# Patient Record
Sex: MALE | Race: WHITE | NOT HISPANIC OR LATINO | Employment: UNEMPLOYED | URBAN - METROPOLITAN AREA
[De-identification: names, ages, dates, MRNs, and addresses within clinical notes are randomized per-mention and may not be internally consistent; named-entity substitution may affect disease eponyms.]

---

## 2018-03-01 ENCOUNTER — CONVERSION ENCOUNTER (OUTPATIENT)
Dept: FAMILY MEDICINE CLINIC | Facility: CLINIC | Age: 58
End: 2018-03-01

## 2018-03-01 ENCOUNTER — OFFICE VISIT CONVERTED (OUTPATIENT)
Dept: FAMILY MEDICINE CLINIC | Facility: CLINIC | Age: 58
End: 2018-03-01
Attending: FAMILY MEDICINE

## 2018-03-15 ENCOUNTER — OFFICE VISIT CONVERTED (OUTPATIENT)
Dept: FAMILY MEDICINE CLINIC | Facility: CLINIC | Age: 58
End: 2018-03-15
Attending: FAMILY MEDICINE

## 2018-03-15 ENCOUNTER — CONVERSION ENCOUNTER (OUTPATIENT)
Dept: FAMILY MEDICINE CLINIC | Facility: CLINIC | Age: 58
End: 2018-03-15

## 2018-03-29 ENCOUNTER — CONVERSION ENCOUNTER (OUTPATIENT)
Dept: FAMILY MEDICINE CLINIC | Facility: CLINIC | Age: 58
End: 2018-03-29

## 2018-03-29 ENCOUNTER — OFFICE VISIT CONVERTED (OUTPATIENT)
Dept: FAMILY MEDICINE CLINIC | Facility: CLINIC | Age: 58
End: 2018-03-29
Attending: FAMILY MEDICINE

## 2018-04-05 ENCOUNTER — OFFICE VISIT CONVERTED (OUTPATIENT)
Dept: FAMILY MEDICINE CLINIC | Facility: CLINIC | Age: 58
End: 2018-04-05
Attending: FAMILY MEDICINE

## 2018-04-05 ENCOUNTER — CONVERSION ENCOUNTER (OUTPATIENT)
Dept: FAMILY MEDICINE CLINIC | Facility: CLINIC | Age: 58
End: 2018-04-05

## 2018-06-04 ENCOUNTER — OFFICE VISIT CONVERTED (OUTPATIENT)
Dept: FAMILY MEDICINE CLINIC | Facility: CLINIC | Age: 58
End: 2018-06-04
Attending: FAMILY MEDICINE

## 2018-06-04 ENCOUNTER — CONVERSION ENCOUNTER (OUTPATIENT)
Dept: FAMILY MEDICINE CLINIC | Facility: CLINIC | Age: 58
End: 2018-06-04

## 2018-08-10 ENCOUNTER — OFFICE VISIT CONVERTED (OUTPATIENT)
Dept: SURGERY | Facility: CLINIC | Age: 58
End: 2018-08-10
Attending: SURGERY

## 2018-08-24 ENCOUNTER — OFFICE VISIT CONVERTED (OUTPATIENT)
Dept: SURGERY | Facility: CLINIC | Age: 58
End: 2018-08-24
Attending: SURGERY

## 2019-01-24 ENCOUNTER — OFFICE VISIT CONVERTED (OUTPATIENT)
Dept: FAMILY MEDICINE CLINIC | Facility: CLINIC | Age: 59
End: 2019-01-24
Attending: NURSE PRACTITIONER

## 2019-01-29 ENCOUNTER — OFFICE VISIT CONVERTED (OUTPATIENT)
Dept: FAMILY MEDICINE CLINIC | Facility: CLINIC | Age: 59
End: 2019-01-29
Attending: FAMILY MEDICINE

## 2019-01-29 ENCOUNTER — HOSPITAL ENCOUNTER (OUTPATIENT)
Dept: FAMILY MEDICINE CLINIC | Facility: CLINIC | Age: 59
Discharge: HOME | End: 2019-01-29
Attending: FAMILY MEDICINE

## 2019-01-29 LAB
ALBUMIN SERPL-MCNC: 4.3 G/DL (ref 3.5–5)
ALBUMIN/GLOB SERPL: 1.5 {RATIO} (ref 1.4–2.6)
ALP SERPL-CCNC: 82 U/L (ref 56–119)
ALT SERPL-CCNC: 17 U/L (ref 10–40)
ANION GAP SERPL CALC-SCNC: 15 MMOL/L (ref 8–19)
AST SERPL-CCNC: 17 U/L (ref 15–50)
BILIRUB SERPL-MCNC: 0.57 MG/DL (ref 0.2–1.3)
BUN SERPL-MCNC: 6 MG/DL (ref 5–25)
BUN/CREAT SERPL: 6 {RATIO} (ref 6–20)
CALCIUM SERPL-MCNC: 9.1 MG/DL (ref 8.7–10.4)
CHLORIDE SERPL-SCNC: 101 MMOL/L (ref 99–111)
CONV CO2: 26 MMOL/L (ref 22–32)
CONV TOTAL PROTEIN: 7.2 G/DL (ref 6.3–8.2)
CREAT UR-MCNC: 0.96 MG/DL (ref 0.7–1.2)
GFR SERPLBLD BASED ON 1.73 SQ M-ARVRAT: >60 ML/MIN/{1.73_M2}
GLOBULIN UR ELPH-MCNC: 2.9 G/DL (ref 2–3.5)
GLUCOSE SERPL-MCNC: 92 MG/DL (ref 70–99)
MAGNESIUM SERPL-MCNC: 1.96 MG/DL (ref 1.6–2.3)
OSMOLALITY SERPL CALC.SUM OF ELEC: 283 MOSM/KG (ref 273–304)
POTASSIUM SERPL-SCNC: 3.7 MMOL/L (ref 3.5–5.3)
SODIUM SERPL-SCNC: 138 MMOL/L (ref 135–147)

## 2019-02-01 ENCOUNTER — OFFICE VISIT CONVERTED (OUTPATIENT)
Dept: FAMILY MEDICINE CLINIC | Facility: CLINIC | Age: 59
End: 2019-02-01
Attending: NURSE PRACTITIONER

## 2019-02-05 ENCOUNTER — OFFICE VISIT CONVERTED (OUTPATIENT)
Dept: OTHER | Facility: HOSPITAL | Age: 59
End: 2019-02-05
Attending: INTERNAL MEDICINE

## 2019-02-05 ENCOUNTER — CONVERSION ENCOUNTER (OUTPATIENT)
Dept: OTHER | Facility: HOSPITAL | Age: 59
End: 2019-02-05

## 2019-03-22 ENCOUNTER — HOSPITAL ENCOUNTER (OUTPATIENT)
Dept: CARDIOLOGY | Facility: HOSPITAL | Age: 59
Discharge: HOME | End: 2019-03-22
Attending: INTERNAL MEDICINE

## 2019-04-30 ENCOUNTER — HOSPITAL ENCOUNTER (OUTPATIENT)
Dept: FAMILY MEDICINE CLINIC | Facility: CLINIC | Age: 59
Discharge: HOME | End: 2019-04-30
Attending: FAMILY MEDICINE

## 2019-04-30 ENCOUNTER — OFFICE VISIT CONVERTED (OUTPATIENT)
Dept: FAMILY MEDICINE CLINIC | Facility: CLINIC | Age: 59
End: 2019-04-30
Attending: FAMILY MEDICINE

## 2019-04-30 LAB
ALBUMIN SERPL-MCNC: 4.3 G/DL (ref 3.5–5)
ALBUMIN/GLOB SERPL: 1.2 {RATIO} (ref 1.4–2.6)
ALP SERPL-CCNC: 103 U/L (ref 56–119)
ALT SERPL-CCNC: 10 U/L (ref 10–40)
ANION GAP SERPL CALC-SCNC: 16 MMOL/L (ref 8–19)
AST SERPL-CCNC: 14 U/L (ref 15–50)
BASOPHILS # BLD AUTO: 0.07 10*3/UL (ref 0–0.2)
BASOPHILS NFR BLD AUTO: 1.1 % (ref 0–3)
BILIRUB SERPL-MCNC: 0.6 MG/DL (ref 0.2–1.3)
BUN SERPL-MCNC: 10 MG/DL (ref 5–25)
BUN/CREAT SERPL: 12 {RATIO} (ref 6–20)
CALCIUM SERPL-MCNC: 9.5 MG/DL (ref 8.7–10.4)
CHLORIDE SERPL-SCNC: 97 MMOL/L (ref 99–111)
CONV ABS IMM GRAN: 0.01 10*3/UL (ref 0–0.2)
CONV CO2: 27 MMOL/L (ref 22–32)
CONV IMMATURE GRAN: 0.2 % (ref 0–1.8)
CONV TOTAL PROTEIN: 7.8 G/DL (ref 6.3–8.2)
CREAT UR-MCNC: 0.86 MG/DL (ref 0.7–1.2)
DEPRECATED RDW RBC AUTO: 41.8 FL (ref 35.1–43.9)
EOSINOPHIL # BLD AUTO: 0.18 10*3/UL (ref 0–0.7)
EOSINOPHIL # BLD AUTO: 2.9 % (ref 0–7)
ERYTHROCYTE [DISTWIDTH] IN BLOOD BY AUTOMATED COUNT: 12.6 % (ref 11.6–14.4)
GFR SERPLBLD BASED ON 1.73 SQ M-ARVRAT: >60 ML/MIN/{1.73_M2}
GLOBULIN UR ELPH-MCNC: 3.5 G/DL (ref 2–3.5)
GLUCOSE SERPL-MCNC: 80 MG/DL (ref 70–99)
HBA1C MFR BLD: 16.1 G/DL (ref 14–18)
HCT VFR BLD AUTO: 50.3 % (ref 42–52)
LYMPHOCYTES # BLD AUTO: 2.35 10*3/UL (ref 1–5)
MCH RBC QN AUTO: 28.9 PG (ref 27–31)
MCHC RBC AUTO-ENTMCNC: 32 G/DL (ref 33–37)
MCV RBC AUTO: 90.1 FL (ref 80–96)
MONOCYTES # BLD AUTO: 0.72 10*3/UL (ref 0.2–1.2)
MONOCYTES NFR BLD AUTO: 11.6 % (ref 3–10)
NEUTROPHILS # BLD AUTO: 2.87 10*3/UL (ref 2–8)
NEUTROPHILS NFR BLD AUTO: 46.3 % (ref 30–85)
NRBC CBCN: 0 % (ref 0–0.7)
OSMOLALITY SERPL CALC.SUM OF ELEC: 280 MOSM/KG (ref 273–304)
PLATELET # BLD AUTO: 271 10*3/UL (ref 130–400)
PMV BLD AUTO: 9.4 FL (ref 9.4–12.4)
POTASSIUM SERPL-SCNC: 4 MMOL/L (ref 3.5–5.3)
RBC # BLD AUTO: 5.58 10*6/UL (ref 4.7–6.1)
SODIUM SERPL-SCNC: 136 MMOL/L (ref 135–147)
TSH SERPL-ACNC: 4.12 M[IU]/L (ref 0.27–4.2)
VARIANT LYMPHS NFR BLD MANUAL: 37.9 % (ref 20–45)
WBC # BLD AUTO: 6.2 10*3/UL (ref 4.8–10.8)

## 2019-05-07 ENCOUNTER — OFFICE VISIT CONVERTED (OUTPATIENT)
Dept: FAMILY MEDICINE CLINIC | Facility: CLINIC | Age: 59
End: 2019-05-07
Attending: FAMILY MEDICINE

## 2019-08-15 ENCOUNTER — CONVERSION ENCOUNTER (OUTPATIENT)
Dept: FAMILY MEDICINE CLINIC | Facility: CLINIC | Age: 59
End: 2019-08-15

## 2019-08-15 ENCOUNTER — OFFICE VISIT CONVERTED (OUTPATIENT)
Dept: FAMILY MEDICINE CLINIC | Facility: CLINIC | Age: 59
End: 2019-08-15
Attending: FAMILY MEDICINE

## 2020-02-11 ENCOUNTER — CONVERSION ENCOUNTER (OUTPATIENT)
Dept: FAMILY MEDICINE CLINIC | Facility: CLINIC | Age: 60
End: 2020-02-11

## 2020-02-11 ENCOUNTER — OFFICE VISIT CONVERTED (OUTPATIENT)
Dept: FAMILY MEDICINE CLINIC | Facility: CLINIC | Age: 60
End: 2020-02-11
Attending: FAMILY MEDICINE

## 2020-02-11 ENCOUNTER — HOSPITAL ENCOUNTER (OUTPATIENT)
Dept: FAMILY MEDICINE CLINIC | Facility: CLINIC | Age: 60
Discharge: HOME | End: 2020-02-11
Attending: FAMILY MEDICINE

## 2020-02-13 ENCOUNTER — OFFICE VISIT CONVERTED (OUTPATIENT)
Dept: FAMILY MEDICINE CLINIC | Facility: CLINIC | Age: 60
End: 2020-02-13
Attending: FAMILY MEDICINE

## 2020-02-13 ENCOUNTER — HOSPITAL ENCOUNTER (OUTPATIENT)
Dept: FAMILY MEDICINE CLINIC | Facility: CLINIC | Age: 60
Discharge: HOME | End: 2020-02-13
Attending: FAMILY MEDICINE

## 2020-02-14 LAB
ALBUMIN SERPL-MCNC: 4.2 G/DL (ref 3.5–5)
ALBUMIN/GLOB SERPL: 1.2 {RATIO} (ref 1.4–2.6)
ALP SERPL-CCNC: 119 U/L (ref 56–155)
ALT SERPL-CCNC: 12 U/L (ref 10–40)
ANION GAP SERPL CALC-SCNC: 17 MMOL/L (ref 8–19)
AST SERPL-CCNC: 15 U/L (ref 15–50)
BASOPHILS # BLD AUTO: 0.06 10*3/UL (ref 0–0.2)
BASOPHILS NFR BLD AUTO: 0.6 % (ref 0–3)
BILIRUB SERPL-MCNC: 0.52 MG/DL (ref 0.2–1.3)
BUN SERPL-MCNC: 9 MG/DL (ref 5–25)
BUN/CREAT SERPL: 10 {RATIO} (ref 6–20)
CALCIUM SERPL-MCNC: 9.5 MG/DL (ref 8.7–10.4)
CHLORIDE SERPL-SCNC: 100 MMOL/L (ref 99–111)
CHOLEST SERPL-MCNC: 200 MG/DL (ref 107–200)
CHOLEST/HDLC SERPL: 6.7 {RATIO} (ref 3–6)
CONV ABS IMM GRAN: 0.03 10*3/UL (ref 0–0.2)
CONV CO2: 25 MMOL/L (ref 22–32)
CONV IMMATURE GRAN: 0.3 % (ref 0–1.8)
CONV TOTAL PROTEIN: 7.8 G/DL (ref 6.3–8.2)
CREAT UR-MCNC: 0.87 MG/DL (ref 0.7–1.2)
DEPRECATED RDW RBC AUTO: 41.4 FL (ref 35.1–43.9)
EOSINOPHIL # BLD AUTO: 0.04 10*3/UL (ref 0–0.7)
EOSINOPHIL # BLD AUTO: 0.4 % (ref 0–7)
ERYTHROCYTE [DISTWIDTH] IN BLOOD BY AUTOMATED COUNT: 12.7 % (ref 11.6–14.4)
FOLATE SERPL-MCNC: 5.5 NG/ML (ref 4.8–20)
GFR SERPLBLD BASED ON 1.73 SQ M-ARVRAT: >60 ML/MIN/{1.73_M2}
GLOBULIN UR ELPH-MCNC: 3.6 G/DL (ref 2–3.5)
GLUCOSE SERPL-MCNC: 88 MG/DL (ref 70–99)
HCT VFR BLD AUTO: 49.3 % (ref 42–52)
HDLC SERPL-MCNC: 30 MG/DL (ref 40–60)
HGB BLD-MCNC: 15.6 G/DL (ref 14–18)
LDLC SERPL CALC-MCNC: 132 MG/DL (ref 70–100)
LYMPHOCYTES # BLD AUTO: 1.05 10*3/UL (ref 1–5)
LYMPHOCYTES NFR BLD AUTO: 10.8 % (ref 20–45)
MCH RBC QN AUTO: 28.7 PG (ref 27–31)
MCHC RBC AUTO-ENTMCNC: 31.6 G/DL (ref 33–37)
MCV RBC AUTO: 90.6 FL (ref 80–96)
MONOCYTES # BLD AUTO: 0.3 10*3/UL (ref 0.2–1.2)
MONOCYTES NFR BLD AUTO: 3.1 % (ref 3–10)
NEUTROPHILS # BLD AUTO: 8.28 10*3/UL (ref 2–8)
NEUTROPHILS NFR BLD AUTO: 84.8 % (ref 30–85)
NRBC CBCN: 0 % (ref 0–0.7)
OSMOLALITY SERPL CALC.SUM OF ELEC: 282 MOSM/KG (ref 273–304)
PLATELET # BLD AUTO: 282 10*3/UL (ref 130–400)
PMV BLD AUTO: 9.5 FL (ref 9.4–12.4)
POTASSIUM SERPL-SCNC: 4.5 MMOL/L (ref 3.5–5.3)
RBC # BLD AUTO: 5.44 10*6/UL (ref 4.7–6.1)
SODIUM SERPL-SCNC: 137 MMOL/L (ref 135–147)
T4 FREE SERPL-MCNC: 1 NG/DL (ref 0.9–1.8)
TRIGL SERPL-MCNC: 188 MG/DL (ref 40–150)
TSH SERPL-ACNC: 3.52 M[IU]/L (ref 0.27–4.2)
VIT B12 SERPL-MCNC: 340 PG/ML (ref 211–911)
VLDLC SERPL-MCNC: 38 MG/DL (ref 5–37)
WBC # BLD AUTO: 9.76 10*3/UL (ref 4.8–10.8)

## 2020-02-19 LAB
CONV EBV EARLY ANTIGEN: <5 U/ML (ref 0–10.9)
CONV EBV NUCLEAR ANTIGEN: 183 U/ML (ref 0–21.9)
CONV EPSTEIN BARR VIRAL CAPSID IGM: <10 U/ML (ref 0–43.9)
CONV EPSTEIN BARR VIRUS ANTIBODY TO VIRAL CAPSID IGG: 672 U/ML (ref 0–21.9)

## 2020-02-20 ENCOUNTER — HOSPITAL ENCOUNTER (OUTPATIENT)
Dept: OTHER | Facility: HOSPITAL | Age: 60
Discharge: HOME | End: 2020-02-20
Attending: FAMILY MEDICINE

## 2020-02-24 ENCOUNTER — HOSPITAL ENCOUNTER (OUTPATIENT)
Dept: FAMILY MEDICINE CLINIC | Facility: CLINIC | Age: 60
Discharge: HOME | End: 2020-02-24
Attending: FAMILY MEDICINE

## 2020-02-24 ENCOUNTER — OFFICE VISIT CONVERTED (OUTPATIENT)
Dept: FAMILY MEDICINE CLINIC | Facility: CLINIC | Age: 60
End: 2020-02-24
Attending: FAMILY MEDICINE

## 2020-02-24 LAB — PSA SERPL-MCNC: 0.65 NG/ML (ref 0–4)

## 2020-03-02 ENCOUNTER — CONVERSION ENCOUNTER (OUTPATIENT)
Dept: FAMILY MEDICINE CLINIC | Facility: CLINIC | Age: 60
End: 2020-03-02

## 2020-03-02 ENCOUNTER — OFFICE VISIT CONVERTED (OUTPATIENT)
Dept: FAMILY MEDICINE CLINIC | Facility: CLINIC | Age: 60
End: 2020-03-02
Attending: FAMILY MEDICINE

## 2020-06-08 ENCOUNTER — CONVERSION ENCOUNTER (OUTPATIENT)
Dept: FAMILY MEDICINE CLINIC | Facility: CLINIC | Age: 60
End: 2020-06-08

## 2020-06-08 ENCOUNTER — OFFICE VISIT CONVERTED (OUTPATIENT)
Dept: FAMILY MEDICINE CLINIC | Facility: CLINIC | Age: 60
End: 2020-06-08
Attending: FAMILY MEDICINE

## 2020-06-16 ENCOUNTER — OFFICE VISIT CONVERTED (OUTPATIENT)
Dept: FAMILY MEDICINE CLINIC | Facility: CLINIC | Age: 60
End: 2020-06-16
Attending: FAMILY MEDICINE

## 2020-07-06 ENCOUNTER — OFFICE VISIT CONVERTED (OUTPATIENT)
Dept: FAMILY MEDICINE CLINIC | Facility: CLINIC | Age: 60
End: 2020-07-06
Attending: FAMILY MEDICINE

## 2020-07-14 ENCOUNTER — OFFICE VISIT CONVERTED (OUTPATIENT)
Dept: FAMILY MEDICINE CLINIC | Facility: CLINIC | Age: 60
End: 2020-07-14
Attending: FAMILY MEDICINE

## 2020-08-04 ENCOUNTER — OFFICE VISIT CONVERTED (OUTPATIENT)
Dept: FAMILY MEDICINE CLINIC | Facility: CLINIC | Age: 60
End: 2020-08-04
Attending: FAMILY MEDICINE

## 2021-03-25 ENCOUNTER — APPOINTMENT (OUTPATIENT)
Dept: OTHER | Facility: HOSPITAL | Age: 61
End: 2021-03-25

## 2021-03-25 ENCOUNTER — HOSPITAL ENCOUNTER (INPATIENT)
Facility: HOSPITAL | Age: 61
LOS: 5 days | Discharge: HOME | End: 2021-03-30
Attending: EMERGENCY MEDICINE | Admitting: HOSPITALIST

## 2021-03-25 DIAGNOSIS — I63.9 ACUTE CVA (CEREBROVASCULAR ACCIDENT) (HCC): Primary | ICD-10-CM

## 2021-03-25 DIAGNOSIS — R59.0 MEDIASTINAL ADENOPATHY: ICD-10-CM

## 2021-03-25 DIAGNOSIS — Z09 FOLLOW UP: ICD-10-CM

## 2021-03-25 DIAGNOSIS — H53.461 HEMIANOPIA OF RIGHT EYE: ICD-10-CM

## 2021-03-25 LAB
ALBUMIN SERPL-MCNC: 3.9 G/DL (ref 3.5–5.2)
ALBUMIN/GLOB SERPL: 1.3 G/DL
ALP SERPL-CCNC: 99 U/L (ref 39–117)
ALT SERPL W P-5'-P-CCNC: 8 U/L (ref 1–41)
ANION GAP SERPL CALCULATED.3IONS-SCNC: 7.5 MMOL/L (ref 5–15)
APTT PPP: 27.9 SECONDS (ref 22.7–35.4)
AST SERPL-CCNC: 14 U/L (ref 1–40)
BASOPHILS # BLD AUTO: 0.07 10*3/MM3 (ref 0–0.2)
BASOPHILS NFR BLD AUTO: 1 % (ref 0–1.5)
BILIRUB SERPL-MCNC: 0.5 MG/DL (ref 0–1.2)
BUN SERPL-MCNC: 6 MG/DL (ref 8–23)
BUN/CREAT SERPL: 6.1 (ref 7–25)
CALCIUM SPEC-SCNC: 8.7 MG/DL (ref 8.6–10.5)
CHLORIDE SERPL-SCNC: 104 MMOL/L (ref 98–107)
CO2 SERPL-SCNC: 25.5 MMOL/L (ref 22–29)
CREAT SERPL-MCNC: 0.99 MG/DL (ref 0.76–1.27)
DEPRECATED RDW RBC AUTO: 39.4 FL (ref 37–54)
EOSINOPHIL # BLD AUTO: 0.22 10*3/MM3 (ref 0–0.4)
EOSINOPHIL NFR BLD AUTO: 3.2 % (ref 0.3–6.2)
ERYTHROCYTE [DISTWIDTH] IN BLOOD BY AUTOMATED COUNT: 12.7 % (ref 12.3–15.4)
GFR SERPL CREATININE-BSD FRML MDRD: 77 ML/MIN/1.73
GLOBULIN UR ELPH-MCNC: 2.9 GM/DL
GLUCOSE BLDC GLUCOMTR-MCNC: 140 MG/DL (ref 70–130)
GLUCOSE SERPL-MCNC: 121 MG/DL (ref 65–99)
HCT VFR BLD AUTO: 45 % (ref 37.5–51)
HGB BLD-MCNC: 15.4 G/DL (ref 13–17.7)
IMM GRANULOCYTES # BLD AUTO: 0.01 10*3/MM3 (ref 0–0.05)
IMM GRANULOCYTES NFR BLD AUTO: 0.1 % (ref 0–0.5)
INR PPP: 1.02 (ref 0.9–1.1)
LYMPHOCYTES # BLD AUTO: 1.96 10*3/MM3 (ref 0.7–3.1)
LYMPHOCYTES NFR BLD AUTO: 28.8 % (ref 19.6–45.3)
MCH RBC QN AUTO: 29.2 PG (ref 26.6–33)
MCHC RBC AUTO-ENTMCNC: 34.2 G/DL (ref 31.5–35.7)
MCV RBC AUTO: 85.2 FL (ref 79–97)
MONOCYTES # BLD AUTO: 0.69 10*3/MM3 (ref 0.1–0.9)
MONOCYTES NFR BLD AUTO: 10.1 % (ref 5–12)
NEUTROPHILS NFR BLD AUTO: 3.85 10*3/MM3 (ref 1.7–7)
NEUTROPHILS NFR BLD AUTO: 56.8 % (ref 42.7–76)
NRBC BLD AUTO-RTO: 0 /100 WBC (ref 0–0.2)
PLATELET # BLD AUTO: 222 10*3/MM3 (ref 140–450)
PMV BLD AUTO: 8.6 FL (ref 6–12)
POTASSIUM SERPL-SCNC: 3.8 MMOL/L (ref 3.5–5.2)
PROT SERPL-MCNC: 6.8 G/DL (ref 6–8.5)
PROTHROMBIN TIME: 13.2 SECONDS (ref 11.7–14.2)
QT INTERVAL: 378 MS
RBC # BLD AUTO: 5.28 10*6/MM3 (ref 4.14–5.8)
SARS-COV-2 ORF1AB RESP QL NAA+PROBE: NOT DETECTED
SODIUM SERPL-SCNC: 137 MMOL/L (ref 136–145)
WBC # BLD AUTO: 6.8 10*3/MM3 (ref 3.4–10.8)

## 2021-03-25 PROCEDURE — 99284 EMERGENCY DEPT VISIT MOD MDM: CPT

## 2021-03-25 PROCEDURE — 85025 COMPLETE CBC W/AUTO DIFF WBC: CPT | Performed by: EMERGENCY MEDICINE

## 2021-03-25 PROCEDURE — 85730 THROMBOPLASTIN TIME PARTIAL: CPT | Performed by: EMERGENCY MEDICINE

## 2021-03-25 PROCEDURE — 93010 ELECTROCARDIOGRAM REPORT: CPT | Performed by: INTERNAL MEDICINE

## 2021-03-25 PROCEDURE — U0004 COV-19 TEST NON-CDC HGH THRU: HCPCS | Performed by: EMERGENCY MEDICINE

## 2021-03-25 PROCEDURE — 82962 GLUCOSE BLOOD TEST: CPT

## 2021-03-25 PROCEDURE — 85610 PROTHROMBIN TIME: CPT | Performed by: EMERGENCY MEDICINE

## 2021-03-25 PROCEDURE — 93005 ELECTROCARDIOGRAM TRACING: CPT | Performed by: EMERGENCY MEDICINE

## 2021-03-25 PROCEDURE — 80053 COMPREHEN METABOLIC PANEL: CPT | Performed by: EMERGENCY MEDICINE

## 2021-03-25 RX ORDER — TAMSULOSIN HYDROCHLORIDE 0.4 MG/1
1 CAPSULE ORAL DAILY
COMMUNITY
End: 2021-09-07

## 2021-03-25 RX ORDER — PANTOPRAZOLE SODIUM 40 MG/1
40 TABLET, DELAYED RELEASE ORAL NIGHTLY
Status: DISCONTINUED | OUTPATIENT
Start: 2021-03-25 | End: 2021-03-26

## 2021-03-25 RX ORDER — PANTOPRAZOLE SODIUM 40 MG/1
40 TABLET, DELAYED RELEASE ORAL
Status: DISCONTINUED | OUTPATIENT
Start: 2021-03-26 | End: 2021-03-25

## 2021-03-25 RX ORDER — ASPIRIN 300 MG/1
300 SUPPOSITORY RECTAL DAILY
Status: DISCONTINUED | OUTPATIENT
Start: 2021-03-26 | End: 2021-03-26

## 2021-03-25 RX ORDER — LANSOPRAZOLE 30 MG/1
30 CAPSULE, DELAYED RELEASE ORAL DAILY
COMMUNITY
End: 2021-08-02

## 2021-03-25 RX ORDER — TRAZODONE HYDROCHLORIDE 50 MG/1
50 TABLET ORAL NIGHTLY
COMMUNITY
End: 2021-08-02

## 2021-03-25 RX ORDER — TRAZODONE HYDROCHLORIDE 50 MG/1
50 TABLET ORAL NIGHTLY
Status: DISCONTINUED | OUTPATIENT
Start: 2021-03-25 | End: 2021-03-30 | Stop reason: HOSPADM

## 2021-03-25 RX ORDER — ASPIRIN 81 MG/1
324 TABLET, CHEWABLE ORAL ONCE
Status: COMPLETED | OUTPATIENT
Start: 2021-03-25 | End: 2021-03-25

## 2021-03-25 RX ORDER — NICOTINE 21 MG/24HR
1 PATCH, TRANSDERMAL 24 HOURS TRANSDERMAL
Status: DISCONTINUED | OUTPATIENT
Start: 2021-03-25 | End: 2021-03-30

## 2021-03-25 RX ORDER — ATORVASTATIN CALCIUM 80 MG/1
80 TABLET, FILM COATED ORAL NIGHTLY
Status: DISCONTINUED | OUTPATIENT
Start: 2021-03-25 | End: 2021-03-30 | Stop reason: HOSPADM

## 2021-03-25 RX ORDER — ASPIRIN 325 MG
325 TABLET ORAL DAILY
Status: DISCONTINUED | OUTPATIENT
Start: 2021-03-26 | End: 2021-03-29

## 2021-03-25 RX ORDER — SODIUM CHLORIDE 0.9 % (FLUSH) 0.9 %
10 SYRINGE (ML) INJECTION AS NEEDED
Status: DISCONTINUED | OUTPATIENT
Start: 2021-03-25 | End: 2021-03-30

## 2021-03-25 RX ORDER — TAMSULOSIN HYDROCHLORIDE 0.4 MG/1
0.4 CAPSULE ORAL NIGHTLY
Status: DISCONTINUED | OUTPATIENT
Start: 2021-03-25 | End: 2021-03-30 | Stop reason: HOSPADM

## 2021-03-25 RX ORDER — SODIUM CHLORIDE 0.9 % (FLUSH) 0.9 %
10 SYRINGE (ML) INJECTION EVERY 12 HOURS SCHEDULED
Status: DISCONTINUED | OUTPATIENT
Start: 2021-03-25 | End: 2021-03-26

## 2021-03-25 RX ORDER — SODIUM CHLORIDE 9 MG/ML
75 INJECTION, SOLUTION INTRAVENOUS CONTINUOUS
Status: DISCONTINUED | OUTPATIENT
Start: 2021-03-25 | End: 2021-03-28

## 2021-03-25 RX ORDER — SODIUM CHLORIDE 9 MG/ML
75 INJECTION, SOLUTION INTRAVENOUS CONTINUOUS
Status: DISCONTINUED | OUTPATIENT
Start: 2021-03-25 | End: 2021-03-26

## 2021-03-25 RX ADMIN — ATORVASTATIN CALCIUM 80 MG: 80 TABLET, FILM COATED ORAL at 22:09

## 2021-03-25 RX ADMIN — TAMSULOSIN HYDROCHLORIDE 0.4 MG: 0.4 CAPSULE ORAL at 22:09

## 2021-03-25 RX ADMIN — TRAZODONE HYDROCHLORIDE 50 MG: 50 TABLET ORAL at 22:09

## 2021-03-25 RX ADMIN — ASPIRIN 324 MG: 81 TABLET, CHEWABLE ORAL at 16:19

## 2021-03-25 RX ADMIN — SODIUM CHLORIDE, PRESERVATIVE FREE 10 ML: 5 INJECTION INTRAVENOUS at 22:10

## 2021-03-25 RX ADMIN — PANTOPRAZOLE SODIUM 40 MG: 40 TABLET, DELAYED RELEASE ORAL at 22:09

## 2021-03-25 RX ADMIN — SODIUM CHLORIDE 75 ML/HR: 9 INJECTION, SOLUTION INTRAVENOUS at 21:57

## 2021-03-26 ENCOUNTER — APPOINTMENT (OUTPATIENT)
Dept: CARDIOLOGY | Facility: HOSPITAL | Age: 61
End: 2021-03-26

## 2021-03-26 ENCOUNTER — APPOINTMENT (OUTPATIENT)
Dept: CT IMAGING | Facility: HOSPITAL | Age: 61
End: 2021-03-26

## 2021-03-26 PROBLEM — I63.9 CRYPTOGENIC STROKE (HCC): Status: ACTIVE | Noted: 2021-03-26

## 2021-03-26 LAB
AORTIC ARCH: 2.7 CM
AORTIC DIMENSIONLESS INDEX: 0.7 (DI)
ASCENDING AORTA: 2.7 CM
BH CV ECHO MEAS - ACS: 1.5 CM
BH CV ECHO MEAS - AO MAX PG: 8 MMHG
BH CV ECHO MEAS - AO MEAN PG (FULL): 3 MMHG
BH CV ECHO MEAS - AO MEAN PG: 5 MMHG
BH CV ECHO MEAS - AO ROOT AREA (BSA CORRECTED): 1.8
BH CV ECHO MEAS - AO ROOT AREA: 10.8 CM^2
BH CV ECHO MEAS - AO ROOT DIAM: 3.7 CM
BH CV ECHO MEAS - AO V2 MAX: 144 CM/SEC
BH CV ECHO MEAS - AO V2 MEAN: 103 CM/SEC
BH CV ECHO MEAS - AO V2 VTI: 33.8 CM
BH CV ECHO MEAS - AVA(I,A): 2.3 CM^2
BH CV ECHO MEAS - AVA(I,D): 2.3 CM^2
BH CV ECHO MEAS - BSA(HAYCOCK): 2 M^2
BH CV ECHO MEAS - BSA: 2 M^2
BH CV ECHO MEAS - BZI_BMI: 22 KILOGRAMS/M^2
BH CV ECHO MEAS - BZI_METRIC_HEIGHT: 188 CM
BH CV ECHO MEAS - BZI_METRIC_WEIGHT: 77.6 KG
BH CV ECHO MEAS - EDV(CUBED): 64 ML
BH CV ECHO MEAS - EDV(MOD-SP2): 92 ML
BH CV ECHO MEAS - EDV(MOD-SP4): 105 ML
BH CV ECHO MEAS - EDV(TEICH): 70 ML
BH CV ECHO MEAS - EF(CUBED): 53.5 %
BH CV ECHO MEAS - EF(MOD-BP): 66 %
BH CV ECHO MEAS - EF(MOD-SP2): 67.4 %
BH CV ECHO MEAS - EF(MOD-SP4): 65.7 %
BH CV ECHO MEAS - EF(TEICH): 45.8 %
BH CV ECHO MEAS - ESV(CUBED): 29.8 ML
BH CV ECHO MEAS - ESV(MOD-SP2): 30 ML
BH CV ECHO MEAS - ESV(MOD-SP4): 36 ML
BH CV ECHO MEAS - ESV(TEICH): 37.9 ML
BH CV ECHO MEAS - FS: 22.5 %
BH CV ECHO MEAS - IVS/LVPW: 0.82
BH CV ECHO MEAS - IVSD: 0.9 CM
BH CV ECHO MEAS - LA DIMENSION: 3.2 CM
BH CV ECHO MEAS - LA/AO: 0.86
BH CV ECHO MEAS - LAT PEAK E' VEL: 15 CM/SEC
BH CV ECHO MEAS - LV DIASTOLIC VOL/BSA (35-75): 51.6 ML/M^2
BH CV ECHO MEAS - LV MASS(C)D: 127.1 GRAMS
BH CV ECHO MEAS - LV MASS(C)DI: 62.5 GRAMS/M^2
BH CV ECHO MEAS - LV MAX PG: 4 MMHG
BH CV ECHO MEAS - LV MEAN PG: 2 MMHG
BH CV ECHO MEAS - LV SYSTOLIC VOL/BSA (12-30): 17.7 ML/M^2
BH CV ECHO MEAS - LV V1 MAX: 95 CM/SEC
BH CV ECHO MEAS - LV V1 MEAN: 68.9 CM/SEC
BH CV ECHO MEAS - LV V1 VTI: 22.3 CM
BH CV ECHO MEAS - LVIDD: 4 CM
BH CV ECHO MEAS - LVIDS: 3.1 CM
BH CV ECHO MEAS - LVLD AP2: 8.6 CM
BH CV ECHO MEAS - LVLD AP4: 8.8 CM
BH CV ECHO MEAS - LVLS AP2: 7.4 CM
BH CV ECHO MEAS - LVLS AP4: 7.2 CM
BH CV ECHO MEAS - LVOT AREA (M): 3.5 CM^2
BH CV ECHO MEAS - LVOT AREA: 3.5 CM^2
BH CV ECHO MEAS - LVOT DIAM: 2.1 CM
BH CV ECHO MEAS - LVPWD: 1.1 CM
BH CV ECHO MEAS - MED PEAK E' VEL: 12.3 CM/SEC
BH CV ECHO MEAS - MV A DUR: 0.19 SEC
BH CV ECHO MEAS - MV A MAX VEL: 79.5 CM/SEC
BH CV ECHO MEAS - MV DEC SLOPE: 215 CM/SEC^2
BH CV ECHO MEAS - MV DEC TIME: 0.24 SEC
BH CV ECHO MEAS - MV E MAX VEL: 77.5 CM/SEC
BH CV ECHO MEAS - MV E/A: 0.97
BH CV ECHO MEAS - MV MEAN PG: 1 MMHG
BH CV ECHO MEAS - MV P1/2T MAX VEL: 80.8 CM/SEC
BH CV ECHO MEAS - MV P1/2T: 110.1 MSEC
BH CV ECHO MEAS - MV V2 MEAN: 56.5 CM/SEC
BH CV ECHO MEAS - MV V2 VTI: 27.4 CM
BH CV ECHO MEAS - MVA P1/2T LCG: 2.7 CM^2
BH CV ECHO MEAS - MVA(P1/2T): 2 CM^2
BH CV ECHO MEAS - MVA(VTI): 2.8 CM^2
BH CV ECHO MEAS - PA ACC SLOPE: 560 CM/SEC^2
BH CV ECHO MEAS - PA ACC TIME: 0.15 SEC
BH CV ECHO MEAS - PA MAX PG (FULL): 0.95 MMHG
BH CV ECHO MEAS - PA MAX PG: 3.2 MMHG
BH CV ECHO MEAS - PA PR(ACCEL): 12.4 MMHG
BH CV ECHO MEAS - PA V2 MAX: 89.6 CM/SEC
BH CV ECHO MEAS - PULM A REVS DUR: 0.17 SEC
BH CV ECHO MEAS - PULM A REVS VEL: 31.2 CM/SEC
BH CV ECHO MEAS - PULM DIAS VEL: 59.8 CM/SEC
BH CV ECHO MEAS - PULM S/D: 1.3
BH CV ECHO MEAS - PULM SYS VEL: 80.5 CM/SEC
BH CV ECHO MEAS - PVA(V,A): 2.6 CM^2
BH CV ECHO MEAS - PVA(V,D): 2.6 CM^2
BH CV ECHO MEAS - QP/QS: 0.68
BH CV ECHO MEAS - RAP SYSTOLE: 3 MMHG
BH CV ECHO MEAS - RV MAX PG: 2.3 MMHG
BH CV ECHO MEAS - RV MEAN PG: 1 MMHG
BH CV ECHO MEAS - RV V1 MAX: 75.2 CM/SEC
BH CV ECHO MEAS - RV V1 MEAN: 54.3 CM/SEC
BH CV ECHO MEAS - RV V1 VTI: 16.6 CM
BH CV ECHO MEAS - RVOT AREA: 3.1 CM^2
BH CV ECHO MEAS - RVOT DIAM: 2 CM
BH CV ECHO MEAS - RVSP: 25.7 MMHG
BH CV ECHO MEAS - SI(AO): 178.7 ML/M^2
BH CV ECHO MEAS - SI(CUBED): 16.8 ML/M^2
BH CV ECHO MEAS - SI(LVOT): 38 ML/M^2
BH CV ECHO MEAS - SI(MOD-SP2): 30.5 ML/M^2
BH CV ECHO MEAS - SI(MOD-SP4): 33.9 ML/M^2
BH CV ECHO MEAS - SI(TEICH): 15.8 ML/M^2
BH CV ECHO MEAS - SUP REN AO DIAM: 2.4 CM
BH CV ECHO MEAS - SV(AO): 363.4 ML
BH CV ECHO MEAS - SV(CUBED): 34.2 ML
BH CV ECHO MEAS - SV(LVOT): 77.2 ML
BH CV ECHO MEAS - SV(MOD-SP2): 62 ML
BH CV ECHO MEAS - SV(MOD-SP4): 69 ML
BH CV ECHO MEAS - SV(RVOT): 52.2 ML
BH CV ECHO MEAS - SV(TEICH): 32.1 ML
BH CV ECHO MEAS - TAPSE (>1.6): 2.4 CM
BH CV ECHO MEAS - TR MAX VEL: 238 CM/SEC
BH CV ECHO MEASUREMENTS AVERAGE E/E' RATIO: 5.68
BH CV XLRA - RV BASE: 3.6 CM
BH CV XLRA - RV LENGTH: 8 CM
BH CV XLRA - RV MID: 3 CM
BH CV XLRA - TDI S': 17.9 CM/SEC
CHOLEST SERPL-MCNC: 163 MG/DL (ref 0–200)
GLUCOSE BLDC GLUCOMTR-MCNC: 108 MG/DL (ref 70–130)
GLUCOSE BLDC GLUCOMTR-MCNC: 109 MG/DL (ref 70–130)
GLUCOSE BLDC GLUCOMTR-MCNC: 117 MG/DL (ref 70–130)
GLUCOSE BLDC GLUCOMTR-MCNC: 96 MG/DL (ref 70–130)
HBA1C MFR BLD: 5.7 % (ref 4.8–5.6)
HDLC SERPL-MCNC: 25 MG/DL (ref 40–60)
LDLC SERPL CALC-MCNC: 114 MG/DL (ref 0–100)
LDLC/HDLC SERPL: 4.45 {RATIO}
LEFT ATRIUM VOLUME INDEX: 15 ML/M2
LV EF 2D ECHO EST: 65 %
MAXIMAL PREDICTED HEART RATE: 158 BPM
PSA SERPL-MCNC: 0.79 NG/ML (ref 0–4)
SINUS: 2.9 CM
STJ: 2.6 CM
STRESS TARGET HR: 134 BPM
TRIGL SERPL-MCNC: 134 MG/DL (ref 0–150)
VLDLC SERPL-MCNC: 24 MG/DL (ref 5–40)

## 2021-03-26 PROCEDURE — 99254 IP/OBS CNSLTJ NEW/EST MOD 60: CPT | Performed by: PSYCHIATRY & NEUROLOGY

## 2021-03-26 PROCEDURE — 82962 GLUCOSE BLOOD TEST: CPT

## 2021-03-26 PROCEDURE — 84153 ASSAY OF PSA TOTAL: CPT | Performed by: HOSPITALIST

## 2021-03-26 PROCEDURE — 80061 LIPID PANEL: CPT | Performed by: PSYCHIATRY & NEUROLOGY

## 2021-03-26 PROCEDURE — 83036 HEMOGLOBIN GLYCOSYLATED A1C: CPT | Performed by: PSYCHIATRY & NEUROLOGY

## 2021-03-26 PROCEDURE — 99253 IP/OBS CNSLTJ NEW/EST LOW 45: CPT | Performed by: INTERNAL MEDICINE

## 2021-03-26 PROCEDURE — 0 IOPAMIDOL PER 1 ML: Performed by: HOSPITALIST

## 2021-03-26 PROCEDURE — 93306 TTE W/DOPPLER COMPLETE: CPT

## 2021-03-26 PROCEDURE — 70498 CT ANGIOGRAPHY NECK: CPT

## 2021-03-26 PROCEDURE — 93306 TTE W/DOPPLER COMPLETE: CPT | Performed by: INTERNAL MEDICINE

## 2021-03-26 PROCEDURE — 70496 CT ANGIOGRAPHY HEAD: CPT

## 2021-03-26 RX ORDER — HYDROCODONE BITARTRATE AND ACETAMINOPHEN 5; 325 MG/1; MG/1
1 TABLET ORAL EVERY 4 HOURS PRN
Status: DISCONTINUED | OUTPATIENT
Start: 2021-03-26 | End: 2021-03-30

## 2021-03-26 RX ORDER — ASPIRIN 81 MG/1
324 TABLET, CHEWABLE ORAL ONCE
Status: DISCONTINUED | OUTPATIENT
Start: 2021-03-26 | End: 2021-03-26

## 2021-03-26 RX ORDER — PANTOPRAZOLE SODIUM 40 MG/1
40 TABLET, DELAYED RELEASE ORAL
Status: DISCONTINUED | OUTPATIENT
Start: 2021-03-26 | End: 2021-03-30 | Stop reason: HOSPADM

## 2021-03-26 RX ADMIN — PANTOPRAZOLE SODIUM 40 MG: 40 TABLET, DELAYED RELEASE ORAL at 17:10

## 2021-03-26 RX ADMIN — IOPAMIDOL 100 ML: 755 INJECTION, SOLUTION INTRAVENOUS at 08:50

## 2021-03-26 RX ADMIN — Medication 1 PATCH: at 01:06

## 2021-03-26 RX ADMIN — TRAZODONE HYDROCHLORIDE 50 MG: 50 TABLET ORAL at 21:24

## 2021-03-26 RX ADMIN — ASPIRIN 325 MG: 325 TABLET ORAL at 09:45

## 2021-03-26 RX ADMIN — ATORVASTATIN CALCIUM 80 MG: 80 TABLET, FILM COATED ORAL at 21:25

## 2021-03-26 RX ADMIN — TAMSULOSIN HYDROCHLORIDE 0.4 MG: 0.4 CAPSULE ORAL at 21:24

## 2021-03-26 RX ADMIN — HYDROCODONE BITARTRATE AND ACETAMINOPHEN 1 TABLET: 5; 325 TABLET ORAL at 17:10

## 2021-03-26 RX ADMIN — HYDROCODONE BITARTRATE AND ACETAMINOPHEN 1 TABLET: 5; 325 TABLET ORAL at 21:24

## 2021-03-26 RX ADMIN — SODIUM CHLORIDE, PRESERVATIVE FREE 10 ML: 5 INJECTION INTRAVENOUS at 09:46

## 2021-03-27 LAB
ALBUMIN SERPL-MCNC: 3.5 G/DL (ref 3.5–5.2)
ALBUMIN/GLOB SERPL: 1.2 G/DL
ALP SERPL-CCNC: 105 U/L (ref 39–117)
ALT SERPL W P-5'-P-CCNC: 10 U/L (ref 1–41)
ANION GAP SERPL CALCULATED.3IONS-SCNC: 8.6 MMOL/L (ref 5–15)
AST SERPL-CCNC: 14 U/L (ref 1–40)
BASOPHILS # BLD AUTO: 0.06 10*3/MM3 (ref 0–0.2)
BASOPHILS NFR BLD AUTO: 0.7 % (ref 0–1.5)
BILIRUB SERPL-MCNC: 0.7 MG/DL (ref 0–1.2)
BUN SERPL-MCNC: 6 MG/DL (ref 8–23)
BUN/CREAT SERPL: 7.6 (ref 7–25)
CALCIUM SPEC-SCNC: 8.6 MG/DL (ref 8.6–10.5)
CHLORIDE SERPL-SCNC: 104 MMOL/L (ref 98–107)
CHOLEST SERPL-MCNC: 153 MG/DL (ref 0–200)
CO2 SERPL-SCNC: 24.4 MMOL/L (ref 22–29)
CREAT SERPL-MCNC: 0.79 MG/DL (ref 0.76–1.27)
DEPRECATED RDW RBC AUTO: 37.7 FL (ref 37–54)
EOSINOPHIL # BLD AUTO: 0.25 10*3/MM3 (ref 0–0.4)
EOSINOPHIL NFR BLD AUTO: 3.1 % (ref 0.3–6.2)
ERYTHROCYTE [DISTWIDTH] IN BLOOD BY AUTOMATED COUNT: 12.6 % (ref 12.3–15.4)
ERYTHROCYTE [SEDIMENTATION RATE] IN BLOOD: 24 MM/HR (ref 0–20)
GFR SERPL CREATININE-BSD FRML MDRD: 99 ML/MIN/1.73
GLOBULIN UR ELPH-MCNC: 3 GM/DL
GLUCOSE SERPL-MCNC: 102 MG/DL (ref 65–99)
HCT VFR BLD AUTO: 42.3 % (ref 37.5–51)
HDLC SERPL-MCNC: 25 MG/DL (ref 40–60)
HGB BLD-MCNC: 14.4 G/DL (ref 13–17.7)
IMM GRANULOCYTES # BLD AUTO: 0.03 10*3/MM3 (ref 0–0.05)
IMM GRANULOCYTES NFR BLD AUTO: 0.4 % (ref 0–0.5)
LDLC SERPL CALC-MCNC: 108 MG/DL (ref 0–100)
LDLC/HDLC SERPL: 4.24 {RATIO}
LYMPHOCYTES # BLD AUTO: 2.05 10*3/MM3 (ref 0.7–3.1)
LYMPHOCYTES NFR BLD AUTO: 25.4 % (ref 19.6–45.3)
MCH RBC QN AUTO: 28.3 PG (ref 26.6–33)
MCHC RBC AUTO-ENTMCNC: 34 G/DL (ref 31.5–35.7)
MCV RBC AUTO: 83.1 FL (ref 79–97)
MONOCYTES # BLD AUTO: 1.16 10*3/MM3 (ref 0.1–0.9)
MONOCYTES NFR BLD AUTO: 14.4 % (ref 5–12)
NEUTROPHILS NFR BLD AUTO: 4.53 10*3/MM3 (ref 1.7–7)
NEUTROPHILS NFR BLD AUTO: 56 % (ref 42.7–76)
NRBC BLD AUTO-RTO: 0 /100 WBC (ref 0–0.2)
NT-PROBNP SERPL-MCNC: 277.9 PG/ML (ref 0–900)
PLATELET # BLD AUTO: 222 10*3/MM3 (ref 140–450)
PMV BLD AUTO: 8.9 FL (ref 6–12)
POTASSIUM SERPL-SCNC: 3.9 MMOL/L (ref 3.5–5.2)
PROT SERPL-MCNC: 6.5 G/DL (ref 6–8.5)
RBC # BLD AUTO: 5.09 10*6/MM3 (ref 4.14–5.8)
SODIUM SERPL-SCNC: 137 MMOL/L (ref 136–145)
T4 FREE SERPL-MCNC: 0.93 NG/DL (ref 0.93–1.7)
TRIGL SERPL-MCNC: 110 MG/DL (ref 0–150)
TSH SERPL DL<=0.05 MIU/L-ACNC: 7.04 UIU/ML (ref 0.27–4.2)
VLDLC SERPL-MCNC: 20 MG/DL (ref 5–40)
WBC # BLD AUTO: 8.08 10*3/MM3 (ref 3.4–10.8)

## 2021-03-27 PROCEDURE — 84439 ASSAY OF FREE THYROXINE: CPT | Performed by: HOSPITALIST

## 2021-03-27 PROCEDURE — 85025 COMPLETE CBC W/AUTO DIFF WBC: CPT | Performed by: HOSPITALIST

## 2021-03-27 PROCEDURE — 80053 COMPREHEN METABOLIC PANEL: CPT | Performed by: HOSPITALIST

## 2021-03-27 PROCEDURE — 80061 LIPID PANEL: CPT | Performed by: HOSPITALIST

## 2021-03-27 PROCEDURE — 85652 RBC SED RATE AUTOMATED: CPT | Performed by: PSYCHIATRY & NEUROLOGY

## 2021-03-27 PROCEDURE — 83880 ASSAY OF NATRIURETIC PEPTIDE: CPT | Performed by: HOSPITALIST

## 2021-03-27 PROCEDURE — 99233 SBSQ HOSP IP/OBS HIGH 50: CPT | Performed by: NURSE PRACTITIONER

## 2021-03-27 PROCEDURE — 84443 ASSAY THYROID STIM HORMONE: CPT | Performed by: HOSPITALIST

## 2021-03-27 RX ADMIN — TAMSULOSIN HYDROCHLORIDE 0.4 MG: 0.4 CAPSULE ORAL at 20:15

## 2021-03-27 RX ADMIN — TRAZODONE HYDROCHLORIDE 50 MG: 50 TABLET ORAL at 20:15

## 2021-03-27 RX ADMIN — HYDROCODONE BITARTRATE AND ACETAMINOPHEN 1 TABLET: 5; 325 TABLET ORAL at 21:59

## 2021-03-27 RX ADMIN — HYDROCODONE BITARTRATE AND ACETAMINOPHEN 1 TABLET: 5; 325 TABLET ORAL at 01:39

## 2021-03-27 RX ADMIN — HYDROCODONE BITARTRATE AND ACETAMINOPHEN 1 TABLET: 5; 325 TABLET ORAL at 06:08

## 2021-03-27 RX ADMIN — ASPIRIN 325 MG: 325 TABLET ORAL at 09:11

## 2021-03-27 RX ADMIN — HYDROCODONE BITARTRATE AND ACETAMINOPHEN 1 TABLET: 5; 325 TABLET ORAL at 12:53

## 2021-03-27 RX ADMIN — Medication 1 PATCH: at 09:11

## 2021-03-27 RX ADMIN — ATORVASTATIN CALCIUM 80 MG: 80 TABLET, FILM COATED ORAL at 20:15

## 2021-03-27 RX ADMIN — PANTOPRAZOLE SODIUM 40 MG: 40 TABLET, DELAYED RELEASE ORAL at 06:08

## 2021-03-27 RX ADMIN — PANTOPRAZOLE SODIUM 40 MG: 40 TABLET, DELAYED RELEASE ORAL at 17:16

## 2021-03-27 RX ADMIN — HYDROCODONE BITARTRATE AND ACETAMINOPHEN 1 TABLET: 5; 325 TABLET ORAL at 17:16

## 2021-03-28 ENCOUNTER — APPOINTMENT (OUTPATIENT)
Dept: CT IMAGING | Facility: HOSPITAL | Age: 61
End: 2021-03-28

## 2021-03-28 LAB
ANION GAP SERPL CALCULATED.3IONS-SCNC: 9.2 MMOL/L (ref 5–15)
BASOPHILS # BLD AUTO: 0.06 10*3/MM3 (ref 0–0.2)
BASOPHILS NFR BLD AUTO: 0.8 % (ref 0–1.5)
BUN SERPL-MCNC: 7 MG/DL (ref 8–23)
BUN/CREAT SERPL: 9 (ref 7–25)
CALCIUM SPEC-SCNC: 9.1 MG/DL (ref 8.6–10.5)
CHLORIDE SERPL-SCNC: 101 MMOL/L (ref 98–107)
CO2 SERPL-SCNC: 24.8 MMOL/L (ref 22–29)
CREAT SERPL-MCNC: 0.78 MG/DL (ref 0.76–1.27)
DEPRECATED RDW RBC AUTO: 38.6 FL (ref 37–54)
EOSINOPHIL # BLD AUTO: 0.24 10*3/MM3 (ref 0–0.4)
EOSINOPHIL NFR BLD AUTO: 3.1 % (ref 0.3–6.2)
ERYTHROCYTE [DISTWIDTH] IN BLOOD BY AUTOMATED COUNT: 12.9 % (ref 12.3–15.4)
GFR SERPL CREATININE-BSD FRML MDRD: 101 ML/MIN/1.73
GLUCOSE SERPL-MCNC: 99 MG/DL (ref 65–99)
HCT VFR BLD AUTO: 42.8 % (ref 37.5–51)
HGB BLD-MCNC: 14.6 G/DL (ref 13–17.7)
IMM GRANULOCYTES # BLD AUTO: 0.03 10*3/MM3 (ref 0–0.05)
IMM GRANULOCYTES NFR BLD AUTO: 0.4 % (ref 0–0.5)
LYMPHOCYTES # BLD AUTO: 1.99 10*3/MM3 (ref 0.7–3.1)
LYMPHOCYTES NFR BLD AUTO: 25.4 % (ref 19.6–45.3)
MCH RBC QN AUTO: 28.8 PG (ref 26.6–33)
MCHC RBC AUTO-ENTMCNC: 34.1 G/DL (ref 31.5–35.7)
MCV RBC AUTO: 84.4 FL (ref 79–97)
MONOCYTES # BLD AUTO: 0.97 10*3/MM3 (ref 0.1–0.9)
MONOCYTES NFR BLD AUTO: 12.4 % (ref 5–12)
NEUTROPHILS NFR BLD AUTO: 4.55 10*3/MM3 (ref 1.7–7)
NEUTROPHILS NFR BLD AUTO: 57.9 % (ref 42.7–76)
NRBC BLD AUTO-RTO: 0 /100 WBC (ref 0–0.2)
PLATELET # BLD AUTO: 209 10*3/MM3 (ref 140–450)
PMV BLD AUTO: 8.8 FL (ref 6–12)
POTASSIUM SERPL-SCNC: 3.6 MMOL/L (ref 3.5–5.2)
RBC # BLD AUTO: 5.07 10*6/MM3 (ref 4.14–5.8)
SARS-COV-2 ORF1AB RESP QL NAA+PROBE: NOT DETECTED
SODIUM SERPL-SCNC: 135 MMOL/L (ref 136–145)
WBC # BLD AUTO: 7.84 10*3/MM3 (ref 3.4–10.8)

## 2021-03-28 PROCEDURE — 71260 CT THORAX DX C+: CPT

## 2021-03-28 PROCEDURE — 80048 BASIC METABOLIC PNL TOTAL CA: CPT | Performed by: HOSPITALIST

## 2021-03-28 PROCEDURE — 25010000002 ONDANSETRON PER 1 MG: Performed by: HOSPITALIST

## 2021-03-28 PROCEDURE — 85025 COMPLETE CBC W/AUTO DIFF WBC: CPT | Performed by: HOSPITALIST

## 2021-03-28 PROCEDURE — 25010000002 IOPAMIDOL 61 % SOLUTION: Performed by: HOSPITALIST

## 2021-03-28 PROCEDURE — U0004 COV-19 TEST NON-CDC HGH THRU: HCPCS | Performed by: INTERNAL MEDICINE

## 2021-03-28 RX ORDER — ONDANSETRON 2 MG/ML
4 INJECTION INTRAMUSCULAR; INTRAVENOUS EVERY 4 HOURS PRN
Status: DISCONTINUED | OUTPATIENT
Start: 2021-03-28 | End: 2021-03-30

## 2021-03-28 RX ADMIN — HYDROCODONE BITARTRATE AND ACETAMINOPHEN 1 TABLET: 5; 325 TABLET ORAL at 02:15

## 2021-03-28 RX ADMIN — ATORVASTATIN CALCIUM 80 MG: 80 TABLET, FILM COATED ORAL at 20:50

## 2021-03-28 RX ADMIN — ONDANSETRON 4 MG: 2 INJECTION INTRAMUSCULAR; INTRAVENOUS at 11:30

## 2021-03-28 RX ADMIN — TRAZODONE HYDROCHLORIDE 50 MG: 50 TABLET ORAL at 20:50

## 2021-03-28 RX ADMIN — HYDROCODONE BITARTRATE AND ACETAMINOPHEN 1 TABLET: 5; 325 TABLET ORAL at 16:12

## 2021-03-28 RX ADMIN — HYDROCODONE BITARTRATE AND ACETAMINOPHEN 1 TABLET: 5; 325 TABLET ORAL at 06:40

## 2021-03-28 RX ADMIN — Medication 1 PATCH: at 08:22

## 2021-03-28 RX ADMIN — IOPAMIDOL 75 ML: 612 INJECTION, SOLUTION INTRAVENOUS at 09:35

## 2021-03-28 RX ADMIN — HYDROCODONE BITARTRATE AND ACETAMINOPHEN 1 TABLET: 5; 325 TABLET ORAL at 20:50

## 2021-03-28 RX ADMIN — PANTOPRAZOLE SODIUM 40 MG: 40 TABLET, DELAYED RELEASE ORAL at 18:21

## 2021-03-28 RX ADMIN — TAMSULOSIN HYDROCHLORIDE 0.4 MG: 0.4 CAPSULE ORAL at 20:50

## 2021-03-28 RX ADMIN — PANTOPRAZOLE SODIUM 40 MG: 40 TABLET, DELAYED RELEASE ORAL at 06:40

## 2021-03-28 RX ADMIN — ASPIRIN 325 MG: 325 TABLET ORAL at 18:21

## 2021-03-29 ENCOUNTER — APPOINTMENT (OUTPATIENT)
Dept: CARDIOLOGY | Facility: HOSPITAL | Age: 61
End: 2021-03-29

## 2021-03-29 PROBLEM — R59.0 MEDIASTINAL ADENOPATHY: Status: ACTIVE | Noted: 2021-03-29

## 2021-03-29 LAB
ANION GAP SERPL CALCULATED.3IONS-SCNC: 7.7 MMOL/L (ref 5–15)
BASOPHILS # BLD AUTO: 0.06 10*3/MM3 (ref 0–0.2)
BASOPHILS NFR BLD AUTO: 0.9 % (ref 0–1.5)
BH CV ECHO MEAS - BSA(HAYCOCK): 2 M^2
BH CV ECHO MEAS - BSA: 2.1 M^2
BH CV ECHO MEAS - BZI_BMI: 22.5 KILOGRAMS/M^2
BH CV ECHO MEAS - BZI_METRIC_HEIGHT: 188 CM
BH CV ECHO MEAS - BZI_METRIC_WEIGHT: 79.4 KG
BH CV LOWER VASCULAR LEFT COMMON FEMORAL AUGMENT: NORMAL
BH CV LOWER VASCULAR LEFT COMMON FEMORAL COMPETENT: NORMAL
BH CV LOWER VASCULAR LEFT COMMON FEMORAL COMPRESS: NORMAL
BH CV LOWER VASCULAR LEFT COMMON FEMORAL PHASIC: NORMAL
BH CV LOWER VASCULAR LEFT COMMON FEMORAL SPONT: NORMAL
BH CV LOWER VASCULAR LEFT DISTAL FEMORAL COMPRESS: NORMAL
BH CV LOWER VASCULAR LEFT GASTRONEMIUS COMPRESS: NORMAL
BH CV LOWER VASCULAR LEFT GREATER SAPH AK COMPRESS: NORMAL
BH CV LOWER VASCULAR LEFT GREATER SAPH BK COMPRESS: NORMAL
BH CV LOWER VASCULAR LEFT LESSER SAPH COMPRESS: NORMAL
BH CV LOWER VASCULAR LEFT MID FEMORAL AUGMENT: NORMAL
BH CV LOWER VASCULAR LEFT MID FEMORAL COMPETENT: NORMAL
BH CV LOWER VASCULAR LEFT MID FEMORAL COMPRESS: NORMAL
BH CV LOWER VASCULAR LEFT MID FEMORAL PHASIC: NORMAL
BH CV LOWER VASCULAR LEFT MID FEMORAL SPONT: NORMAL
BH CV LOWER VASCULAR LEFT PERONEAL COMPRESS: NORMAL
BH CV LOWER VASCULAR LEFT POPLITEAL AUGMENT: NORMAL
BH CV LOWER VASCULAR LEFT POPLITEAL COMPETENT: NORMAL
BH CV LOWER VASCULAR LEFT POPLITEAL COMPRESS: NORMAL
BH CV LOWER VASCULAR LEFT POPLITEAL PHASIC: NORMAL
BH CV LOWER VASCULAR LEFT POPLITEAL SPONT: NORMAL
BH CV LOWER VASCULAR LEFT POSTERIOR TIBIAL COMPRESS: NORMAL
BH CV LOWER VASCULAR LEFT PROFUNDA FEMORAL COMPRESS: NORMAL
BH CV LOWER VASCULAR LEFT PROXIMAL FEMORAL COMPRESS: NORMAL
BH CV LOWER VASCULAR LEFT SAPHENOFEMORAL JUNCTION COMPRESS: NORMAL
BH CV LOWER VASCULAR RIGHT COMMON FEMORAL AUGMENT: NORMAL
BH CV LOWER VASCULAR RIGHT COMMON FEMORAL COMPETENT: NORMAL
BH CV LOWER VASCULAR RIGHT COMMON FEMORAL COMPRESS: NORMAL
BH CV LOWER VASCULAR RIGHT COMMON FEMORAL PHASIC: NORMAL
BH CV LOWER VASCULAR RIGHT COMMON FEMORAL SPONT: NORMAL
BH CV LOWER VASCULAR RIGHT DISTAL FEMORAL COMPRESS: NORMAL
BH CV LOWER VASCULAR RIGHT GASTRONEMIUS COMPRESS: NORMAL
BH CV LOWER VASCULAR RIGHT GREATER SAPH AK COMPRESS: NORMAL
BH CV LOWER VASCULAR RIGHT GREATER SAPH BK COMPRESS: NORMAL
BH CV LOWER VASCULAR RIGHT LESSER SAPH COMPRESS: NORMAL
BH CV LOWER VASCULAR RIGHT MID FEMORAL AUGMENT: NORMAL
BH CV LOWER VASCULAR RIGHT MID FEMORAL COMPETENT: NORMAL
BH CV LOWER VASCULAR RIGHT MID FEMORAL COMPRESS: NORMAL
BH CV LOWER VASCULAR RIGHT MID FEMORAL PHASIC: NORMAL
BH CV LOWER VASCULAR RIGHT MID FEMORAL SPONT: NORMAL
BH CV LOWER VASCULAR RIGHT PERONEAL COMPRESS: NORMAL
BH CV LOWER VASCULAR RIGHT POPLITEAL AUGMENT: NORMAL
BH CV LOWER VASCULAR RIGHT POPLITEAL COMPETENT: NORMAL
BH CV LOWER VASCULAR RIGHT POPLITEAL COMPRESS: NORMAL
BH CV LOWER VASCULAR RIGHT POPLITEAL PHASIC: NORMAL
BH CV LOWER VASCULAR RIGHT POPLITEAL SPONT: NORMAL
BH CV LOWER VASCULAR RIGHT POSTERIOR TIBIAL COMPRESS: NORMAL
BH CV LOWER VASCULAR RIGHT PROFUNDA FEMORAL COMPRESS: NORMAL
BH CV LOWER VASCULAR RIGHT PROXIMAL FEMORAL COMPRESS: NORMAL
BH CV LOWER VASCULAR RIGHT SAPHENOFEMORAL JUNCTION COMPRESS: NORMAL
BUN SERPL-MCNC: 9 MG/DL (ref 8–23)
BUN/CREAT SERPL: 10.5 (ref 7–25)
CALCIUM SPEC-SCNC: 9.3 MG/DL (ref 8.6–10.5)
CHLORIDE SERPL-SCNC: 100 MMOL/L (ref 98–107)
CO2 SERPL-SCNC: 27.3 MMOL/L (ref 22–29)
CREAT SERPL-MCNC: 0.86 MG/DL (ref 0.76–1.27)
DEPRECATED RDW RBC AUTO: 39.4 FL (ref 37–54)
EOSINOPHIL # BLD AUTO: 0.3 10*3/MM3 (ref 0–0.4)
EOSINOPHIL NFR BLD AUTO: 4.5 % (ref 0.3–6.2)
ERYTHROCYTE [DISTWIDTH] IN BLOOD BY AUTOMATED COUNT: 12.8 % (ref 12.3–15.4)
GFR SERPL CREATININE-BSD FRML MDRD: 90 ML/MIN/1.73
GLUCOSE SERPL-MCNC: 89 MG/DL (ref 65–99)
HCT VFR BLD AUTO: 45.5 % (ref 37.5–51)
HGB BLD-MCNC: 15 G/DL (ref 13–17.7)
IMM GRANULOCYTES # BLD AUTO: 0.02 10*3/MM3 (ref 0–0.05)
IMM GRANULOCYTES NFR BLD AUTO: 0.3 % (ref 0–0.5)
LYMPHOCYTES # BLD AUTO: 1.64 10*3/MM3 (ref 0.7–3.1)
LYMPHOCYTES NFR BLD AUTO: 24.4 % (ref 19.6–45.3)
MCH RBC QN AUTO: 28 PG (ref 26.6–33)
MCHC RBC AUTO-ENTMCNC: 33 G/DL (ref 31.5–35.7)
MCV RBC AUTO: 84.9 FL (ref 79–97)
MONOCYTES # BLD AUTO: 0.89 10*3/MM3 (ref 0.1–0.9)
MONOCYTES NFR BLD AUTO: 13.3 % (ref 5–12)
NEUTROPHILS NFR BLD AUTO: 3.8 10*3/MM3 (ref 1.7–7)
NEUTROPHILS NFR BLD AUTO: 56.6 % (ref 42.7–76)
NRBC BLD AUTO-RTO: 0 /100 WBC (ref 0–0.2)
PLATELET # BLD AUTO: 216 10*3/MM3 (ref 140–450)
PMV BLD AUTO: 8.9 FL (ref 6–12)
POTASSIUM SERPL-SCNC: 4.4 MMOL/L (ref 3.5–5.2)
RBC # BLD AUTO: 5.36 10*6/MM3 (ref 4.14–5.8)
SODIUM SERPL-SCNC: 135 MMOL/L (ref 136–145)
WBC # BLD AUTO: 6.71 10*3/MM3 (ref 3.4–10.8)

## 2021-03-29 PROCEDURE — 93321 DOPPLER ECHO F-UP/LMTD STD: CPT | Performed by: INTERNAL MEDICINE

## 2021-03-29 PROCEDURE — 99152 MOD SED SAME PHYS/QHP 5/>YRS: CPT

## 2021-03-29 PROCEDURE — 93312 ECHO TRANSESOPHAGEAL: CPT

## 2021-03-29 PROCEDURE — 25010000002 FENTANYL CITRATE (PF) 100 MCG/2ML SOLUTION: Performed by: INTERNAL MEDICINE

## 2021-03-29 PROCEDURE — 99233 SBSQ HOSP IP/OBS HIGH 50: CPT | Performed by: NURSE PRACTITIONER

## 2021-03-29 PROCEDURE — B24BZZ4 ULTRASONOGRAPHY OF HEART WITH AORTA, TRANSESOPHAGEAL: ICD-10-PCS | Performed by: INTERNAL MEDICINE

## 2021-03-29 PROCEDURE — 25010000002 MIDAZOLAM PER 1 MG: Performed by: INTERNAL MEDICINE

## 2021-03-29 PROCEDURE — 93321 DOPPLER ECHO F-UP/LMTD STD: CPT

## 2021-03-29 PROCEDURE — 93970 EXTREMITY STUDY: CPT

## 2021-03-29 PROCEDURE — 93325 DOPPLER ECHO COLOR FLOW MAPG: CPT | Performed by: INTERNAL MEDICINE

## 2021-03-29 PROCEDURE — 80048 BASIC METABOLIC PNL TOTAL CA: CPT | Performed by: HOSPITALIST

## 2021-03-29 PROCEDURE — 93325 DOPPLER ECHO COLOR FLOW MAPG: CPT

## 2021-03-29 PROCEDURE — 93312 ECHO TRANSESOPHAGEAL: CPT | Performed by: INTERNAL MEDICINE

## 2021-03-29 PROCEDURE — 85025 COMPLETE CBC W/AUTO DIFF WBC: CPT | Performed by: HOSPITALIST

## 2021-03-29 PROCEDURE — 99233 SBSQ HOSP IP/OBS HIGH 50: CPT | Performed by: INTERNAL MEDICINE

## 2021-03-29 RX ORDER — LEVOTHYROXINE SODIUM 0.03 MG/1
25 TABLET ORAL
Status: DISCONTINUED | OUTPATIENT
Start: 2021-03-30 | End: 2021-03-30 | Stop reason: HOSPADM

## 2021-03-29 RX ORDER — FENTANYL CITRATE 50 UG/ML
INJECTION, SOLUTION INTRAMUSCULAR; INTRAVENOUS
Status: COMPLETED | OUTPATIENT
Start: 2021-03-29 | End: 2021-03-29

## 2021-03-29 RX ORDER — SODIUM CHLORIDE 9 MG/ML
INJECTION, SOLUTION INTRAVENOUS
Status: COMPLETED | OUTPATIENT
Start: 2021-03-29 | End: 2021-03-29

## 2021-03-29 RX ORDER — MIDAZOLAM HYDROCHLORIDE 1 MG/ML
INJECTION INTRAMUSCULAR; INTRAVENOUS
Status: COMPLETED | OUTPATIENT
Start: 2021-03-29 | End: 2021-03-29

## 2021-03-29 RX ORDER — LIDOCAINE HYDROCHLORIDE 20 MG/ML
SOLUTION OROPHARYNGEAL
Status: COMPLETED | OUTPATIENT
Start: 2021-03-29 | End: 2021-03-29

## 2021-03-29 RX ADMIN — PANTOPRAZOLE SODIUM 40 MG: 40 TABLET, DELAYED RELEASE ORAL at 08:10

## 2021-03-29 RX ADMIN — Medication 1 PATCH: at 08:12

## 2021-03-29 RX ADMIN — MIDAZOLAM 1 MG: 1 INJECTION INTRAMUSCULAR; INTRAVENOUS at 13:18

## 2021-03-29 RX ADMIN — SODIUM CHLORIDE 50 ML/HR: 9 INJECTION, SOLUTION INTRAVENOUS at 12:46

## 2021-03-29 RX ADMIN — APIXABAN 5 MG: 5 TABLET, FILM COATED ORAL at 20:43

## 2021-03-29 RX ADMIN — HYDROCODONE BITARTRATE AND ACETAMINOPHEN 1 TABLET: 5; 325 TABLET ORAL at 18:33

## 2021-03-29 RX ADMIN — MIDAZOLAM 1 MG: 1 INJECTION INTRAMUSCULAR; INTRAVENOUS at 13:21

## 2021-03-29 RX ADMIN — ATORVASTATIN CALCIUM 80 MG: 80 TABLET, FILM COATED ORAL at 20:43

## 2021-03-29 RX ADMIN — MIDAZOLAM 2 MG: 1 INJECTION INTRAMUSCULAR; INTRAVENOUS at 13:15

## 2021-03-29 RX ADMIN — PANTOPRAZOLE SODIUM 40 MG: 40 TABLET, DELAYED RELEASE ORAL at 20:42

## 2021-03-29 RX ADMIN — LIDOCAINE HYDROCHLORIDE 10 ML: 20 SOLUTION ORAL; TOPICAL at 12:59

## 2021-03-29 RX ADMIN — TRAZODONE HYDROCHLORIDE 50 MG: 50 TABLET ORAL at 20:43

## 2021-03-29 RX ADMIN — SODIUM CHLORIDE 50 ML/HR: 9 INJECTION, SOLUTION INTRAVENOUS at 12:58

## 2021-03-29 RX ADMIN — FENTANYL CITRATE 25 MCG: 50 INJECTION INTRAMUSCULAR; INTRAVENOUS at 13:16

## 2021-03-29 RX ADMIN — FENTANYL CITRATE 25 MCG: 50 INJECTION INTRAMUSCULAR; INTRAVENOUS at 13:22

## 2021-03-29 RX ADMIN — HYDROCODONE BITARTRATE AND ACETAMINOPHEN 1 TABLET: 5; 325 TABLET ORAL at 08:48

## 2021-03-29 RX ADMIN — TAMSULOSIN HYDROCHLORIDE 0.4 MG: 0.4 CAPSULE ORAL at 20:43

## 2021-03-29 RX ADMIN — MIDAZOLAM 2 MG: 1 INJECTION INTRAMUSCULAR; INTRAVENOUS at 13:14

## 2021-03-29 RX ADMIN — FENTANYL CITRATE 25 MCG: 50 INJECTION INTRAMUSCULAR; INTRAVENOUS at 13:14

## 2021-03-30 ENCOUNTER — TRANSCRIBE ORDERS (OUTPATIENT)
Dept: PULMONOLOGY | Facility: HOSPITAL | Age: 61
End: 2021-03-30

## 2021-03-30 ENCOUNTER — APPOINTMENT (OUTPATIENT)
Dept: CARDIOLOGY | Facility: HOSPITAL | Age: 61
End: 2021-03-30

## 2021-03-30 VITALS
DIASTOLIC BLOOD PRESSURE: 71 MMHG | RESPIRATION RATE: 16 BRPM | OXYGEN SATURATION: 96 % | BODY MASS INDEX: 22.46 KG/M2 | SYSTOLIC BLOOD PRESSURE: 137 MMHG | WEIGHT: 175 LBS | TEMPERATURE: 98.1 F | HEART RATE: 81 BPM | HEIGHT: 74 IN

## 2021-03-30 LAB
ANION GAP SERPL CALCULATED.3IONS-SCNC: 9.6 MMOL/L (ref 5–15)
BASOPHILS # BLD AUTO: 0.08 10*3/MM3 (ref 0–0.2)
BASOPHILS NFR BLD AUTO: 1 % (ref 0–1.5)
BUN SERPL-MCNC: 13 MG/DL (ref 8–23)
BUN/CREAT SERPL: 15.9 (ref 7–25)
CALCIUM SPEC-SCNC: 8.6 MG/DL (ref 8.6–10.5)
CHLORIDE SERPL-SCNC: 99 MMOL/L (ref 98–107)
CO2 SERPL-SCNC: 24.4 MMOL/L (ref 22–29)
CREAT SERPL-MCNC: 0.82 MG/DL (ref 0.76–1.27)
DEPRECATED RDW RBC AUTO: 39 FL (ref 37–54)
EOSINOPHIL # BLD AUTO: 0.47 10*3/MM3 (ref 0–0.4)
EOSINOPHIL NFR BLD AUTO: 6 % (ref 0.3–6.2)
ERYTHROCYTE [DISTWIDTH] IN BLOOD BY AUTOMATED COUNT: 12.7 % (ref 12.3–15.4)
GFR SERPL CREATININE-BSD FRML MDRD: 95 ML/MIN/1.73
GLUCOSE SERPL-MCNC: 107 MG/DL (ref 65–99)
HCT VFR BLD AUTO: 44.6 % (ref 37.5–51)
HGB BLD-MCNC: 15 G/DL (ref 13–17.7)
IMM GRANULOCYTES # BLD AUTO: 0.02 10*3/MM3 (ref 0–0.05)
IMM GRANULOCYTES NFR BLD AUTO: 0.3 % (ref 0–0.5)
LYMPHOCYTES # BLD AUTO: 1.72 10*3/MM3 (ref 0.7–3.1)
LYMPHOCYTES NFR BLD AUTO: 22 % (ref 19.6–45.3)
MCH RBC QN AUTO: 28.3 PG (ref 26.6–33)
MCHC RBC AUTO-ENTMCNC: 33.6 G/DL (ref 31.5–35.7)
MCV RBC AUTO: 84.2 FL (ref 79–97)
MONOCYTES # BLD AUTO: 1.09 10*3/MM3 (ref 0.1–0.9)
MONOCYTES NFR BLD AUTO: 13.9 % (ref 5–12)
NEUTROPHILS NFR BLD AUTO: 4.44 10*3/MM3 (ref 1.7–7)
NEUTROPHILS NFR BLD AUTO: 56.8 % (ref 42.7–76)
NRBC BLD AUTO-RTO: 0 /100 WBC (ref 0–0.2)
PLATELET # BLD AUTO: 233 10*3/MM3 (ref 140–450)
PMV BLD AUTO: 8.8 FL (ref 6–12)
POTASSIUM SERPL-SCNC: 4.1 MMOL/L (ref 3.5–5.2)
RBC # BLD AUTO: 5.3 10*6/MM3 (ref 4.14–5.8)
SODIUM SERPL-SCNC: 133 MMOL/L (ref 136–145)
WBC # BLD AUTO: 7.82 10*3/MM3 (ref 3.4–10.8)

## 2021-03-30 PROCEDURE — 80048 BASIC METABOLIC PNL TOTAL CA: CPT | Performed by: HOSPITALIST

## 2021-03-30 PROCEDURE — 93246 EXT ECG>7D<15D RECORDING: CPT

## 2021-03-30 PROCEDURE — 85025 COMPLETE CBC W/AUTO DIFF WBC: CPT | Performed by: HOSPITALIST

## 2021-03-30 RX ORDER — ATORVASTATIN CALCIUM 80 MG/1
80 TABLET, FILM COATED ORAL NIGHTLY
Qty: 30 TABLET | Refills: 0 | Status: SHIPPED | OUTPATIENT
Start: 2021-03-30 | End: 2021-04-29

## 2021-03-30 RX ORDER — LEVOTHYROXINE SODIUM 0.03 MG/1
25 TABLET ORAL DAILY
Qty: 30 TABLET | Refills: 0 | Status: SHIPPED | OUTPATIENT
Start: 2021-03-30 | End: 2021-10-14 | Stop reason: SDUPTHER

## 2021-03-30 RX ADMIN — HYDROCODONE BITARTRATE AND ACETAMINOPHEN 1 TABLET: 5; 325 TABLET ORAL at 02:08

## 2021-03-30 RX ADMIN — HYDROCODONE BITARTRATE AND ACETAMINOPHEN 1 TABLET: 5; 325 TABLET ORAL at 06:54

## 2021-03-30 RX ADMIN — LEVOTHYROXINE SODIUM 25 MCG: 0.03 TABLET ORAL at 06:46

## 2021-03-30 RX ADMIN — APIXABAN 5 MG: 5 TABLET, FILM COATED ORAL at 08:59

## 2021-03-30 RX ADMIN — Medication 1 PATCH: at 09:03

## 2021-03-30 RX ADMIN — PANTOPRAZOLE SODIUM 40 MG: 40 TABLET, DELAYED RELEASE ORAL at 06:46

## 2021-03-31 ENCOUNTER — READMISSION MANAGEMENT (OUTPATIENT)
Dept: CALL CENTER | Facility: HOSPITAL | Age: 61
End: 2021-03-31

## 2021-03-31 NOTE — OUTREACH NOTE
Prep Survey      Responses   [facility] facility patient discharged from?  [place]   Is LACE score < 7 ?  No   Emergency Room discharge w/ pulse ox?  No   Eligibility  Readm Mgmt   Discharge diagnosis  Bilateral posterior cerebral artery embolic stroke   Does the patient have one of the following disease processes/diagnoses(primary or secondary)?  Stroke (TIA)   Does the patient have Home health ordered?  No   Is there a DME ordered?  No   Medication alerts for this patient  Eliquis    Prep survey completed?  Yes          Michelle Gonzales RN

## 2021-04-01 ENCOUNTER — TRANSCRIBE ORDERS (OUTPATIENT)
Dept: ADMINISTRATIVE | Facility: HOSPITAL | Age: 61
End: 2021-04-01

## 2021-04-01 ENCOUNTER — READMISSION MANAGEMENT (OUTPATIENT)
Dept: CALL CENTER | Facility: HOSPITAL | Age: 61
End: 2021-04-01

## 2021-04-01 DIAGNOSIS — Z01.818 OTHER SPECIFIED PRE-OPERATIVE EXAMINATION: Primary | ICD-10-CM

## 2021-04-01 NOTE — OUTREACH NOTE
Stroke Week 1 Survey      Responses   [facility] [place] patient discharged from?  [place]   Does the patient have one of the following disease processes/diagnoses(primary or secondary)?  Stroke (TIA)   Week 1 attempt successful?  Yes   Call start time  1759   Call end time  1803   Meds reviewed with patient/caregiver?  Yes   Is the patient having any side effects they believe may be caused by any medication additions or changes?  No   Does the patient have all medications ordered at discharge?  Yes   Is the patient taking all medications as directed (includes completed medication regime)?  Yes   Does the patient have a primary care provider?   Yes   Does the patient have an appointment with their PCP within 7 days of discharge?  N/A   Comments  will be giving a call about insurance   Has home health visited the patient within 72 hours of discharge?  N/A   Did the patient receive a copy of their discharge instructions?  Yes   Nursing interventions  Reviewed instructions with patient   What is the patient's perception of their health status since discharge?  Same [never felt bad, no vision perpherial]   Is the patient able to teach back FAST for Stroke?  Yes [discussed]   Is the patient/caregiver able to teach back signs and symptoms related to disease process for when to call PCP?  Yes   Is the patient/caregiver able to teach back signs and symptoms related to disease process for when to call 911?  Yes   If the patient is a current smoker, are they able to teach back resources for cessation?  -- [quit smoking]   Is the patient/caregiver able to teach back the hierarchy of who to call/visit for symptoms/problems? PCP, Specialist, Home health nurse, Urgent Care, ED, 911  Yes   Week 1 call completed?  Yes   Wrap up additional comments  has no problems at this time          Leisa Berumen RN

## 2021-04-06 ENCOUNTER — LAB (OUTPATIENT)
Dept: LAB | Facility: HOSPITAL | Age: 61
End: 2021-04-06

## 2021-04-06 DIAGNOSIS — Z01.818 OTHER SPECIFIED PRE-OPERATIVE EXAMINATION: ICD-10-CM

## 2021-04-06 LAB — SARS-COV-2 ORF1AB RESP QL NAA+PROBE: NOT DETECTED

## 2021-04-06 PROCEDURE — C9803 HOPD COVID-19 SPEC COLLECT: HCPCS

## 2021-04-06 PROCEDURE — U0004 COV-19 TEST NON-CDC HGH THRU: HCPCS

## 2021-04-08 ENCOUNTER — HOSPITAL ENCOUNTER (OUTPATIENT)
Facility: HOSPITAL | Age: 61
Setting detail: HOSPITAL OUTPATIENT SURGERY
Discharge: HOME | End: 2021-04-08
Attending: INTERNAL MEDICINE | Admitting: INTERNAL MEDICINE

## 2021-04-08 ENCOUNTER — ANESTHESIA (OUTPATIENT)
Dept: GASTROENTEROLOGY | Facility: HOSPITAL | Age: 61
End: 2021-04-08

## 2021-04-08 ENCOUNTER — ANESTHESIA EVENT (OUTPATIENT)
Dept: GASTROENTEROLOGY | Facility: HOSPITAL | Age: 61
End: 2021-04-08

## 2021-04-08 ENCOUNTER — READMISSION MANAGEMENT (OUTPATIENT)
Dept: CALL CENTER | Facility: HOSPITAL | Age: 61
End: 2021-04-08

## 2021-04-08 VITALS
SYSTOLIC BLOOD PRESSURE: 124 MMHG | OXYGEN SATURATION: 97 % | BODY MASS INDEX: 21.82 KG/M2 | HEART RATE: 76 BPM | RESPIRATION RATE: 16 BRPM | WEIGHT: 170 LBS | HEIGHT: 74 IN | DIASTOLIC BLOOD PRESSURE: 71 MMHG

## 2021-04-08 DIAGNOSIS — R59.0 MEDIASTINAL ADENOPATHY: ICD-10-CM

## 2021-04-08 PROCEDURE — 88305 TISSUE EXAM BY PATHOLOGIST: CPT | Performed by: INTERNAL MEDICINE

## 2021-04-08 PROCEDURE — C1726 CATH, BAL DIL, NON-VASCULAR: HCPCS | Performed by: INTERNAL MEDICINE

## 2021-04-08 PROCEDURE — 25010000002 PROPOFOL 10 MG/ML EMULSION: Performed by: ANESTHESIOLOGY

## 2021-04-08 PROCEDURE — 88173 CYTOPATH EVAL FNA REPORT: CPT | Performed by: INTERNAL MEDICINE

## 2021-04-08 PROCEDURE — 25010000002 MIDAZOLAM PER 1 MG: Performed by: ANESTHESIOLOGY

## 2021-04-08 PROCEDURE — 25010000002 PHENYLEPHRINE PER 1 ML: Performed by: ANESTHESIOLOGY

## 2021-04-08 RX ORDER — SODIUM CHLORIDE, SODIUM LACTATE, POTASSIUM CHLORIDE, CALCIUM CHLORIDE 600; 310; 30; 20 MG/100ML; MG/100ML; MG/100ML; MG/100ML
30 INJECTION, SOLUTION INTRAVENOUS CONTINUOUS PRN
Status: DISCONTINUED | OUTPATIENT
Start: 2021-04-08 | End: 2021-04-08 | Stop reason: HOSPADM

## 2021-04-08 RX ORDER — FAMOTIDINE 10 MG/ML
20 INJECTION, SOLUTION INTRAVENOUS
Status: COMPLETED | OUTPATIENT
Start: 2021-04-08 | End: 2021-04-08

## 2021-04-08 RX ORDER — LIDOCAINE HYDROCHLORIDE 10 MG/ML
INJECTION, SOLUTION EPIDURAL; INFILTRATION; INTRACAUDAL; PERINEURAL AS NEEDED
Status: DISCONTINUED | OUTPATIENT
Start: 2021-04-08 | End: 2021-04-08 | Stop reason: HOSPADM

## 2021-04-08 RX ORDER — MIDAZOLAM HYDROCHLORIDE 1 MG/ML
2 INJECTION INTRAMUSCULAR; INTRAVENOUS
Status: DISCONTINUED | OUTPATIENT
Start: 2021-04-08 | End: 2021-04-08 | Stop reason: HOSPADM

## 2021-04-08 RX ORDER — EPHEDRINE SULFATE 50 MG/ML
INJECTION, SOLUTION INTRAVENOUS AS NEEDED
Status: DISCONTINUED | OUTPATIENT
Start: 2021-04-08 | End: 2021-04-08 | Stop reason: SURG

## 2021-04-08 RX ORDER — PROPOFOL 10 MG/ML
VIAL (ML) INTRAVENOUS AS NEEDED
Status: DISCONTINUED | OUTPATIENT
Start: 2021-04-08 | End: 2021-04-08 | Stop reason: SURG

## 2021-04-08 RX ORDER — MIDAZOLAM HYDROCHLORIDE 1 MG/ML
1 INJECTION INTRAMUSCULAR; INTRAVENOUS
Status: DISCONTINUED | OUTPATIENT
Start: 2021-04-08 | End: 2021-04-08 | Stop reason: HOSPADM

## 2021-04-08 RX ORDER — LIDOCAINE HYDROCHLORIDE 20 MG/ML
INJECTION, SOLUTION INFILTRATION; PERINEURAL AS NEEDED
Status: DISCONTINUED | OUTPATIENT
Start: 2021-04-08 | End: 2021-04-08 | Stop reason: SURG

## 2021-04-08 RX ADMIN — FAMOTIDINE 20 MG: 10 INJECTION INTRAVENOUS at 10:46

## 2021-04-08 RX ADMIN — LIDOCAINE HYDROCHLORIDE 60 MG: 20 INJECTION, SOLUTION INFILTRATION; PERINEURAL at 11:11

## 2021-04-08 RX ADMIN — MIDAZOLAM 1 MG: 1 INJECTION INTRAMUSCULAR; INTRAVENOUS at 10:46

## 2021-04-08 RX ADMIN — PROPOFOL 300 MCG/KG/MIN: 10 INJECTION, EMULSION INTRAVENOUS at 11:11

## 2021-04-08 RX ADMIN — SODIUM CHLORIDE, POTASSIUM CHLORIDE, SODIUM LACTATE AND CALCIUM CHLORIDE 30 ML/HR: 600; 310; 30; 20 INJECTION, SOLUTION INTRAVENOUS at 10:41

## 2021-04-08 RX ADMIN — PROPOFOL 200 MG: 10 INJECTION, EMULSION INTRAVENOUS at 11:11

## 2021-04-08 RX ADMIN — PHENYLEPHRINE HYDROCHLORIDE 200 MCG: 10 INJECTION INTRAVENOUS at 11:46

## 2021-04-08 RX ADMIN — EPHEDRINE SULFATE 10 MG: 50 INJECTION INTRAVENOUS at 11:28

## 2021-04-08 RX ADMIN — PHENYLEPHRINE HYDROCHLORIDE 150 MCG: 10 INJECTION INTRAVENOUS at 11:42

## 2021-04-08 RX ADMIN — PHENYLEPHRINE HYDROCHLORIDE 50 MCG: 10 INJECTION INTRAVENOUS at 11:37

## 2021-04-08 NOTE — ANESTHESIA PREPROCEDURE EVALUATION
Anesthesia Evaluation     Patient summary reviewed and Nursing notes reviewed   NPO Solid Status: > 8 hours  NPO Liquid Status: > 2 hours           Airway   Mallampati: II  TM distance: >3 FB  Neck ROM: full  Dental - normal exam   (+) poor dentition    Pulmonary - normal exam    breath sounds clear to auscultation  (+) a smoker Abstained day of surgery,   Cardiovascular - normal exam    Rhythm: regular  Rate: normal    (+) valvular problems/murmurs (PFO), CHF Diastolic >=55%, hyperlipidemia,   (-) angina, orthopnea, PND, PAK    ROS comment: · Left ventricular ejection fraction appears to be 61 - 65%  · Left ventricular wall thickness is consistent with mild concentric hypertrophy  · Left ventricular diastolic function is consistent with (grade I) impaired relaxation  · No evidence of a left atrial appendage thrombus was present.  · The agitated saline study was consistent with a small to moderate PFO  · The noncoronary leaflet of the aortic valve is heavily calcified and restricted in motion  · There were mild plaques in the descending thoracic aorta. There were moderate plaques in the aortic arch. All plaques were nonmobile.  · There is no evidence of pericardial effusion    Neuro/Psych  (+) CVA (Cryptogenic stroke with loss of peripheral vision) residual symptoms, psychiatric history Anxiety,     GI/Hepatic/Renal/Endo    (+)  GERD,  thyroid problem hypothyroidism    Musculoskeletal (-) negative ROS    Abdominal    Substance History - negative use     OB/GYN negative ob/gyn ROS         Other - negative ROS                     Anesthesia Plan    ASA 3     general   (LMA)  intravenous induction     Anesthetic plan, all risks, benefits, and alternatives have been provided, discussed and informed consent has been obtained with: patient.

## 2021-04-08 NOTE — ANESTHESIA PROCEDURE NOTES
Airway  Urgency: elective    Date/Time: 4/8/2021 11:14 AM  Airway not difficult    General Information and Staff    Patient location during procedure: OR  Anesthesiologist: Usman Chambers MD    Indications and Patient Condition  Indications for airway management: airway protection    Preoxygenated: yes  MILS maintained throughout  Mask difficulty assessment: 1 - vent by mask    Final Airway Details  Final airway type: supraglottic airway      Successful airway: classic and LMA  Size 5    Number of attempts at approach: 1  Assessment: lips, teeth, and gum same as pre-op and atraumatic intubation

## 2021-04-08 NOTE — ANESTHESIA POSTPROCEDURE EVALUATION
Patient: Sathya Benavides    Procedure Summary     Date: 04/08/21 Room / Location:  [facility]    Anesthesia Start: 1058 Anesthesia Stop: 1209    Procedure: BRONCHOSCOPY ENDOBRONCHIAL ULTRASOUND WITH FNA'S (Bilateral Bronchus) Diagnosis:       Mediastinal adenopathy      (Mediastinal adenopathy [R59.0])    Surgeons: Sam Tony MD Provider: sUman Chambers MD    Anesthesia Type: MAC ASA Status: 3          Anesthesia Type: MAC    Vitals  No vitals data found for the desired time range.          Post Anesthesia Care and Evaluation    Patient location during evaluation: PHASE II  Patient participation: complete - patient participated  Level of consciousness: awake and alert  Pain management: adequate  Airway patency: patent  Anesthetic complications: No anesthetic complications  PONV Status: none  Cardiovascular status: acceptable and hemodynamically stable  Respiratory status: acceptable, nonlabored ventilation and spontaneous ventilation  Hydration status: acceptable

## 2021-04-08 NOTE — OUTREACH NOTE
Stroke Week 2 Survey      Responses   [facility] patient discharged from?  [place]   Does the patient have one of the following disease processes/diagnoses(primary or secondary)?  Stroke (TIA)   Week 2 attempt successful?  No   Unsuccessful attempts  Attempt 1          Shira Brown RN

## 2021-04-09 ENCOUNTER — READMISSION MANAGEMENT (OUTPATIENT)
Dept: CALL CENTER | Facility: HOSPITAL | Age: 61
End: 2021-04-09

## 2021-04-09 LAB
CYTO UR: NORMAL
CYTOLOGY INITIAL INTERPRETATION: NORMAL
LAB AP CASE REPORT: NORMAL
LAB AP NON-GYN SPECIMEN ADEQUACY: NORMAL
PATH REPORT.FINAL DX SPEC: NORMAL
PATH REPORT.GROSS SPEC: NORMAL

## 2021-04-09 NOTE — OUTREACH NOTE
Stroke Week 2 Survey      Responses   [facility] patient discharged from?  [place]   Does the patient have one of the following disease processes/diagnoses(primary or secondary)?  Stroke (TIA)   Week 2 attempt successful?  Yes   Call start time  1623   Call end time  1633   Discharge diagnosis  Bilateral posterior cerebral artery embolic stroke   Is patient permission given to speak with other caregiver?  Yes   List who call center can speak with  dtr or sister   Meds reviewed with patient/caregiver?  Yes   Is the patient having any side effects they believe may be caused by any medication additions or changes?  Yes   Side effects comments   diarrhea from lipitor   Is the patient taking all medications as directed (includes completed medication regime)?  Yes   Does the patient have an appointment with their PCP within 7 days of discharge?  Yes   Comments regarding PCP  PCP f/u 4-16   Has the patient kept scheduled appointments due by today?  N/A   Psychosocial issues?  No   Does the patient require any assistance with activities of daily living such as eating, bathing, dressing, walking, etc.?  No   Does the patient have any residual symptoms from stroke/TIA?  Yes   Residual symptoms comments  right eye vision is still impaired, no peripheral vision on right side of either eye.    Does the patient understand the diet ordered at discharge?  Yes   Comments  pt doing well other than the vision. Denies HA or dizziness.   What is the patient's perception of their health status since discharge?  Same   Is the patient able to teach back FAST for Stroke?  Yes   Is the patient/caregiver able to teach back the risk factors for a stroke?  Smoking, High blood pressure-goal below 120/80, Physical inactivity and obesity, High Cholesterol   Is the patient/caregiver able to teach back signs and symptoms related to disease process for when to call PCP?  Yes   Is the patient/caregiver able to teach back signs and symptoms  related to disease process for when to call 911?  Yes   Is the patient/caregiver able to teach back the hierarchy of who to call/visit for symptoms/problems? PCP, Specialist, Home health nurse, Urgent Care, ED, 911  Yes   Week 2 call completed?  Yes   Wrap up additional comments  has no problems at this time          Cindy Schilling, RN

## 2021-04-12 ENCOUNTER — TRANSCRIBE ORDERS (OUTPATIENT)
Dept: ADMINISTRATIVE | Facility: HOSPITAL | Age: 61
End: 2021-04-12

## 2021-04-12 DIAGNOSIS — R59.1 LYMPHADENOPATHY: Primary | ICD-10-CM

## 2021-04-16 ENCOUNTER — OFFICE VISIT CONVERTED (OUTPATIENT)
Dept: FAMILY MEDICINE CLINIC | Facility: CLINIC | Age: 61
End: 2021-04-16
Attending: FAMILY MEDICINE

## 2021-04-16 ENCOUNTER — READMISSION MANAGEMENT (OUTPATIENT)
Dept: CALL CENTER | Facility: HOSPITAL | Age: 61
End: 2021-04-16

## 2021-04-16 NOTE — OUTREACH NOTE
Stroke Week 3 Survey      Responses   [facility] patient discharged from?  [place]   Does the patient have one of the following disease processes/diagnoses(primary or secondary)?  Stroke (TIA)   Week 3 attempt successful?  Yes   Call start time  1448   Call end time  1451   Discharge diagnosis  Bilateral posterior cerebral artery embolic stroke   Meds reviewed with patient/caregiver?  Yes   Is the patient taking all medications as directed (includes completed medication regime)?  Yes   Medication comments  PCP added Welbutrin today for smoking cessation.    Has the patient kept scheduled appointments due by today?  Yes   Does the patient require any assistance with activities of daily living such as eating, bathing, dressing, walking, etc.?  No   Does the patient have any residual symptoms from stroke/TIA?  Yes   Residual symptoms comments  tension neck aches. Still has no peripheral vision on right side out of bilat eyes. No dizziness, he feels left arm is weakner than was.    Does the patient understand the diet ordered at discharge?  Yes   What is the patient's perception of their health status since discharge?  Same   If the patient is a current smoker, are they able to teach back resources for cessation?  Smoking cessation medications [pt used to smoke 30cig/day but down to 5cig/day and just starting Wellbutrin today.]   Is the patient/caregiver able to teach back the hierarchy of who to call/visit for symptoms/problems? PCP, Specialist, Home health nurse, Urgent Care, ED, 911  Yes   Week 3 call completed?  Yes   Revoked  No further contact(revokes)-requires comment   Is the patient interested in additional calls from an ambulatory ?  NOTE:  applies to high risk patients requiring additional follow-up.  No   Graduated/Revoked comments  goals met          Cindy Schilling RN

## 2021-04-19 ENCOUNTER — HOSPITAL ENCOUNTER (OUTPATIENT)
Dept: PET IMAGING | Facility: HOSPITAL | Age: 61
Discharge: HOME | End: 2021-04-19

## 2021-04-19 DIAGNOSIS — R59.1 LYMPHADENOPATHY: ICD-10-CM

## 2021-04-19 LAB — GLUCOSE BLDC GLUCOMTR-MCNC: 104 MG/DL (ref 70–130)

## 2021-04-19 PROCEDURE — 82962 GLUCOSE BLOOD TEST: CPT

## 2021-04-19 PROCEDURE — 78815 PET IMAGE W/CT SKULL-THIGH: CPT

## 2021-04-19 PROCEDURE — 0 FLUDEOXYGLUCOSE F18 SOLUTION: Performed by: INTERNAL MEDICINE

## 2021-04-19 PROCEDURE — A9552 F18 FDG: HCPCS | Performed by: INTERNAL MEDICINE

## 2021-04-19 RX ADMIN — FLUDEOXYGLUCOSE F18 1 DOSE: 300 INJECTION INTRAVENOUS at 11:00

## 2021-04-24 PROCEDURE — 93248 EXT ECG>7D<15D REV&INTERPJ: CPT | Performed by: INTERNAL MEDICINE

## 2021-05-03 ENCOUNTER — TRANSCRIBE ORDERS (OUTPATIENT)
Dept: ADMINISTRATIVE | Facility: HOSPITAL | Age: 61
End: 2021-05-03

## 2021-05-03 DIAGNOSIS — R59.0 MEDIASTINAL ADENOPATHY: Primary | ICD-10-CM

## 2021-05-04 ENCOUNTER — OFFICE VISIT (OUTPATIENT)
Dept: CARDIOLOGY | Facility: CLINIC | Age: 61
End: 2021-05-04

## 2021-05-04 VITALS
SYSTOLIC BLOOD PRESSURE: 136 MMHG | HEIGHT: 74 IN | RESPIRATION RATE: 18 BRPM | DIASTOLIC BLOOD PRESSURE: 70 MMHG | WEIGHT: 166 LBS | HEART RATE: 78 BPM | OXYGEN SATURATION: 97 % | BODY MASS INDEX: 21.3 KG/M2

## 2021-05-04 DIAGNOSIS — Z09 HOSPITAL DISCHARGE FOLLOW-UP: Primary | ICD-10-CM

## 2021-05-04 DIAGNOSIS — R59.0 MEDIASTINAL ADENOPATHY: ICD-10-CM

## 2021-05-04 DIAGNOSIS — I63.433 CEREBROVASCULAR ACCIDENT (CVA) DUE TO BILATERAL EMBOLISM OF POSTERIOR CEREBRAL ARTERIES (HCC): ICD-10-CM

## 2021-05-04 PROCEDURE — 99213 OFFICE O/P EST LOW 20 MIN: CPT | Performed by: INTERNAL MEDICINE

## 2021-05-04 RX ORDER — ATORVASTATIN CALCIUM 80 MG/1
80 TABLET, FILM COATED ORAL DAILY
COMMUNITY
End: 2021-10-01

## 2021-05-05 ENCOUNTER — OFFICE VISIT (OUTPATIENT)
Dept: NEUROLOGY | Facility: CLINIC | Age: 61
End: 2021-05-05

## 2021-05-05 VITALS
SYSTOLIC BLOOD PRESSURE: 138 MMHG | OXYGEN SATURATION: 99 % | WEIGHT: 168 LBS | HEIGHT: 74 IN | BODY MASS INDEX: 21.56 KG/M2 | HEART RATE: 88 BPM | DIASTOLIC BLOOD PRESSURE: 82 MMHG

## 2021-05-05 DIAGNOSIS — Z72.0 TOBACCO ABUSE: ICD-10-CM

## 2021-05-05 DIAGNOSIS — Z86.73 RECENT CEREBROVASCULAR ACCIDENT (CVA): Primary | ICD-10-CM

## 2021-05-05 DIAGNOSIS — Q21.12 PFO (PATENT FORAMEN OVALE): Chronic | ICD-10-CM

## 2021-05-05 DIAGNOSIS — E78.5 HYPERLIPIDEMIA LDL GOAL <70: ICD-10-CM

## 2021-05-05 PROCEDURE — 99215 OFFICE O/P EST HI 40 MIN: CPT | Performed by: NURSE PRACTITIONER

## 2021-05-05 NOTE — PROGRESS NOTES
DOS: 2021  NAME: Sathya Benavides   : 1960  PCP: Adi Kaminski MD    Chief Complaint   Patient presents with   • Stroke      SUBJECTIVE  Neurological Problem:  60 y.o. RHW male with stroke, HLD, GERD, tobacco abuse who presents today for stroke follow-up.  He is unaccompanied.    Interval History:   Mr. Benavides presented to [facility] on 3/25/21 after being referred by ophthalmology d/t abnormal MRI brain and right visual field changes. At the time of admission he was not on any antiplatelet or statin. His MRI brain showed old bilateral PCA strokes of different ages, mostly subacute. Vessel imaging with CTA h/n showed evidence of recent left occipital lobe infarct; No flow-limiting stenosis but also noted right hilar and subcarinal adenopathy. F/U CT chest showed RLLnoncalcified nodule and pulmonology was consulted. His cardiac w/u revealed a very small PFO on D[place], no thrombus. BLE Dopplers were negative. Etiology of stroke not definitive, possibilities including PFO, hypercoag state of malignancy or possible arrhythmia. He was empirically treated with OAC, Eliquis and ZIo patch was ordered.    Review of Holter monitor results shows a normal study, no evidence of A. fib.  Since lab work from March admission shows A1c 5.7; TSH 7.040.  He apparently was started on Synthroid but reports from patient indicate this caused severe diarrhea and he is now longer taking this.  Lipid panel with a total of 153, HDL 25, , .  He also underwent a NM PET scan in April, showing hypermetabolic lymphadenopathy and enlarged node near liver, no hypermetabolic pulmonary lesions, surveillance was recommended.  Continues to follow-up with pulmonology.      He is doing well on Eliquis, denies any signs/symptoms of bleeding.  He is followed by cardiology, being considered for PFO closure but awaiting follow-up malignancy work-up.  He denies any new stroke/TIA symptoms.  He continues with right far peripheral visual field  deficit.  He tells me he has not seen ophthalmology since his discharge.  Also reports some left shoulder pain in the past, feels like his left arm is weaker at times.  He denies any radicular symptoms.  No dropping things, no difficulties using his left arm.  No involvement of his leg.  He denies any other changes in his health since his last visit.  He states his BP is well controlled.  He denies any signs or symptoms of sleep apnea.  He continues to smoke, is trying to quit, now smoking about only 6-7 cigarette a day, down from 2 packs a day.  Occasional alcohol.  Denies any changes in relation, no falls, no assistive device.    Review of Systems:Review of Systems   Constitutional: Positive for activity change and appetite change. Negative for fatigue.   HENT: Negative for ear pain, tinnitus and trouble swallowing.    Eyes: Negative for photophobia, pain and visual disturbance.   Respiratory: Negative for cough, chest tightness and shortness of breath.    Cardiovascular: Negative for chest pain, palpitations and leg swelling.   Gastrointestinal: Negative for abdominal pain, nausea and vomiting.   Endocrine: Negative for cold intolerance, heat intolerance and polydipsia.   Musculoskeletal: Negative for back pain, gait problem and neck pain.   Skin: Negative for color change, pallor and rash.   Allergic/Immunologic: Negative for environmental allergies, food allergies and immunocompromised state.   Neurological: Negative for dizziness, tremors, seizures, syncope, facial asymmetry, speech difficulty, weakness, light-headedness, numbness and headaches.   Hematological: Negative for adenopathy. Does not bruise/bleed easily.   Psychiatric/Behavioral: Negative for agitation, behavioral problems, confusion, decreased concentration, dysphoric mood, hallucinations, self-injury, sleep disturbance and suicidal ideas. The patient is not nervous/anxious and is not hyperactive.     Above ROS reviewed    The following portions  of the patient's history were reviewed and updated as appropriate: allergies, current medications, past family history, past medical history, past social history, past surgical history and problem list.    Current Medications:   Current Outpatient Medications:   •  apixaban (ELIQUIS) 5 MG tablet tablet, Take 5 mg by mouth 2 (Two) Times a Day., Disp: , Rfl:   •  atorvastatin (LIPITOR) 80 MG tablet, Take 80 mg by mouth Daily., Disp: , Rfl:   •  lansoprazole (PREVACID) 30 MG capsule, Take 30 mg by mouth Daily., Disp: , Rfl:   •  tamsulosin (FLOMAX) 0.4 MG capsule 24 hr capsule, Take 1 capsule by mouth Daily., Disp: , Rfl:   •  traZODone (DESYREL) 50 MG tablet, Take 50 mg by mouth Every Night., Disp: , Rfl:   •  levothyroxine (SYNTHROID, LEVOTHROID) 25 MCG tablet, Take 1 tablet by mouth Daily for 30 days., Disp: 30 tablet, Rfl: 0  **I did not stop or change the above medications.  Patient's medication list was updated to reflect medications they have reported as currently taking, including medication changes made by other providers.    OBJECTIVE  Vitals:    05/05/21 0749   BP: 138/82   Pulse: 88   SpO2: 99%     Body mass index is 21.56 kg/m².    Diagnostics:  DALLAS 3/29/2021:Interpretation Summary  · Left ventricular ejection fraction appears to be 61 - 65%  · Left ventricular wall thickness is consistent with mild concentric hypertrophy  · Left ventricular diastolic function is consistent with (grade I) impaired relaxation  · No evidence of a left atrial appendage thrombus was present.  · The agitated saline study was consistent with a small to moderate PFO  · The noncoronary leaflet of the aortic valve is heavily calcified and restricted in motion  · There were mild plaques in the descending thoracic aorta. There were moderate plaques in the aortic arch. All plaques were nonmobile.  · There is no evidence of pericardial effusion.     Holter Monitor: Monitor placed on patient by MB on 3/30/2021  at 12:48 EDT.  The  monitor was scanned on 4/23/2021. The patient was monitored for 10 days 5 hours,  Normal Study.  No evidence of Afib.     NM PET scan 4/21/2021: IMPRESSION:  1. The right hilar and mediastinal lymphadenopathy is hypermetabolic and  there is a mildly enlarged node at the joey hepatis which is mildly  hypermetabolic as well. There are no hypermetabolic pulmonary lesions.  Tissue diagnosis should be considered with bronchoscopy.  2. Conservative surveillance is recommended for the photopenic 2.4 cm  right lung base nodule and the asymmetric nodular scarring at the right  apex with a chest CT in 3 months.    Laboratory Results:         Lab Results   Component Value Date    WBC 7.82 03/30/2021    HGB 15.0 03/30/2021    HCT 44.6 03/30/2021    MCV 84.2 03/30/2021     03/30/2021     Lab Results   Component Value Date    GLUCOSE 107 (H) 03/30/2021    BUN 13 03/30/2021    CREATININE 0.82 03/30/2021    EGFRIFNONA 95 03/30/2021    BCR 15.9 03/30/2021    K 4.1 03/30/2021    CO2 24.4 03/30/2021    CALCIUM 8.6 03/30/2021    ALBUMIN 3.50 03/27/2021    LABIL2 1.3 07/26/2019    AST 14 03/27/2021    ALT 10 03/27/2021     Lab Results   Component Value Date    HGBA1C 5.70 (H) 03/26/2021     Lab Results   Component Value Date    CHOL 153 03/27/2021    CHOL 163 03/26/2021     Lab Results   Component Value Date    HDL 25 (L) 03/27/2021    HDL 25 (L) 03/26/2021     Lab Results   Component Value Date     (H) 03/27/2021     (H) 03/26/2021     Lab Results   Component Value Date    TRIG 110 03/27/2021    TRIG 134 03/26/2021     No results found for: RPR  Lab Results   Component Value Date    TSH 7.040 (H) 03/27/2021     No results found for: [id number]    Physical Examination:   General Appearance:   Well developed, thin, well groomed, alert, and cooperative.  HEENT: Normocephalic.    Neck and Spine: Normal range of motion.  Normal alignment. No mass or tenderness.   Cardiac: Regular rate and rhythm.   Peripheral  Vasculature: Radial pulses are equal and symmetric. No signs of distal embolization.  Extremities:    No edema or deformities. Normal joint ROM.  Skin:    No rashes or birth marks.  Psychiatric:    Good mood, normal affect. Thought process normal.    Neurological examination:  Higher Integrative  Function: Oriented to time, place and person. Normal registration, recall (3/3), attention span and concentration. Normal language including comprehension, spontaneous speech, repetition, reading, naming and vocabulary. No neglect. Normal fund of knowledge and higher integrative function.  CN II: Pupils are equal, round, and reactive to light. Grossly normal visual acuity and visual fields, subjective far right peripheral deficit.    CN III IV VI: Extraocular movements are full without nystagmus.   CN V: Normal facial sensation and strength of muscles of mastication.  CN VII: Facial movements are symmetric. No weakness.  CN VIII:   Auditory acuity is normal.  CN IX & X:   Symmetric palatal movement.  CN XI: Sternocleidomastoid and trapezius are normal.  No weakness.  CN XII:   The tongue is midline.  No atrophy or fasciculations.  Motor: Normal muscle strength, bulk and tone in upper and lower extremities.  No fasciculations, rigidity, spasticity, or abnormal movements.  Reflexes: 1+ in the upper and lower extremities.   Sensation: Normal to light touch, vibration, temperature in arms and legs.  Station and Gait: Normal gait and station.    Coordination: Finger to nose test shows no dysmetria.  Heel to shin normal.    Impression: Mr. Benavides presents for follow-up of recent CVA, likely embolic, unclear etiology, possibly PFO versus hypercoag state from possible malignancy, work-up in progress.  He has been maintained on Eliquis plus statin with no recurrent or new stroke/TIA symptoms and essentially normal neurologic exam today.  Recommend continuing the same.  He will continue to follow-up with cardiology, pulmonology.  We  reviewed the signs and symptoms of stroke the importance of calling 911 if you develop any of these.  We reviewed his vascular risk factors and the importance of RF control, strongly encouraged him to quit smoking altogether.  He will follow-up here in 6 months, sooner if symptoms warrant.  He voiced understanding and agrees with above plan.    Plan:    1. Recent b/l PCA strokes, embolic  Continue Eliquis, Lipitor  F/U with PCP for continued cholesterol surveillance.    Monitor BP regularly  Strongly encouraged smoking cessation  Secondary stroke prevention: Ideal targets for stroke prevention would be Blood pressure < 130/80; B12 > 500 TSH in normal range and LDL < 70; HbA1c < 6.5 and smoking cessation if applicable.  Call 911 for stroke symptoms  Follow-up in 6 months    2. Small PFO  Followed by cardiology, considering closure    3. Mediastinal adenopathy   Followed by pulmonology    4. Tobacco abuse  Strongly encouraged smoking cessation.       I spent a total of 55 minutes today in reviewing records, prior diagnostics, examination of patient as well as counseling and educating patient regarding diagnoses, symptoms, reviewing diagnostics with patient, pharmacologic treatment options including purpose, risk, benefits, possible side-effects, recommendations, lifestyle modifications for stroke prevention, signs/symptoms of stroke, coordination of care and documenting plan of care.      There are no diagnoses linked to this encounter.    Coding      Dictated using Dragon

## 2021-05-07 NOTE — PROGRESS NOTES
Progress Note      Patient Name: Sathya Benavides   Patient ID: [id number]   Sex: Male   YOB: 1960        Visit Date: March 29, 2018    Provider: Adi Kaminski MD   Location: [facility]   Location Address: [address] [place] [address]  [id number]   Location Phone: [phone]          Chief Complaint     2 week follow up       History Of Present Illness  Sathya Benavides is a 57 year old /White male who presents for evaluation and treatment of:      pt is getting some better but still having some depression- he says that the cloudy weather has not helped- no SI or HI- pt will be given 1 more week then will reevaluate med- will be increasing Zoloft dose  pt urged to quit smoking       Past Medical History  Disease Name Date Onset Notes   Acid reflux --  --    Anxiety --  --    Depression --  --          Past Surgical History  Procedure Name Date Notes   *Denies any surgical procedures --  --          Medication List  Name Date Started Instructions   cimetidine 300 mg oral tablet 01/11/2018 take 1 tablet by oral route 3 times a day as needed for 30 days acid reflex   Flomax 0.4 mg oral capsule,extended release 24hr 03/01/2018 take 1 capsule by oral route 2 times a day for 30 days   Nicoderm CQ 21 mg/24 hr transdermal patch 24 hour  apply 1 patch (21 mg) by transdermal route once daily   Zoloft 25 mg oral tablet 03/01/2018 take 1 tablet (25 mg) by oral route once daily for 30 days         Allergy List  Allergen Name Date Reaction Notes   Augmentin --  vomiting --          Family Medical History  Disease Name Relative/Age Notes   *No Known Family History / --          Social History  Finding Status Start/Stop Quantity Notes   Alcohol Never --/-- --  --    Tobacco Current every day --/-- 1 ppd smoker x 20 yrs         Immunizations  NameDate Admin Mfg Trade Name Lot Number Route Inj VIS Given VIS Publication   Uhtpfvlru41/17/2017 UNK Unknown TradeName [id number] NE NE  03/15/2018 08/07/2015   Comments:          Review of Systems  · Constitutional  o Denies  o : fatigue, fever  · Cardiovascular  o Denies  o : chest pain, palpitations  · Respiratory  o Denies  o : shortness of breath, cough  · Gastrointestinal  o Denies  o : nausea, vomiting, diarrhea  · Psychiatric  o Admits  o : anxiety, depression  o Denies  o : suicidal ideation, homicidal ideation      Vitals  Date Time BP Position Site L\R Cuff Size HR RR TEMP(F) WT  HT  BMI kg/m2 BSA m2 O2 Sat HC       03/29/2018 10:08 /60 Sitting    99 - R  97 166lbs 0oz    98 %          Physical Examination  · Constitutional  o Appearance  o : well developed, well-nourished, in no acute distress  · Respiratory  o Respiratory Effort  o : breathing unlabored  o Auscultation of Lungs  o : clear to ascultation  · Cardiovascular  o Heart  o :   § Auscultation of Heart  § : regular rate and rhythm  o Peripheral Vascular System  o :   § Extremities  § : no edema  · Musculoskeletal  o General  o :   § General Musculoskeletal  § : No joint swelling or deformity., Muscle tone, strength, and development grossly normal.  · Neurologic  o Gait and Station  o :   § Gait Screening  § : normal gait  · Psychiatric  o Mood and Affect  o : mood normal, affect appropriate          Assessment  · Fatigue     780.79/R53.83  · Anxiety and depression       Other specified anxiety disorders     300.00/F41.8      Plan  · Orders  o TSH [facility] (73256) - 300.00/F41.8, 780.79/R53.83 - 03/29/2018  o ACO-17: Screened for tobacco use AND received tobacco cessation intervention (4004F) - - 03/29/2018  o ACO-39: Current medications updated and reviewed () - - 03/29/2018  o ACO-18: Screening for clinical depression not completed due to current diagnosis of depression or bipolar disorder () - - 03/29/2018  o Influenza immunization was not ordered or administered for reasons documented by clinician () - - 03/29/2018  o Vitamin B-12 (90913) with Folate(47855)  (85747\VF) - 300.00/F41.8, 780.79/R53.83 - 03/29/2018  · Medications  o Zoloft 50 mg oral tablet   SIG: take 1 tablet (50 mg) by oral route once daily for 30 days   DISP: (30) tablets with 1 refills  Adjusted on 03/29/2018     · Instructions  o Handouts were given to patient: anxiety self care  o Patient was educated/instructed on their diagnosis, treatment and medications prior to discharge from the clinic today.            Electronically Signed by: Adi Kaminski MD -Author on March 29, 2018 10:51:20 AM

## 2021-05-07 NOTE — PROGRESS NOTES
Progress Note      Patient Name: Sathya Benavides   Patient ID: [id number]   Sex: Male   YOB: 1960    Primary Care Provider: Nery LUNA    Visit Date: February 11, 2020    Provider: Adi Kaminski MD   Location: [facility]   Location Address: [address] [place] [address] [place]  [address]   Location Phone: [phone]          Chief Complaint     Cough x four days. Getting worse       History Of Present Illness  Sathya Benavides is a 59 year old /White male who presents for evaluation and treatment of:      cough x 4 days- sudden onset- worsening symptoms- otc meds not helping    xrays:NAD       Past Medical History  Disease Name Date Onset Notes   Acid reflux --  --    Anxiety --  --    Depression --  --          Past Surgical History  Procedure Name Date Notes   *Denies any surgical procedures --  --          Medication List  Name Date Started Instructions   Flomax 0.4 mg oral capsule 07/22/2019 TAKE ONE CAPSULE BY MOUTH TWICE A DAY   lansoprazole 30 mg oral capsule,delayed release(/EC) 11/13/2019 take 1 capsule (30 mg) by oral route once daily before a meal for 90 days   trazodone 50 mg oral tablet 04/30/2019 TAKE ONE TABLET BY MOUTH EVERY NIGHT AT BEDTIME FOR 30 DAYS   Wellbutrin  mg oral tablet extended release 24 hr 07/22/2019 take 1 tablet (150 mg) by oral route once daily for 90 days         Allergy List  Allergen Name Date Reaction Notes   Augmentin --  vomiting --          Family Medical History  Disease Name Relative/Age Notes   *No Known Family History  --          Social History  Finding Status Start/Stop Quantity Notes   Alcohol Never --/-- --  --    Tobacco Current every day --/-- 1 ppd smoker x 20 yrs         Immunizations  NameDate Admin Mfg Trade Name Lot Number Route Inj VIS Given VIS Publication   Lvaihgpvl00/17/2017 UNK Unknown TradeName [id number] NE NE 03/15/2018 08/07/2015   Comments:    Tdap05/17/2017 PMC ADACEL [id number] NE NE  03/29/2018 02/24/2015   Comments:          Review of Systems  · Constitutional  o Admits  o : fever, chills, body aches  o Denies  o : fatigue  · HENT  o Admits  o : nasal congestion, nasal discharge, sore throat  · Cardiovascular  o Denies  o : chest pain, palpitations  · Respiratory  o Admits  o : cough  o Denies  o : shortness of breath  · Gastrointestinal  o Denies  o : nausea, vomiting, diarrhea      Vitals  Date Time BP Position Site L\R Cuff Size HR RR TEMP (F) WT  HT  BMI kg/m2 BSA m2 O2 Sat HC       02/11/2020 06:24 /70 Sitting    100 - R  96 160lbs 0oz    95 %          Physical Examination  · Constitutional  o Appearance  o : well developed, well-nourished, in no acute distress  · Head and Face  o HEENT  o : MMM, erythema of throat, uvula midline, throat open, no abscesses seen, TM's bilaterally clear  · Respiratory  o Respiratory Effort  o : breathing unlabored  o Auscultation of Lungs  o : clear to ascultation  · Cardiovascular  o Heart  o :   § Auscultation of Heart  § : regular rate and rhythm  o Peripheral Vascular System  o :   § Extremities  § : no edema  · Gastrointestinal  o Abdomen  o : soft, non-tender, non-distended, + bowel sounds, no hepatosplenomegaly, no masses palpated  · Musculoskeletal  o General  o :   § General Musculoskeletal  § : No joint swelling or deformity., Muscle tone, strength, and development grossly normal.  · Neurologic  o Gait and Station  o :   § Gait Screening  § : normal gait  · Psychiatric  o Mood and Affect  o : mood normal, affect appropriate          Assessment  · Cough     786.2/R05  · Pharyngitis     462/J02.9    Problems Reconciled  Plan  · Orders  o IOP - Rapid Strep (47266) - 786.2/R05, 462/J02.9 - 02/11/2020   STREP- NEG  o ACO-39: Current medications updated and reviewed () - - 02/11/2020  o Xray chest 2 views [facility] Preferred View (09404) - 786.2/R05, 462/J02.9 - 02/11/2020  o IOP - Influenza A/B Test (81601) - 786.2/R05, 462/J02.9 - 02/11/2020   FLU  A-NEG FLU B-NEG  · Medications  o cefdinir 300 mg oral capsule   SIG: take 1 capsule (300 mg) by oral route every 12 hours for 7 days   DISP: (14) capsules with 0 refills  Prescribed on 02/11/2020     o Probiotic 20 billion cell oral capsule   SIG: take 1 capsule by oral route 2 times a day for 10 days   DISP: (20) capsules with 0 refills  Prescribed on 02/11/2020     o prednisone 20 mg oral tablet   SIG: take 3 tablets by oral route daily for 5 days   DISP: (15) tablets with 0 refills  Prescribed on 02/11/2020     o Medications have been Reconciled  o Transition of Care or Provider Policy  · Instructions  o Patient was educated/instructed on their diagnosis, treatment and medications prior to discharge from the clinic today.            Electronically Signed by: Adi Kaminski MD -Author on February 13, 2020 04:49:12 PM

## 2021-05-07 NOTE — PROGRESS NOTES
Progress Note      Patient Name: Sathya Benavides   Patient ID: [id number]   Sex: Male   YOB: 1960    Primary Care Provider: Nery ULNA   Referring Provider: Nery LUNA    Visit Date: February 13, 2020    Provider: Adi Kaminski MD   Location: [facility]   Location Address: [address] [place] [address] [place]  [address]   Location Phone: [phone]          Chief Complaint     Follow up on depression screening and recent cxr results.       History Of Present Illness  Sathya Benavides is a 59 year old /White male who presents for evaluation and treatment of:      discussed xrays- will get chest CT- pt agrees    pt has flare-up of depression- has multiple family issues going- friend has cancer- son getting divorce- pt having a lot of stress and anxiety- no SI or HI- restarted Wellbutrin and trazodone for sleep    urged pt to quit smoking    pt will be off work until 2/24/2020       Past Medical History  Disease Name Date Onset Notes   Acid reflux --  --    Anxiety --  --    Depression --  --          Past Surgical History  Procedure Name Date Notes   *Denies any surgical procedures --  --          Medication List  Name Date Started Instructions   cefdinir 300 mg oral capsule 02/11/2020 take 1 capsule (300 mg) by oral route every 12 hours for 7 days   Flomax 0.4 mg oral capsule 07/22/2019 TAKE ONE CAPSULE BY MOUTH TWICE A DAY   lansoprazole 30 mg oral capsule,delayed release(DR/EC) 11/13/2019 take 1 capsule (30 mg) by oral route once daily before a meal for 90 days   prednisone 20 mg oral tablet 02/11/2020 take 3 tablets by oral route daily for 5 days   Probiotic 20 billion cell oral capsule 02/11/2020 take 1 capsule by oral route 2 times a day for 10 days   trazodone 50 mg oral tablet 02/13/2020 TAKE ONE TABLET BY MOUTH EVERY NIGHT AT BEDTIME FOR 30 DAYS   Wellbutrin  mg oral tablet extended release 24 hr 02/13/2020 take 1 tablet (150 mg) by  oral route once daily for 90 days         Allergy List  Allergen Name Date Reaction Notes   Augmentin --  vomiting --          Family Medical History  Disease Name Relative/Age Notes   *No Known Family History  --          Social History  Finding Status Start/Stop Quantity Notes   Alcohol Never --/-- --  --    Tobacco Current every day --/-- 1 ppd smoker x 20 yrs         Immunizations  NameDate Admin Mfg Trade Name Lot Number Route Inj VIS Given VIS Publication   Qcpdocgyf97/17/2017 UNK Unknown TradeName [id number] NE NE 03/15/2018 08/07/2015   Comments:    Tdap05/17/2017 PMC ADACEL [id number] NE NE 03/29/2018 02/24/2015   Comments:          Review of Systems  · Constitutional  o Admits  o : fatigue  o Denies  o : fever  · Cardiovascular  o Denies  o : chest pain, palpitations  · Respiratory  o Denies  o : shortness of breath, cough  · Gastrointestinal  o Denies  o : nausea, vomiting, diarrhea  · Psychiatric  o Admits  o : anxiety, depression  o Denies  o : suicidal ideation, homicidal ideation      Vitals  Date Time BP Position Site L\R Cuff Size HR RR TEMP (F) WT  HT  BMI kg/m2 BSA m2 O2 Sat        02/13/2020 04:19 /60 Sitting    90 - R  97.6 160lbs 0oz    98 %          Physical Examination  · Constitutional  o Appearance  o : well developed, well-nourished, in no acute distress  · Respiratory  o Respiratory Effort  o : breathing unlabored  o Auscultation of Lungs  o : clear to ascultation  · Cardiovascular  o Heart  o :   § Auscultation of Heart  § : regular rate and rhythm  o Peripheral Vascular System  o :   § Extremities  § : no edema  · Gastrointestinal  o Abdomen  o : soft, non-tender, non-distended, + bowel sounds, no hepatosplenomegaly, no masses palpated  · Musculoskeletal  o General  o :   § General Musculoskeletal  § : No joint swelling or deformity., Muscle tone, strength, and development grossly normal.  · Neurologic  o Gait and Station  o :   § Gait Screening  § : normal gait  · Psychiatric  o Mood  and Affect  o : mood normal, affect appropriate          Assessment  · Essential hypertension     401.9/I10  · Fatigue     780.79/R53.83  · Anxiety and depression       Anxiety disorder, unspecified     300.00/F41.9  Major depressive disorder, single episode, unspecified     300.00/F32.9    Problems Reconciled  Plan  · Orders  o Thyroid Profile (THYII) - 780.79/R53.83 - 02/13/2020  o EBV antibody panel (61076, 33098, 66864) - 780.79/R53.83 - 02/13/2020  o B12 Folate levels (B12FO) - 780.79/R53.83 - 02/13/2020  o CBC with Auto Diff [facility] (50807) - 300.00/F41.9, 300.00/F32.9, 401.9/I10, 780.79/R53.83 - 02/13/2020  o CMP [facility] (27874) - 300.00/F41.9, 300.00/F32.9, 401.9/I10, 780.79/R53.83 - 02/13/2020  o Lipid Panel [facility] (24506) - 300.00/F41.9, 300.00/F32.9, 401.9/I10, 780.79/R53.83 - 02/13/2020  o ACO-17: Screened for tobacco use AND received tobacco cessation intervention (4004F) - - 02/13/2020  o ACO-39: Current medications updated and reviewed () - - 02/13/2020  · Medications  o lisinopril 10 mg oral tablet   SIG: take 1 tablet (10 mg) by oral route once daily for 30 days   DISP: (30) tablets with 5 refills  Prescribed on 02/13/2020     o trazodone 50 mg oral tablet   SIG: TAKE ONE TABLET BY MOUTH EVERY NIGHT AT BEDTIME FOR 30 DAYS   DISP: (30) Tablet with 5 refills  Adjusted on 02/13/2020     o Wellbutrin  mg oral tablet extended release 24 hr   SIG: take 1 tablet (150 mg) by oral route once daily for 90 days   DISP: (90) tablets with 1 refills  Adjusted on 02/13/2020     o Medications have been Reconciled  o Transition of Care or Provider Policy  · Instructions  o Patient was educated/instructed on their diagnosis, treatment and medications prior to discharge from the clinic today.            Electronically Signed by: Adi Kaminski MD -Author on February 13, 2020 05:11:19 PM

## 2021-05-07 NOTE — PROGRESS NOTES
Progress Note      Patient Name: Sathya Benavides   Patient ID: [id number]   Sex: Male   YOB: 1960    Primary Care Provider: Nery LUNA   Referring Provider: Nery LUNA    Visit Date: January 29, 2019    Provider: Adi Kaminski MD   Location: [facility]   Location Address: [address] [place] [address]  [id number]   Location Phone: [phone]          Chief Complaint     still does not feel like he can go back to work after having pneumonia two weeks ago.       History Of Present Illness  Sathya Benavides is a 58 year old /White male who presents for evaluation and treatment of:      symptoms began on 1/16/19- had worsening symptoms- seen in ER [facility]- diagnosed with pneumonia- has had Levaquin antibiotic- still having fevers and cough and fatigue and weakness and pneumonia symptoms- pt does not feel that he can return to work yet as he is still having episodes of breathing difficulties    xrays: right apical nodularity, no infiltrate seen now       Past Medical History  Disease Name Date Onset Notes   Acid reflux --  --    Anxiety --  --    Depression --  --          Past Surgical History  Procedure Name Date Notes   *Denies any surgical procedures --  --          Medication List  Name Date Started Instructions   albuterol sulfate 0.63 mg/3 mL inhalation solution for nebulization 01/24/2019 use in nebulizer as directed every 4 hours as needed   Chantix Starting Month Box 0.5 mg (11)- 1 mg (42) oral tablets,dose pack 01/24/2019 take as directed for 30 days   Flomax 0.4 mg oral capsule 01/24/2019 TAKE ONE CAPSULE BY MOUTH TWICE A DAY   Nicoderm CQ 21 mg/24 hr transdermal patch 24 hour  apply 1 patch (21 mg) by transdermal route once daily   Tagamet  mg oral tablet 07/17/2018 take 1 tablet (200 mg) by oral route 2 times per day 30 minutes before meals for 30 days   trazodone 50 mg oral tablet 01/24/2019 TAKE ONE TABLET BY MOUTH EVERY  "NIGHT AT BEDTIME FOR 30 DAYS         Allergy List  Allergen Name Date Reaction Notes   Augmentin --  vomiting --          Family Medical History  Disease Name Relative/Age Notes   *No Known Family History / --          Social History  Finding Status Start/Stop Quantity Notes   Alcohol Never --/-- --  --    Tobacco Current every day --/-- 1 ppd smoker x 20 yrs         Immunizations  NameDate Admin Mfg Trade Name Lot Number Route Inj VIS Given VIS Publication   Ximzibxfj24/17/2017 UNK Unknown TradeName [id number] NE NE 03/15/2018 08/07/2015   Comments:    Tdap05/17/2017 PMC ADACEL [id number] NE NE 03/29/2018 02/24/2015   Comments:          Review of Systems  · Constitutional  o Admits  o : fatigue, fever, body aches  · HENT  o Admits  o : nasal congestion, nasal discharge, sore throat  · Cardiovascular  o Denies  o : chest pain, palpitations  · Respiratory  o Admits  o : cough  o Denies  o : shortness of breath  · Gastrointestinal  o Denies  o : nausea, vomiting, diarrhea      Vitals  Date Time BP Position Site L\R Cuff Size HR RR TEMP(F) WT  HT  BMI kg/m2 BSA m2 O2 Sat        01/29/2019 02:22 /84 Sitting    96 - R  97.3 163lbs 0oz 6'  2\" 20.93 1.96 97 %           Physical Examination  · Constitutional  o Appearance  o : well developed, well-nourished, in no acute distress  · Head and Face  o HEENT  o : erythema of throat, uvula midline, throat open, no abscesses seen  · Respiratory  o Respiratory Effort  o : breathing unlabored  o Auscultation of Lungs  o : clear to ascultation  · Cardiovascular  o Heart  o :   § Auscultation of Heart  § : regular rate and rhythm  o Peripheral Vascular System  o :   § Extremities  § : no edema  · Gastrointestinal  o Abdomen  o : soft, non-tender, non-distended, + bowel sounds, no hepatosplenomegaly, no masses palpated  · Musculoskeletal  o General  o :   § General Musculoskeletal  § : No joint swelling or deformity., Muscle tone, strength, and development grossly " normal.  · Neurologic  o Gait and Station  o :   § Gait Screening  § : normal gait  · Psychiatric  o Mood and Affect  o : mood normal, affect appropriate          Assessment  · Pneumonia     486/J18.9      Plan  · Orders  o IOP - CBC (79966) - 486/J18.9 - 01/29/2019  o CMP [facility] (43337) - 486/J18.9 - 01/29/2019  o ACO-39: Current medications updated and reviewed () - - 01/29/2019  o Xray chest 2 views [facility] Preferred View (26003) - 486/J18.9 - 01/29/2019  o Magnesium level (71899) - 486/J18.9 - 01/29/2019  o IOP - Influenza A/B Test (80990) - 486/J18.9 - 01/29/2019  o Pulmonology Consultation (26) - 486/J18.9 - 01/29/2019   pt has possible nodule at right apex of lung seen on chest x-ray today- need eval and tx  · Medications  o azithromycin 250 mg oral tablet   SIG: take 2 tablets (500 mg) by oral route once daily for 1 day then 1 tablet (250 mg) by oral route once daily for 4 days   DISP: (6) tablets with 0 refills  Prescribed on 01/29/2019     o prednisone 10 mg oral tablet   SIG: take 4 tabs PO daily x 2 days, then 3 tabs daily x 2 days then 2 tabs daily x 2 days then 1 tab daily x 2 days   DISP: (20) tablets with 0 refills  Prescribed on 01/29/2019     · Instructions  o Patient was educated/instructed on their diagnosis, treatment and medications prior to discharge from the clinic today.            Electronically Signed by: Adi Kaminski MD -Author on January 29, 2019 04:04:54 PM

## 2021-05-07 NOTE — PROGRESS NOTES
Progress Note      Patient Name: Sathya Benavides   Patient ID: [id number]   Sex: Male   YOB: 1960        Visit Date: June 4, 2018    Provider: Adi Kaminski MD   Location: [facility]   Location Address: [address] [place] [address]  [id number]   Location Phone: [phone]          Chief Complaint     follow up on depression       History Of Present Illness  Sathya Benavides is a 57 year old /White male who presents for evaluation and treatment of:      Pt doing better with depression now- job going well- Zoloft helping well  need FMLA filled out  pt wants to take Chantix for smoking cessation- pt understands risk of SI and depression and will take risk- pt smokes 1ppd- pt has no h/o seizures       Past Medical History  Disease Name Date Onset Notes   Acid reflux --  --    Anxiety --  --    Depression --  --          Past Surgical History  Procedure Name Date Notes   *Denies any surgical procedures --  --          Medication List  Name Date Started Instructions   cimetidine 300 mg oral tablet 01/11/2018 take 1 tablet by oral route 3 times a day as needed for 30 days acid reflex   Flomax 0.4 mg oral capsule,extended release 24hr 03/01/2018 take 1 capsule by oral route 2 times a day for 30 days   Nicoderm CQ 21 mg/24 hr transdermal patch 24 hour  apply 1 patch (21 mg) by transdermal route once daily   Zoloft 50 mg oral tablet 03/29/2018 take 1 tablet (50 mg) by oral route once daily for 30 days         Allergy List  Allergen Name Date Reaction Notes   Augmentin --  vomiting --          Family Medical History  Disease Name Relative/Age Notes   *No Known Family History / --          Social History  Finding Status Start/Stop Quantity Notes   Alcohol Never --/-- --  --    Tobacco Current every day --/-- 1 ppd smoker x 20 yrs         Immunizations  NameDate Admin Mfg Trade Name Lot Number Route Inj VIS Given VIS Publication   [id number]/17/2017 UNK Unknown TradeName  [id number] NE NE 03/15/2018 08/07/2015   Comments:    Tdap05/17/2017 [facility] ADACEL [id number] NE NE 03/29/2018 02/24/2015   Comments:          Review of Systems  · Constitutional  o Denies  o : fatigue, fever  · Cardiovascular  o Denies  o : chest pain, palpitations  · Respiratory  o Denies  o : shortness of breath, cough  · Gastrointestinal  o Denies  o : nausea, vomiting, diarrhea  · Psychiatric  o Denies  o : anxiety, depression      Vitals  Date Time BP Position Site L\R Cuff Size HR RR TEMP(F) WT  HT  BMI kg/m2 BSA m2 O2 Sat        06/04/2018 03:03 /60 Sitting    87 - R  97.6 165lbs 4oz    98 %           Physical Examination  · Constitutional  o Appearance  o : well developed, well-nourished, in no acute distress  · Respiratory  o Respiratory Effort  o : breathing unlabored  o Auscultation of Lungs  o : clear to ascultation  · Cardiovascular  o Heart  o :   § Auscultation of Heart  § : regular rate and rhythm  · Gastrointestinal  o Abdomen  o : soft, non-tender, non-distended, + bowel sounds, no hepatosplenomegaly, no masses palpated  · Musculoskeletal  o General  o :   § General Musculoskeletal  § : No joint swelling or deformity., Muscle tone, strength, and development grossly normal.  · Neurologic  o Mental Status Examination  o :   § Orientation  § : grossly oriented to person, place and time  o Gait and Station  o :   § Gait Screening  § : normal gait          Assessment  · Tobacco abuse counseling       Tobacco abuse counseling     V65.42/Z71.6  · Anxiety and depression       Anxiety disorder, unspecified     300.00/F41.9  Major depressive disorder, single episode, unspecified     300.00/F32.9      Plan  · Orders  o Smoking cessation counseling, 3-10 minutes [facility] ([address]) - V65.42/Z71.6 - 06/04/2018  o ACO-17: Screened for tobacco use AND received tobacco cessation intervention (4004F) - V65.42/Z71.6 - 06/04/2018  o ACO-39: Current medications updated and reviewed () - -  06/04/2018  · Medications  o Chantix Starting Month Box 0.5 mg (11)- 1 mg (42) oral tablets,dose pack   SIG: take as directed for 30 days   DISP: (1) 53 ct dose-pack with 0 refills  Prescribed on 06/04/2018     · Instructions  o Tobacco and smoking cessation counseling for more than 3 minutes was completed.  o Patient was educated/instructed on their diagnosis, treatment and medications prior to discharge from the clinic today.            Electronically Signed by: Adi Kaminski MD -Author on June 4, 2018 06:04:33 PM

## 2021-05-07 NOTE — PROGRESS NOTES
Progress Note      Patient Name: Sathya Benavides   Patient ID: [id number]   Sex: Male   YOB: 1960    Primary Care Provider: Nery LUNA   Referring Provider: Nery LUNA    Visit Date: February 24, 2020    Provider: Adi Kaminski MD   Location: [facility]   Location Address: [address] [place] [address] [place]  [address]   Location Phone: [phone]          Chief Complaint     Follow up on labs  and CT results.       History Of Present Illness  Sathya Benavides is a 59 year old /White male who presents for evaluation and treatment of:      discussed labs and CT  hyperlipidemia- uncontrolled- counseled on diet and exercise    depression/anxiety- getting better but not ready for work- pt still having trouble sleeping and focusing- pt says that he is hoping to be ready to work in 1 week- flare-up is getting better- no SI or HI- will give phone numbers for psychiatry for counseling for future flare-ups    pt will be off 1 more week.       Past Medical History  Disease Name Date Onset Notes   Acid reflux --  --    Anxiety --  --    Depression --  --          Past Surgical History  Procedure Name Date Notes   *Denies any surgical procedures --  --          Medication List  Name Date Started Instructions   cefdinir 300 mg oral capsule 02/11/2020 take 1 capsule (300 mg) by oral route every 12 hours for 7 days   Flomax 0.4 mg oral capsule 07/22/2019 TAKE ONE CAPSULE BY MOUTH TWICE A DAY   lansoprazole 30 mg oral capsule,delayed release(/EC) 11/13/2019 take 1 capsule (30 mg) by oral route once daily before a meal for 90 days   lisinopril 10 mg oral tablet 02/13/2020 take 1 tablet (10 mg) by oral route once daily for 30 days   prednisone 20 mg oral tablet 02/11/2020 take 3 tablets by oral route daily for 5 days   Probiotic 20 billion cell oral capsule 02/11/2020 take 1 capsule by oral route 2 times a day for 10 days   trazodone 50 mg oral tablet 02/13/2020  TAKE ONE TABLET BY MOUTH EVERY NIGHT AT BEDTIME FOR 30 DAYS   Wellbutrin  mg oral tablet extended release 24 hr 02/13/2020 take 1 tablet (150 mg) by oral route once daily for 90 days         Allergy List  Allergen Name Date Reaction Notes   Augmentin --  vomiting --          Family Medical History  Disease Name Relative/Age Notes   *No Known Family History  --          Social History  Finding Status Start/Stop Quantity Notes   Alcohol Never --/-- --  --    Tobacco Current every day --/-- 1 ppd smoker x 20 yrs         Immunizations  NameDate Admin Mfg Trade Name Lot Number Route Inj VIS Given VIS Publication   [id number]/17/2017 UNK Unknown TradeName [id number] NE NE 03/15/2018 08/07/2015   Comments:    Tdap05/17/2017 [facility] ADACEL [id number] NE NE 03/29/2018 02/24/2015   Comments:          Review of Systems  · Constitutional  o Denies  o : fatigue, fever  · Cardiovascular  o Denies  o : chest pain, palpitations  · Respiratory  o Denies  o : shortness of breath, cough  · Gastrointestinal  o Denies  o : nausea, vomiting, diarrhea  · Psychiatric  o Admits  o : anxiety, depression, difficulty sleeping  o Denies  o : suicidal ideation, homicidal ideation      Vitals  Date Time BP Position Site L\R Cuff Size HR RR TEMP (F) WT  HT  BMI kg/m2 BSA m2 O2 Sat        02/24/2020 01:44 /70 Sitting    102 - R  98.6 168lbs 8oz    97 %          Physical Examination  · Constitutional  o Appearance  o : well developed, well-nourished, in no acute distress  · Head and Face  o HEENT  o : Unremarkable  · Respiratory  o Respiratory Effort  o : breathing unlabored  o Auscultation of Lungs  o : clear to ascultation  · Cardiovascular  o Heart  o :   § Auscultation of Heart  § : regular rate and rhythm  o Peripheral Vascular System  o :   § Extremities  § : no edema  · Gastrointestinal  o Abdomen  o : soft, non-tender, non-distended, + bowel sounds, no hepatosplenomegaly, no masses palpated  · Musculoskeletal  o General  o :   § General  Musculoskeletal  § : No joint swelling or deformity., Muscle tone, strength, and development grossly normal.  · Neurologic  o Gait and Station  o :   § Gait Screening  § : normal gait  · Psychiatric  o Mood and Affect  o : mood normal, affect appropriate          Assessment  · Hyperlipidemia     272.4/E78.5  · Screening PSA (prostate specific antigen)     V76.44/Z12.5  · Anxiety and depression       Anxiety disorder, unspecified     300.00/F41.9  Major depressive disorder, single episode, unspecified     300.00/F32.9    Problems Reconciled  Plan  · Orders  o ACO-18: Positive screen for clinical depression using a standardized tool and a follow-up plan documented () - - 02/24/2020  o ACO-39: Current medications updated and reviewed () - - 02/24/2020  o PSA Ultrasensitive, ANNUAL SCREENING [facility] (04013) - V76.44/Z12.5 - 02/24/2020  · Medications  o Medications have been Reconciled  o Transition of Care or Provider Policy  · Instructions  o Advised that cheeses and other sources of dairy fats, animal fats, fast food, and the extras (candy, pasteries, pies, doughnuts and cookies) all contain LDL raising nutrients. Advised to increase fruits, vegetables, whole grains, and to monitor portion sizes.   o Patient was educated/instructed on their diagnosis, treatment and medications prior to discharge from the clinic today.            Electronically Signed by: Adi Kaminski MD -Author on February 24, 2020 02:09:28 PM

## 2021-05-07 NOTE — PROGRESS NOTES
Progress Note      Patient Name: Sathya Benavides   Patient ID: [id number]   Sex: Male   YOB: 1960    Primary Care Provider: Nery LUNA   Referring Provider: Nery LUNA    Visit Date: March 2, 2020    Provider: Adi Kaminski MD   Location: [facility]   Location Address: [address] [place] [address] [place]  [address]   Location Phone: [phone]          Chief Complaint     PATIENT IS HERE OR A FOLLOW UP APT.       History Of Present Illness  Sathya Benavides is a 59 year old /White male who presents for evaluation and treatment of:      following up for depression- pt getting better- taking meds and pt says symptoms have almost resolved- no SI or HI       Past Medical History  Disease Name Date Onset Notes   Acid reflux --  --    Anxiety --  --    Depression --  --          Past Surgical History  Procedure Name Date Notes   *Denies any surgical procedures --  --          Medication List  Name Date Started Instructions   cefdinir 300 mg oral capsule 02/11/2020 take 1 capsule (300 mg) by oral route every 12 hours for 7 days   Flomax 0.4 mg oral capsule 07/22/2019 TAKE ONE CAPSULE BY MOUTH TWICE A DAY   lansoprazole 30 mg oral capsule,delayed release(DR/EC) 11/13/2019 take 1 capsule (30 mg) by oral route once daily before a meal for 90 days   lisinopril 10 mg oral tablet 02/13/2020 take 1 tablet (10 mg) by oral route once daily for 30 days   prednisone 20 mg oral tablet 02/11/2020 take 3 tablets by oral route daily for 5 days   Probiotic 20 billion cell oral capsule 02/11/2020 take 1 capsule by oral route 2 times a day for 10 days   Tessalon Perles 100 mg oral capsule 02/24/2020 take 1 capsule (100 mg) by oral route 3 times per day as needed for cough for 10 days   trazodone 50 mg oral tablet 02/13/2020 TAKE ONE TABLET BY MOUTH EVERY NIGHT AT BEDTIME FOR 30 DAYS   Wellbutrin  mg oral tablet extended release 24 hr 02/13/2020 take 1 tablet (150 mg)  "by oral route once daily for 90 days         Allergy List  Allergen Name Date Reaction Notes   Augmentin --  vomiting --          Family Medical History  Disease Name Relative/Age Notes   *No Known Family History  --          Social History  Finding Status Start/Stop Quantity Notes   Alcohol Never --/-- --  --    Tobacco Current every day --/-- 1 ppd smoker x 20 yrs         Immunizations  NameDate Admin Mfg Trade Name Lot Number Route Inj VIS Given VIS Publication   [id number]/17/2017 UNK Unknown TradeName [id number] NE NE 03/15/2018 08/07/2015   Comments:    Tdap05/17/2017 [facility] ADACEL [id number] NE NE 03/29/2018 02/24/2015   Comments:          Review of Systems  · Constitutional  o Denies  o : fatigue, fever  · Cardiovascular  o Denies  o : chest pain, palpitations  · Respiratory  o Denies  o : shortness of breath, cough  · Psychiatric  o Denies  o : anxiety, depression, difficulty sleeping, suicidal ideation, homicidal ideation      Vitals  Date Time BP Position Site L\R Cuff Size HR RR TEMP (F) WT  HT  BMI kg/m2 BSA m2 O2 Sat        03/02/2020 01:13 /78 Sitting    90 - R  98.2 166lbs 8oz 6'  2\" 21.38 1.99 97 %          Physical Examination  · Constitutional  o Appearance  o : well developed, well-nourished, in no acute distress  · Respiratory  o Respiratory Effort  o : breathing unlabored  o Auscultation of Lungs  o : clear to ascultation  · Cardiovascular  o Heart  o :   § Auscultation of Heart  § : regular rate and rhythm  o Peripheral Vascular System  o :   § Extremities  § : no edema  · Musculoskeletal  o General  o :   § General Musculoskeletal  § : No joint swelling or deformity., Muscle tone, strength, and development grossly normal.  · Neurologic  o Gait and Station  o :   § Gait Screening  § : normal gait  · Psychiatric  o Mood and Affect  o : mood normal, affect appropriate          Assessment  · Anxiety and depression       Anxiety disorder, unspecified     300.00/F41.9  Major depressive disorder, " single episode, unspecified     300.00/F32.9    Problems Reconciled  Plan  · Orders  o ACO-39: Current medications updated and reviewed () - - 03/02/2020  · Medications  o Medications have been Reconciled  o Transition of Care or Provider Policy  · Instructions  o Patient was educated/instructed on their diagnosis, treatment and medications prior to discharge from the clinic today.  o Pt ready to return to work on 3/3/2020.            Electronically Signed by: Adi Kaminski MD -Author on March 2, 2020 01:47:34 PM

## 2021-05-07 NOTE — PROGRESS NOTES
Progress Note      Patient Name: Sathya Benavides   Patient ID: [id number]   Sex: Male   YOB: 1960    Primary Care Provider: Nery LUNA   Referring Provider: Nery LUNA    Visit Date: August 4, 2020    Provider: Adi Kaminski MD   Location: [facility]   Location Address: [address] [place] [address] [place]  [address]   Location Phone: [phone]          Chief Complaint     3 WEEK MEDICINE FOLLOW UP       History Of Present Illness  Sathya Benavides is a 60 year old /White male who presents for evaluation and treatment of:      pt seeing psychiatry and is off until 8/10/2020  pt thinking about going back to work  pt feeling better- anxiety/depression getting better  pt has rash on back that is worsened during the summer and goes away in fall- has had for a couple yrs- sudden onset- continuing symptoms       Past Medical History  Disease Name Date Onset Notes   Acid reflux --  --    Anxiety --  --    Depression --  --          Past Surgical History  Procedure Name Date Notes   *Denies any surgical procedures --  --          Medication List  Name Date Started Instructions   buspirone 10 mg oral tablet 07/14/2020 take 1 tablet (10 mg) by oral route 2 times per day for 30 days   Flomax 0.4 mg oral capsule 04/07/2020 TAKE ONE CAPSULE BY MOUTH TWICE A DAY   lansoprazole 30 mg oral capsule,delayed release(/EC) 04/07/2020 take 1 capsule (30 mg) by oral route once daily before a meal for 90 days   Wellbutrin  mg oral tablet extended release 24 hr 04/07/2020 take 1 tablet (150 mg) by oral route once daily for 90 days         Allergy List  Allergen Name Date Reaction Notes   Augmentin --  vomiting --          Family Medical History  Disease Name Relative/Age Notes   *No Known Family History  --          Social History  Finding Status Start/Stop Quantity Notes   Alcohol Never --/-- --  --    Tobacco Current every day --/-- 1 ppd smoker x 20 yrs          Immunizations  NameDate Admin Mfg Trade Name Lot Number Route Inj VIS Given VIS Publication   [id number]/17/2017 UNK Unknown TradeName [id number] NE NE 03/15/2018 08/07/2015   Comments:    Tdap05/17/2017 [facility] ADACEL [id number] NE NE 03/29/2018 02/24/2015   Comments:          Review of Systems  · Constitutional  o Denies  o : fatigue, fever  · Cardiovascular  o Denies  o : chest pain, palpitations  · Respiratory  o Denies  o : shortness of breath, cough  · Gastrointestinal  o Denies  o : nausea, vomiting, diarrhea  · Integument  o Admits  o : rash, itching  · Psychiatric  o Admits  o : anxiety, depression  o Denies  o : suicidal ideation, homicidal ideation      Vitals  Date Time BP Position Site L\R Cuff Size HR RR TEMP (F) WT  HT  BMI kg/m2 BSA m2 O2 Sat HC       08/04/2020 12:46 /60 Sitting    99 - R  98.4 165lbs 6oz    96 %          Physical Examination  · Constitutional  o Appearance  o : well developed, well-nourished, in no acute distress  · Respiratory  o Respiratory Effort  o : breathing unlabored  o Auscultation of Lungs  o : clear to ascultation  · Cardiovascular  o Heart  o :   § Auscultation of Heart  § : regular rate and rhythm  o Peripheral Vascular System  o :   § Extremities  § : no edema  · Gastrointestinal  o Abdomen  o : soft, non-tender, non-distended, + bowel sounds, no hepatosplenomegaly, no masses palpated  · Musculoskeletal  o General  o :   § General Musculoskeletal  § : No joint swelling or deformity., Muscle tone, strength, and development grossly normal.  · Skin and Subcutaneous Tissue  o General Inspection  o : left midback 5cm macular rash with raised borders circular  · Neurologic  o Gait and Station  o :   § Gait Screening  § : normal gait  · Psychiatric  o Mood and Affect  o : mood normal, affect appropriate          Assessment  · Anxiety     300.02/F41.1  · Depression     311/F32.9  · Tinea corporis     110.5/B35.4      Plan  · Orders  o ACO-39: Current medications updated  and reviewed () - - 08/04/2020  o DERMATOLOGY CONSULTATION (DERMA) - 110.5/B35.4 - 08/04/2020  · Medications  o ketoconazole 2 % topical cream   SIG: apply to the affected area(s) by topical route 2 times per day for 4 weeks   DISP: (1) 60 gm tube with 1 refills  Prescribed on 08/04/2020     o Medications have been Reconciled  o Transition of Care or Provider Policy  · Instructions  o Patient was educated/instructed on their diagnosis, treatment and medications prior to discharge from the clinic today.            Electronically Signed by: Adi Kaminski MD -Author on August 4, 2020 01:23:35 PM

## 2021-05-07 NOTE — PROGRESS NOTES
Progress Note      Patient Name: Sathya Benavides   Patient ID: [id number]   Sex: Male   YOB: 1960        Visit Date: 2018    Provider: Adi Kaminski MD   Location: [facility]   Location Address: [address] [place] [address]  [id number]   Location Phone: [phone]          Chief Complaint     Did not take Flomax x 3 days now having trouble urinating       History Of Present Illness  Sathya Benavides is a 57 year old /White male who presents for evaluation and treatment of:      pt having more problems with urinating x 3 days- pt back on Flomax but still having problems- pt had some prostate pain a little week ago- no pain currently    pt having more depression- pt lost best friend- he  after CABG procedure 1 week ago- pt having a lot of depression, decreased appetite- trouble focusing, fatigue    pt will be given 30 days off from work for his depression until pt can get life back in order    gave pt psyche numbers to call if he has any emergencies       Past Medical History  Disease Name Date Onset Notes   Acid reflux --  --    Anxiety --  --    Depression --  --          Past Surgical History  Procedure Name Date Notes   *Denies any surgical procedures --  --          Medication List  Name Date Started Instructions   cefdinir 300 mg oral capsule 2017 take 1 capsule (300 mg) by oral route every 12 hours for 7 days   cimetidine 300 mg oral tablet 2018 take 1 tablet by oral route 3 times a day as needed for 30 days acid reflex   Flomax 0.4 mg oral capsule,extended release 24hr 2018 TAKE ONE CAPSULE BY MOUTH DAILY 1/2 AN HOUR FOLLOWING THE SAME MEAL EACH DAY FOR 30 DAYS   Nicoderm CQ 21 mg/24 hr transdermal patch 24 hour  apply 1 patch (21 mg) by transdermal route once daily   Probiotic 20 billion cell oral capsule 2017 take 1 capsule by oral route 2 times a day for 10 days   trazodone 50 mg oral tablet 2017 take 1 tablet  (50 mg) by oral route once daily at bedtime for 30 days   Zofran (as hydrochloride) 4 mg oral tablet 12/05/2017 take 1 tablet by oral route 4 times a day as needed for 7 days nausea   Zyban 150 mg oral tablet extended release 12 hr 11/07/2017 take 1 tablet (150 mg) by oral route 2 times per day for 30 days         Allergy List  Allergen Name Date Reaction Notes   Augmentin --  vomiting --          Family Medical History  Disease Name Relative/Age Notes   *No Known Family History / --          Social History  Finding Status Start/Stop Quantity Notes   Alcohol Never --/-- --  --    Tobacco Current every day --/-- 1 ppd smoker x 20 yrs         Review of Systems  · Constitutional  o Admits  o : fatigue  o Denies  o : fever  · Cardiovascular  o Denies  o : chest pain, palpitations  · Respiratory  o Denies  o : shortness of breath, cough  · Gastrointestinal  o Denies  o : nausea, vomiting, diarrhea  · Psychiatric  o Admits  o : depression  o Denies  o : anxiety, suicidal ideation, homicidal ideation      Vitals  Date Time BP Position Site L\R Cuff Size HR RR TEMP(F) WT  HT  BMI kg/m2 BSA m2 O2 Sat HC       03/01/2018 12:16 /70 Sitting    80 - R  97 163lbs 0oz    97 %           Physical Examination  · Constitutional  o Appearance  o : well developed, well-nourished, in no acute distress  · Respiratory  o Respiratory Effort  o : breathing unlabored  o Auscultation of Lungs  o : clear to ascultation  · Cardiovascular  o Heart  o :   § Auscultation of Heart  § : regular rate and rhythm  o Peripheral Vascular System  o :   § Extremities  § : no edema  · Gastrointestinal  o Abdominal Examination  o :   § Abdomen  § : active bowel sounds, nontender, no hepatosplenomegaly, no masses palpated, no CVA tenderness  · Musculoskeletal  o General  o :   § General Musculoskeletal  § : No joint swelling or deformity., Muscle tone, strength, and development grossly normal.  · Skin and Subcutaneous Tissue  o General Inspection  o : no  lesions present, no areas of discoloration, skin turgor normal, texture normal  · Neurologic  o Gait and Station  o :   § Gait Screening  § : normal gait  · Psychiatric  o Mood and Affect  o : mood normal, affect appropriate          Assessment  · BPH (benign prostatic hyperplasia)     600.00/N40.0  · Anxiety and depression       Other specified anxiety disorders     300.00/F41.8  · Prostatitis     601.9/N41.9  · Screening PSA (prostate specific antigen)     V76.44/Z12.5  · Screening cholesterol level     V77.91/Z13.220      Plan  · Orders  o PSA Ultrasensitive, ANNUAL SCREENING [facility] (04183) - 600.00/N40.0 - 03/01/2018  o CBC with Auto Diff [facility] (72071) - 601.9/N41.9 - 03/01/2018  o CMP [facility] (94350) - 601.9/N41.9 - 03/01/2018  o Lipid Panel [facility] (04486) - V77.91/Z13.220 - 03/01/2018  o IOP - Urinalysis without Microscopy (Clinitek) [facility] (45460) - 601.9/N41.9 - 03/01/2018   glu neg shiraz neg ket neg sg 1.015 blo neg ph 7.0 pro neg uro 0.2 nit neg pratima neg  o ACO-39: Current medications updated and reviewed () - - 03/01/2018  · Medications  o Zoloft 25 mg oral tablet   SIG: take 1 tablet (25 mg) by oral route once daily for 30 days   DISP: (30) tablets with 1 refills  Prescribed on 03/01/2018     o Cipro 500 mg oral tablet   SIG: take 1 tablet (500 mg) by oral route 2 times per day for 10 days   DISP: (20) tablets with 0 refills  Prescribed on 03/01/2018     o Flomax 0.4 mg oral capsule,extended release 24hr   SIG: take 1 capsule by oral route 2 times a day for 30 days   DISP: (60) Capsule with 1 refills  Adjusted on 03/01/2018     o cefdinir 300 mg oral capsule   SIG: take 1 capsule (300 mg) by oral route every 12 hours for 7 days   DISP: (14) capsules with 0 refills  Discontinued on 03/01/2018     o Probiotic 20 billion cell oral capsule   SIG: take 1 capsule by oral route 2 times a day for 10 days   DISP: (20) capsules with 0 refills  Discontinued on 03/01/2018     o trazodone 50 mg oral tablet   SIG: take 1 tablet  (50 mg) by oral route once daily at bedtime for 30 days   DISP: (30) tablets with 5 refills  Discontinued on 03/01/2018     o Zofran (as hydrochloride) 4 mg oral tablet   SIG: take 1 tablet by oral route 4 times a day as needed for 7 days nausea   DISP: (20) tablets with 1 refills  Discontinued on 03/01/2018     o Zyban 150 mg oral tablet extended release 12 hr   SIG: take 1 tablet (150 mg) by oral route 2 times per day for 30 days   DISP: (60) tablets with 2 refills  Discontinued on 03/01/2018     · Instructions  o Take all medications as prescribed/directed.  o Patient was educated/instructed on their diagnosis, treatment and medications prior to discharge from the clinic today.            Electronically Signed by: Adi Kaminski MD -Author on March 1, 2018 01:24:44 PM

## 2021-05-07 NOTE — PROGRESS NOTES
Progress Note      Patient Name: Sathya Benavides   Patient ID: [id number]   Sex: Male   YOB: 1960    Primary Care Provider: Nery LUNA    Visit Date: June 8, 2020    Provider: Adi Kaminski MD   Location: [facility]   Location Address: [address] [place] [address]   [place]  [address]   Location Phone: [phone]          Chief Complaint     Anxiety and depression.       History Of Present Illness  Sathya Benavides is a 59 year old /White male who presents for evaluation and treatment of:      pt having more trouble with anxiety and depression over last 2 weeks- pt has been out of work over last 2 weeks due to fear of getting virus at work  pt has no SI or HI  pt had been out of Wellbutrin for a couple weeks in May due to [employer] not being able to deliver meds on time- pt back on Wellbutrin  pt will be calling psyche to get further eval and tx and learn coping mechanisms  pt has had sleep problems and severe depression- pt describes almost being frozen to move when getting ready for work- His job has him close to 2 other people and all he has is a mask- 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 [employer]       Past Medical History  Disease Name Date Onset Notes   Acid reflux --  --    Anxiety --  --    Depression --  --          Past Surgical History  Procedure Name Date Notes   *Denies any surgical procedures --  --          Medication List  Name Date Started Instructions   Flomax 0.4 mg oral capsule 04/07/2020 TAKE ONE CAPSULE BY MOUTH TWICE A DAY   lansoprazole 30 mg oral capsule,delayed release(/EC) 04/07/2020 take 1 capsule (30 mg) by oral route once daily before a meal for 90 days   lisinopril 10 mg oral tablet 02/13/2020 take 1 tablet (10 mg) by oral route once daily for 30 days   Wellbutrin  mg oral tablet extended release 24 hr 04/07/2020 take 1 tablet (150 mg) by oral route once daily for 90 days          Allergy List  Allergen Name Date Reaction Notes   Augmentin --  vomiting --          Family Medical History  Disease Name Relative/Age Notes   *No Known Family History  --          Social History  Finding Status Start/Stop Quantity Notes   Alcohol Never --/-- --  --    Tobacco Current every day --/-- 1 ppd smoker x 20 yrs         Immunizations  NameDate Admin Mfg Trade Name Lot Number Route Inj VIS Given VIS Publication   Bgsttnxlb32/17/2017 UNK Unknown TradeName [id number] NE NE 03/15/2018 08/07/2015   Comments:    Tdap05/17/2017 [facility] ADACEL [id number] NE NE 03/29/2018 02/24/2015   Comments:          Review of Systems  · Constitutional  o Denies  o : fatigue, fever  · Cardiovascular  o Denies  o : chest pain, palpitations  · Respiratory  o Denies  o : shortness of breath, cough  · Gastrointestinal  o Denies  o : nausea, vomiting, diarrhea  · Psychiatric  o Admits  o : anxiety, depression  o Denies  o : suicidal ideation, homicidal ideation      Vitals  Date Time BP Position Site L\R Cuff Size HR RR TEMP (F) WT  HT  BMI kg/m2 BSA m2 O2 Sat        06/08/2020 01:08 /70 Sitting    100 - R  97.3 165lbs 0oz    96 %          Physical Examination  · Constitutional  o Appearance  o : well developed, well-nourished, in no acute distress  · Respiratory  o Respiratory Effort  o : breathing unlabored  o Auscultation of Lungs  o : clear to ascultation  · Cardiovascular  o Heart  o :   § Auscultation of Heart  § : regular rate and rhythm  o Peripheral Vascular System  o :   § Extremities  § : no edema  · Gastrointestinal  o Abdomen  o : soft, non-tender, non-distended, + bowel sounds, no hepatosplenomegaly, no masses palpated  · Musculoskeletal  o General  o :   § General Musculoskeletal  § : No joint swelling or deformity., Muscle tone, strength, and development grossly normal.  · Neurologic  o Gait and Station  o :   § Gait Screening  § : normal gait  · Psychiatric  o Mood and Affect  o : mood normal, affect  appropriate          Assessment  · Anxiety and depression       Anxiety disorder, unspecified     300.00/F41.9  Major depressive disorder, single episode, unspecified     300.00/F32.9    Problems Reconciled  Plan  · Orders  o ACO-39: Current medications updated and reviewed () - - 06/08/2020  · Medications  o Seroquel  mg oral tablet extended release 24 hr   SIG: take 1 tablet (150 mg) by oral route once daily in the evening without food or with a light meal for 30 days   DISP: (30) tablets with 1 refills  Prescribed on 06/08/2020     o trazodone 50 mg oral tablet   SIG: TAKE ONE TABLET BY MOUTH EVERY NIGHT AT BEDTIME FOR 30 DAYS   DISP: (30) Tablet with 5 refills  Discontinued on 06/08/2020     o Medications have been Reconciled  o Transition of Care or Provider Policy  · Instructions  o Patient was educated/instructed on their diagnosis, treatment and medications prior to discharge from the clinic today.  o Pt will be calling [facility] to make appt. In meantime, pt will stop trazodone and start Seroquel to get better control of depression. Pt understands to go to ER if symptoms worsen. He feels safe currently. Will have off work this week and pt will follow-up in 1 week to reevaluate condition.            Electronically Signed by: Adi Kaminski MD -Author on June 11, 2020 11:16:20 AM

## 2021-05-07 NOTE — PROGRESS NOTES
Progress Note      Patient Name: Sathya Benavides   Patient ID: [id number]   Sex: Male   YOB: 1960    Primary Care Provider: Nery LUNA   Referring Provider: Nery LUNA    Visit Date: July 14, 2020    Provider: Adi Kaminski MD   Location: [facility] [place]   Location Address: [address] [place] [address] [place]  [address]   Location Phone: [phone]          Chief Complaint     follow up from counselor       History Of Present Illness  Sathya Benavides is a 59 year old /White male who presents for evaluation and treatment of:      pt has been seeing counselor at [facility]- they are suggesting for pt to be off 3 more weeks then he can decide what to do as far as work- pt tolerating meds well- pt needs to increase dose of buspar due to continued anxiety.  Pt will be seeing psyche again in 2 weeks.  He is making progress.  He is very thankful for us getting him into psychiatry.       Past Medical History  Disease Name Date Onset Notes   Acid reflux --  --    Anxiety --  --    Depression --  --          Past Surgical History  Procedure Name Date Notes   *Denies any surgical procedures --  --          Medication List  Name Date Started Instructions   buspirone 7.5 mg oral tablet 06/16/2020 take 1 tablet (7.5 mg) by oral route 2 times per day for 30 days   Flomax 0.4 mg oral capsule 04/07/2020 TAKE ONE CAPSULE BY MOUTH TWICE A DAY   lansoprazole 30 mg oral capsule,delayed release(/EC) 04/07/2020 take 1 capsule (30 mg) by oral route once daily before a meal for 90 days   Wellbutrin  mg oral tablet extended release 24 hr 04/07/2020 take 1 tablet (150 mg) by oral route once daily for 90 days         Allergy List  Allergen Name Date Reaction Notes   Augmentin --  vomiting --          Family Medical History  Disease Name Relative/Age Notes   *No Known Family History  --          Social History  Finding Status Start/Stop Quantity Notes   Alcohol Never --/-- --   "--    Tobacco Current every day --/-- 1 ppd smoker x 20 yrs         Immunizations  NameDate Admin Mfg Trade Name Lot Number Route Inj VIS Given VIS Publication   Bnoeloryx00/17/2017 UNK Unknown TradeName [id number] NE NE 03/15/2018 08/07/2015   Comments:    Tdap05/17/2017 [facility] ADACEL [id number] NE NE 03/29/2018 02/24/2015   Comments:          Review of Systems  · Constitutional  o Denies  o : fatigue, fever  · Cardiovascular  o Denies  o : chest pain, palpitations  · Respiratory  o Denies  o : shortness of breath, cough  · Gastrointestinal  o Denies  o : nausea, vomiting, diarrhea  · Psychiatric  o Admits  o : anxiety, depression  o Denies  o : suicidal ideation, homicidal ideation      Vitals  Date Time BP Position Site L\R Cuff Size HR RR TEMP (F) WT  HT  BMI kg/m2 BSA m2 O2 Sat        07/14/2020 09:53 /74 Sitting    83 - R  98.4 165lbs 4oz 6'  1\" 21.8 1.96 97 %          Physical Examination  · Constitutional  o Appearance  o : well developed, well-nourished, in no acute distress  · Respiratory  o Respiratory Effort  o : breathing unlabored  o Auscultation of Lungs  o : clear to ascultation  · Cardiovascular  o Heart  o :   § Auscultation of Heart  § : regular rate and rhythm  o Peripheral Vascular System  o :   § Extremities  § : no edema  · Gastrointestinal  o Abdomen  o : soft, non-tender, non-distended, + bowel sounds, no hepatosplenomegaly, no masses palpated  · Musculoskeletal  o General  o :   § General Musculoskeletal  § : No joint swelling or deformity., Muscle tone, strength, and development grossly normal.  · Neurologic  o Gait and Station  o :   § Gait Screening  § : normal gait  · Psychiatric  o Mood and Affect  o : mood normal, affect appropriate          Assessment  · Anxiety and depression       Anxiety disorder, unspecified     300.00/F41.9  Major depressive disorder, single episode, unspecified     300.00/F32.9      Plan  · Orders  o ACO-39: Current medications updated and reviewed () - - " 07/14/2020  · Medications  o buspirone 10 mg oral tablet   SIG: take 1 tablet (10 mg) by oral route 2 times per day for 30 days   DISP: (60) tablets with 1 refills  Adjusted on 07/14/2020     o Medications have been Reconciled  o Transition of Care or Provider Policy  · Instructions  o Patient was educated/instructed on their diagnosis, treatment and medications prior to discharge from the clinic today.  o Pt will be off work until 8/6/2020.  · Disposition  o Return Visit Request in/on 3 weeks +/- 2 days (9243).            Electronically Signed by: Adi Kaminski MD -Author on July 14, 2020 10:41:25 AM

## 2021-05-07 NOTE — PROGRESS NOTES
Progress Note      Patient Name: Sathya Benavides   Patient ID: [id number]   Sex: Male   YOB: 1960    Primary Care Provider: Nery LUNA   Referring Provider: Nery LUNA    Visit Date: April 16, 2021    Provider: Adi Kaminski MD   Location: [facility]   Location Address: [address] [place] [address] [place]  [address]   Location Phone: [phone]          Chief Complaint     HOSPITAL FOLLOW UP; STROKE  MED REFILLS       History Of Present Illness  Sathya Benavides is a 60 year old /White male who presents for evaluation and treatment of:      pt went to eye doctor recently for decreased right peripheral vision- milly got MRI and was found to have had a stroke  pt was found to have a pfo- pt will be seeing cardiology to possibly get repair to pfo  pt has seen pulmonology last week and had bronchoscopy and lesion was found too small for biopsy, per pt- awaiting notes from pulmonology  pt is getting PET scan done next week  pt doing better  pt has weakness and numbness in left arm  pt is weaning down off smoking- smoking 5-6 cigarettes daily       Past Medical History  Disease Name Date Onset Notes   Acid reflux --  --    Anxiety --  --    BPH (benign prostatic hyperplasia) --  --    Depression --  --    Embolic stroke --  --    Hypertension, Benign Essential --  --    Hypothyroidism --  --    Lung nodule --  --          Past Surgical History  Procedure Name Date Notes   *Denies any surgical procedures --  --          Medication List  Name Date Started Instructions   atorvastatin 80 mg oral tablet  take 1 tablet (80 mg) by oral route once daily at bedtime   Eliquis 5 mg oral tablet  take 1 tablet (5 mg) by oral route 2 times per day   Flomax 0.4 mg oral capsule 04/02/2021 TAKE ONE CAPSULE BY MOUTH TWICE A DAY   lansoprazole 30 mg oral capsule,delayed release(/EC) 04/02/2021 take 1 capsule (30 mg) by oral route once daily before a meal for 30  "days   levothyroxine 25 mcg oral capsule  take 1 capsule (25 mcg) by oral route once daily on an empty stomach 30 minutes before breakfast   trazodone 50 mg oral tablet 04/02/2021 take 1 tablet (50 mg) by oral route once daily at bedtime for 30 days         Allergy List  Allergen Name Date Reaction Notes   Augmentin --  vomiting --          Family Medical History  Disease Name Relative/Age Notes   *No Known Family History  --          Social History  Finding Status Start/Stop Quantity Notes   Alcohol Never --/-- --  --    Tobacco Current every day --/-- 4-5CIGS ppd smoker x 20 yrs, 4/16/21 DOWN TO 4-5 CIGS DAILY         Immunizations  NameDate Admin Mfg Trade Name Lot Number Route Inj VIS Given VIS Publication   [id number]/17/2017 UNK Unknown TradeName [id number] NE NE 03/15/2018 08/07/2015   Comments:    Tdap05/17/2017 [facility] ADACEL [id number] NE NE 03/29/2018 02/24/2015   Comments:          Review of Systems  · Constitutional  o Denies  o : fatigue, fever  · Cardiovascular  o Denies  o : chest pain, palpitations  · Respiratory  o Denies  o : shortness of breath, cough  · Psychiatric  o Denies  o : anxiety, depression      Vitals  Date Time BP Position Site L\R Cuff Size HR RR TEMP (F) WT  HT  BMI kg/m2 BSA m2 O2 Sat FR L/min FiO2 HC       04/16/2021 11:46 /90 Sitting    97 - R  98 169lbs 4oz 6'  1\" 22.33 1.99 98 %            Physical Examination  · Constitutional  o Appearance  o : well developed, well-nourished, in no acute distress  · Eyes  o Conjunctivae  o : conjunctivae normal  o Pupils and Irises  o : pupils equal and round, pupils reactive to light bilaterally  · Neck  o Inspection/Palpation  o : supple  o Thyroid  o : no thyromegaly  · Respiratory  o Respiratory Effort  o : breathing unlabored  o Auscultation of Lungs  o : clear to ascultation  · Cardiovascular  o Heart  o :   § Auscultation of Heart  § : regular rate and rhythm  o Peripheral Vascular System  o :   § Extremities  § : no " edema  · Gastrointestinal  o Abdomen  o : soft, non-tender, non-distended, + bowel sounds, no hepatosplenomegaly, no masses palpated  · Lymphatic  o Neck  o : no lymphadenopathy present in neck  · Musculoskeletal  o General  o :   § General Musculoskeletal  § : No joint swelling or deformity., Muscle tone, strength, and development grossly normal.  · Skin and Subcutaneous Tissue  o General Inspection  o : no lesions present, no areas of discoloration, skin turgor normal, texture normal  · Neurologic  o Gait and Station  o :   § Gait Screening  § : normal gait, right peripheral vision is absent, strength is 5/5 in all extremities except left arm is 4/5, rest of neuro exam normal  · Psychiatric  o Mood and Affect  o : mood normal, affect appropriate          Assessment  · Hypothyroidism     244.9/E03.9  · CVA (cerebral vascular accident)     434.91/I63.9  · Weakness     780.79/R53.1  · Tobacco abuse     305.1/Z72.0      Plan  · Orders  o Thyroid Profile (THYII) - 244.9/E03.9 - 05/16/2021  o Thyroid antibody panel (31697) - 244.9/E03.9 - 05/16/2021  o ACO-39: Current medications updated and reviewed (, 1159F) - - 04/16/2021  o PHYSICAL THERAPY CONSULTATION ([facility]) - 434.91/I63.9, 780.79/R53.1 - 04/16/2021  · Medications  o Wellbutrin  mg oral tablet extended release 24 hr   SIG: take 1 tablet (150 mg) by oral route once daily for 30 days   DISP: (30) Tablet with 2 refills  Prescribed on 04/16/2021     o atorvastatin 80 mg oral tablet   SIG: take 1 tablet (80 mg) by oral route once daily at bedtime for 30 days   DISP: (30) Tablet with 5 refills  Prescribed on 04/16/2021     o Eliquis 5 mg oral tablet   SIG: take 1 tablet (5 mg) by oral route 2 times per day for 30 days   DISP: (60) Tablet with 1 refills  Prescribed on 04/16/2021     o levothyroxine 25 mcg oral capsule   SIG: take 1 capsule (25 mcg) by oral route once daily on an empty stomach 30 minutes before breakfast for 30 days   DISP: (30) Capsule with 1  refills  Prescribed on 04/16/2021     o Medications have been Reconciled  o Transition of Care or Provider Policy  · Instructions  o Patient was educated/instructed on their diagnosis, treatment and medications prior to discharge from the clinic today.            Electronically Signed by: Adi Kaminski MD -Author on April 16, 2021 12:36:03 PM

## 2021-05-07 NOTE — PROGRESS NOTES
Progress Note      Patient Name: Sathya Benavides   Patient ID: [id number]   Sex: Male   YOB: 1960    Primary Care Provider: Nery LUNA   Referring Provider: Nery LUNA    Visit Date: May 7, 2019    Provider: Adi Kaminski MD   Location: [facility]   Location Address: [address] [place] [address]  [id number]   Location Phone: [phone]          Chief Complaint     1 week follow up for depression       History Of Present Illness  Sathya Benavides is a 58 year old /White male who presents for evaluation and treatment of:      pt said that medicine is beginning to work and should be ready to return to work this next Monday 5/13/19.  Pt doing better- depression improving- sleep getting better, pt able to focus better on tasks       Past Medical History  Disease Name Date Onset Notes   Acid reflux --  --    Anxiety --  --    Depression --  --          Past Surgical History  Procedure Name Date Notes   *Denies any surgical procedures --  --          Medication List  Name Date Started Instructions   Flomax 0.4 mg oral capsule 01/24/2019 TAKE ONE CAPSULE BY MOUTH TWICE A DAY   Tagamet  mg oral tablet 03/11/2019 TAKE ONE TABLET BY MOUTH TWICE A DAY 30 MINUTES BEFORE MEALS   trazodone 50 mg oral tablet 04/30/2019 TAKE ONE TABLET BY MOUTH EVERY NIGHT AT BEDTIME FOR 30 DAYS   Wellbutrin  mg oral tablet extended release 24 hr 04/30/2019 take 1 tablet (150 mg) by oral route once daily for 30 days         Allergy List  Allergen Name Date Reaction Notes   Augmentin --  vomiting --          Family Medical History  Disease Name Relative/Age Notes   *No Known Family History  --          Social History  Finding Status Start/Stop Quantity Notes   Alcohol Never --/-- --  --    Tobacco Current every day --/-- 1 ppd smoker x 20 yrs         Immunizations  NameDate Admin Mfg Trade Name Lot Number Route Inj VIS Given VIS Publication   [id number]/17/2017  "UNK Unknown TradeName [id number] NE NE 03/15/2018 08/07/2015   Comments:    Tdap05/17/2017 [facility] ADACEL [id number] NE NE 03/29/2018 02/24/2015   Comments:          Review of Systems  · Constitutional  o Denies  o : fatigue, fever  · Cardiovascular  o Denies  o : chest pain, palpitations  · Respiratory  o Denies  o : shortness of breath, cough  · Gastrointestinal  o Denies  o : nausea, vomiting, diarrhea  · Psychiatric  o Admits  o : anxiety, depression  o Denies  o : suicidal ideation, homicidal ideation      Vitals  Date Time BP Position Site L\R Cuff Size HR RR TEMP (F) WT  HT  BMI kg/m2 BSA m2 O2 Sat        05/07/2019 03:50 /56 Sitting    108 - R  98 162lbs 8oz 6'  2\" 20.86 1.96 97 %          Physical Examination  · Constitutional  o Appearance  o : well developed, well-nourished, in no acute distress  · Respiratory  o Respiratory Effort  o : breathing unlabored  o Auscultation of Lungs  o : clear to ascultation  · Cardiovascular  o Heart  o :   § Auscultation of Heart  § : regular rate and rhythm  o Peripheral Vascular System  o :   § Extremities  § : no edema  · Gastrointestinal  o Abdomen  o : soft, non-tender, non-distended, + bowel sounds, no hepatosplenomegaly, no masses palpated  · Musculoskeletal  o General  o :   § General Musculoskeletal  § : No joint swelling or deformity., Muscle tone, strength, and development grossly normal.  · Neurologic  o Gait and Station  o :   § Gait Screening  § : normal gait  · Psychiatric  o Mood and Affect  o : mood normal, affect appropriate          Assessment  · Anxiety     300.02/F41.1  · Depression     311/F32.9      Plan  · Orders  o ACO-39: Current medications updated and reviewed () - - 05/07/2019  · Instructions  o Patient was educated/instructed on their diagnosis, treatment and medications prior to discharge from the clinic today.  o pt will be released to go back to work on Monday 5/13/19. Pt is to continue meds.            Electronically Signed by: Adi" TORY Kaminski MD -Author on May 7, 2019 04:06:32 PM

## 2021-05-07 NOTE — PROGRESS NOTES
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       History Of Present Illness  Sathya Benavides is a 59 year old /White male who presents for evaluation and treatment of:      pt is seeing [facility] for anxiety and depression- pt will be off work until 7/15/2020- pt sees [facility] again 7/10/2020- pt will make decision on work after this visit with [facility]- pt tolerating meds well now- pt is stable- no SI or HI       Past Medical History  Disease Name Date Onset Notes   Acid reflux --  --    Anxiety --  --    Depression --  --          Past Surgical History  Procedure Name Date Notes   *Denies any surgical procedures --  --          Medication List  Name Date Started Instructions   buspirone 7.5 mg oral tablet 06/16/2020 take 1 tablet (7.5 mg) by oral route 2 times per day for 30 days   Flomax 0.4 mg oral capsule 04/07/2020 TAKE ONE CAPSULE BY MOUTH TWICE A DAY   lansoprazole 30 mg oral capsule,delayed release(DR/EC) 04/07/2020 take 1 capsule (30 mg) by oral route once daily before a meal for 90 days   Wellbutrin  mg oral tablet extended release 24 hr 04/07/2020 take 1 tablet (150 mg) by oral route once daily for 90 days         Allergy List  Allergen Name Date Reaction Notes   Augmentin --  vomiting --          Family Medical History  Disease Name Relative/Age Notes   *No Known Family History  --          Social History  Finding Status Start/Stop Quantity Notes   Alcohol Never --/-- --  --    Tobacco Current every day --/-- 1 ppd smoker x 20 yrs         Immunizations  NameDate Admin [facility]  "Trade Name Lot Number Route Inj VIS Given VIS Publication   [id number]/17/2017 UNK Unknown TradeName [id number] NE NE 03/15/2018 08/07/2015   Comments:    Tdap05/17/2017 [facility] ADACEL [id number] NE NE 03/29/2018 02/24/2015   Comments:          Review of Systems  · Constitutional  o Denies  o : fatigue, fever  · Cardiovascular  o Denies  o : chest pain, palpitations  · Respiratory  o Denies  o : shortness of breath, cough  · Gastrointestinal  o Denies  o : nausea, vomiting, diarrhea  · Psychiatric  o Admits  o : anxiety, depression  o Denies  o : suicidal ideation, homicidal ideation      Vitals  Date Time BP Position Site L\R Cuff Size HR RR TEMP (F) WT  HT  BMI kg/m2 BSA m2 O2 Sat HC       07/06/2020 09:32 /64 Sitting    98 - R  97.7 165lbs 4oz 6'  1\" 21.8 1.96 97 %          Physical Examination  · Constitutional  o Appearance  o : well developed, well-nourished, in no acute distress  · Respiratory  o Respiratory Effort  o : breathing unlabored  o Auscultation of Lungs  o : clear to ascultation  · Cardiovascular  o Heart  o :   § Auscultation of Heart  § : regular rate and rhythm  o Peripheral Vascular System  o :   § Extremities  § : no edema  · Gastrointestinal  o Abdomen  o : soft, non-tender, non-distended, + bowel sounds, no hepatosplenomegaly, no masses palpated  · Musculoskeletal  o General  o :   § General Musculoskeletal  § : No joint swelling or deformity., Muscle tone, strength, and development grossly normal.  · Neurologic  o Gait and Station  o :   § Gait Screening  § : normal gait  · Psychiatric  o Mood and Affect  o : mood normal, affect appropriate          Assessment  · Hyperlipidemia     272.4/E78.5  · Anxiety and depression       Anxiety disorder, unspecified     300.00/F41.9  Major depressive disorder, single episode, unspecified     300.00/F32.9      Plan  · Orders  o CBC with Auto Diff [facility] (81595) - 300.00/F41.9, 300.00/F32.9, 272.4/E78.5 - 07/06/2020  o CMP [facility] (72970) - 300.00/F41.9, " 300.00/F32.9, 272.4/E78.5 - 07/06/2020  o Lipid Panel [facility] (05826) - 300.00/F41.9, 300.00/F32.9, 272.4/E78.5 - 07/06/2020  o ACO-39: Current medications updated and reviewed () - - 07/06/2020  · Medications  o Medications have been Reconciled  o Transition of Care or Provider Policy  · Instructions  o Advised that cheeses and other sources of dairy fats, animal fats, fast food, and the extras (candy, pasteries, pies, doughnuts and cookies) all contain LDL raising nutrients. Advised to increase fruits, vegetables, whole grains, and to monitor portion sizes.   o Patient was educated/instructed on their diagnosis, treatment and medications prior to discharge from the clinic today.            Electronically Signed by: Adi Kaminski MD -Author on July 6, 2020 10:03:36 AM

## 2021-05-07 NOTE — PROGRESS NOTES
Progress Note      Patient Name: Sathya Benavides   Patient ID: [id number]   Sex: Male   YOB: 1960    Primary Care Provider: Adi Kaminski MD   Referring Provider: Adi Kaminski MD    Visit Date: June 16, 2020    Provider: Adi Kaminski MD   Location: [facility]   Location Address: [address] [place] [address]   [place]  [address]   Location Phone: [phone]          Chief Complaint     medicine follow up  Seroquel made him mean when he woke up.       History Of Present Illness  Sathya Benavides is a 59 year old /White male who presents for evaluation and treatment of:      pt awaiting to get into [facility]- they are scheduling him an appointment- he will be going there this afternoon to fill out paperwork- he has already been calling them to get an appointment scheduled- he did this after last visit    pt was unable to tolerate Seroquel- caused too much sleep and pt was really angry when he got up- the medication actually caused more anger in patient and worsened symptoms    pt still having a lot of symptoms of anxiety and depression- pt feels that anxiety seems to be the main contributing factor to the latest flare-up- he has intense fear of getting Covid-19 to the point that it is causing him to freeze from going to work- we will try placing pt on buspar to help the anxiety- pt will be seeing soon [facility] for psyche and counseling on coping with the anxiety.  Pt has no SI or HI.  Pt is not at point to being able to control his anxiety enough to return to work.    Filled out paperwork for work       Past Medical History  Disease Name Date Onset Notes   Acid reflux --  --    Anxiety --  --    Depression --  --          Past Surgical History  Procedure Name Date Notes   *Denies any surgical procedures --  --          Medication List  Name Date Started Instructions   Flomax 0.4 mg oral capsule 04/07/2020 TAKE ONE CAPSULE BY MOUTH TWICE A DAY   lansoprazole 30 mg oral  "capsule,delayed release(DR/EC) 04/07/2020 take 1 capsule (30 mg) by oral route once daily before a meal for 90 days   Wellbutrin  mg oral tablet extended release 24 hr 04/07/2020 take 1 tablet (150 mg) by oral route once daily for 90 days         Allergy List  Allergen Name Date Reaction Notes   Augmentin --  vomiting --          Family Medical History  Disease Name Relative/Age Notes   *No Known Family History  --          Social History  Finding Status Start/Stop Quantity Notes   Alcohol Never --/-- --  --    Tobacco Current every day --/-- 1 ppd smoker x 20 yrs         Immunizations  NameDate Admin Mfg Trade Name Lot Number Route Inj VIS Given VIS Publication   [id number]/17/2017 UNK Unknown TradeName [id number] NE NE 03/15/2018 08/07/2015   Comments:    Tdap05/17/2017 [facility] ADACEL [id number] NE NE 03/29/2018 02/24/2015   Comments:          Review of Systems  · Constitutional  o Admits  o : fatigue  o Denies  o : fever  · Cardiovascular  o Denies  o : chest pain, palpitations  · Respiratory  o Denies  o : shortness of breath, cough  · Gastrointestinal  o Denies  o : nausea, vomiting, diarrhea  · Psychiatric  o Admits  o : anxiety, depression, difficulty sleeping  o Denies  o : suicidal ideation, homicidal ideation      Vitals  Date Time BP Position Site L\R Cuff Size HR RR TEMP (F) WT  HT  BMI kg/m2 BSA m2 O2 Sat        06/16/2020 11:32 /56 Sitting    111 - R  97.3 166lbs 6oz 6'  1\" 21.95 1.97 96 %          Physical Examination  · Constitutional  o Appearance  o : well developed, well-nourished, in no acute distress  · Respiratory  o Respiratory Effort  o : breathing unlabored  o Auscultation of Lungs  o : clear to ascultation  · Cardiovascular  o Heart  o :   § Auscultation of Heart  § : regular rate and rhythm  o Peripheral Vascular System  o :   § Extremities  § : no edema  · Gastrointestinal  o Abdomen  o : soft, non-tender, non-distended, + bowel sounds, no hepatosplenomegaly, no masses " palpated  · Musculoskeletal  o General  o :   § General Musculoskeletal  § : No joint swelling or deformity., Muscle tone, strength, and development grossly normal.  · Neurologic  o Gait and Station  o :   § Gait Screening  § : normal gait  · Psychiatric  o Mood and Affect  o : mood normal, affect appropriate          Assessment  · Anxiety     300.02/F41.1  · Depression     311/F32.9  · Anxiety and depression       Anxiety disorder, unspecified     300.00/F41.9  Major depressive disorder, single episode, unspecified     300.00/F32.9      Plan  · Orders  o ACO-39: Current medications updated and reviewed () - - 06/16/2020  · Medications  o buspirone 7.5 mg oral tablet   SIG: take 1 tablet (7.5 mg) by oral route 2 times per day for 30 days   DISP: (60) tablets with 1 refills  Prescribed on 06/16/2020     o Medications have been Reconciled  o Transition of Care or Provider Policy  · Instructions  o Patient was educated/instructed on their diagnosis, treatment and medications prior to discharge from the clinic today.  · Disposition  o Return Visit Request in/on 2 weeks +/- 2 days ([id number]).            Electronically Signed by: Adi Kaminski MD -Author on June 16, 2020 11:55:56 AM

## 2021-05-07 NOTE — PROGRESS NOTES
Progress Note      Patient Name: Sathya Benavides   Patient ID: [id number]   Sex: Male   YOB: 1960        Visit Date: March 15, 2018    Provider: Adi Kaminski MD   Location: [facility] [place]   Location Address: [address] [place] [address]  [id number]   Location Phone: [phone]          Chief Complaint     follow up on medicine       History Of Present Illness  Sathya Benavieds is a 57 year old /White male who presents for evaluation and treatment of:      pt says that meds are helping- depression symptoms getting better- pt getting back to [facility]  pt is getting counseling from   pt no longer tearful- doing much better- estimated time to return to work- 2-3 weeks       Past Medical History  Disease Name Date Onset Notes   Acid reflux --  --    Anxiety --  --    Depression --  --          Past Surgical History  Procedure Name Date Notes   *Denies any surgical procedures --  --          Medication List  Name Date Started Instructions   cimetidine 300 mg oral tablet 01/11/2018 take 1 tablet by oral route 3 times a day as needed for 30 days acid reflex   Flomax 0.4 mg oral capsule,extended release 24hr 03/01/2018 take 1 capsule by oral route 2 times a day for 30 days   Nicoderm CQ 21 mg/24 hr transdermal patch 24 hour  apply 1 patch (21 mg) by transdermal route once daily   Zoloft 25 mg oral tablet 03/01/2018 take 1 tablet (25 mg) by oral route once daily for 30 days         Allergy List  Allergen Name Date Reaction Notes   Augmentin --  vomiting --          Family Medical History  Disease Name Relative/Age Notes   *No Known Family History / --          Social History  Finding Status Start/Stop Quantity Notes   Alcohol Never --/-- --  --    Tobacco Current every day --/-- 1 ppd smoker x 20 yrs         Immunizations  NameDate Admin Mfg Trade Name Lot Number Route Inj VIS Given VIS Publication   Bapaegaue16/17/2017 UNK Unknown TradeName [id number] NE NE 03/15/2018  08/07/2015   Comments:          Review of Systems  · Constitutional  o Denies  o : fatigue, fever  · Cardiovascular  o Denies  o : chest pain, palpitations  · Respiratory  o Denies  o : shortness of breath, cough  · Gastrointestinal  o Denies  o : nausea, vomiting  · Psychiatric  o Admits  o : depression  o Denies  o : anxiety, suicidal ideation, homicidal ideation      Vitals  Date Time BP Position Site L\R Cuff Size HR RR TEMP(F) WT  HT  BMI kg/m2 BSA m2 O2 Sat HC       03/15/2018 10:49 /78 Sitting    121 - R  97.8 162lbs 0oz    100 %          Physical Examination  · Constitutional  o Appearance  o : well developed, well-nourished, in no acute distress  · Respiratory  o Respiratory Effort  o : breathing unlabored  o Auscultation of Lungs  o : clear to ascultation  · Cardiovascular  o Heart  o :   § Auscultation of Heart  § : regular rate and rhythm  o Peripheral Vascular System  o :   § Extremities  § : no edema  · Gastrointestinal  o Abdominal Examination  o :   § Abdomen  § : active bowel sounds, no hepatosplenomegaly, nontender, no masses palpated  · Musculoskeletal  o General  o :   § General Musculoskeletal  § : No joint swelling or deformity., Muscle tone, strength, and development grossly normal.  · Neurologic  o Gait and Station  o :   § Gait Screening  § : normal gait  · Psychiatric  o Mood and Affect  o : mood normal, affect appropriate          Assessment  · Anxiety and depression       Other specified anxiety disorders     300.00/F41.8      Plan  · Orders  o ACO-17: Screened for tobacco use AND received tobacco cessation intervention (4004F) - - 03/15/2018  o ACO-39: Current medications updated and reviewed () - - 03/15/2018  o ACO-18: Screening for clinical depression not completed due to current diagnosis of depression or bipolar disorder () - - 03/15/2018  o Influenza immunization was not ordered or administered for reasons documented by clinician () - -  03/15/2018  · Instructions  o Handouts were given to patient: anxiety disorder self care  o Patient was educated/instructed on their diagnosis, treatment and medications prior to discharge from the clinic today.            Electronically Signed by: Adi Kaminski MD -Author on March 15, 2018 10:55:06 AM

## 2021-05-07 NOTE — PROGRESS NOTES
Progress Note      Patient Name: Sathya Benavides   Patient ID: [id number]   Sex: Male   YOB: 1960        Visit Date: April 5, 2018    Provider: Adi Kaminski MD   Location: [facility]   Location Address: [address] [place] [address]  [id number]   Location Phone: [phone]          Chief Complaint     medical leave RTW form       History Of Present Illness  Sathya Benavides is a 57 year old /White male who presents for evaluation and treatment of:      pt returning to work 4/9/18- pt doing much better- new dose on med working well    discussed labs- hyperlipidemia       Past Medical History  Disease Name Date Onset Notes   Acid reflux --  --    Anxiety --  --    Depression --  --          Past Surgical History  Procedure Name Date Notes   *Denies any surgical procedures --  --          Medication List  Name Date Started Instructions   cimetidine 300 mg oral tablet 01/11/2018 take 1 tablet by oral route 3 times a day as needed for 30 days acid reflex   Flomax 0.4 mg oral capsule,extended release 24hr 03/01/2018 take 1 capsule by oral route 2 times a day for 30 days   Nicoderm CQ 21 mg/24 hr transdermal patch 24 hour  apply 1 patch (21 mg) by transdermal route once daily   Zoloft 50 mg oral tablet 03/29/2018 take 1 tablet (50 mg) by oral route once daily for 30 days         Allergy List  Allergen Name Date Reaction Notes   Augmentin --  vomiting --          Family Medical History  Disease Name Relative/Age Notes   *No Known Family History / --          Social History  Finding Status Start/Stop Quantity Notes   Alcohol Never --/-- --  --    Tobacco Current every day --/-- 1 ppd smoker x 20 yrs         Immunizations  NameDate Admin Mfg Trade Name Lot Number Route Inj VIS Given VIS Publication   [id number]/17/2017 UNK Unknown TradeName [id number] NE NE 03/15/2018 08/07/2015   Comments:    Tdap05/17/2017 [facility] ADACEL [id number] NE NE 03/29/2018 02/24/2015   Comments:           Review of Systems  · Constitutional  o Denies  o : fatigue, fever  · Cardiovascular  o Denies  o : chest pain, palpitations  · Respiratory  o Denies  o : shortness of breath, cough  · Gastrointestinal  o Denies  o : nausea, vomiting, diarrhea  · Psychiatric  o Denies  o : anxiety, depression, suicidal ideation      Vitals  Date Time BP Position Site L\R Cuff Size HR RR TEMP(F) WT  HT  BMI kg/m2 BSA m2 O2 Sat        04/05/2018 10:30 /76 Sitting    120 - R  98.3 165lbs 4oz    98 %           Physical Examination  · Constitutional  o Appearance  o : well developed, well-nourished, in no acute distress  · Respiratory  o Respiratory Effort  o : breathing unlabored  o Auscultation of Lungs  o : clear to ascultation  · Cardiovascular  o Heart  o :   § Auscultation of Heart  § : regular rate and rhythm  o Peripheral Vascular System  o :   § Extremities  § : no edema  · Gastrointestinal  o Abdominal Examination  o :   § Abdomen  § : active bowel sounds, nontender, no hepatosplenomegaly, no masses palpated  · Musculoskeletal  o General  o :   § General Musculoskeletal  § : No joint swelling or deformity., Muscle tone, strength, and development grossly normal.  · Neurologic  o Gait and Station  o :   § Gait Screening  § : normal gait  · Psychiatric  o Mood and Affect  o : mood normal, affect appropriate          Assessment  · Hyperlipidemia     272.4/E78.5  · Depression     311/F32.9      Plan  · Orders  o ACO-17: Screened for tobacco use AND received tobacco cessation intervention (4004F) - - 04/05/2018  o ACO-39: Current medications updated and reviewed () - - 04/05/2018  o ACO-18: Screening for clinical depression not completed due to current diagnosis of depression or bipolar disorder () - - 04/05/2018  o Influenza immunization was not ordered or administered for reasons documented by clinician () - - 04/05/2018  · Instructions  o Advised that cheeses and other sources of dairy fats, animal  fats, fast food, and the extras (candy, pasteries, pies, doughnuts and cookies) all contain LDL raising nutrients. Advised to increase fruits, vegetables, whole grains, and to monitor portion sizes.   o Handouts were given to patient: cholesterol diet.  o Take all medications as prescribed/directed.  o Patient instructed/educated on their diet and exercise program.  o Patient was educated/instructed on their diagnosis, treatment and medications prior to discharge from the clinic today.  o pt will go back to work on 4/9/18            Electronically Signed by: Adi Kaminski MD -Author on April 5, 2018 11:02:33 AM

## 2021-05-07 NOTE — PROGRESS NOTES
Progress Note      Patient Name: Sathya Benavides   Patient ID: [id number]   Sex: Male   YOB: 1960    Primary Care Provider: Nery LUNA   Referring Provider: [place] LUNA    Visit Date: January 24, 2019    Provider: JEREMY Nettles   Location: [facility]   Location Address: [address] [place] [address]  [id number]   Location Phone: [phone]          Chief Complaint     FOLLOW UP ON PNEUMONIA.  WANTS TO STOP SMOKING.       History Of Present Illness  Sathya Benavides is a 58 year old /White male who presents for evaluation and treatment of:      follow up from ER visit to [facility] and dx'd with pneumonia - continues to feel SOB intermittently - cannot sleep due to coughing with lying down - continues to have some fever, chills, and body aches - pt states he was recommended to stay at the hospital but states that he couldn't at the time but wishes he would have  Has been off work for about 1 week -     Pt states he had CT scan and recommended he have a PET scan - he has pleural thickening and a PET scan was recommended to get for benign     Has not smoked for 1 week - would like to continue to quit - no current desire to smoke       Past Medical History  Disease Name Date Onset Notes   Acid reflux --  --    Anxiety --  --    Depression --  --          Past Surgical History  Procedure Name Date Notes   *Denies any surgical procedures --  --          Medication List  Name Date Started Instructions   Chantix Starting Month Box 0.5 mg (11)- 1 mg (42) oral tablets,dose pack 06/04/2018 take as directed for 30 days   Flomax 0.4 mg oral capsule 11/26/2018 TAKE ONE CAPSULE BY MOUTH TWICE A DAY   Nicoderm CQ 21 mg/24 hr transdermal patch 24 hour  apply 1 patch (21 mg) by transdermal route once daily   Tagamet  mg oral tablet 07/17/2018 take 1 tablet (200 mg) by oral route 2 times per day 30 minutes before meals for 30 days   trazodone 50 mg oral  tablet 11/26/2018 TAKE ONE TABLET BY MOUTH EVERY NIGHT AT BEDTIME FOR 30 DAYS         Allergy List  Allergen Name Date Reaction Notes   Augmentin --  vomiting --          Family Medical History  Disease Name Relative/Age Notes   *No Known Family History / --          Social History  Finding Status Start/Stop Quantity Notes   Alcohol Never --/-- --  --    Tobacco Current every day --/-- 1 ppd smoker x 20 yrs         Immunizations  NameDate Admin Mfg Trade Name Lot Number Route Inj VIS Given VIS Publication   Hqnbhpvfp05/17/2017 UNK Unknown TradeName [id number] NE NE 03/15/2018 08/07/2015   Comments:    Tdap05/17/2017 [facility] ADACEL [id number] NE NE 03/29/2018 02/24/2015   Comments:          Review of Systems  · Constitutional  o * See HPI  · Respiratory  o * See HPI  · Gastrointestinal  o Denies  o : nausea, vomiting, diarrhea      Vitals  Date Time BP Position Site L\R Cuff Size HR RR TEMP(F) WT  HT  BMI kg/m2 BSA m2 O2 Sat HC       01/24/2019 10:23 /80 Sitting    92 - R  97.2 161lbs 6oz    95 %           Physical Examination  · Constitutional  o Appearance  o : well developed, well-nourished, in no acute distress  · Eyes  o Conjunctivae  o : conjunctivae normal  o Pupils and Irises  o : pupils equal and round, pupils reactive to light bilaterally  · Neck  o Inspection/Palpation  o : supple  o Thyroid  o : no thyromegaly  · Respiratory  o Respiratory Effort  o : breathing unlabored; coughing with deep breathing  o Auscultation of Lungs  o : wheezing and crackles present throughout  · Cardiovascular  o Heart  o :   § Auscultation of Heart  § : regular rate and rhythm  o Peripheral Vascular System  o :   § Extremities  § : no edema  · Lymphatic  o Neck  o : no lymphadenopathy present  · Musculoskeletal  o General  o :   § General Musculoskeletal  § : No joint swelling or deformity. Muscle tone, strength, and development grossly normal.  · Skin and Subcutaneous Tissue  o General Inspection  o : NL tone  · Neurologic  o Gait  and Station  o :   § Gait Screening  § : normal gait  · Psychiatric  o Mood and Affect  o : mood normal, affect appropriate              Assessment  · Pneumonia     486/J18.9  · Pleural thickening     511.0/J92.9      Plan  · Orders  o ACO-39: Current medications updated and reviewed () - - 01/24/2019  o Nebulizer Treatment [facility] (29839) - 486/J18.9 - 01/24/2019   DuoNeb   NDC 9901-9108-52 LOT [id number] EXP 05 2020  o Solu-Medrol Injection 40mg (-4) - 486/J18.9 - 01/24/2019   Injection - Solu-Medrol 40mg; Dose: 1mL; Site: Right Gluteus; Route: intramuscular; Date: 01/24/2019 11:19:04; Exp: 07/01/2020; Lot: [id number]; Mfg: [employer]; TradeName: methylprednisolone sod suc(PF); Location: [facility]; Administered By: Johnny Mcmahan MA; Comment: NDC [id number]  · Medications  o prednisone 5 mg oral tablets,dose pack   SIG: take as directed for 6 days   DISP: (1) 21 ct dose-pack with 0 refills  Prescribed on 01/24/2019     o albuterol sulfate 0.63 mg/3 mL inhalation solution for nebulization   SIG: use in nebulizer as directed every 4 hours as needed   DISP: (100) 3 ml vial with 0 refills  Prescribed on 01/24/2019     o Chantix Starting Month Box 0.5 mg (11)- 1 mg (42) oral tablets,dose pack   SIG: take as directed for 30 days   DISP: (1) 53 ct dose-pack with 0 refills  Adjusted on 01/24/2019     · Instructions  o Patient was educated/instructed on their diagnosis, treatment and medications prior to discharge from the clinic today.  o Pt was given nebulizer machine to use at home every 4 hours x 4 days then use as needed - may return to work on Monday if symptoms are improved. Solumedrol 40mg in office today and start steroid dose pack tomorrow - finish Levaquin. Follow up in 2 weeks to repeat CXR  o Will repeat CXR and pending will order CT or refer to Pulmonology if needed.   · Disposition  o Return Visit Request in/on 2 weeks +/- 2 days ([id number]).            Electronically Signed by: [place]  JEREMY Schuster -Author on January 29, 2019 03:00:49 PM

## 2021-05-07 NOTE — PROGRESS NOTES
Progress Note      Patient Name: Sathya Benavides   Patient ID: [id number]   Sex: Male   YOB: 1960    Primary Care Provider: Nery LUNA   Referring Provider: Nery LUNA    Visit Date: August 15, 2019    Provider: Adi Kaminski MD   Location: [facility]   Location Address: [address] [place] [address] [place]  [id number]   Location Phone: [phone]          Chief Complaint     follow up on meds and labs       History Of Present Illness  Sathya Benavides is a 59 year old /White male who presents for evaluation and treatment of:      follow-up for depression- doing better- meds helping    needs fasting labs    filled out FMLA forms  pt tolerating meds well       Past Medical History  Disease Name Date Onset Notes   Acid reflux --  --    Anxiety --  --    Depression --  --          Past Surgical History  Procedure Name Date Notes   *Denies any surgical procedures --  --          Medication List  Name Date Started Instructions   Flomax 0.4 mg oral capsule 07/22/2019 TAKE ONE CAPSULE BY MOUTH TWICE A DAY   Tagamet  mg oral tablet 03/11/2019 TAKE ONE TABLET BY MOUTH TWICE A DAY 30 MINUTES BEFORE MEALS   trazodone 50 mg oral tablet 04/30/2019 TAKE ONE TABLET BY MOUTH EVERY NIGHT AT BEDTIME FOR 30 DAYS   Wellbutrin  mg oral tablet extended release 24 hr 07/22/2019 take 1 tablet (150 mg) by oral route once daily for 90 days         Allergy List  Allergen Name Date Reaction Notes   Augmentin --  vomiting --          Family Medical History  Disease Name Relative/Age Notes   *No Known Family History  --          Social History  Finding Status Start/Stop Quantity Notes   Alcohol Never --/-- --  --    Tobacco Current every day --/-- 1 ppd smoker x 20 yrs         Immunizations  NameDate Admin Mfg Trade Name Lot Number Route Inj VIS Given VIS Publication   Ulsckeoqg41/17/2017 UNK Unknown TradeName [id number] NE NE 03/15/2018 08/07/2015   Comments:     Tdap05/17/2017 [facility] ADACEL [id number] NE NE 03/29/2018 02/24/2015   Comments:          Review of Systems  · Constitutional  o Denies  o : fatigue, fever  · Cardiovascular  o Denies  o : chest pain, palpitations  · Respiratory  o Denies  o : shortness of breath, cough  · Gastrointestinal  o Denies  o : nausea, vomiting, diarrhea  · Psychiatric  o Denies  o : anxiety, depression      Vitals  Date Time BP Position Site L\R Cuff Size HR RR TEMP (F) WT  HT  BMI kg/m2 BSA m2 O2 Sat        08/15/2019 09:33 /80 Sitting    105 - R   155lbs 0oz    97 %          Physical Examination  · Constitutional  o Appearance  o : well developed, well-nourished, in no acute distress  · Respiratory  o Respiratory Effort  o : breathing unlabored  o Auscultation of Lungs  o : clear to ascultation  · Cardiovascular  o Heart  o :   § Auscultation of Heart  § : regular rate and rhythm  o Peripheral Vascular System  o :   § Extremities  § : no edema  · Gastrointestinal  o Abdomen  o : soft, non-tender, non-distended, + bowel sounds, no hepatosplenomegaly, no masses palpated  · Musculoskeletal  o General  o :   § General Musculoskeletal  § : No joint swelling or deformity., Muscle tone, strength, and development grossly normal.  · Neurologic  o Gait and Station  o :   § Gait Screening  § : normal gait  · Psychiatric  o Mood and Affect  o : mood normal, affect appropriate          Assessment  · Anxiety and depression       Anxiety disorder, unspecified     300.00/F41.9  Major depressive disorder, single episode, unspecified     300.00/F32.9  · Screening PSA (prostate specific antigen)     V76.44/Z12.5  · Screening cholesterol level     V77.91/Z13.220      Plan  · Orders  o CBC with Auto Diff [facility] (23731) - 300.00/F41.9, 300.00/F32.9, V76.44/Z12.5, V77.91/Z13.220 - 08/15/2019  o CMP [facility] (54868) - 300.00/F41.9, 300.00/F32.9, V76.44/Z12.5, V77.91/Z13.220 - 08/15/2019  o Lipid Panel [facility] (28027) - 300.00/F41.9, 300.00/F32.9, V76.44/Z12.5,  V77.91/Z13.220 - 08/15/2019  o Thyroid Profile (THYII) - 300.00/F41.9, 300.00/F32.9, V76.44/Z12.5, V77.91/Z13.220 - 08/15/2019  o ACO-39: Current medications updated and reviewed () - - 08/15/2019  o PSA Ultrasensitive, ANNUAL SCREENING [facility] (20840) - V76.44/Z12.5 - 08/15/2019  · Instructions  o Patient was educated/instructed on their diagnosis, treatment and medications prior to discharge from the clinic today.            Electronically Signed by: Adi Kaminski MD -Author on August 15, 2019 10:01:44 AM

## 2021-05-09 VITALS
OXYGEN SATURATION: 98 % | DIASTOLIC BLOOD PRESSURE: 60 MMHG | TEMPERATURE: 97.6 F | WEIGHT: 160 LBS | SYSTOLIC BLOOD PRESSURE: 142 MMHG | HEART RATE: 90 BPM

## 2021-05-09 VITALS
DIASTOLIC BLOOD PRESSURE: 84 MMHG | BODY MASS INDEX: 20.92 KG/M2 | WEIGHT: 163 LBS | TEMPERATURE: 97.3 F | HEIGHT: 74 IN | HEART RATE: 96 BPM | OXYGEN SATURATION: 97 % | SYSTOLIC BLOOD PRESSURE: 140 MMHG

## 2021-05-09 VITALS
TEMPERATURE: 98.6 F | HEART RATE: 102 BPM | OXYGEN SATURATION: 97 % | SYSTOLIC BLOOD PRESSURE: 130 MMHG | WEIGHT: 168.5 LBS | DIASTOLIC BLOOD PRESSURE: 70 MMHG

## 2021-05-09 VITALS
TEMPERATURE: 97.6 F | OXYGEN SATURATION: 98 % | SYSTOLIC BLOOD PRESSURE: 140 MMHG | DIASTOLIC BLOOD PRESSURE: 60 MMHG | WEIGHT: 165.25 LBS | HEART RATE: 87 BPM

## 2021-05-09 VITALS
OXYGEN SATURATION: 96 % | WEIGHT: 161.12 LBS | TEMPERATURE: 98.2 F | SYSTOLIC BLOOD PRESSURE: 124 MMHG | HEART RATE: 111 BPM | DIASTOLIC BLOOD PRESSURE: 74 MMHG

## 2021-05-09 VITALS
WEIGHT: 155 LBS | DIASTOLIC BLOOD PRESSURE: 58 MMHG | OXYGEN SATURATION: 97 % | SYSTOLIC BLOOD PRESSURE: 110 MMHG | HEART RATE: 105 BPM | DIASTOLIC BLOOD PRESSURE: 80 MMHG | SYSTOLIC BLOOD PRESSURE: 150 MMHG

## 2021-05-09 VITALS
DIASTOLIC BLOOD PRESSURE: 60 MMHG | SYSTOLIC BLOOD PRESSURE: 122 MMHG | TEMPERATURE: 97 F | OXYGEN SATURATION: 98 % | HEART RATE: 99 BPM | WEIGHT: 166 LBS

## 2021-05-09 VITALS
WEIGHT: 165.25 LBS | SYSTOLIC BLOOD PRESSURE: 124 MMHG | TEMPERATURE: 98.3 F | OXYGEN SATURATION: 98 % | DIASTOLIC BLOOD PRESSURE: 76 MMHG | HEART RATE: 120 BPM

## 2021-05-09 VITALS
TEMPERATURE: 98 F | DIASTOLIC BLOOD PRESSURE: 56 MMHG | SYSTOLIC BLOOD PRESSURE: 100 MMHG | WEIGHT: 162.5 LBS | HEIGHT: 74 IN | OXYGEN SATURATION: 97 % | BODY MASS INDEX: 20.86 KG/M2 | HEART RATE: 108 BPM

## 2021-05-09 VITALS
TEMPERATURE: 97.8 F | HEART RATE: 100 BPM | DIASTOLIC BLOOD PRESSURE: 70 MMHG | OXYGEN SATURATION: 97 % | SYSTOLIC BLOOD PRESSURE: 120 MMHG | WEIGHT: 160.25 LBS

## 2021-05-09 VITALS
WEIGHT: 162 LBS | SYSTOLIC BLOOD PRESSURE: 138 MMHG | TEMPERATURE: 97.8 F | HEART RATE: 121 BPM | DIASTOLIC BLOOD PRESSURE: 78 MMHG | OXYGEN SATURATION: 100 %

## 2021-05-09 VITALS
TEMPERATURE: 97.3 F | OXYGEN SATURATION: 96 % | HEART RATE: 100 BPM | SYSTOLIC BLOOD PRESSURE: 150 MMHG | DIASTOLIC BLOOD PRESSURE: 70 MMHG | WEIGHT: 165 LBS

## 2021-05-09 VITALS
HEART RATE: 97 BPM | OXYGEN SATURATION: 98 % | HEIGHT: 73 IN | WEIGHT: 169.25 LBS | TEMPERATURE: 98 F | SYSTOLIC BLOOD PRESSURE: 146 MMHG | DIASTOLIC BLOOD PRESSURE: 90 MMHG | BODY MASS INDEX: 22.43 KG/M2

## 2021-05-09 VITALS
HEART RATE: 100 BPM | SYSTOLIC BLOOD PRESSURE: 140 MMHG | OXYGEN SATURATION: 95 % | WEIGHT: 160 LBS | TEMPERATURE: 96 F | DIASTOLIC BLOOD PRESSURE: 70 MMHG

## 2021-05-09 VITALS
TEMPERATURE: 98.4 F | SYSTOLIC BLOOD PRESSURE: 134 MMHG | DIASTOLIC BLOOD PRESSURE: 74 MMHG | HEART RATE: 83 BPM | WEIGHT: 165.25 LBS | BODY MASS INDEX: 21.9 KG/M2 | HEIGHT: 73 IN | OXYGEN SATURATION: 97 %

## 2021-05-09 VITALS
OXYGEN SATURATION: 96 % | WEIGHT: 166.37 LBS | TEMPERATURE: 97.3 F | HEART RATE: 111 BPM | HEIGHT: 73 IN | SYSTOLIC BLOOD PRESSURE: 100 MMHG | BODY MASS INDEX: 22.05 KG/M2 | DIASTOLIC BLOOD PRESSURE: 56 MMHG

## 2021-05-09 VITALS
HEIGHT: 73 IN | HEART RATE: 98 BPM | BODY MASS INDEX: 21.9 KG/M2 | TEMPERATURE: 97.7 F | OXYGEN SATURATION: 97 % | WEIGHT: 165.25 LBS | DIASTOLIC BLOOD PRESSURE: 64 MMHG | SYSTOLIC BLOOD PRESSURE: 126 MMHG

## 2021-05-09 VITALS
WEIGHT: 161.37 LBS | OXYGEN SATURATION: 95 % | DIASTOLIC BLOOD PRESSURE: 80 MMHG | TEMPERATURE: 97.2 F | SYSTOLIC BLOOD PRESSURE: 150 MMHG | HEART RATE: 92 BPM

## 2021-05-09 VITALS
BODY MASS INDEX: 21.37 KG/M2 | HEIGHT: 74 IN | TEMPERATURE: 98.2 F | HEART RATE: 90 BPM | DIASTOLIC BLOOD PRESSURE: 78 MMHG | SYSTOLIC BLOOD PRESSURE: 128 MMHG | WEIGHT: 166.5 LBS | OXYGEN SATURATION: 97 %

## 2021-05-09 VITALS
DIASTOLIC BLOOD PRESSURE: 70 MMHG | OXYGEN SATURATION: 97 % | WEIGHT: 163 LBS | TEMPERATURE: 97 F | HEART RATE: 80 BPM | SYSTOLIC BLOOD PRESSURE: 130 MMHG

## 2021-05-09 VITALS
DIASTOLIC BLOOD PRESSURE: 60 MMHG | WEIGHT: 165.37 LBS | HEART RATE: 99 BPM | SYSTOLIC BLOOD PRESSURE: 110 MMHG | OXYGEN SATURATION: 96 % | TEMPERATURE: 98.4 F

## 2021-05-16 VITALS — RESPIRATION RATE: 16 BRPM | WEIGHT: 165 LBS | BODY MASS INDEX: 21.17 KG/M2 | HEIGHT: 74 IN

## 2021-05-16 VITALS — HEIGHT: 74 IN | WEIGHT: 163 LBS | BODY MASS INDEX: 20.92 KG/M2 | RESPIRATION RATE: 16 BRPM

## 2021-05-28 VITALS
HEIGHT: 74 IN | HEART RATE: 89 BPM | DIASTOLIC BLOOD PRESSURE: 60 MMHG | TEMPERATURE: 99 F | OXYGEN SATURATION: 97 % | BODY MASS INDEX: 20.41 KG/M2 | WEIGHT: 159 LBS | RESPIRATION RATE: 14 BRPM | SYSTOLIC BLOOD PRESSURE: 119 MMHG

## 2021-05-28 NOTE — PROGRESS NOTES
Patient: SCOTTY PALENCIA     Acct: [id number]     Report: #[id number]  UNIT #: [id number]     : 1960    Encounter Date:2019  PRIMARY CARE: ANDREW THEODORE  ***Signed***  --------------------------------------------------------------------------------------------------------------------  Chief Complaint      Encounter Date      2019            Primary Care Provider      ANDREW THEODORE            Referring Provider      ANDREW THEODORE            Patient Complaint      Patient is complaining of      New pt here for lung nodule            VITALS      Height 6 ft 2 in / 187.96 cm      Weight 159 lbs 0 oz / 72.611739 kg      BSA 1.97 m2      BMI 20.4 kg/m2      Temperature 99 F / 37.22 C - Temporal      Pulse 89      Respirations 14      Blood Pressure 119/60 Sitting, Left Arm      Pulse Oximetry 97%, Room air            HPI      The patient is a very pleasant 58 year old  male cigarettes smoker here    for abnormal chest CT scan.            The patient reported being in his usual health until he got pneumonia about a     month ago.  He had increasing coughing, shortness of breath, weight loss and     fatigue. He received a course of antibiotics and had chest x-ray done showing     some right apex pleural thickening and nodularity. Chest CT scan was done     showing significant emphysema and right greater than left biapical pleural     thickening with some opacification. The patient had no other nodules and     significant emphysema. The patient is trying to quit smoking as he has been     feeling so bad. He received a course of antibiotics and steroids and is feeling     somewhat better. He has a nebulizer at home which I gave him albuterol for but     he is not taking it. He works at [employer] and denies any silica     exposure but does have a history of brake dust exposure related to this. He also    has a history of dust exposure and inhalational  exposure. He gets short of     breath walking about 1000 feet or up 3-4 flights of stairs, moderate in     severity, worse with exertion, better with rest. His cough is now productive     throughout the day of thick, milky sputum. He denies any wheezing, chest pain or    hemoptysis. He denies any seasonal allergies, itchy, scratchy throat and watery     eyes or nasal congestion. He is able to perform his activities of daily living     without difficulty and denies any swollen lymph nodes or glands in his head and     neck.             I have personally reviewed the Review of Systems, past family, social, surgical     and medical histories and I agree with the findings.            ROS      Constitutional:  Denies: Fatigue, Fever, Weight gain, Weight loss, Chills,     Insomnia, Other      Respiratory/Breathing:  Complains of: Cough; Denies: Shortness of air, Wheezing,    Hemoptysis, Pleuritic pain, Other      Endocrine:  Denies: Polydipsia, Polyuria, Heat/cold intolerance, Diabetes, Other      Eyes:  Denies: Blurred vision, Vision Changes, Other      Ears, nose, mouth, throat:  Denies: Mouth lesions, Thrush, Throat pain,     Hoarseness, Allergies/Hay Fever, Post Nasal Drip, Headaches, Recent Head Injury,    Nose Bleeding, Neck Stiffness, Thyroid Mass, Hearing Loss, Ear Fullness, Dry     Mouth, Nasal or Sinus Pain, Dry Lips, Nasal discharge, Nasal congestion, Other      Cardiovascular:  Denies: Palpitations, Syncope, Claudication, Chest Pain, Wake     up Gasping for air, Leg Swelling, Irregular Heart Rate, Cyanosis, Dyspnea on     Exertion, Other      Gastrointestinal:  Denies: Nausea, Constipation, Diarrhea, Abdominal pain,     Vomiting, Difficulty Swallowing, Reflux/Heartburn, Dysphagia, Jaundice,     Bloating, Melena, Bloody stools, Other      Genitourinary:  Denies: Urinary frequency, Incontinence, Hematuria, Urgency,     Nocturia, Dysuria, Testicular problems, Other      Musculoskeletal:  Denies: Joint Pain, Joint  "Stiffness, Joint Swelling, Myalgias,    Other      Hematologic/lymphatic:  DENIES: Lymphadenopathy, Bruising, Bleeding tendencies,     Other      Neurological:  Denies: Headache, Numbness, Weakness, Seizures, Other      Psychiatric:  Denies: Anxiety, Appropriate Effect, Depression, Other      Sleep:  No: Excessive daytime sleep, Morning Headache?, Snoring, Insomnia?, Stop    breathing at sleep?, Other      Integumentary:  Denies: Rash, Dry skin, Skin Warm to Touch, Other      Immunologic/Allergic:  Denies: Latex allergy, Seasonal allergies, Asthma,     Urticaria, Eczema, Other      Immunization status:  No: Up to date            FAMILY/SOCIAL/MEDICAL HX      Surgical History:  Yes: Appendectomy, Orthopedic Surgery (RIGHT SHOULDER, RIGHT     KNEE ARTHROSCOPY, CARPAL TUNNEL SURGERY BOTH WRISTS)      Diabetes - Family Hx:  Mother      Cancer/Type - Family Hx:  Father      Is Father Still Living?:  No      Is Mother Still Living?:  No      Smoking status:  Current every day smoker (1ppd x 20 years, nothing since     2/12/19)      Anticoagulation Therapy:  No      Antibiotic Prophylaxis:  No      Medical History:  Yes: Depression, Hemorrhoids/Rectal Prob (STOMACH ULCER IN     PAST, GALLSTONES, NAUSEA ); No: Blood Disease, Chemotherapy/Cancer, Deafness or     Ringing Ears, Shortness Of Breath, Miscellaneous Medical/oth      Psychiatric History      Depression            PREVENTION      Hx Influenza Vaccination:  No      Influenza Vaccine Declined:  Yes      2 or More Falls Past Year?:  No      Fall Past Year with Injury?:  No      Hx Pneumococcal Vaccination:  No      Encouraged to follow-up with:  PCP regarding preventative exams.      Chart initiated by      Iraida Green ma            ALLERGIES/MEDICATIONS      Allergies:        Coded Allergies:             ADHESIVE TAPE (Verified  Allergy, Unknown, RASH AND ITCHING IF LEFT ON FOR     \"LONG TIME\", 2/4/19)           AMOXICILLIN TRIHYDRATE (Verified  Allergy, Unknown, " STOMACH INFECTION,     2/4/19)           POTASSIUM CLAVULANATE (Verified  Allergy, Unknown, STOMACH INFECTION,     2/4/19)      Medications    Last Reconciled on 2/19/19 14:52 by NIA KINCAID MD      Fluticasone/Vilanterol 100-25 Mcg Inh (Breo Ellipta 100-25 Mcg Inh) 1 Each     Blst.w.dev      1 PUFF INH QDAY, #1 INH 0 Refills         Prov: NIA KINCAID         2/19/19       Varenicline (Chantix) Unknown Strength Tablet      PO BID for 30 Days, TAB         Reported         2/19/19       Cimetidine (Cimetidine) 200 Mg Tab      200 MG PO BID, #60 TAB         Reported         8/15/18       Tamsulosin HCL (Flomax) 0.4 Mg Cap.er.24h      0.4 MG PO BID, #30 CAP 0 Refills         Reported         8/15/18      Current Medications      Current Medications Reviewed 2/4/19            EXAM      Vital Signs Reviewed      Gen: WDWN, Alert, NAD.        HEENT:  PERRL, EOMI.  OP, nares clear, no sinus tenderness.      Neck:  Supple, no JVD, no thyromegaly.      Lymph: No axillary, cervical, supraclavicular lymphadenopathy noted bilaterally.      Chest:  Good aeration, barrel chested, bibasilar rhonchi with expiration,     tympanic to percussion bilaterally, no work of breathing noted.      CV:  RRR, no MGR, pulses 2+, equal.      Abd:  Soft, NT, ND, + BS, no HSM.      EXT:  No clubbing, no cyanosis, no edema, no joint tenderness.       Neuro:  A  Skin: No rashes or lesions.      Vtials      Vitals:             Height 6 ft 2 in / 187.96 cm           Weight 159 lbs 0 oz / 72.173005 kg           BSA 1.97 m2           BMI 20.4 kg/m2           Temperature 99 F / 37.22 C - Temporal           Pulse 89           Respirations 14           Blood Pressure 119/60 Sitting, Left Arm           Pulse Oximetry 97%, Room air            REVIEW      Results Reviewed      PCCS Results Reviewed?:  Yes Prev Lab Results, Yes Prev Radiology Results, Yes     Previous [facility] Records (I personally reviewed the patient's office notes from     his referring  provider. )      Lab Results      I personally reviewed the patient's labs from 2018 showing no peripheral     eosinophilia and labs from 2019 showing no evidence of chronic     hypercapnic respiratory failure.      Radiographic Results      I personally reviewed the patient's chest CT scan from  showing emphysema     with right greater than left biapical pleural thickening with some right upper     lobe opacification with no lung nodules or masses. Chest CT scan also shows some    very mild lymphadenopathy in the mediastinum which appears to be reactive.                      [facility]                PACS RADIOLOGY REPORT            Patient: SCOTTY PALENCIA   Acct: #[id number]   Report: #[id number]            UNIT #: [id number]    DOS: 19       INSURANCE:[employer]   ORDER #:[id number]      LOCATION:[facility]     : 1960            PROVIDERS      ADMITTING:     ATTENDING: ANDREW THEODORE      FAMILY:  ANDREW THEODORE   ORDERING:  ANDREW THEODORE         OTHER:    DICTATING:  DELILAH HOLLOWAY MD            REQ #:[id number]   EXAM:CXR2 - CHEST 2V AP PA LAT      REASON FOR EXAM:  COUGH      REASON FOR VISIT:  COUGH            *******Signed******         PROCEDURE:   CHEST AP/PA AND LATERAL             COMPARISON:   None.             INDICATIONS:   COUGH             FINDINGS:         Lungs are hyperexpanded consistent with emphysema.  There is an area of asy    mmetric nodularity at       the right apex which may relate to a pleural-parenchymal scarring however there     is no comparison       imaging at time of this dictation.  There is chronic scarring and blunting at     the left or right       lateral costophrenic angles posteriorly.             CONCLUSION:         1. Emphysema with area of nodularity at the right apex, recommend chest CT for     further assessment.        This could reflect an  area of chronic scarring versus a pulmonary nodule.      2. No consolidation or findings of pneumonia.              DELILAH HOLLOWAY MD             Electronically Signed and Approved By: DELILAH HOLLOWAY MD on 1/29/2019 at 15:22                        Until signed, this is an unconfirmed preliminary report that may contain      errors and is subject to change.                                              NICKI:      D:01/29/19 1522            Assessment      Cough - R05            PAK (dyspnea on exertion) - R06.09            Tobacco abuse - Z72.0            Emphysema lung         Centrilobular emphysema - J43.2         Emphysema type: centrilobular            Abnormal CT of the chest - R93.89            Notes      New Medications      * Varenicline (Chantix) Unknown Strength TABLET: PO BID 30 Days         Instructions: FOR SMOKING CESSATION      * Fluticasone/Vilanterol 100-25 Mcg Inh (Breo Ellipta 100-25 Mcg Inh) 1 EACH       BLST.W.DEV: 1 PUFF INH QDAY #1         Dx: Cough - R05      New Diagnostics      * 6 Min Walk With Pulse Ox, Routine         Dx: Cough - R05      * Chest W/O Cont CT, Month         Dx: Cough - R05      * PFT-Comp, PrePost,DLCO,BodyBox, Week         Dx: Cough - R05      IMPRESSION:      1. Dyspnea on exertion.       2. Cough.       3. Tobacco abuse of cigarettes.       4. Emphysema of lung.       5. Abnormal chest CT scan with right greater than left biapical pleural     thickening and mild reactive mediastinal adenopathy.             PLAN:      1. Clinically I think the patient likely has a pneumonia and has responded well     to treatment.       2. Repeat noncontrast chest CT scan in short interval period of 2 months to     assess for resolution of possible infiltrates versus transition into pleural     scarring.       3. Start Breo 100/25 1 puff daily. Inhaler education provided today.      4. Check pulmonary fibrosis and six minute walk test to assess for airflow     obstruction and bronchodilator  response.       5. Smoking cessation counseling provided. I spent 4 minutes today counseling the    patient on risks of smoking, including throat cancer, lung cancer, COPD, heart     disease and death. I also discussed the benefits of quitting. I applauded the     patient for trying to cut back and he is currently on Chantix.       6. Up to date with flu vaccine, we will assess Prevnar and Pneumovax at the next    office visit.       7. We will assess alpha-1 antitrypsin testing at the next office visit.       8. Follow up with me in 1-2 months to discuss his chest CT scan results and     reassess his symptoms.       9.  The case was discussed in multidisciplinary fashion with myself, thoracic     surgery, radiation oncology, hematology oncology and radiology.            Patient Education      Tobacco Cessation Counseling:  for 3 - 10 minutes      Patient Education Provided:  COPD, How to use an Inhaler      Other Education:  CT with pleural thickening                 Disclaimer: Converted document may not contain table formatting or lab diagrams. Please see [employer] for the authenticated document.

## 2021-06-01 ENCOUNTER — HOSPITAL ENCOUNTER (OUTPATIENT)
Dept: FAMILY MEDICINE CLINIC | Facility: CLINIC | Age: 61
Discharge: HOME | End: 2021-06-01
Attending: FAMILY MEDICINE

## 2021-06-01 LAB
T4 FREE SERPL-MCNC: 0.9 NG/DL (ref 0.9–1.8)
TSH SERPL-ACNC: 3.75 M[IU]/L (ref 0.27–4.2)

## 2021-06-03 LAB
CONV ANTI MICROSOMAL AB: 132 IU/ML (ref 0–34)
THYROGLOBULIN ANTIBODY: 3 IU/ML (ref 0–0.9)

## 2021-06-09 ENCOUNTER — TELEPHONE (OUTPATIENT)
Dept: FAMILY MEDICINE CLINIC | Facility: CLINIC | Age: 61
End: 2021-06-09

## 2021-06-09 NOTE — TELEPHONE ENCOUNTER
"PATIENT'S EX WIFE HAS CALLED CONCERNED ABOUT PATIENT'S EYES, SHE STATED HIS EYES ARE \"DROOPY\" AND WORRIED IT COULD BE A MINI STROKE, SHE ALSO SAID IT COULD BE THAT THE PATIENT HAS BUMPED AND HURT HIS EYE.     SHE STATED PATIENT HAS HAS PREVIOUS STROKES. SHE IS SEEKING ADVICE FROM DR. THEODORE.     HAKAN'S CALL BACK: [phone]      "

## 2021-08-02 RX ORDER — APIXABAN 5 MG/1
TABLET, FILM COATED ORAL
Qty: 60 TABLET | Refills: 0 | Status: SHIPPED | OUTPATIENT
Start: 2021-08-02 | End: 2021-09-07

## 2021-08-02 RX ORDER — LANSOPRAZOLE 30 MG/1
CAPSULE, DELAYED RELEASE ORAL
Qty: 30 CAPSULE | Refills: 0 | Status: SHIPPED | OUTPATIENT
Start: 2021-08-02 | End: 2021-09-07

## 2021-08-02 RX ORDER — TRAZODONE HYDROCHLORIDE 50 MG/1
TABLET ORAL
Qty: 30 TABLET | Refills: 0 | Status: SHIPPED | OUTPATIENT
Start: 2021-08-02 | End: 2021-09-07

## 2021-09-02 ENCOUNTER — HOSPITAL ENCOUNTER (OUTPATIENT)
Dept: CT IMAGING | Facility: HOSPITAL | Age: 61
Discharge: HOME | End: 2021-09-02
Admitting: INTERNAL MEDICINE

## 2021-09-02 DIAGNOSIS — R59.0 MEDIASTINAL ADENOPATHY: ICD-10-CM

## 2021-09-02 LAB — CREAT BLDA-MCNC: 0.9 MG/DL (ref 0.6–1.3)

## 2021-09-02 PROCEDURE — 71260 CT THORAX DX C+: CPT

## 2021-09-02 PROCEDURE — 25010000002 IOPAMIDOL 61 % SOLUTION: Performed by: INTERNAL MEDICINE

## 2021-09-02 PROCEDURE — 82565 ASSAY OF CREATININE: CPT

## 2021-09-02 RX ADMIN — IOPAMIDOL 75 ML: 612 INJECTION, SOLUTION INTRAVENOUS at 11:06

## 2021-09-03 NOTE — PROGRESS NOTES
"   Progress Note      Patient Name: Sathya Benavides   Patient ID: [id number]   Sex: Male   YOB: 1960    Primary Care Provider: Nery LUNA   Referring Provider: Nery LUNA    Visit Date: February 1, 2019    Provider: JEREMY Nettles   Location: [facility]   Location Address: [address] [place] [address]  [id number]   Location Phone: [phone]          Chief Complaint     f/u from last visit with Dr Kaminski on going back to work.       History Of Present Illness  Sathya Benavides is a 58 year old /White male who presents for evaluation and treatment of:      he was here earlier this week and saw Dr. Kaminski and was referred to Pulmonology on 2/5/19 and told to follow up today - continues to have some cough with some production -   lots of \"air on stomach\"           Past Medical History  Disease Name Date Onset Notes   Acid reflux --  --    Anxiety --  --    Depression --  --          Past Surgical History  Procedure Name Date Notes   *Denies any surgical procedures --  --          Medication List  Name Date Started Instructions   albuterol sulfate 0.63 mg/3 mL inhalation solution for nebulization 01/24/2019 use in nebulizer as directed every 4 hours as needed   azithromycin 250 mg oral tablet 01/29/2019 take 2 tablets (500 mg) by oral route once daily for 1 day then 1 tablet (250 mg) by oral route once daily for 4 days   Chantix Starting Month Box 0.5 mg (11)- 1 mg (42) oral tablets,dose pack 01/24/2019 take as directed for 30 days   Flomax 0.4 mg oral capsule 01/24/2019 TAKE ONE CAPSULE BY MOUTH TWICE A DAY   Nicoderm CQ 21 mg/24 hr transdermal patch 24 hour  apply 1 patch (21 mg) by transdermal route once daily   prednisone 10 mg oral tablet 01/29/2019 take 4 tabs PO daily x 2 days, then 3 tabs daily x 2 days then 2 tabs daily x 2 days then 1 tab daily x 2 days   Tagamet  mg oral tablet 07/17/2018 take 1 tablet (200 mg) by oral route 2 " times per day 30 minutes before meals for 30 days   trazodone 50 mg oral tablet 01/24/2019 TAKE ONE TABLET BY MOUTH EVERY NIGHT AT BEDTIME FOR 30 DAYS         Allergy List  Allergen Name Date Reaction Notes   Augmentin --  vomiting --          Family Medical History  Disease Name Relative/Age Notes   *No Known Family History / --          Social History  Finding Status Start/Stop Quantity Notes   Alcohol Never --/-- --  --    Tobacco Current every day --/-- 1 ppd smoker x 20 yrs         Immunizations  NameDate Admin Mfg Trade Name Lot Number Route Inj VIS Given VIS Publication   Yefdxmydi64/17/2017 UNK Unknown TradeName [id number] NE NE 03/15/2018 08/07/2015   Comments:    Tdap05/17/2017 PMC ADACEL [id number] NE NE 03/29/2018 02/24/2015   Comments:          Review of Systems  · Constitutional  o Denies  o : fever, chills, body aches  · HENT  o Denies  o : sinus pain, nasal congestion  · Respiratory  o Admits  o : cough  · Gastrointestinal  o Admits  o : excessive flatulence  o Denies  o : nausea, vomiting      Vitals  Date Time BP Position Site L\R Cuff Size HR RR TEMP(F) WT  HT  BMI kg/m2 BSA m2 O2 Sat HC       02/01/2019 03:58 /74 Sitting    111 - R  98.2 161lbs 2oz    96 %           Physical Examination  · Constitutional  o Appearance  o : well developed, well-nourished, in no acute distress  · Eyes  o Conjunctivae  o : conjunctivae normal  o Pupils and Irises  o : pupils equal and round, pupils reactive to light bilaterally  · Neck  o Inspection/Palpation  o : supple  o Thyroid  o : no thyromegaly  · Respiratory  o Respiratory Effort  o : breathing unlabored  o Auscultation of Lungs  o : wheezing present to bilateral lower lung bases  · Cardiovascular  o Heart  o :   § Auscultation of Heart  § : regular rate and rhythm  o Peripheral Vascular System  o :   § Extremities  § : no edema  · Lymphatic  o Neck  o : no lymphadenopathy present  · Musculoskeletal  o General  o :   § General Musculoskeletal  § : No joint  swelling or deformity. Muscle tone, strength, and development grossly normal.  · Skin and Subcutaneous Tissue  o General Inspection  o : NL tone  · Neurologic  o Gait and Station  o :   § Gait Screening  § : normal gait  · Psychiatric  o Mood and Affect  o : mood normal, affect appropriate              Assessment  · Pneumonia     486/J18.9      Plan  · Orders  o ACO-39: Current medications updated and reviewed () - - 02/01/2019  · Instructions  o Take all medications as prescribed/directed.  o Rest. Increase Fluids.  o Patient was educated/instructed on their diagnosis, treatment and medications prior to discharge from the clinic today.  o RTW without restriction.   · Disposition  o Follow up as needed.            Electronically Signed by: JEREMY Nettles -Author on February 1, 2019 04:24:53 PM   01-Jan-2018

## 2021-09-07 RX ORDER — TRAZODONE HYDROCHLORIDE 50 MG/1
TABLET ORAL
Qty: 30 TABLET | Refills: 0 | Status: SHIPPED | OUTPATIENT
Start: 2021-09-07 | End: 2021-10-14 | Stop reason: SDUPTHER

## 2021-09-07 RX ORDER — APIXABAN 5 MG/1
TABLET, FILM COATED ORAL
Qty: 60 TABLET | Refills: 0 | Status: SHIPPED | OUTPATIENT
Start: 2021-09-07 | End: 2021-10-14 | Stop reason: SDUPTHER

## 2021-09-07 RX ORDER — TAMSULOSIN HYDROCHLORIDE 0.4 MG/1
CAPSULE ORAL
Qty: 60 CAPSULE | Refills: 2 | Status: SHIPPED | OUTPATIENT
Start: 2021-09-07 | End: 2021-10-14 | Stop reason: SDUPTHER

## 2021-09-07 RX ORDER — LANSOPRAZOLE 30 MG/1
CAPSULE, DELAYED RELEASE ORAL
Qty: 30 CAPSULE | Refills: 0 | Status: SHIPPED | OUTPATIENT
Start: 2021-09-07 | End: 2021-10-14 | Stop reason: SDUPTHER

## 2021-09-10 RX ORDER — TAMSULOSIN HYDROCHLORIDE 0.4 MG/1
1 CAPSULE ORAL 2 TIMES DAILY
Qty: 60 CAPSULE | Refills: 2 | OUTPATIENT
Start: 2021-09-10

## 2021-09-10 RX ORDER — ATORVASTATIN CALCIUM 80 MG/1
80 TABLET, FILM COATED ORAL DAILY
OUTPATIENT
Start: 2021-09-10

## 2021-09-10 RX ORDER — TRAZODONE HYDROCHLORIDE 50 MG/1
50 TABLET ORAL
Qty: 30 TABLET | Refills: 0 | OUTPATIENT
Start: 2021-09-10

## 2021-09-10 RX ORDER — LEVOTHYROXINE SODIUM 0.03 MG/1
25 TABLET ORAL DAILY
Qty: 30 TABLET | Refills: 0 | OUTPATIENT
Start: 2021-09-10 | End: 2021-10-10

## 2021-09-10 RX ORDER — LANSOPRAZOLE 30 MG/1
30 CAPSULE, DELAYED RELEASE ORAL
Qty: 30 CAPSULE | Refills: 0 | OUTPATIENT
Start: 2021-09-10

## 2021-09-10 NOTE — TELEPHONE ENCOUNTER
Caller: Sathya Benavides    Relationship: Self    Best call back number: [phone]  Medication needed:   Requested Prescriptions     Pending Prescriptions Disp Refills   • apixaban (Eliquis) 5 MG tablet tablet 60 tablet 0     Sig: Take 1 tablet by mouth 2 (Two) Times a Day.   • tamsulosin (FLOMAX) 0.4 MG capsule 24 hr capsule 60 capsule 2     Sig: Take 1 capsule by mouth 2 (Two) Times a Day.   • traZODone (DESYREL) 50 MG tablet 30 tablet 0     Sig: Take 1 tablet by mouth every night at bedtime.   • levothyroxine (SYNTHROID, LEVOTHROID) 25 MCG tablet 30 tablet 0     Sig: Take 1 tablet by mouth Daily for 30 days.   • atorvastatin (LIPITOR) 80 MG tablet       Sig: Take 1 tablet by mouth Daily.   • lansoprazole (PREVACID) 30 MG capsule 30 capsule 0     Sig: Take 1 capsule by mouth Every Morning Before Breakfast.       When do you need the refill by: TODAY    What additional details did the patient provide when requesting the medication: PATIENT IS COMPLETELY OUT OF ALL MEDICATIONS    Does the patient have less than a 3 day supply:  [x] Yes  [] No    What is the patient's preferred pharmacy:    [facility] [address] [address] [place] - [phone]        PLEASE NOTIFY THE PATIENT WHEN CALLED IN PLEASE.

## 2021-09-24 ENCOUNTER — TRANSCRIBE ORDERS (OUTPATIENT)
Dept: ADMINISTRATIVE | Facility: HOSPITAL | Age: 61
End: 2021-09-24

## 2021-09-24 DIAGNOSIS — R59.0 MEDIASTINAL ADENOPATHY: Primary | ICD-10-CM

## 2021-09-27 ENCOUNTER — OFFICE VISIT (OUTPATIENT)
Dept: FAMILY MEDICINE CLINIC | Facility: CLINIC | Age: 61
End: 2021-09-27

## 2021-09-27 VITALS
OXYGEN SATURATION: 98 % | DIASTOLIC BLOOD PRESSURE: 70 MMHG | WEIGHT: 168 LBS | HEART RATE: 75 BPM | SYSTOLIC BLOOD PRESSURE: 120 MMHG | TEMPERATURE: 98 F | BODY MASS INDEX: 21.56 KG/M2

## 2021-09-27 DIAGNOSIS — E06.3 HYPOTHYROIDISM DUE TO HASHIMOTO'S THYROIDITIS: ICD-10-CM

## 2021-09-27 DIAGNOSIS — R20.0 NUMBNESS: ICD-10-CM

## 2021-09-27 DIAGNOSIS — E03.8 HYPOTHYROIDISM DUE TO HASHIMOTO'S THYROIDITIS: ICD-10-CM

## 2021-09-27 DIAGNOSIS — H54.3: ICD-10-CM

## 2021-09-27 DIAGNOSIS — E78.2 MIXED HYPERLIPIDEMIA: ICD-10-CM

## 2021-09-27 DIAGNOSIS — E06.3 HASHIMOTO'S THYROIDITIS: Primary | ICD-10-CM

## 2021-09-27 DIAGNOSIS — H53.9 VISION CHANGES: ICD-10-CM

## 2021-09-27 PROCEDURE — 99214 OFFICE O/P EST MOD 30 MIN: CPT | Performed by: FAMILY MEDICINE

## 2021-09-27 NOTE — PROGRESS NOTES
Chief Complaint  Hypothyroidism (Follow up)    Subjective          Sathya Benavides presents to [facility]  Pt has had right eye lateral decreased visual fields x 3 weeks    Hypothyroidism  This is a chronic problem. The current episode started more than 1 year ago. The problem occurs constantly. The problem has been gradually improving. Pertinent negatives include no abdominal pain, anorexia, arthralgias, change in bowel habit, chest pain, chills, congestion, coughing, diaphoresis, fatigue, fever, headaches, joint swelling, myalgias, nausea, neck pain, numbness, rash, sore throat, swollen glands, urinary symptoms, vertigo, visual change, vomiting or weakness. Nothing aggravates the symptoms. Treatments tried: synthroid. The treatment provided moderate relief.   Hyperlipidemia  This is a chronic problem. The current episode started more than 1 year ago. The problem is uncontrolled. Recent lipid tests were reviewed and are variable. Exacerbating diseases include hypothyroidism. There are no known factors aggravating his hyperlipidemia. Pertinent negatives include no chest pain, focal sensory loss, focal weakness, leg pain, myalgias or shortness of breath. Current antihyperlipidemic treatment includes statins. The current treatment provides significant improvement of lipids. There are no compliance problems.  Risk factors for coronary artery disease include dyslipidemia, family history and male sex.       Objective   Allergies   Allergen Reactions   • Amoxicillin-Pot Clavulanate Other (See Comments) and GI Intolerance     Upset stomach       There is no immunization history on file for this patient.    Vital Signs:   Vitals:    09/27/21 1434   BP: 120/70   Pulse: 75   Temp: 98 °F (36.7 °C)   SpO2: 98%   Weight: 76.2 kg (168 lb)       Physical Exam  Vitals reviewed.   Constitutional:       Appearance: Normal appearance. He is well-developed.   HENT:      Head: Normocephalic and  atraumatic.      Right Ear: External ear normal.      Left Ear: External ear normal.      Mouth/Throat:      Pharynx: No oropharyngeal exudate.   Eyes:      Conjunctiva/sclera: Conjunctivae normal.      Pupils: Pupils are equal, round, and reactive to light.   Cardiovascular:      Rate and Rhythm: Normal rate and regular rhythm.      Pulses: Normal pulses.      Heart sounds: Normal heart sounds. No murmur heard.   No friction rub. No gallop.    Pulmonary:      Effort: Pulmonary effort is normal.      Breath sounds: Normal breath sounds. No wheezing or rhonchi.   Abdominal:      General: Bowel sounds are normal. There is no distension.      Palpations: Abdomen is soft.      Tenderness: There is no abdominal tenderness.   Skin:     General: Skin is warm and dry.   Neurological:      Mental Status: He is alert and oriented to person, place, and time.      Cranial Nerves: No cranial nerve deficit.   Psychiatric:         Mood and Affect: Mood and affect normal.         Behavior: Behavior normal.         Thought Content: Thought content normal.         Judgment: Judgment normal.        Result Review :                 Assessment and Plan    Diagnoses and all orders for this visit:    1. Hashimoto's thyroiditis (Primary)  -     Ambulatory Referral to Endocrinology  -     TSH+Free T4    2. Hypothyroidism due to Hashimoto's thyroiditis  -     Ambulatory Referral to Endocrinology  -     TSH+Free T4    3. Vision changes  -     CBC Auto Differential  -     Comprehensive Metabolic Panel  -     Ambulatory Referral to Neurology  -     MRI Brain With & Without Contrast; Future  -     Ambulatory Referral to Ophthalmology    4. Complete loss of vision in visual field  -     Ambulatory Referral to Neurology  -     MRI Brain With & Without Contrast; Future  -     Ambulatory Referral to Ophthalmology    5. Numbness  -     Vitamin B12 & Folate  -     Ambulatory Referral to Neurology  -     MRI Brain With & Without Contrast; Future    6.  Mixed hyperlipidemia  -     Comprehensive Metabolic Panel  -     Lipid Panel            Follow Up   Return in about 6 months (around 3/27/2022) for Recheck.  Patient was given instructions and counseling regarding his condition or for health maintenance advice. Please see specific information pulled into the AVS if appropriate.

## 2021-10-01 ENCOUNTER — OFFICE VISIT (OUTPATIENT)
Dept: CARDIOLOGY | Facility: CLINIC | Age: 61
End: 2021-10-01

## 2021-10-01 VITALS
DIASTOLIC BLOOD PRESSURE: 70 MMHG | BODY MASS INDEX: 20.35 KG/M2 | WEIGHT: 158.6 LBS | HEIGHT: 74 IN | SYSTOLIC BLOOD PRESSURE: 120 MMHG | HEART RATE: 70 BPM

## 2021-10-01 DIAGNOSIS — Q21.12 PFO (PATENT FORAMEN OVALE): Primary | Chronic | ICD-10-CM

## 2021-10-01 DIAGNOSIS — R59.0 MEDIASTINAL ADENOPATHY: ICD-10-CM

## 2021-10-01 DIAGNOSIS — Z72.0 TOBACCO ABUSE: ICD-10-CM

## 2021-10-01 DIAGNOSIS — I10 BENIGN ESSENTIAL HYPERTENSION: ICD-10-CM

## 2021-10-01 DIAGNOSIS — I63.9 CRYPTOGENIC STROKE (HCC): ICD-10-CM

## 2021-10-01 PROBLEM — N40.0 BPH (BENIGN PROSTATIC HYPERPLASIA): Status: ACTIVE | Noted: 2021-10-01

## 2021-10-01 PROBLEM — N42.9 PROSTATE DISORDER: Status: ACTIVE | Noted: 2021-10-01

## 2021-10-01 PROBLEM — R91.8 LUNG MASS: Status: ACTIVE | Noted: 2021-10-01

## 2021-10-01 PROBLEM — F32.A DEPRESSION: Status: ACTIVE | Noted: 2021-10-01

## 2021-10-01 PROBLEM — K21.9 ACID REFLUX: Status: ACTIVE | Noted: 2021-10-01

## 2021-10-01 PROBLEM — F41.9 ANXIETY: Status: ACTIVE | Noted: 2021-10-01

## 2021-10-01 PROBLEM — R10.11 RUQ PAIN: Status: ACTIVE | Noted: 2021-10-01

## 2021-10-01 PROBLEM — Z86.73 RECENT CEREBROVASCULAR ACCIDENT (CVA): Status: RESOLVED | Noted: 2021-05-05 | Resolved: 2021-10-01

## 2021-10-01 PROBLEM — R10.11 RUQ PAIN: Status: RESOLVED | Noted: 2021-10-01 | Resolved: 2021-10-01

## 2021-10-01 PROBLEM — E03.9 HYPOTHYROIDISM: Status: ACTIVE | Noted: 2021-10-01

## 2021-10-01 PROCEDURE — 93000 ELECTROCARDIOGRAM COMPLETE: CPT | Performed by: NURSE PRACTITIONER

## 2021-10-01 PROCEDURE — 99214 OFFICE O/P EST MOD 30 MIN: CPT | Performed by: NURSE PRACTITIONER

## 2021-10-01 NOTE — PROGRESS NOTES
"  Date of Office Visit: 10/01/2021  Encounter Provider: JEREMY Machuca  Place of Service: [facility]  Patient Name: Sathya Benavides  :1960  Primary Cardiologist: Dr. Edson Hua     Chief Complaint   Patient presents with   • Follow-up     PFO   :     HPI: Sathya Benavides is a pleasant 61 y.o. male who presents today for follow up on PFO. He is a new patient to me and his previous records have been reviewed.    In 2021, he presented with right eye vision loss 10 days prior to presentation to the ED.  He had already been evaluated outpatient by ophthalmology.  MRI showed bilateral posterior cerebral artery embolic infarcts.  Echocardiogram and DALLAS demonstrated a small to moderate sized PFO.  He was found to have mediastinal and right hilar adenopathy and pulmonary nodule.  He was seen by Dr. Tony.  He was started on apixaban and atorvastatin.  Outpatient Holter monitor showed no evidence of atrial fibrillation.  PET scan showed right hilar and mediastinal lymphadenopathy with hypermetabolic and mildly enlarged node in the joey hepatis.  He underwent bronchoscopy with biopsy.    In May 2021, he followed up with Dr. Hua in the office.  He noted that the CVA was due to bilateral emboli felt secondary to the PFO.  Dr. Hua recommended consideration of potential closure in the future, pending determination of the etiology for the hypermetabolic adenopathy.    Mr. Benavides saw Dr. Tony on 2021 I reviewed the note.  His recent CT scan showed stable lymph nodes.  Endobronchial guided needle biopsy did not show any malignancy of the lymph node.  He was recommended to have a follow-up CT scan in 3 months and quit smoking.  \"From the respiratory standpoint, he is okay to undergo general anesthesia and closure of his PFO\".  He was felt to be at high risk for stroke due to his past history of strokes and current cigarette smoking.  In that same note there was " documentation that the patient was recommended to see a gastroenterologist, but refused to do so.  The patient and his wife said they did not decline and would be glad to see the gastroenterologist if recommended.    The patient presents today with his wife accompanying him.  He said if he needs to have the PFO closure procedure completed he is very nervous about it.  He denies any further neurological symptoms, but has continued to experience right-sided visual loss.  He denies chest pain, shortness of air, palpitations, edema, dizziness, syncope, or bleeding.  His blood pressure and heart rate are both normal today.    Past Medical History:   Diagnosis Date   • Acid reflux    • Enlarged prostate    • Headache, tension-type    • Hyperlipidemia    • Lung nodule    • PFO (patent foramen ovale)    • Stroke (HCC) 2021    LOSS OF PERIPHERAL VISION       Past Surgical History:   Procedure Laterality Date   • APPENDECTOMY     • BRONCHOSCOPY Bilateral 2021    Procedure: BRONCHOSCOPY ENDOBRONCHIAL ULTRASOUND WITH FNA'S;  Surgeon: Sam Tony MD;  Location: [facility];  Service: Pulmonary;  Laterality: Bilateral;  PRE- LYMPHADENOPATHY  POST- SAME   • KNEE ARTHROSCOPY     • SHOULDER ARTHROSCOPY         Social History     Socioeconomic History   • Marital status:      Spouse name: Not on file   • Number of children: Not on file   • Years of education: Not on file   • Highest education level: Not on file   Tobacco Use   • Smoking status: Former Smoker     Packs/day: 1.00     Years: 22.00     Pack years: 22.00     Types: Cigarettes     Quit date: 2021     Years since quittin.5   • Smokeless tobacco: Never Used   • Tobacco comment: caffeine 4 cups qd   Vaping Use   • Vaping Use: Never used   Substance and Sexual Activity   • Alcohol use: Yes     Comment: occasionally   • Drug use: Not Currently   • Sexual activity: Defer       Family History   Problem Relation Age of Onset   • Malig Hyperthermia  "Neg Hx        The following portion of the patient's history were reviewed and updated as appropriate: past medical history, past surgical history, past social history, past family history, allergies, current medications, and problem list.    Review of Systems   Constitutional: Positive for weight loss.   Cardiovascular: Negative.    Respiratory: Negative.    Hematologic/Lymphatic: Negative.    Neurological: Negative.        Allergies   Allergen Reactions   • Amoxicillin-Pot Clavulanate Other (See Comments) and GI Intolerance     Upset stomach         Current Outpatient Medications:   •  Eliquis 5 MG tablet tablet, TAKE ONE TABLET BY MOUTH TWICE A DAY, Disp: 60 tablet, Rfl: 0  •  lansoprazole (PREVACID) 30 MG capsule, TAKE ONE CAPSULE BY MOUTH DAILY BEFORE A MEAL, Disp: 30 capsule, Rfl: 0  •  tamsulosin (FLOMAX) 0.4 MG capsule 24 hr capsule, TAKE ONE CAPSULE BY MOUTH TWICE A DAY, Disp: 60 capsule, Rfl: 2  •  traZODone (DESYREL) 50 MG tablet, TAKE ONE TABLET BY MOUTH EVERY NIGHT AT BEDTIME, Disp: 30 tablet, Rfl: 0  •  levothyroxine (SYNTHROID, LEVOTHROID) 25 MCG tablet, Take 1 tablet by mouth Daily for 30 days., Disp: 30 tablet, Rfl: 0        Objective:     Vitals:    10/01/21 1112 10/01/21 1117   BP: 124/68 120/70   BP Location: Left arm Right arm   Pulse: 70    Weight: 71.9 kg (158 lb 9.6 oz)    Height: 188 cm (74\")      Body mass index is 20.36 kg/m².    PHYSICAL EXAM:    Vitals Reviewed.   General Appearance: No acute distress, well developed and well nourished. Thin.    Eyes: Conjunctiva and lids: No erythema, swelling, or discharge. Sclera non-icteric. Glasses.   HENT: Atraumatic, normocephalic. External eyes, ears, and nose normal. No hearing loss noted. Mucous membranes normal. Lips not cyanotic. Neck supple with no tenderness. Wearing mask.   Respiratory: No signs of respiratory distress. Respiration rhythm and depth normal.   Clear to auscultation. No rales, crackles, rhonchi, or wheezing auscultated. "   Cardiovascular:  Jugular Venous Pressure: Normal  Heart Rate and Rhythm: Normal, Heart Sounds: Normal S1 and S2. No S3 or S4 noted.  Murmurs: No murmurs noted. No rubs, thrills, or gallops.   Lower Extremities: No edema noted.  Gastrointestinal:  Abdomen soft, non-distended, non-tender.    Musculoskeletal: Normal movement of extremities.  Skin and Nails: General appearance normal. No pallor, cyanosis, diaphoresis. Skin temperature normal. No clubbing of fingernails.   Psychiatric: Patient alert and oriented to person, place, and time. Speech and behavior appropriate. Normal mood and affect.       ECG 12 Lead    Date/Time: 10/1/2021 11:22 AM  Performed by: Dipika Lucas APRN  Authorized by: Dipika Lucas APRN   Comparison: compared with previous ECG from 3/25/2021  Similar to previous ECG  Rhythm: sinus rhythm  Rate: normal  BPM: 70  Conduction: conduction normal  ST Segments: ST segments normal  T Waves: T waves normal  QRS axis: normal  Other findings: non-specific ST-T wave changes    Clinical impression: non-specific ECG              Assessment:       Diagnosis Plan   1. PFO (patent foramen ovale)     2. Cryptogenic stroke (HCC)     3. Benign essential hypertension     4. Tobacco abuse     5. Mediastinal adenopathy            Plan:       1.  PFO: I will discuss with Dr. Hua if he is a feels the patient is ready to proceed with PFO closure.  Dr. Tony said from a respiratory standpoint, the patient was at acceptable risk to proceed with PFO closure.    2.  Cryptogenic Stroke: Felt to be embolic from PFO.  He should continue with the apixaban and atorvastatin.  He has residual right eye vision loss.    3.  Hypertension: Blood pressure well controlled today.    4.  Cigarette Smoking: I have highly recommended that he abstain from cigarette smoking.    5.  Mediastinal Adenopathy and Pulmonary Nodule: Followed by Dr. Tony.    6.  If necessary, he said he would be willing to see the gastroenterologist if  still recommended.  I will defer to Dr. Tony and Dr. Kaminski.    7.  Further recommendations to follow.      As always, it has been a pleasure to participate in your patient's care. Thank you.       Sincerely,         JEREMY Gonzalez  [facility]      · Dictated utilizing Dragon Dictation  · COVID-19 Precautions - Patient was compliant in wearing a mask. When I saw the patient, I used appropriate personal protective equipment (PPE) including mask and eye shield (standard procedure).  Additionally, I used gown and gloves if indicated.  Hand hygiene was completed before and after seeing the patient.  · I spent 30 minutes reviewing her medical records/testing/previous office notes/labs, face-to-face interaction with patient, physical examination, formulating the plan of care, and discussion of plan of care with patient.

## 2021-10-08 ENCOUNTER — TELEPHONE (OUTPATIENT)
Dept: CARDIOLOGY | Facility: CLINIC | Age: 61
End: 2021-10-08

## 2021-10-08 NOTE — TELEPHONE ENCOUNTER
Dr. Hua - please review my last office note. Patient is ready to proceed with PFO closure and wants to know if you are ok with that now. Please advise next steps. (I am out of the office today) Thanks.

## 2021-10-08 NOTE — TELEPHONE ENCOUNTER
Thanks, I would arrange for him to be seen by one of the interventionalists in the office so they can discuss PFO closure with the patient and then proceed

## 2021-10-11 NOTE — TELEPHONE ENCOUNTER
I discussed with Dr. Venkat Chatman and he said he will be glad to see the patient for possible PFO closure.    Faustina Chatman asked me to send this message to you so that you could arrange for the patient to be seen in the office.  Thank you

## 2021-10-12 NOTE — TELEPHONE ENCOUNTER
Called pt and left a message offering a couple of possibilities, 10/18 at 8:40a or 10/28 at 1:20p. I asked for a call back to see if either of those works for him.    Oanh

## 2021-10-13 RX ORDER — TRAZODONE HYDROCHLORIDE 50 MG/1
TABLET ORAL
Qty: 30 TABLET | Refills: 0 | OUTPATIENT
Start: 2021-10-13

## 2021-10-13 RX ORDER — APIXABAN 5 MG/1
TABLET, FILM COATED ORAL
Qty: 60 TABLET | Refills: 0 | OUTPATIENT
Start: 2021-10-13

## 2021-10-13 RX ORDER — LANSOPRAZOLE 30 MG/1
CAPSULE, DELAYED RELEASE ORAL
Qty: 30 CAPSULE | Refills: 0 | OUTPATIENT
Start: 2021-10-13

## 2021-10-14 DIAGNOSIS — E03.9 HYPOTHYROIDISM, UNSPECIFIED TYPE: ICD-10-CM

## 2021-10-14 DIAGNOSIS — G47.00 INSOMNIA, UNSPECIFIED TYPE: ICD-10-CM

## 2021-10-14 DIAGNOSIS — Z79.01 ANTICOAGULATED: Primary | ICD-10-CM

## 2021-10-14 DIAGNOSIS — K21.9 GASTROESOPHAGEAL REFLUX DISEASE WITHOUT ESOPHAGITIS: ICD-10-CM

## 2021-10-14 DIAGNOSIS — N40.0 BENIGN PROSTATIC HYPERPLASIA WITHOUT LOWER URINARY TRACT SYMPTOMS: ICD-10-CM

## 2021-10-14 RX ORDER — LEVOTHYROXINE SODIUM 0.03 MG/1
25 TABLET ORAL DAILY
Qty: 30 TABLET | Refills: 5 | Status: SHIPPED | OUTPATIENT
Start: 2021-10-14 | End: 2022-06-28

## 2021-10-14 RX ORDER — TRAZODONE HYDROCHLORIDE 50 MG/1
50 TABLET ORAL
Qty: 30 TABLET | Refills: 5 | Status: SHIPPED | OUTPATIENT
Start: 2021-10-14 | End: 2022-05-13

## 2021-10-14 RX ORDER — TAMSULOSIN HYDROCHLORIDE 0.4 MG/1
1 CAPSULE ORAL 2 TIMES DAILY
Qty: 60 CAPSULE | Refills: 5 | Status: SHIPPED | OUTPATIENT
Start: 2021-10-14

## 2021-10-14 RX ORDER — LANSOPRAZOLE 30 MG/1
30 CAPSULE, DELAYED RELEASE ORAL
Qty: 30 CAPSULE | Refills: 5 | Status: SHIPPED | OUTPATIENT
Start: 2021-10-14 | End: 2022-05-13

## 2021-10-15 ENCOUNTER — APPOINTMENT (OUTPATIENT)
Dept: MRI IMAGING | Facility: HOSPITAL | Age: 61
End: 2021-10-15

## 2021-10-18 ENCOUNTER — TRANSCRIBE ORDERS (OUTPATIENT)
Dept: CARDIOLOGY | Facility: CLINIC | Age: 61
End: 2021-10-18

## 2021-10-18 ENCOUNTER — LAB (OUTPATIENT)
Dept: LAB | Facility: HOSPITAL | Age: 61
End: 2021-10-18

## 2021-10-18 ENCOUNTER — OFFICE VISIT (OUTPATIENT)
Dept: CARDIOLOGY | Facility: CLINIC | Age: 61
End: 2021-10-18

## 2021-10-18 VITALS
BODY MASS INDEX: 20.53 KG/M2 | HEIGHT: 74 IN | SYSTOLIC BLOOD PRESSURE: 142 MMHG | HEART RATE: 80 BPM | DIASTOLIC BLOOD PRESSURE: 68 MMHG | WEIGHT: 160 LBS

## 2021-10-18 DIAGNOSIS — Z01.810 PREPROCEDURAL CARDIOVASCULAR EXAMINATION: ICD-10-CM

## 2021-10-18 DIAGNOSIS — Z01.818 OTHER SPECIFIED PRE-OPERATIVE EXAMINATION: ICD-10-CM

## 2021-10-18 DIAGNOSIS — I63.9 CRYPTOGENIC STROKE (HCC): ICD-10-CM

## 2021-10-18 DIAGNOSIS — Q21.12 PFO (PATENT FORAMEN OVALE): Primary | Chronic | ICD-10-CM

## 2021-10-18 DIAGNOSIS — I10 BENIGN ESSENTIAL HYPERTENSION: ICD-10-CM

## 2021-10-18 DIAGNOSIS — Z13.6 SCREENING FOR CARDIOVASCULAR CONDITION: Primary | ICD-10-CM

## 2021-10-18 DIAGNOSIS — Z13.6 SCREENING FOR CARDIOVASCULAR CONDITION: ICD-10-CM

## 2021-10-18 LAB
ANION GAP SERPL CALCULATED.3IONS-SCNC: 8.5 MMOL/L (ref 5–15)
BASOPHILS # BLD AUTO: 0.06 10*3/MM3 (ref 0–0.2)
BASOPHILS NFR BLD AUTO: 1 % (ref 0–1.5)
BUN SERPL-MCNC: 6 MG/DL (ref 8–23)
BUN/CREAT SERPL: 8.3 (ref 7–25)
CALCIUM SPEC-SCNC: 8.8 MG/DL (ref 8.6–10.5)
CHLORIDE SERPL-SCNC: 104 MMOL/L (ref 98–107)
CO2 SERPL-SCNC: 28.5 MMOL/L (ref 22–29)
CREAT SERPL-MCNC: 0.72 MG/DL (ref 0.76–1.27)
DEPRECATED RDW RBC AUTO: 38.9 FL (ref 37–54)
EOSINOPHIL # BLD AUTO: 0.15 10*3/MM3 (ref 0–0.4)
EOSINOPHIL NFR BLD AUTO: 2.6 % (ref 0.3–6.2)
ERYTHROCYTE [DISTWIDTH] IN BLOOD BY AUTOMATED COUNT: 12.4 % (ref 12.3–15.4)
GFR SERPL CREATININE-BSD FRML MDRD: 110 ML/MIN/1.73
GLUCOSE SERPL-MCNC: 89 MG/DL (ref 65–99)
HCT VFR BLD AUTO: 43.5 % (ref 37.5–51)
HGB BLD-MCNC: 14.9 G/DL (ref 13–17.7)
IMM GRANULOCYTES # BLD AUTO: 0.01 10*3/MM3 (ref 0–0.05)
IMM GRANULOCYTES NFR BLD AUTO: 0.2 % (ref 0–0.5)
LYMPHOCYTES # BLD AUTO: 1.43 10*3/MM3 (ref 0.7–3.1)
LYMPHOCYTES NFR BLD AUTO: 24.3 % (ref 19.6–45.3)
MCH RBC QN AUTO: 29.9 PG (ref 26.6–33)
MCHC RBC AUTO-ENTMCNC: 34.3 G/DL (ref 31.5–35.7)
MCV RBC AUTO: 87.2 FL (ref 79–97)
MONOCYTES # BLD AUTO: 0.62 10*3/MM3 (ref 0.1–0.9)
MONOCYTES NFR BLD AUTO: 10.5 % (ref 5–12)
NEUTROPHILS NFR BLD AUTO: 3.61 10*3/MM3 (ref 1.7–7)
NEUTROPHILS NFR BLD AUTO: 61.4 % (ref 42.7–76)
NRBC BLD AUTO-RTO: 0 /100 WBC (ref 0–0.2)
PLATELET # BLD AUTO: 214 10*3/MM3 (ref 140–450)
PMV BLD AUTO: 8.9 FL (ref 6–12)
POTASSIUM SERPL-SCNC: 4.1 MMOL/L (ref 3.5–5.2)
RBC # BLD AUTO: 4.99 10*6/MM3 (ref 4.14–5.8)
SODIUM SERPL-SCNC: 141 MMOL/L (ref 136–145)
WBC # BLD AUTO: 5.88 10*3/MM3 (ref 3.4–10.8)

## 2021-10-18 PROCEDURE — 85025 COMPLETE CBC W/AUTO DIFF WBC: CPT

## 2021-10-18 PROCEDURE — 99214 OFFICE O/P EST MOD 30 MIN: CPT | Performed by: INTERNAL MEDICINE

## 2021-10-18 PROCEDURE — 36415 COLL VENOUS BLD VENIPUNCTURE: CPT

## 2021-10-18 PROCEDURE — 80048 BASIC METABOLIC PNL TOTAL CA: CPT

## 2021-10-18 RX ORDER — ATORVASTATIN CALCIUM 80 MG/1
1 TABLET, FILM COATED ORAL DAILY
Status: ON HOLD | COMMUNITY
Start: 2021-10-12 | End: 2021-10-29

## 2021-10-18 NOTE — PROGRESS NOTES
Sathya LONG Kennedy  1960  Date of Office Visit: 10/18/21  Encounter Provider: Sivakumar Chatman MD  Place of Service: [facility] CARDIOLOGY      CHIEF COMPLAINT:  Patent foramen ovale  CVA      HISTORY OF PRESENT ILLNESS: 61-year-old male who is here to see me today secondary to PFO and cryptogenic stroke.  He previously had been evaluated back in March by ophthalmology and was noted at that time to have visual field defect.  He had an MRI that showed bilateral posterior cerebral artery embolic infarcts.  He underwent evaluation including transthoracic and transesophageal echocardiogram with a small to moderate sized patent foramen ovale but no other evidence of intracardiac clot.  He had rhythm monitoring system with no atrial fibrillation.  He underwent CTA of the neck without severe carotid artery disease.  He did have mediastinal and hilar lymphadenopathy and underwent biopsy along with bronchoscopy.  He was not noted to have any malignancy on that.  He has continued on Eliquis therapy.  He is here today to discuss patent foramen ovale closure.    Review of Systems   Constitutional: Negative for fever and malaise/fatigue.   HENT: Negative for nosebleeds and sore throat.    Eyes: Negative for blurred vision and double vision.   Cardiovascular: Negative for chest pain, claudication, palpitations and syncope.   Respiratory: Negative for cough, shortness of breath and snoring.    Endocrine: Negative for cold intolerance, heat intolerance and polydipsia.   Skin: Negative for itching, poor wound healing and rash.   Musculoskeletal: Negative for joint pain, joint swelling, muscle weakness and myalgias.   Gastrointestinal: Negative for abdominal pain, melena, nausea and vomiting.   Neurological: Negative for light-headedness, loss of balance, seizures, vertigo and weakness.   Psychiatric/Behavioral: Negative for altered mental status and depression.       Past Medical History:  "  Diagnosis Date   • Acid reflux    • Enlarged prostate    • Headache, tension-type    • Hyperlipidemia    • Lung nodule    • PFO (patent foramen ovale)    • Stroke (HCC) 03/2021    LOSS OF PERIPHERAL VISION       The following portions of the patient's history were reviewed and updated as appropriate: Social history , Family history and Surgical history     Current Outpatient Medications on File Prior to Visit   Medication Sig Dispense Refill   • apixaban (Eliquis) 5 MG tablet tablet Take 1 tablet by mouth 2 (Two) Times a Day. 60 tablet 5   • atorvastatin (LIPITOR) 80 MG tablet Take 1 tablet by mouth Daily.     • lansoprazole (PREVACID) 30 MG capsule Take 1 capsule by mouth Every Morning Before Breakfast. 30 capsule 5   • levothyroxine (SYNTHROID, LEVOTHROID) 25 MCG tablet Take 1 tablet by mouth Daily for 30 days. 30 tablet 5   • tamsulosin (FLOMAX) 0.4 MG capsule 24 hr capsule Take 1 capsule by mouth 2 (Two) Times a Day. 60 capsule 5   • traZODone (DESYREL) 50 MG tablet Take 1 tablet by mouth every night at bedtime. 30 tablet 5     No current facility-administered medications on file prior to visit.       Allergies   Allergen Reactions   • Amoxicillin-Pot Clavulanate Other (See Comments) and GI Intolerance     Upset stomach       Vitals:    10/18/21 0841   BP: 142/68   Pulse: 80   Weight: 72.6 kg (160 lb)   Height: 188 cm (74\")     Body mass index is 20.54 kg/m².   Constitutional:       Appearance: Well-developed.   Eyes:      General: No scleral icterus.     Conjunctiva/sclera: Conjunctivae normal.   HENT:      Head: Normocephalic and atraumatic.   Neck:      Thyroid: No thyromegaly.      Vascular: Normal carotid pulses. No carotid bruit, hepatojugular reflux or JVD.      Trachea: No tracheal deviation.   Pulmonary:      Effort: No respiratory distress.      Breath sounds: Normal breath sounds. No decreased breath sounds. No wheezing. No rhonchi. No rales.   Chest:      Chest wall: Not tender to palpatation. "   Cardiovascular:      Normal rate. Regular rhythm.      No gallop.   Pulses:     Carotid: 2+ bilaterally.     Radial: 2+ bilaterally.     Femoral: 2+ bilaterally.     Dorsalis pedis: 2+ bilaterally.     Posterior tibial: 2+ bilaterally.  Edema:     Peripheral edema absent.   Abdominal:      General: Bowel sounds are normal. There is no distension.      Palpations: Abdomen is soft.      Tenderness: There is no abdominal tenderness.   Musculoskeletal:         General: No deformity.      Cervical back: Normal range of motion and neck supple. Skin:     Findings: No erythema or rash.   Neurological:      Mental Status: Alert and oriented to person, place, and time.      Sensory: No sensory deficit.   Psychiatric:         Behavior: Behavior normal.            Lab Results   Component Value Date    WBC 7.82 03/30/2021    HGB 15.0 03/30/2021    HCT 44.6 03/30/2021    MCV 84.2 03/30/2021     03/30/2021       Lab Results   Component Value Date    GLUCOSE 107 (H) 03/30/2021    BUN 13 03/30/2021    CREATININE 0.90 09/02/2021    EGFRIFNONA 95 03/30/2021    BCR 15.9 03/30/2021    K 4.1 03/30/2021    CO2 24.4 03/30/2021    CALCIUM 8.6 03/30/2021    ALBUMIN 3.50 03/27/2021    LABIL2 1.2 (L) 02/13/2020    AST 14 03/27/2021    ALT 10 03/27/2021       Lab Results   Component Value Date    GLUCOSE 107 (H) 03/30/2021    CALCIUM 8.6 03/30/2021     (L) 03/30/2021    K 4.1 03/30/2021    CO2 24.4 03/30/2021    CL 99 03/30/2021    BUN 13 03/30/2021    CREATININE 0.90 09/02/2021    EGFRIFNONA 95 03/30/2021    BCR 15.9 03/30/2021    ANIONGAP 9.6 03/30/2021       Lab Results   Component Value Date    CHOL 153 03/27/2021    CHLPL 200 02/13/2020    TRIG 110 03/27/2021    HDL 25 (L) 03/27/2021     (H) 03/27/2021       Procedures     Results for orders placed during the hospital encounter of 03/25/21    Adult Transesophageal Echo ([place]) W/ Cont if Necessary Per Protocol    Interpretation Summary  · Left ventricular ejection  fraction appears to be 61 - 65%  · Left ventricular wall thickness is consistent with mild concentric hypertrophy  · Left ventricular diastolic function is consistent with (grade I) impaired relaxation  · No evidence of a left atrial appendage thrombus was present.  · The agitated saline study was consistent with a small to moderate PFO  · The noncoronary leaflet of the aortic valve is heavily calcified and restricted in motion  · There were mild plaques in the descending thoracic aorta. There were moderate plaques in the aortic arch. All plaques were nonmobile.  · There is no evidence of pericardial effusion.      DISCUSSION/SUMMARY  Very pleasant 61-year-old male who presents to me secondary to patent foramen ovale along with cardioembolic CVA.  He has prior MRI and CT imaging documenting bilateral embolic appearing infarcts.  He has had no recent issues documented on telemetry or rhythm monitoring system of atrial fibrillation.  I do think that based on his multiple infarcts and lack of an alternative etiology that he can be diagnosed with PFO associated strokes.  I do think a PFO closure is indicated    1.  Patent foramen ovale with PFO associated stroke  Discontinue Eliquis until we get him set up for closure.  After PFO closure we will have him on aspirin and Plavix for 6 months and then aspirin lifelong afterwards.    -MRI, CT and transesophageal echocardiogram have been reviewed.  Clearly evidence of CVA that is bilateral and cardioembolic in appearance.  Patent foramen ovale is tunneled but certainly present and I would recommend closure

## 2021-10-25 ENCOUNTER — TRANSCRIBE ORDERS (OUTPATIENT)
Dept: CARDIOLOGY | Facility: CLINIC | Age: 61
End: 2021-10-25

## 2021-10-25 DIAGNOSIS — Z01.818 OTHER SPECIFIED PRE-OPERATIVE EXAMINATION: Primary | ICD-10-CM

## 2021-10-27 ENCOUNTER — LAB (OUTPATIENT)
Dept: LAB | Facility: HOSPITAL | Age: 61
End: 2021-10-27

## 2021-10-27 LAB — SARS-COV-2 ORF1AB RESP QL NAA+PROBE: NOT DETECTED

## 2021-10-27 PROCEDURE — C9803 HOPD COVID-19 SPEC COLLECT: HCPCS | Performed by: INTERNAL MEDICINE

## 2021-10-27 PROCEDURE — U0004 COV-19 TEST NON-CDC HGH THRU: HCPCS | Performed by: INTERNAL MEDICINE

## 2021-10-29 ENCOUNTER — HOSPITAL ENCOUNTER (OUTPATIENT)
Facility: HOSPITAL | Age: 61
Setting detail: HOSPITAL OUTPATIENT SURGERY
Discharge: HOME | End: 2021-10-29
Attending: INTERNAL MEDICINE | Admitting: INTERNAL MEDICINE

## 2021-10-29 VITALS
SYSTOLIC BLOOD PRESSURE: 124 MMHG | HEIGHT: 74 IN | OXYGEN SATURATION: 99 % | WEIGHT: 172 LBS | HEART RATE: 54 BPM | TEMPERATURE: 97.6 F | DIASTOLIC BLOOD PRESSURE: 57 MMHG | RESPIRATION RATE: 16 BRPM | BODY MASS INDEX: 22.07 KG/M2

## 2021-10-29 DIAGNOSIS — Q21.12 PFO (PATENT FORAMEN OVALE): ICD-10-CM

## 2021-10-29 DIAGNOSIS — I63.9 CRYPTOGENIC STROKE (HCC): ICD-10-CM

## 2021-10-29 LAB
ACT BLD: 180 SECONDS (ref 82–152)
ACT BLD: 230 SECONDS (ref 82–152)

## 2021-10-29 PROCEDURE — 85347 COAGULATION TIME ACTIVATED: CPT

## 2021-10-29 PROCEDURE — C1894 INTRO/SHEATH, NON-LASER: HCPCS | Performed by: INTERNAL MEDICINE

## 2021-10-29 PROCEDURE — 93662 INTRACARDIAC ECG (ICE): CPT | Performed by: INTERNAL MEDICINE

## 2021-10-29 PROCEDURE — 25010000002 VANCOMYCIN: Performed by: INTERNAL MEDICINE

## 2021-10-29 PROCEDURE — 25010000002 MORPHINE PER 10 MG: Performed by: INTERNAL MEDICINE

## 2021-10-29 PROCEDURE — 93580 TRANSCATH CLOSURE OF ASD: CPT | Performed by: INTERNAL MEDICINE

## 2021-10-29 PROCEDURE — 25010000002 FENTANYL CITRATE (PF) 50 MCG/ML SOLUTION: Performed by: INTERNAL MEDICINE

## 2021-10-29 PROCEDURE — C1887 CATHETER, GUIDING: HCPCS | Performed by: INTERNAL MEDICINE

## 2021-10-29 PROCEDURE — C1759 CATH, INTRA ECHOCARDIOGRAPHY: HCPCS | Performed by: INTERNAL MEDICINE

## 2021-10-29 PROCEDURE — 25010000002 HEPARIN (PORCINE) PER 1000 UNITS: Performed by: INTERNAL MEDICINE

## 2021-10-29 PROCEDURE — 25010000002 MIDAZOLAM PER 1 MG: Performed by: INTERNAL MEDICINE

## 2021-10-29 PROCEDURE — C1817 SEPTAL DEFECT IMP SYS: HCPCS | Performed by: INTERNAL MEDICINE

## 2021-10-29 PROCEDURE — C1769 GUIDE WIRE: HCPCS | Performed by: INTERNAL MEDICINE

## 2021-10-29 PROCEDURE — 99152 MOD SED SAME PHYS/QHP 5/>YRS: CPT | Performed by: INTERNAL MEDICINE

## 2021-10-29 PROCEDURE — 99153 MOD SED SAME PHYS/QHP EA: CPT | Performed by: INTERNAL MEDICINE

## 2021-10-29 DEVICE — OCCL PFO AMPLATZER CVR 45D 8F 25MM: Type: IMPLANTABLE DEVICE | Status: FUNCTIONAL

## 2021-10-29 RX ORDER — ASPIRIN 81 MG/1
81 TABLET ORAL DAILY
Qty: 30 TABLET | Refills: 6 | Status: SHIPPED | OUTPATIENT
Start: 2021-10-29

## 2021-10-29 RX ORDER — ONDANSETRON 4 MG/1
4 TABLET, FILM COATED ORAL EVERY 6 HOURS PRN
Status: DISCONTINUED | OUTPATIENT
Start: 2021-10-29 | End: 2021-10-29 | Stop reason: HOSPADM

## 2021-10-29 RX ORDER — CLOPIDOGREL BISULFATE 75 MG/1
TABLET ORAL AS NEEDED
Status: DISCONTINUED | OUTPATIENT
Start: 2021-10-29 | End: 2021-10-29 | Stop reason: HOSPADM

## 2021-10-29 RX ORDER — SODIUM CHLORIDE 0.9 % (FLUSH) 0.9 %
3 SYRINGE (ML) INJECTION EVERY 12 HOURS SCHEDULED
Status: DISCONTINUED | OUTPATIENT
Start: 2021-10-29 | End: 2021-10-29 | Stop reason: HOSPADM

## 2021-10-29 RX ORDER — HYDROCODONE BITARTRATE AND ACETAMINOPHEN 5; 325 MG/1; MG/1
1 TABLET ORAL EVERY 4 HOURS PRN
Status: DISCONTINUED | OUTPATIENT
Start: 2021-10-29 | End: 2021-10-29 | Stop reason: HOSPADM

## 2021-10-29 RX ORDER — MORPHINE SULFATE 2 MG/ML
1 INJECTION, SOLUTION INTRAMUSCULAR; INTRAVENOUS EVERY 4 HOURS PRN
Status: DISCONTINUED | OUTPATIENT
Start: 2021-10-29 | End: 2021-10-29 | Stop reason: HOSPADM

## 2021-10-29 RX ORDER — TEMAZEPAM 15 MG/1
15 CAPSULE ORAL NIGHTLY PRN
Status: DISCONTINUED | OUTPATIENT
Start: 2021-10-29 | End: 2021-10-29 | Stop reason: HOSPADM

## 2021-10-29 RX ORDER — FENTANYL CITRATE 50 UG/ML
INJECTION, SOLUTION INTRAMUSCULAR; INTRAVENOUS AS NEEDED
Status: DISCONTINUED | OUTPATIENT
Start: 2021-10-29 | End: 2021-10-29 | Stop reason: HOSPADM

## 2021-10-29 RX ORDER — HEPARIN SODIUM 1000 [USP'U]/ML
INJECTION, SOLUTION INTRAVENOUS; SUBCUTANEOUS AS NEEDED
Status: DISCONTINUED | OUTPATIENT
Start: 2021-10-29 | End: 2021-10-29 | Stop reason: HOSPADM

## 2021-10-29 RX ORDER — CLOPIDOGREL BISULFATE 75 MG/1
75 TABLET ORAL DAILY
Qty: 30 TABLET | Refills: 5 | Status: SHIPPED | OUTPATIENT
Start: 2021-10-29 | End: 2022-05-13

## 2021-10-29 RX ORDER — SODIUM CHLORIDE 9 MG/ML
75 INJECTION, SOLUTION INTRAVENOUS CONTINUOUS
Status: DISCONTINUED | OUTPATIENT
Start: 2021-10-29 | End: 2021-10-29 | Stop reason: HOSPADM

## 2021-10-29 RX ORDER — ACETAMINOPHEN 325 MG/1
650 TABLET ORAL EVERY 4 HOURS PRN
Status: DISCONTINUED | OUTPATIENT
Start: 2021-10-29 | End: 2021-10-29 | Stop reason: HOSPADM

## 2021-10-29 RX ORDER — SODIUM CHLORIDE 9 MG/ML
250 INJECTION, SOLUTION INTRAVENOUS ONCE AS NEEDED
Status: DISCONTINUED | OUTPATIENT
Start: 2021-10-29 | End: 2021-10-29 | Stop reason: HOSPADM

## 2021-10-29 RX ORDER — LIDOCAINE HYDROCHLORIDE 20 MG/ML
INJECTION, SOLUTION INFILTRATION; PERINEURAL AS NEEDED
Status: DISCONTINUED | OUTPATIENT
Start: 2021-10-29 | End: 2021-10-29 | Stop reason: HOSPADM

## 2021-10-29 RX ORDER — ASPIRIN 325 MG
TABLET ORAL AS NEEDED
Status: DISCONTINUED | OUTPATIENT
Start: 2021-10-29 | End: 2021-10-29 | Stop reason: HOSPADM

## 2021-10-29 RX ORDER — NALOXONE HCL 0.4 MG/ML
0.4 VIAL (ML) INJECTION
Status: DISCONTINUED | OUTPATIENT
Start: 2021-10-29 | End: 2021-10-29 | Stop reason: HOSPADM

## 2021-10-29 RX ORDER — SODIUM CHLORIDE 0.9 % (FLUSH) 0.9 %
10 SYRINGE (ML) INJECTION AS NEEDED
Status: DISCONTINUED | OUTPATIENT
Start: 2021-10-29 | End: 2021-10-29 | Stop reason: HOSPADM

## 2021-10-29 RX ORDER — SODIUM CHLORIDE 9 MG/ML
100 INJECTION, SOLUTION INTRAVENOUS CONTINUOUS
Status: DISCONTINUED | OUTPATIENT
Start: 2021-10-29 | End: 2021-10-29 | Stop reason: HOSPADM

## 2021-10-29 RX ORDER — PANTOPRAZOLE SODIUM 40 MG/1
40 TABLET, DELAYED RELEASE ORAL ONCE
Status: DISCONTINUED | OUTPATIENT
Start: 2021-10-29 | End: 2021-10-29 | Stop reason: HOSPADM

## 2021-10-29 RX ORDER — MIDAZOLAM HYDROCHLORIDE 1 MG/ML
INJECTION INTRAMUSCULAR; INTRAVENOUS AS NEEDED
Status: DISCONTINUED | OUTPATIENT
Start: 2021-10-29 | End: 2021-10-29 | Stop reason: HOSPADM

## 2021-10-29 RX ORDER — ONDANSETRON 2 MG/ML
4 INJECTION INTRAMUSCULAR; INTRAVENOUS EVERY 6 HOURS PRN
Status: DISCONTINUED | OUTPATIENT
Start: 2021-10-29 | End: 2021-10-29 | Stop reason: HOSPADM

## 2021-10-29 RX ADMIN — VANCOMYCIN HYDROCHLORIDE 1500 MG: 10 INJECTION, POWDER, LYOPHILIZED, FOR SOLUTION INTRAVENOUS at 08:20

## 2021-10-29 RX ADMIN — SODIUM CHLORIDE 75 ML/HR: 9 INJECTION, SOLUTION INTRAVENOUS at 07:43

## 2021-10-29 RX ADMIN — HYDROCODONE BITARTRATE AND ACETAMINOPHEN 1 TABLET: 5; 325 TABLET ORAL at 10:16

## 2021-10-29 RX ADMIN — MORPHINE SULFATE 1 MG: 2 INJECTION, SOLUTION INTRAMUSCULAR; INTRAVENOUS at 10:16

## 2021-11-01 ENCOUNTER — PATIENT ROUNDING (BHMG ONLY) (OUTPATIENT)
Dept: CARDIOLOGY | Facility: CLINIC | Age: 61
End: 2021-11-01

## 2021-11-01 NOTE — PROGRESS NOTES
November 1, 2021    Hello, may I speak with Sathya Benavides?    My name is Viola Hadley      I am  with [facility] CARDIOLOGY  [address]  [place] [address] [phone].    Before we get started may I verify your date of birth? 1960    I am calling to officially welcome you to our practice and ask about your recent visit. Is this a good time to talk? yes    Tell me about your visit with us. What things went well?  Everything was wonderful and I just had a procedure to repair my PFO       We're always looking for ways to make our patients' experiences even better. Do you have recommendations on ways we may improve?  no    Overall were you satisfied with your first visit to our practice? yes       I appreciate you taking the time to speak with me today. Is there anything else I can do for you? no      Thank you, and have a great day.

## 2021-11-09 ENCOUNTER — OFFICE VISIT (OUTPATIENT)
Dept: CARDIOLOGY | Facility: CLINIC | Age: 61
End: 2021-11-09

## 2021-11-09 VITALS
WEIGHT: 157.6 LBS | SYSTOLIC BLOOD PRESSURE: 126 MMHG | DIASTOLIC BLOOD PRESSURE: 70 MMHG | HEIGHT: 74 IN | HEART RATE: 87 BPM | BODY MASS INDEX: 20.23 KG/M2

## 2021-11-09 DIAGNOSIS — Q21.12 PFO (PATENT FORAMEN OVALE): Primary | Chronic | ICD-10-CM

## 2021-11-09 DIAGNOSIS — I10 BENIGN ESSENTIAL HYPERTENSION: ICD-10-CM

## 2021-11-09 PROCEDURE — 99214 OFFICE O/P EST MOD 30 MIN: CPT | Performed by: NURSE PRACTITIONER

## 2021-11-09 PROCEDURE — 93000 ELECTROCARDIOGRAM COMPLETE: CPT | Performed by: NURSE PRACTITIONER

## 2021-11-09 NOTE — PROGRESS NOTES
Date of Office Visit: 2021  Encounter Provider: JEREMY Amos  Place of Service: [facility] CARDIOLOGY  Patient Name: Sathya Benavides  :1960    Chief Complaint   Patient presents with   • PFO   :     HPI: Sathya Benavides is a 61 y.o. male who presents today for follow-up.  He is a patient of Dr. Hua' who suffered an acute CVA in March of this year.  At that time, he was found to have a moderate sized PFO.  Additionally, he was found to have mediastinal and right hilar adenopathy along with a lung nodule for which she was seen by Dr. Tony.  He was placed on anticoagulation and an event monitor was placed.  There was no evidence of atrial fibrillation.  Ultimately, Dr. Hua recommended consideration of PFO closure pending the determination of the hypermetabolic adenopathy.  Fortunately, biopsy demonstrated no evidence of malignancy and a follow-up CT scan was recommended.  On 10/18, he was seen in the office by Dr. Chatman to discuss PFO closure.  The Eliquis was discontinued in preparation for upcoming procedure.  Ultimately, on 10/29, he underwent successful PFO closure utilizing a 25 mm Amplatzer PFO occluder device.  The plan was to continue aspirin and Plavix for 6 months.  He is here today for follow-up.     He is feeling well.  He denies any chest pain, shortness of breath, palpitations, edema, dizziness, syncope, bleeding difficulties or melena.  His only complaint is fatigue which does seem to be improving.    Past Medical History:   Diagnosis Date   • Acid reflux    • Enlarged prostate    • Headache, tension-type    • Hyperlipidemia    • Lung nodule    • PFO (patent foramen ovale)    • Stroke (HCC) 2021    LOSS OF PERIPHERAL VISION       Past Surgical History:   Procedure Laterality Date   • APPENDECTOMY     • BRONCHOSCOPY Bilateral 2021    Procedure: BRONCHOSCOPY ENDOBRONCHIAL ULTRASOUND WITH FNA'S;  Surgeon: aSm Tony MD;  Location: [facility]  ENDOSCOPY;  Service: Pulmonary;  Laterality: Bilateral;  PRE- LYMPHADENOPATHY  POST- SAME   • CARDIAC CATHETERIZATION N/A 10/29/2021    Procedure: Patent foramen ovale closure  ABBOTT;  Surgeon: Sivakumar Chatman MD;  Location: [facility] INVASIVE LOCATION;  Service: Cardiology;  Laterality: N/A;   • KNEE ARTHROSCOPY     • SHOULDER ARTHROSCOPY         Social History     Socioeconomic History   • Marital status:    Tobacco Use   • Smoking status: Former Smoker     Packs/day: 1.00     Years: 22.00     Pack years: 22.00     Types: Cigarettes     Quit date: 2021     Years since quittin.6   • Smokeless tobacco: Never Used   • Tobacco comment: caffeine 4 cups qd   Vaping Use   • Vaping Use: Never used   Substance and Sexual Activity   • Alcohol use: Yes     Comment: occasionally   • Drug use: Not Currently   • Sexual activity: Defer       Family History   Problem Relation Age of Onset   • No Known Problems Mother    • No Known Problems Father    • Malig Hyperthermia Neg Hx        Review of Systems   Constitutional: Positive for malaise/fatigue. Negative for chills and fever.   Cardiovascular: Negative for chest pain, dyspnea on exertion, leg swelling, near-syncope, orthopnea, palpitations, paroxysmal nocturnal dyspnea and syncope.   Respiratory: Negative for cough and shortness of breath.    Musculoskeletal: Negative for joint pain, joint swelling and myalgias.   Gastrointestinal: Negative for abdominal pain, diarrhea, melena, nausea and vomiting.   Genitourinary: Negative for frequency and hematuria.   Neurological: Negative for light-headedness, numbness, paresthesias and seizures.   Allergic/Immunologic: Negative.    All other systems reviewed and are negative.      Allergies   Allergen Reactions   • Adhesive Tape Rash   • Amoxicillin-Pot Clavulanate Other (See Comments) and GI Intolerance     Upset stomach         Current Outpatient Medications:   •  aspirin (aspirin) 81 MG EC tablet, Take 1  "tablet by mouth Daily., Disp: 30 tablet, Rfl: 6  •  clopidogrel (PLAVIX) 75 MG tablet, Take 1 tablet by mouth Daily., Disp: 30 tablet, Rfl: 5  •  lansoprazole (PREVACID) 30 MG capsule, Take 1 capsule by mouth Every Morning Before Breakfast., Disp: 30 capsule, Rfl: 5  •  levothyroxine (SYNTHROID, LEVOTHROID) 25 MCG tablet, Take 1 tablet by mouth Daily for 30 days., Disp: 30 tablet, Rfl: 5  •  tamsulosin (FLOMAX) 0.4 MG capsule 24 hr capsule, Take 1 capsule by mouth 2 (Two) Times a Day., Disp: 60 capsule, Rfl: 5  •  traZODone (DESYREL) 50 MG tablet, Take 1 tablet by mouth every night at bedtime., Disp: 30 tablet, Rfl: 5      Objective:     Vitals:    11/09/21 1452   BP: 126/70   Pulse: 87   Weight: 71.5 kg (157 lb 9.6 oz)   Height: 188 cm (74\")     Body mass index is 20.23 kg/m².    PHYSICAL EXAM:    Neck:      Vascular: No JVD.   Pulmonary:      Effort: Pulmonary effort is normal.      Breath sounds: Normal breath sounds.   Cardiovascular:      Normal rate. Regular rhythm.      Murmurs: There is no murmur.      No gallop. No click. No rub.   Pulses:     Intact distal pulses.   Skin:               ECG 12 Lead    Date/Time: 11/9/2021 3:04 PM  Performed by: Adelina Maxwell APRN  Authorized by: Adelina Maxwell APRN   Comparison: compared with previous ECG from 10/1/2021  Similar to previous ECG  Rhythm: sinus rhythm  Rate: normal  BPM: 87  Other findings: left atrial abnormality and right atrial abnormality    Clinical impression: abnormal EKG  Comments: Indication: PFO              Assessment:       Diagnosis Plan   1. PFO (patent foramen ovale)  ECG 12 Lead    Adult Transthoracic Echo Complete W/ Cont if Necessary Per Protocol   2. Benign essential hypertension       Orders Placed This Encounter   Procedures   • ECG 12 Lead     This order was created via procedure documentation     Order Specific Question:   Release to patient     Answer:   Immediate   • Adult Transthoracic Echo Complete W/ Cont if Necessary " Per Protocol     Standing Status:   Future     Standing Expiration Date:   11/9/2022     Order Specific Question:   Reason for exam?     Answer:   Other Reasons     Order Specific Question:   Other reason(s)?     Answer:   Additional Reasons     Order Specific Question:   Additional reason(s)?     Answer:   Adult Congenital Heart Disease          Plan:       1.  PFO.  Status post closure.  Continue aspirin and Plavix for 6 months.      2.  Hypertension.  His blood pressure looks great.      I think is doing well.  He denies any symptoms of angina or heart failure.  I am not going to make any changes today.  He will have a repeat echocardiogram at his 1 month follow-up with Dr. Chatman.      As always, it has been a pleasure to participate in your patient's care.      Sincerely,         JEREMY Ho

## 2021-12-20 ENCOUNTER — HOSPITAL ENCOUNTER (OUTPATIENT)
Dept: CARDIOLOGY | Facility: HOSPITAL | Age: 61
Discharge: HOME | End: 2021-12-20
Admitting: NURSE PRACTITIONER

## 2021-12-20 ENCOUNTER — TELEPHONE (OUTPATIENT)
Dept: CARDIOLOGY | Facility: CLINIC | Age: 61
End: 2021-12-20

## 2021-12-20 ENCOUNTER — OFFICE VISIT (OUTPATIENT)
Dept: CARDIOLOGY | Facility: CLINIC | Age: 61
End: 2021-12-20

## 2021-12-20 VITALS
HEIGHT: 74 IN | HEART RATE: 58 BPM | SYSTOLIC BLOOD PRESSURE: 122 MMHG | DIASTOLIC BLOOD PRESSURE: 70 MMHG | WEIGHT: 157.2 LBS | BODY MASS INDEX: 20.17 KG/M2

## 2021-12-20 DIAGNOSIS — I63.433 CEREBROVASCULAR ACCIDENT (CVA) DUE TO BILATERAL EMBOLISM OF POSTERIOR CEREBRAL ARTERIES (HCC): ICD-10-CM

## 2021-12-20 DIAGNOSIS — I10 BENIGN ESSENTIAL HYPERTENSION: ICD-10-CM

## 2021-12-20 DIAGNOSIS — Q21.12 PFO (PATENT FORAMEN OVALE): ICD-10-CM

## 2021-12-20 DIAGNOSIS — Q21.12 PFO (PATENT FORAMEN OVALE): Primary | Chronic | ICD-10-CM

## 2021-12-20 DIAGNOSIS — E78.5 HYPERLIPIDEMIA LDL GOAL <70: ICD-10-CM

## 2021-12-20 LAB
BH CV ECHO MEAS - ACS: 2 CM
BH CV ECHO MEAS - AO ROOT AREA (BSA CORRECTED): 1.6
BH CV ECHO MEAS - AO ROOT AREA: 7.9 CM^2
BH CV ECHO MEAS - AO ROOT DIAM: 3.2 CM
BH CV ECHO MEAS - BSA(HAYCOCK): 2 M^2
BH CV ECHO MEAS - BSA: 2 M^2
BH CV ECHO MEAS - BZI_BMI: 21.2 KILOGRAMS/M^2
BH CV ECHO MEAS - BZI_METRIC_HEIGHT: 188 CM
BH CV ECHO MEAS - BZI_METRIC_WEIGHT: 74.8 KG
BH CV ECHO MEAS - EDV(MOD-SP2): 83 ML
BH CV ECHO MEAS - EDV(MOD-SP4): 81 ML
BH CV ECHO MEAS - EDV(TEICH): 101.5 ML
BH CV ECHO MEAS - EF(CUBED): 63.7 %
BH CV ECHO MEAS - EF(MOD-BP): 57 %
BH CV ECHO MEAS - EF(MOD-SP2): 56.6 %
BH CV ECHO MEAS - EF(MOD-SP4): 55.6 %
BH CV ECHO MEAS - EF(TEICH): 55.2 %
BH CV ECHO MEAS - ESV(MOD-SP2): 36 ML
BH CV ECHO MEAS - ESV(MOD-SP4): 36 ML
BH CV ECHO MEAS - ESV(TEICH): 45.5 ML
BH CV ECHO MEAS - FS: 28.7 %
BH CV ECHO MEAS - IVS/LVPW: 0.93
BH CV ECHO MEAS - IVSD: 0.9 CM
BH CV ECHO MEAS - LAT PEAK E' VEL: 14.6 CM/SEC
BH CV ECHO MEAS - LV DIASTOLIC VOL/BSA (35-75): 40.4 ML/M^2
BH CV ECHO MEAS - LV MASS(C)D: 148 GRAMS
BH CV ECHO MEAS - LV MASS(C)DI: 73.9 GRAMS/M^2
BH CV ECHO MEAS - LV SYSTOLIC VOL/BSA (12-30): 18 ML/M^2
BH CV ECHO MEAS - LVIDD: 4.7 CM
BH CV ECHO MEAS - LVIDS: 3.3 CM
BH CV ECHO MEAS - LVLD AP2: 9.1 CM
BH CV ECHO MEAS - LVLD AP4: 8.7 CM
BH CV ECHO MEAS - LVLS AP2: 7.4 CM
BH CV ECHO MEAS - LVLS AP4: 7.3 CM
BH CV ECHO MEAS - LVOT AREA (M): 3.5 CM^2
BH CV ECHO MEAS - LVOT AREA: 3.3 CM^2
BH CV ECHO MEAS - LVOT DIAM: 2.1 CM
BH CV ECHO MEAS - LVPWD: 0.96 CM
BH CV ECHO MEAS - MED PEAK E' VEL: 10.6 CM/SEC
BH CV ECHO MEAS - MV A DUR: 0.1 SEC
BH CV ECHO MEAS - MV A MAX VEL: 56.6 CM/SEC
BH CV ECHO MEAS - MV DEC SLOPE: 369 CM/SEC^2
BH CV ECHO MEAS - MV DEC TIME: 0.22 SEC
BH CV ECHO MEAS - MV E MAX VEL: 74.6 CM/SEC
BH CV ECHO MEAS - MV E/A: 1.3
BH CV ECHO MEAS - MV MAX PG: 2.5 MMHG
BH CV ECHO MEAS - MV MEAN PG: 1.1 MMHG
BH CV ECHO MEAS - MV P1/2T MAX VEL: 79.1 CM/SEC
BH CV ECHO MEAS - MV P1/2T: 62.8 MSEC
BH CV ECHO MEAS - MV V2 MAX: 78.3 CM/SEC
BH CV ECHO MEAS - MV V2 MEAN: 49.6 CM/SEC
BH CV ECHO MEAS - MV V2 VTI: 17.4 CM
BH CV ECHO MEAS - MVA P1/2T LCG: 2.8 CM^2
BH CV ECHO MEAS - MVA(P1/2T): 3.5 CM^2
BH CV ECHO MEAS - PA MAX PG (FULL): -0.3 MMHG
BH CV ECHO MEAS - PA MAX PG: 2.1 MMHG
BH CV ECHO MEAS - PA V2 MAX: 71.7 CM/SEC
BH CV ECHO MEAS - PULM A REVS DUR: 0.09 SEC
BH CV ECHO MEAS - PULM A REVS VEL: 34.3 CM/SEC
BH CV ECHO MEAS - PULM DIAS VEL: 65.1 CM/SEC
BH CV ECHO MEAS - PULM S/D: 0.73
BH CV ECHO MEAS - PULM SYS VEL: 47.6 CM/SEC
BH CV ECHO MEAS - PVA(V,A): 3.1 CM^2
BH CV ECHO MEAS - PVA(V,D): 3.1 CM^2
BH CV ECHO MEAS - RV MAX PG: 2.4 MMHG
BH CV ECHO MEAS - RV MEAN PG: 1.5 MMHG
BH CV ECHO MEAS - RV V1 MAX: 76.7 CM/SEC
BH CV ECHO MEAS - RV V1 MEAN: 57.5 CM/SEC
BH CV ECHO MEAS - RV V1 VTI: 17.5 CM
BH CV ECHO MEAS - RVOT AREA: 2.9 CM^2
BH CV ECHO MEAS - RVOT DIAM: 1.9 CM
BH CV ECHO MEAS - SI(CUBED): 32.7 ML/M^2
BH CV ECHO MEAS - SI(MOD-SP2): 23.5 ML/M^2
BH CV ECHO MEAS - SI(MOD-SP4): 22.5 ML/M^2
BH CV ECHO MEAS - SI(TEICH): 28 ML/M^2
BH CV ECHO MEAS - SV(CUBED): 65.4 ML
BH CV ECHO MEAS - SV(MOD-SP2): 47 ML
BH CV ECHO MEAS - SV(MOD-SP4): 45 ML
BH CV ECHO MEAS - SV(RVOT): 50.9 ML
BH CV ECHO MEAS - SV(TEICH): 56 ML
BH CV ECHO MEASUREMENTS AVERAGE E/E' RATIO: 5.92
BH CV XLRA - RV BASE: 2.9 CM
BH CV XLRA - RV LENGTH: 6.2 CM
BH CV XLRA - RV MID: 2.2 CM
BH CV XLRA - TDI S': 12.6 CM/SEC
LEFT ATRIUM VOLUME INDEX: 23 ML/M2
MAXIMAL PREDICTED HEART RATE: 157 BPM
STRESS TARGET HR: 133 BPM

## 2021-12-20 PROCEDURE — 93306 TTE W/DOPPLER COMPLETE: CPT

## 2021-12-20 PROCEDURE — 99214 OFFICE O/P EST MOD 30 MIN: CPT | Performed by: INTERNAL MEDICINE

## 2021-12-20 PROCEDURE — 93306 TTE W/DOPPLER COMPLETE: CPT | Performed by: INTERNAL MEDICINE

## 2021-12-20 PROCEDURE — 93000 ELECTROCARDIOGRAM COMPLETE: CPT | Performed by: INTERNAL MEDICINE

## 2021-12-20 NOTE — TELEPHONE ENCOUNTER
Pt wants to know if he needs to continue with the plavix. He forgot to ask when he was in office today. Let me know and I will give him a call.    Thanks  Heidi

## 2021-12-20 NOTE — PROGRESS NOTES
Date of Office Visit: 21  Encounter Provider: Sivakumar Chatman MD  Place of Service: [facility] CARDIOLOGY  Patient Name: Sathya Benavides  :1960    Chief complaint  PFO  CVA    HPI:   61 y.o. male who presents today for follow-up.  He is a patient of Dr. Hua' who suffered an acute CVA in .  At that time, he was found to have a moderate sized PFO.  Additionally, he was found to have mediastinal and right hilar adenopathy along with a lung nodule for which she was seen by Dr. Tony.  He was placed on anticoagulation and an event monitor was placed.  There was no evidence of atrial fibrillation.  Ultimately, Dr. Hua recommended consideration of PFO closure pending the determination of the hypermetabolic adenopathy.  Fortunately, biopsy demonstrated no evidence of malignancy and a follow-up CT scan was recommended.  On 10/18, he was seen in the office byme to discuss PFO closure.  The Eliquis was discontinued in preparation for upcoming procedure.  Ultimately, on 10/29, he underwent successful PFO closure utilizing a 25 mm Amplatzer PFO occluder device.  The plan was to continue aspirin and Plavix for 6 months.  He is here today for follow-up.      He is doing great.  He denies any issues with his groin access.  No chest pain or dyspnea.  He denies bleeding complications on his dual antiplatelet therapy    Past Medical History:   Diagnosis Date   • Acid reflux    • Enlarged prostate    • Headache, tension-type    • Hyperlipidemia    • Lung nodule    • PFO (patent foramen ovale)    • Stroke (HCC) 2021    LOSS OF PERIPHERAL VISION       Past Surgical History:   Procedure Laterality Date   • APPENDECTOMY     • BRONCHOSCOPY Bilateral 2021    Procedure: BRONCHOSCOPY ENDOBRONCHIAL ULTRASOUND WITH FNA'S;  Surgeon: Sam Tony MD;  Location: [facility] ENDOSCOPY;  Service: Pulmonary;  Laterality: Bilateral;  PRE- LYMPHADENOPATHY  POST- SAME   •  CARDIAC CATHETERIZATION N/A 10/29/2021    Procedure: Patent foramen ovale closure  ABBOTT;  Surgeon: Sivakumar Chatman MD;  Location: [facility] INVASIVE LOCATION;  Service: Cardiology;  Laterality: N/A;   • KNEE ARTHROSCOPY     • SHOULDER ARTHROSCOPY         Social History     Socioeconomic History   • Marital status:    Tobacco Use   • Smoking status: Former Smoker     Packs/day: 1.00     Years: 22.00     Pack years: 22.00     Types: Cigarettes     Quit date: 2021     Years since quittin.7   • Smokeless tobacco: Never Used   • Tobacco comment: caffeine use- soda   Vaping Use   • Vaping Use: Never used   Substance and Sexual Activity   • Alcohol use: Yes     Comment: occasionally   • Drug use: Not Currently   • Sexual activity: Defer       Family History   Problem Relation Age of Onset   • No Known Problems Mother    • No Known Problems Father    • Malig Hyperthermia Neg Hx        Review of Systems   Constitutional: Positive for malaise/fatigue. Negative for chills and fever.   Cardiovascular: Negative for chest pain, dyspnea on exertion, leg swelling, near-syncope, orthopnea, palpitations, paroxysmal nocturnal dyspnea and syncope.   Respiratory: Negative for cough and shortness of breath.    Musculoskeletal: Negative for joint pain, joint swelling and myalgias.   Gastrointestinal: Negative for abdominal pain, diarrhea, melena, nausea and vomiting.   Genitourinary: Negative for frequency and hematuria.   Neurological: Negative for light-headedness, numbness, paresthesias and seizures.   Allergic/Immunologic: Negative.    All other systems reviewed and are negative.      Allergies   Allergen Reactions   • Adhesive Tape Rash   • Amoxicillin-Pot Clavulanate Other (See Comments) and GI Intolerance     Upset stomach         Current Outpatient Medications:   •  aspirin (aspirin) 81 MG EC tablet, Take 1 tablet by mouth Daily., Disp: 30 tablet, Rfl: 6  •  clopidogrel (PLAVIX) 75 MG tablet, Take 1  "tablet by mouth Daily., Disp: 30 tablet, Rfl: 5  •  lansoprazole (PREVACID) 30 MG capsule, Take 1 capsule by mouth Every Morning Before Breakfast., Disp: 30 capsule, Rfl: 5  •  tamsulosin (FLOMAX) 0.4 MG capsule 24 hr capsule, Take 1 capsule by mouth 2 (Two) Times a Day., Disp: 60 capsule, Rfl: 5  •  traZODone (DESYREL) 50 MG tablet, Take 1 tablet by mouth every night at bedtime., Disp: 30 tablet, Rfl: 5  •  levothyroxine (SYNTHROID, LEVOTHROID) 25 MCG tablet, Take 1 tablet by mouth Daily for 30 days., Disp: 30 tablet, Rfl: 5      Objective:     Vitals:    12/20/21 1103   BP: 122/70   BP Location: Left arm   Pulse: 58   Weight: 71.3 kg (157 lb 3.2 oz)   Height: 188 cm (74\")     Body mass index is 20.18 kg/m².    PHYSICAL EXAM:  Constitutional:       Appearance: Well-developed.   Eyes:      General: No scleral icterus.     Conjunctiva/sclera: Conjunctivae normal.   HENT:      Head: Normocephalic and atraumatic.   Neck:      Thyroid: No thyromegaly.      Vascular: Normal carotid pulses. No carotid bruit, hepatojugular reflux or JVD.      Trachea: No tracheal deviation.   Pulmonary:      Effort: No respiratory distress.      Breath sounds: Normal breath sounds. No decreased breath sounds. No wheezing. No rhonchi. No rales.   Chest:      Chest wall: Not tender to palpatation.   Cardiovascular:      Normal rate. Regular rhythm.      No gallop.   Pulses:     Carotid: 2+ bilaterally.     Radial: 2+ bilaterally.     Femoral: 2+ bilaterally.     Dorsalis pedis: 2+ bilaterally.     Posterior tibial: 2+ bilaterally.  Abdominal:      General: Bowel sounds are normal. There is no distension.      Palpations: Abdomen is soft.      Tenderness: There is no abdominal tenderness.   Musculoskeletal:         General: No deformity.      Cervical back: Normal range of motion and neck supple. Skin:     Findings: No erythema or rash.   Neurological:      Mental Status: Alert and oriented to person, place, and time.      Sensory: No sensory " deficit.   Psychiatric:         Behavior: Behavior normal.           ECG 12 Lead    Date/Time: 12/20/2021 11:25 AM  Performed by: Sivakumar Chatman MD  Authorized by: Sivakumar Chatman MD   Comparison: compared with previous ECG from 11/9/2021  Similar to previous ECG  Rhythm: sinus rhythm  Rate: normal  QRS axis: normal    Clinical impression: normal ECG              Assessment:       Diagnosis Plan   1. PFO (patent foramen ovale)     2. Hyperlipidemia LDL goal <70     3. Benign essential hypertension     4. Cerebrovascular accident (CVA) due to bilateral embolism of posterior cerebral arteries (HCC)       No orders of the defined types were placed in this encounter.         Plan:   Very pleasant 61-year-old with medical history of CVA, PFO, essential hypertension underwent PFO closure with an excellent result.  At 25 mm Amplatzer PFO occluder device placed about a couple of months ago and this looks great on echo today.  No residual shunting.  Tolerated the procedure well.      1.  PFO.  Status post closure.  Continue aspirin and Plavix for 6 months.  He will continue aspirin lifelong after that.  -Antibiotic prophylaxis for 6 months for dental work.    2.  Hypertension.  His blood pressure looks great.      I have recommended that he follow with Dr. Edson Hua as his primary cardiologist and give us a call if he has any additional issues.

## 2021-12-20 NOTE — TELEPHONE ENCOUNTER
Per verbal from Dr. Chatman, pt to continue the Plavix for 6 months since PFO closure.  I called and s/w pt,  he understands verbally./jlf

## 2022-01-31 ENCOUNTER — APPOINTMENT (OUTPATIENT)
Dept: CT IMAGING | Facility: HOSPITAL | Age: 62
End: 2022-01-31

## 2022-05-13 DIAGNOSIS — K21.9 GASTROESOPHAGEAL REFLUX DISEASE WITHOUT ESOPHAGITIS: ICD-10-CM

## 2022-05-13 DIAGNOSIS — G47.00 INSOMNIA, UNSPECIFIED TYPE: ICD-10-CM

## 2022-05-13 RX ORDER — LANSOPRAZOLE 30 MG/1
CAPSULE, DELAYED RELEASE ORAL
Qty: 30 CAPSULE | Refills: 5 | Status: SHIPPED | OUTPATIENT
Start: 2022-05-13 | End: 2023-01-06

## 2022-05-13 RX ORDER — TRAZODONE HYDROCHLORIDE 50 MG/1
TABLET ORAL
Qty: 30 TABLET | Refills: 5 | Status: SHIPPED | OUTPATIENT
Start: 2022-05-13

## 2022-05-13 RX ORDER — CLOPIDOGREL BISULFATE 75 MG/1
TABLET ORAL
Qty: 30 TABLET | Refills: 5 | Status: SHIPPED | OUTPATIENT
Start: 2022-05-13 | End: 2022-06-28

## 2022-06-28 ENCOUNTER — OFFICE VISIT (OUTPATIENT)
Dept: CARDIOLOGY | Facility: CLINIC | Age: 62
End: 2022-06-28

## 2022-06-28 VITALS
HEART RATE: 65 BPM | BODY MASS INDEX: 20.28 KG/M2 | SYSTOLIC BLOOD PRESSURE: 160 MMHG | WEIGHT: 158 LBS | DIASTOLIC BLOOD PRESSURE: 72 MMHG | HEIGHT: 74 IN

## 2022-06-28 DIAGNOSIS — Q21.12 PFO (PATENT FORAMEN OVALE): Primary | Chronic | ICD-10-CM

## 2022-06-28 DIAGNOSIS — I63.9 CRYPTOGENIC STROKE: ICD-10-CM

## 2022-06-28 PROCEDURE — 99213 OFFICE O/P EST LOW 20 MIN: CPT | Performed by: INTERNAL MEDICINE

## 2022-06-28 NOTE — PROGRESS NOTES
"    Subjective:     Encounter Date:06/28/22      Patient ID: Sathya Benavides is a 61 y.o. male.    Chief Complaint:  History of Present Illness    Dear Dr. Kaminski,    I had the pleasure of seeing this patient in the office today.  He comes in today fof/u of his PFO s/p closure.    This patient underwent PFO closure with Dr. Chatman October 29, 2021.  He was seen by Dr. Chatman in December and the device was found to be well seated with no shunt.    Patient is doing great with no further symptoms.  He was instructed to stop clopidogrel 6 months after his procedure but he still taking the clopidogrel along with the aspirin.    Patient presented March 25.  He presented with acute CVA.  He was found to have bilateral posterior cerebral artery embolic CVAs.  He had an echocardiogram and then a transesophageal cardiogram that demonstrated a small to moderate sized PFO.  He was also found to have mediastinal and right hilar adenopathy along with a 2.3 mm noncalcified lung nodule.  He was seen by Dr. Tony.  For his CVA was placed on chronic anticoagulation, and an event monitor was placed.    Event monitor showed no atrial arrhythmias.  There is no evidence of atrial fibrillation.      The following portions of the patient's history were reviewed and updated as appropriate: allergies, current medications, past family history, past medical history, past social history, past surgical history and problem list.    Procedures       Objective:     Vitals:    06/28/22 0914   BP: 160/72   Pulse: 65   Weight: 71.7 kg (158 lb)   Height: 188 cm (74\")     Body mass index is 20.29 kg/m².      Vitals reviewed.   Constitutional:       General: Not in acute distress.     Appearance: Well-developed. Not diaphoretic.   Eyes:      General:         Right eye: No discharge.         Left eye: No discharge.      Conjunctiva/sclera: Conjunctivae normal.      Pupils: Pupils are equal, round, and reactive to light.   HENT:      Head: Normocephalic and " atraumatic.      Nose: Nose normal.   Neck:      Thyroid: No thyromegaly.      Trachea: No tracheal deviation.      Lymphadenopathy: No cervical adenopathy.   Pulmonary:      Effort: Pulmonary effort is normal. No respiratory distress.      Breath sounds: Normal breath sounds. No stridor.   Chest:      Chest wall: Not tender to palpatation.   Cardiovascular:      Normal rate. Regular rhythm.      Murmurs: There is no murmur.      . No S3 gallop. No click. No rub.   Pulses:     Intact distal pulses.   Edema:     Peripheral edema absent.   Abdominal:      General: Bowel sounds are normal. There is no distension.      Palpations: Abdomen is soft. There is no abdominal mass.      Tenderness: There is no abdominal tenderness. There is no guarding or rebound.   Musculoskeletal: Normal range of motion.         General: No tenderness or deformity.      Cervical back: Normal range of motion and neck supple. Skin:     General: Skin is warm and dry.      Findings: No erythema or rash.   Neurological:      Mental Status: Alert and oriented to person, place, and time.      Deep Tendon Reflexes: Reflexes are normal and symmetric.   Psychiatric:         Thought Content: Thought content normal.         Data and records reviewed:     Lab Results   Component Value Date    GLUCOSE 89 10/18/2021    BUN 6 (L) 10/18/2021    CREATININE 0.72 (L) 10/18/2021    EGFRIFNONA 110 10/18/2021    BCR 8.3 10/18/2021    K 4.1 10/18/2021    CO2 28.5 10/18/2021    CALCIUM 8.8 10/18/2021    ALBUMIN 3.50 03/27/2021    LABIL2 1.2 (L) 02/13/2020    AST 14 03/27/2021    ALT 10 03/27/2021     Lab Results   Component Value Date    CHOL 153 03/27/2021    CHOL 163 03/26/2021     Lab Results   Component Value Date    TRIG 110 03/27/2021    TRIG 134 03/26/2021    TRIG 188 (H) 02/13/2020     Lab Results   Component Value Date    HDL 25 (L) 03/27/2021    HDL 25 (L) 03/26/2021    HDL 30 (L) 02/13/2020     Lab Results   Component Value Date     (H) 03/27/2021      (H) 03/26/2021     (H) 02/13/2020     Lab Results   Component Value Date    VLDL 20 03/27/2021    VLDL 24 03/26/2021    VLDL 38 (H) 02/13/2020     Lab Results   Component Value Date    LDLHDL 4.24 03/27/2021    LDLHDL 4.45 03/26/2021     CBC    CBC 10/18/21   WBC 5.88   RBC 4.99   Hemoglobin 14.9   Hematocrit 43.5   MCV 87.2   MCH 29.9   MCHC 34.3   RDW 12.4   Platelets 214           No radiology results for the last 90 days.  Results for orders placed during the hospital encounter of 12/20/21    Adult Transthoracic Echo Complete W/ Cont if Necessary Per Protocol    Interpretation Summary  · Left ventricular ejection fraction appears to be 61 - 65%. Left ventricular systolic function is normal.  · Left ventricular diastolic function was normal.  · Normal right ventricular cavity size and systolic function noted.  · ASD or PFO closure device in interatrial septum. The agitated saline study is mildly positive with Valsalva  · The left atrial cavity is mildly dilated.  · A bicuspid aortic valve cannot be excluded from the study. There is moderate nodular calcification of the aortic valve  · No hemodynamically significant aortic valve stenosis is present.  · There is no evidence of pericardial effusion          Assessment:          Diagnosis Plan   1. PFO (patent foramen ovale)     2. Cryptogenic stroke (HCC)            Plan:       1.  CVA due to bilateral emboli-felt secondary to PFO.  Status post PFO closure October 29, 2021.  Patient will remain on life time aspirin  2.  Mediastinal and right hilar hypermetabolic adenopathy- negative biopsy, continues to follow with pulmonary.    Thank you very much for allowing us to participate in the care of this pleasant patient.  Please don't hesitate to call if I can be of assistance in any way.      Current Outpatient Medications:   •  aspirin (aspirin) 81 MG EC tablet, Take 1 tablet by mouth Daily., Disp: 30 tablet, Rfl: 6  •  lansoprazole (PREVACID) 30 MG  capsule, TAKE ONE CAPSULE BY MOUTH EVERY MORNING BEFORE BREAKFAST, Disp: 30 capsule, Rfl: 5  •  tamsulosin (FLOMAX) 0.4 MG capsule 24 hr capsule, Take 1 capsule by mouth 2 (Two) Times a Day., Disp: 60 capsule, Rfl: 5  •  traZODone (DESYREL) 50 MG tablet, TAKE ONE TABLET BY MOUTH EVERY NIGHT AT BEDTIME, Disp: 30 tablet, Rfl: 5         No follow-ups on file.

## 2023-01-06 DIAGNOSIS — K21.9 GASTROESOPHAGEAL REFLUX DISEASE WITHOUT ESOPHAGITIS: ICD-10-CM

## 2023-01-06 RX ORDER — LANSOPRAZOLE 30 MG/1
CAPSULE, DELAYED RELEASE ORAL
Qty: 30 CAPSULE | Refills: 0 | Status: SHIPPED | OUTPATIENT
Start: 2023-01-06

## 2023-02-11 DIAGNOSIS — K21.9 GASTROESOPHAGEAL REFLUX DISEASE WITHOUT ESOPHAGITIS: ICD-10-CM

## 2023-02-11 DIAGNOSIS — N40.0 BENIGN PROSTATIC HYPERPLASIA WITHOUT LOWER URINARY TRACT SYMPTOMS: ICD-10-CM

## 2023-02-13 RX ORDER — LANSOPRAZOLE 30 MG/1
30 CAPSULE, DELAYED RELEASE ORAL
Qty: 30 CAPSULE | Refills: 0 | OUTPATIENT
Start: 2023-02-13

## 2023-02-13 RX ORDER — TAMSULOSIN HYDROCHLORIDE 0.4 MG/1
1 CAPSULE ORAL 2 TIMES DAILY
Qty: 60 CAPSULE | Refills: 5 | OUTPATIENT
Start: 2023-02-13

## 2023-02-13 RX ORDER — LANSOPRAZOLE 30 MG/1
CAPSULE, DELAYED RELEASE ORAL
Qty: 30 CAPSULE | Refills: 0 | OUTPATIENT
Start: 2023-02-13

## 2023-02-13 RX ORDER — TAMSULOSIN HYDROCHLORIDE 0.4 MG/1
CAPSULE ORAL
Qty: 60 CAPSULE | Refills: 0 | OUTPATIENT
Start: 2023-02-13

## 2023-02-13 NOTE — TELEPHONE ENCOUNTER
Caller: Sathya Benavides    Relationship: Self    Best call back number: [phone]    Requested Prescriptions:   Requested Prescriptions     Pending Prescriptions Disp Refills   • tamsulosin (FLOMAX) 0.4 MG capsule 24 hr capsule 60 capsule 5     Sig: Take 1 capsule by mouth 2 (Two) Times a Day.   • lansoprazole (PREVACID) 30 MG capsule 30 capsule 0     Sig: Take 1 capsule by mouth Every Morning Before Breakfast.     Refused Prescriptions Disp Refills   • lansoprazole (PREVACID) 30 MG capsule [Pharmacy Med Name: Lansoprazole 30 MG Oral Capsule Delayed Release] 30 capsule 0     Sig: TAKE 1 CAPSULE BY MOUTH ONCE DAILY IN THE MORNING BEFORE BREAKFAST     Refused By: ANDREW THEODORE     Reason for Refusal: Patient needs an appointment   • tamsulosin (FLOMAX) 0.4 MG capsule 24 hr capsule [Pharmacy Med Name: Tamsulosin HCl 0.4 MG Oral Capsule] 60 capsule 0     Sig: Take 1 capsule by mouth twice daily     Refused By: ANDREW THEODORE     Reason for Refusal: Patient needs an appointment        Pharmacy where request should be sent: [facility] [address] [place], [place] [id number] [place] [address]     Additional details provided by patient: PATIENT REPORTS HE DOES NOT HAVE INSURANCE AT THIS TIME - WON'T HAVE INSURANCE UNTIL July - DOES NOT / CAN NOT COME IN FOR AN APPOINTMENT UNTIL THEN. - HAS APPOINTMENT SCHEDULED FOR 07/10/2023    PLEASE CONTACT PATIENT WITH ANY STATUS UPDATES.      Does the patient have less than a 3 day supply:  [x] Yes  [] No      Franca Da Silva Rep   02/13/23 11:24 EST            yes...

## 2023-02-15 NOTE — TELEPHONE ENCOUNTER
Patient states that he will not have insurance til July, he got refills at the hospital, he is having a hard time urinating. I told him to go to ER if it gets worse or if he can not urinate. He wants to know if you can refill the Flomax til July.

## 2023-08-18 DIAGNOSIS — K21.9 GASTROESOPHAGEAL REFLUX DISEASE WITHOUT ESOPHAGITIS: ICD-10-CM

## 2023-08-18 RX ORDER — LANSOPRAZOLE 30 MG/1
CAPSULE, DELAYED RELEASE ORAL
Qty: 30 CAPSULE | Refills: 0 | Status: SHIPPED | OUTPATIENT
Start: 2023-08-18

## 2023-08-25 DIAGNOSIS — N40.0 BENIGN PROSTATIC HYPERPLASIA WITHOUT LOWER URINARY TRACT SYMPTOMS: ICD-10-CM

## 2023-08-25 RX ORDER — TAMSULOSIN HYDROCHLORIDE 0.4 MG/1
CAPSULE ORAL
Qty: 60 CAPSULE | Refills: 0 | Status: SHIPPED | OUTPATIENT
Start: 2023-08-25

## 2023-11-08 DIAGNOSIS — N40.0 BENIGN PROSTATIC HYPERPLASIA WITHOUT LOWER URINARY TRACT SYMPTOMS: ICD-10-CM

## 2023-11-08 DIAGNOSIS — G47.00 INSOMNIA, UNSPECIFIED TYPE: ICD-10-CM

## 2023-11-08 DIAGNOSIS — K21.9 GASTROESOPHAGEAL REFLUX DISEASE WITHOUT ESOPHAGITIS: ICD-10-CM

## 2023-11-08 RX ORDER — LANSOPRAZOLE 30 MG/1
CAPSULE, DELAYED RELEASE ORAL
Qty: 30 CAPSULE | Refills: 0 | Status: SHIPPED | OUTPATIENT
Start: 2023-11-08

## 2023-11-08 RX ORDER — TAMSULOSIN HYDROCHLORIDE 0.4 MG/1
CAPSULE ORAL
Qty: 60 CAPSULE | Refills: 0 | Status: SHIPPED | OUTPATIENT
Start: 2023-11-08

## 2023-11-08 RX ORDER — TRAZODONE HYDROCHLORIDE 50 MG/1
50 TABLET ORAL
Qty: 30 TABLET | Refills: 0 | Status: SHIPPED | OUTPATIENT
Start: 2023-11-08

## 2023-12-08 ENCOUNTER — TELEPHONE (OUTPATIENT)
Dept: FAMILY MEDICINE CLINIC | Facility: CLINIC | Age: 63
End: 2023-12-08
Payer: MEDICARE

## 2023-12-08 NOTE — TELEPHONE ENCOUNTER
"    Caller: Sathya Benavides \"Yariel\"    Relationship to patient: Self    Best call back number: [phone]    Chief complaint: N/A    Type of visit: WELCOME TO MEDICARE?    Requested date: 01/05/2023     If rescheduling, when is the original appointment: N/A     Additional notes:PATIENT CALLED IN TODAY WITH HIS NEW INSURANCE PROVIDER AND REQUESTED A MEDICARE WELLNESS EXAM. I ASKED INSURANCE PERSON WAS THE APPT FOR SUBSEQUENT MEDICARE WELLNESS AND SHE JUST SAID WELLNESS EXAM.    UPON FURTHER REVIEW, PATIENT HAS TURNED 63 SINCE HIS LAST APPT. NOT SURE IF PATIENT HAS HAD THE INITIAL MEDICARE VISIT OR NOT. NOT SURE HOW LONG PATIENT HAS HAD MEDICARE MOSTLY OFFICE VISITS ON HIS PAST TAB. PLEASE CHANGE TYPE IF NECESSARY TO WELCOME TO MEDICARE AND PHYSICAL IF NECESSARY.         "

## 2023-12-10 DIAGNOSIS — G47.00 INSOMNIA, UNSPECIFIED TYPE: ICD-10-CM

## 2023-12-10 DIAGNOSIS — N40.0 BENIGN PROSTATIC HYPERPLASIA WITHOUT LOWER URINARY TRACT SYMPTOMS: ICD-10-CM

## 2023-12-10 DIAGNOSIS — K21.9 GASTROESOPHAGEAL REFLUX DISEASE WITHOUT ESOPHAGITIS: ICD-10-CM

## 2023-12-11 RX ORDER — TRAZODONE HYDROCHLORIDE 50 MG/1
50 TABLET ORAL
Qty: 30 TABLET | Refills: 0 | Status: SHIPPED | OUTPATIENT
Start: 2023-12-11

## 2023-12-11 RX ORDER — LANSOPRAZOLE 30 MG/1
CAPSULE, DELAYED RELEASE ORAL
Qty: 30 CAPSULE | Refills: 0 | Status: SHIPPED | OUTPATIENT
Start: 2023-12-11

## 2023-12-11 RX ORDER — TAMSULOSIN HYDROCHLORIDE 0.4 MG/1
CAPSULE ORAL
Qty: 60 CAPSULE | Refills: 0 | Status: SHIPPED | OUTPATIENT
Start: 2023-12-11

## 2024-01-13 DIAGNOSIS — K21.9 GASTROESOPHAGEAL REFLUX DISEASE WITHOUT ESOPHAGITIS: ICD-10-CM

## 2024-01-13 DIAGNOSIS — G47.00 INSOMNIA, UNSPECIFIED TYPE: ICD-10-CM

## 2024-01-13 DIAGNOSIS — N40.0 BENIGN PROSTATIC HYPERPLASIA WITHOUT LOWER URINARY TRACT SYMPTOMS: ICD-10-CM

## 2024-01-15 RX ORDER — LANSOPRAZOLE 30 MG/1
CAPSULE, DELAYED RELEASE ORAL
Qty: 30 CAPSULE | Refills: 0 | OUTPATIENT
Start: 2024-01-15

## 2024-01-15 RX ORDER — TRAZODONE HYDROCHLORIDE 50 MG/1
50 TABLET ORAL
Qty: 30 TABLET | Refills: 0 | OUTPATIENT
Start: 2024-01-15

## 2024-01-15 RX ORDER — TAMSULOSIN HYDROCHLORIDE 0.4 MG/1
CAPSULE ORAL
Qty: 60 CAPSULE | Refills: 0 | OUTPATIENT
Start: 2024-01-15

## 2024-01-18 DIAGNOSIS — K21.9 GASTROESOPHAGEAL REFLUX DISEASE WITHOUT ESOPHAGITIS: ICD-10-CM

## 2024-01-18 DIAGNOSIS — N40.0 BENIGN PROSTATIC HYPERPLASIA WITHOUT LOWER URINARY TRACT SYMPTOMS: ICD-10-CM

## 2024-01-18 DIAGNOSIS — G47.00 INSOMNIA, UNSPECIFIED TYPE: ICD-10-CM

## 2024-01-18 RX ORDER — TAMSULOSIN HYDROCHLORIDE 0.4 MG/1
CAPSULE ORAL
Qty: 60 CAPSULE | Refills: 0 | OUTPATIENT
Start: 2024-01-18

## 2024-01-18 RX ORDER — TRAZODONE HYDROCHLORIDE 50 MG/1
50 TABLET ORAL
Qty: 30 TABLET | Refills: 0 | OUTPATIENT
Start: 2024-01-18

## 2024-01-18 RX ORDER — LANSOPRAZOLE 30 MG/1
CAPSULE, DELAYED RELEASE ORAL
Qty: 30 CAPSULE | Refills: 0 | OUTPATIENT
Start: 2024-01-18

## 2024-01-26 ENCOUNTER — OFFICE VISIT (OUTPATIENT)
Dept: FAMILY MEDICINE CLINIC | Facility: CLINIC | Age: 64
End: 2024-01-26
Payer: MEDICARE

## 2024-01-26 ENCOUNTER — TELEPHONE (OUTPATIENT)
Dept: GASTROENTEROLOGY | Facility: CLINIC | Age: 64
End: 2024-01-26
Payer: COMMERCIAL

## 2024-01-26 VITALS
BODY MASS INDEX: 20.41 KG/M2 | HEART RATE: 99 BPM | SYSTOLIC BLOOD PRESSURE: 150 MMHG | TEMPERATURE: 97.3 F | OXYGEN SATURATION: 99 % | DIASTOLIC BLOOD PRESSURE: 90 MMHG | WEIGHT: 159 LBS

## 2024-01-26 DIAGNOSIS — K21.9 GASTROESOPHAGEAL REFLUX DISEASE WITHOUT ESOPHAGITIS: ICD-10-CM

## 2024-01-26 DIAGNOSIS — Z12.5 SCREENING PSA (PROSTATE SPECIFIC ANTIGEN): ICD-10-CM

## 2024-01-26 DIAGNOSIS — Z23 NEED FOR VACCINATION FOR PNEUMOCOCCUS: ICD-10-CM

## 2024-01-26 DIAGNOSIS — I10 PRIMARY HYPERTENSION: Primary | ICD-10-CM

## 2024-01-26 DIAGNOSIS — N52.9 ERECTILE DYSFUNCTION, UNSPECIFIED ERECTILE DYSFUNCTION TYPE: ICD-10-CM

## 2024-01-26 DIAGNOSIS — Z12.11 SCREEN FOR COLON CANCER: ICD-10-CM

## 2024-01-26 DIAGNOSIS — N40.0 BENIGN PROSTATIC HYPERPLASIA WITHOUT LOWER URINARY TRACT SYMPTOMS: ICD-10-CM

## 2024-01-26 DIAGNOSIS — G47.00 INSOMNIA, UNSPECIFIED TYPE: ICD-10-CM

## 2024-01-26 DIAGNOSIS — E78.2 MIXED HYPERLIPIDEMIA: ICD-10-CM

## 2024-01-26 LAB
ALBUMIN SERPL-MCNC: 4.3 G/DL (ref 3.5–5.2)
ALBUMIN/GLOB SERPL: 1.5 G/DL
ALP SERPL-CCNC: 95 U/L (ref 39–117)
ALT SERPL W P-5'-P-CCNC: 10 U/L (ref 1–41)
ANION GAP SERPL CALCULATED.3IONS-SCNC: 11 MMOL/L (ref 5–15)
AST SERPL-CCNC: 16 U/L (ref 1–40)
BASOPHILS # BLD AUTO: 0.08 10*3/MM3 (ref 0–0.2)
BASOPHILS NFR BLD AUTO: 1.2 % (ref 0–1.5)
BILIRUB SERPL-MCNC: 0.6 MG/DL (ref 0–1.2)
BUN SERPL-MCNC: 7 MG/DL (ref 8–23)
BUN/CREAT SERPL: 6.3 (ref 7–25)
CALCIUM SPEC-SCNC: 9.4 MG/DL (ref 8.6–10.5)
CHLORIDE SERPL-SCNC: 102 MMOL/L (ref 98–107)
CHOLEST SERPL-MCNC: 193 MG/DL (ref 0–200)
CO2 SERPL-SCNC: 26 MMOL/L (ref 22–29)
CREAT SERPL-MCNC: 1.11 MG/DL (ref 0.76–1.27)
DEPRECATED RDW RBC AUTO: 39.6 FL (ref 37–54)
EGFRCR SERPLBLD CKD-EPI 2021: 73.7 ML/MIN/1.73
EOSINOPHIL # BLD AUTO: 0.12 10*3/MM3 (ref 0–0.4)
EOSINOPHIL NFR BLD AUTO: 1.8 % (ref 0.3–6.2)
ERYTHROCYTE [DISTWIDTH] IN BLOOD BY AUTOMATED COUNT: 12.6 % (ref 12.3–15.4)
GLOBULIN UR ELPH-MCNC: 2.9 GM/DL
GLUCOSE SERPL-MCNC: 80 MG/DL (ref 65–99)
HCT VFR BLD AUTO: 45.4 % (ref 37.5–51)
HDLC SERPL-MCNC: 31 MG/DL (ref 40–60)
HGB BLD-MCNC: 15.4 G/DL (ref 13–17.7)
IMM GRANULOCYTES # BLD AUTO: 0.01 10*3/MM3 (ref 0–0.05)
IMM GRANULOCYTES NFR BLD AUTO: 0.1 % (ref 0–0.5)
LDLC SERPL CALC-MCNC: 145 MG/DL (ref 0–100)
LDLC/HDLC SERPL: 4.65 {RATIO}
LYMPHOCYTES # BLD AUTO: 2.13 10*3/MM3 (ref 0.7–3.1)
LYMPHOCYTES NFR BLD AUTO: 31.6 % (ref 19.6–45.3)
MCH RBC QN AUTO: 29.4 PG (ref 26.6–33)
MCHC RBC AUTO-ENTMCNC: 33.9 G/DL (ref 31.5–35.7)
MCV RBC AUTO: 86.8 FL (ref 79–97)
MONOCYTES # BLD AUTO: 0.67 10*3/MM3 (ref 0.1–0.9)
MONOCYTES NFR BLD AUTO: 10 % (ref 5–12)
NEUTROPHILS NFR BLD AUTO: 3.72 10*3/MM3 (ref 1.7–7)
NEUTROPHILS NFR BLD AUTO: 55.3 % (ref 42.7–76)
NRBC BLD AUTO-RTO: 0 /100 WBC (ref 0–0.2)
PLATELET # BLD AUTO: 237 10*3/MM3 (ref 140–450)
PMV BLD AUTO: 9 FL (ref 6–12)
POTASSIUM SERPL-SCNC: 4 MMOL/L (ref 3.5–5.2)
PROLACTIN SERPL-MCNC: 5.87 NG/ML (ref 4.04–15.2)
PROT SERPL-MCNC: 7.2 G/DL (ref 6–8.5)
PSA SERPL-MCNC: 0.75 NG/ML (ref 0–4)
RBC # BLD AUTO: 5.23 10*6/MM3 (ref 4.14–5.8)
SODIUM SERPL-SCNC: 139 MMOL/L (ref 136–145)
T4 FREE SERPL-MCNC: 0.94 NG/DL (ref 0.93–1.7)
TRIGL SERPL-MCNC: 90 MG/DL (ref 0–150)
TSH SERPL DL<=0.05 MIU/L-ACNC: 3.54 UIU/ML (ref 0.27–4.2)
VLDLC SERPL-MCNC: 17 MG/DL (ref 5–40)
WBC NRBC COR # BLD AUTO: 6.73 10*3/MM3 (ref 3.4–10.8)

## 2024-01-26 PROCEDURE — 1159F MED LIST DOCD IN RCRD: CPT | Performed by: FAMILY MEDICINE

## 2024-01-26 PROCEDURE — 36415 COLL VENOUS BLD VENIPUNCTURE: CPT | Performed by: FAMILY MEDICINE

## 2024-01-26 PROCEDURE — 84146 ASSAY OF PROLACTIN: CPT | Performed by: FAMILY MEDICINE

## 2024-01-26 PROCEDURE — 84439 ASSAY OF FREE THYROXINE: CPT | Performed by: FAMILY MEDICINE

## 2024-01-26 PROCEDURE — G0103 PSA SCREENING: HCPCS | Performed by: FAMILY MEDICINE

## 2024-01-26 PROCEDURE — 1160F RVW MEDS BY RX/DR IN RCRD: CPT | Performed by: FAMILY MEDICINE

## 2024-01-26 PROCEDURE — 99214 OFFICE O/P EST MOD 30 MIN: CPT | Performed by: FAMILY MEDICINE

## 2024-01-26 PROCEDURE — 80053 COMPREHEN METABOLIC PANEL: CPT | Performed by: FAMILY MEDICINE

## 2024-01-26 PROCEDURE — 85025 COMPLETE CBC W/AUTO DIFF WBC: CPT | Performed by: FAMILY MEDICINE

## 2024-01-26 PROCEDURE — 90677 PCV20 VACCINE IM: CPT | Performed by: FAMILY MEDICINE

## 2024-01-26 PROCEDURE — 84443 ASSAY THYROID STIM HORMONE: CPT | Performed by: FAMILY MEDICINE

## 2024-01-26 PROCEDURE — G0009 ADMIN PNEUMOCOCCAL VACCINE: HCPCS | Performed by: FAMILY MEDICINE

## 2024-01-26 PROCEDURE — 80061 LIPID PANEL: CPT | Performed by: FAMILY MEDICINE

## 2024-01-26 PROCEDURE — 3077F SYST BP >= 140 MM HG: CPT | Performed by: FAMILY MEDICINE

## 2024-01-26 PROCEDURE — 3080F DIAST BP >= 90 MM HG: CPT | Performed by: FAMILY MEDICINE

## 2024-01-26 RX ORDER — TRAZODONE HYDROCHLORIDE 50 MG/1
50 TABLET ORAL
Qty: 90 TABLET | Refills: 1 | Status: SHIPPED | OUTPATIENT
Start: 2024-01-26

## 2024-01-26 RX ORDER — TADALAFIL 20 MG/1
20 TABLET ORAL DAILY PRN
Qty: 90 TABLET | Refills: 1 | Status: SHIPPED | OUTPATIENT
Start: 2024-01-26

## 2024-01-26 RX ORDER — LISINOPRIL 10 MG/1
10 TABLET ORAL DAILY
Qty: 90 TABLET | Refills: 1 | Status: SHIPPED | OUTPATIENT
Start: 2024-01-26

## 2024-01-26 RX ORDER — TAMSULOSIN HYDROCHLORIDE 0.4 MG/1
2 CAPSULE ORAL DAILY
Qty: 180 CAPSULE | Refills: 1 | Status: SHIPPED | OUTPATIENT
Start: 2024-01-26

## 2024-01-26 RX ORDER — LANSOPRAZOLE 30 MG/1
30 CAPSULE, DELAYED RELEASE ORAL
Qty: 90 CAPSULE | Refills: 1 | Status: SHIPPED | OUTPATIENT
Start: 2024-01-26

## 2024-01-26 NOTE — TELEPHONE ENCOUNTER
Spoke with patient in regards to a referral we received from Adi Kaminski for a colon screening. Since our phone screenings was out to March. I offered him a phone screening with Dr. Hinojosa on 2/22/24 or with another provider. . Patient scheduled the appointment with Dr. Hinojosa on 2/22/24@3:00  
Bed/Stretcher in lowest position, wheels locked, appropriate side rails in place/Call bell, personal items and telephone in reach/Instruct patient to call for assistance before getting out of bed/chair/stretcher/Non-slip footwear applied when patient is off stretcher/[place] to call system/Physically safe environment - no spills, clutter or unnecessary equipment/Purposeful proactive rounding/Room/bathroom lighting operational, light cord in reach

## 2024-01-26 NOTE — PROGRESS NOTES
Venipuncture Blood Specimen Collection  Venipuncture performed in left arm by Saba Griffin with good hemostasis. Patient tolerated the procedure well without complications.   01/26/24   Saba Griffin

## 2024-01-26 NOTE — PROGRESS NOTES
Chief Complaint  Hyperlipidemia, Hypertension, and Hypothyroidism    Subjective          Sathya Benavides presents to [facility]  Hyperlipidemia  This is a chronic problem. The current episode started more than 1 year ago. The problem is uncontrolled. Recent lipid tests were reviewed and are variable. Exacerbating diseases include hypothyroidism. There are no known factors aggravating his hyperlipidemia. Pertinent negatives include no chest pain, focal sensory loss, focal weakness, leg pain, myalgias or shortness of breath. Current antihyperlipidemic treatment includes diet change. The current treatment provides moderate improvement of lipids. There are no compliance problems.  Risk factors for coronary artery disease include dyslipidemia, hypertension and male sex.   Hypertension  This is a chronic problem. The current episode started more than 1 year ago. The problem has been worse since onset. The problem is uncontrolled. Pertinent negatives include no anxiety, blurred vision, chest pain, headaches, malaise/fatigue, neck pain, orthopnea, palpitations, peripheral edema, PND, shortness of breath or sweats. There are no associated agents to hypertension. Current antihypertension treatment includes nothing. The current treatment provides significant improvement. There are no compliance problems.    Hypothyroidism  This is a chronic problem. The current episode started more than 1 year ago. The problem occurs intermittently. The problem has been gradually improving. Pertinent negatives include no abdominal pain, anorexia, arthralgias, change in bowel habit, chest pain, chills, congestion, coughing, diaphoresis, fatigue, fever, headaches, joint swelling, myalgias, nausea, neck pain, numbness, rash, sore throat, swollen glands, urinary symptoms, vertigo, visual change, vomiting or weakness. Nothing aggravates the symptoms. He has tried nothing for the symptoms.       BMI is within normal  parameters. No other follow-up for BMI required.       Objective   Allergies   Allergen Reactions    Adhesive Tape Rash    Amoxicillin-Pot Clavulanate Other (See Comments) and GI Intolerance     Upset stomach     Immunization History   Administered Date(s) Administered    Flu Vaccine Quad PF >36MO 10/17/2017    Tdap 05/17/2017     Past Medical History:   Diagnosis Date    Acid reflux     Enlarged prostate     Headache, tension-type     Hyperlipidemia     Lung nodule     PFO (patent foramen ovale)     Stroke 03/2021    LOSS OF PERIPHERAL VISION      Past Surgical History:   Procedure Laterality Date    APPENDECTOMY      BRONCHOSCOPY Bilateral 4/8/2021    Procedure: BRONCHOSCOPY ENDOBRONCHIAL ULTRASOUND WITH FNA'S;  Surgeon: Sam Tony MD;  Location: [facility] ENDOSCOPY;  Service: Pulmonary;  Laterality: Bilateral;  PRE- LYMPHADENOPATHY  POST- SAME    CARDIAC CATHETERIZATION N/A 10/29/2021    Procedure: Patent foramen ovale closure  ABBOTT;  Surgeon: Sivakumar Chatman MD;  Location: [facility] CATH INVASIVE LOCATION;  Service: Cardiology;  Laterality: N/A;    KNEE ARTHROSCOPY      SHOULDER ARTHROSCOPY        Social History     Socioeconomic History    Marital status:    Tobacco Use    Smoking status: Every Day     Packs/day: 1.00     Years: 23.00     Additional pack years: 0.00     Total pack years: 23.00     Types: Cigarettes     Start date: 1981    Smokeless tobacco: Never   Vaping Use    Vaping Use: Never used   Substance and Sexual Activity    Alcohol use: Yes     Comment: occasionally    Drug use: Not Currently    Sexual activity: Defer        Current Outpatient Medications:     aspirin (aspirin) 81 MG EC tablet, Take 1 tablet by mouth Daily., Disp: 30 tablet, Rfl: 6    lansoprazole (PREVACID) 30 MG capsule, Take 1 capsule by mouth Every Morning Before Breakfast., Disp: 90 capsule, Rfl: 1    tamsulosin (FLOMAX) 0.4 MG capsule 24 hr capsule, Take 2 capsules by mouth Daily., Disp: 180 capsule, Rfl: 1     traZODone (DESYREL) 50 MG tablet, Take 1 tablet by mouth every night at bedtime., Disp: 90 tablet, Rfl: 1    lisinopril (PRINIVIL,ZESTRIL) 10 MG tablet, Take 1 tablet by mouth Daily., Disp: 90 tablet, Rfl: 1    tadalafil (Cialis) 20 MG tablet, Take 1 tablet by mouth Daily As Needed for Erectile Dysfunction., Disp: 90 tablet, Rfl: 1   Family History   Problem Relation Age of Onset    No Known Problems Mother     No Known Problems Father     Malig Hyperthermia Neg Hx           Vital Signs:   Vitals:    01/26/24 1321   BP: 150/90   BP Location: Left arm   Patient Position: Sitting   Cuff Size: Adult   Pulse: 99   Temp: 97.3 °F (36.3 °C)   SpO2: 99%   Weight: 72.1 kg (159 lb)       Review of Systems   Constitutional:  Negative for chills, diaphoresis, fatigue, fever and malaise/fatigue.   HENT:  Negative for congestion and sore throat.    Eyes:  Negative for blurred vision and visual disturbance.   Respiratory:  Negative for cough, chest tightness, shortness of breath and wheezing.    Cardiovascular:  Negative for chest pain, palpitations, orthopnea, leg swelling and PND.   Gastrointestinal:  Negative for abdominal pain, anorexia, change in bowel habit, diarrhea, nausea and vomiting.   Musculoskeletal:  Negative for arthralgias, joint swelling, myalgias and neck pain.   Skin:  Negative for rash.   Neurological:  Negative for vertigo, focal weakness, weakness, light-headedness, numbness and headaches.      Physical Exam  Vitals reviewed.   Constitutional:       Appearance: Normal appearance. He is well-developed.   HENT:      Head: Normocephalic and atraumatic.      Right Ear: External ear normal.      Left Ear: External ear normal.      Mouth/Throat:      Pharynx: No oropharyngeal exudate.   Eyes:      Conjunctiva/sclera: Conjunctivae normal.      Pupils: Pupils are equal, round, and reactive to light.   Cardiovascular:      Rate and Rhythm: Normal rate and regular rhythm.      Pulses: Normal pulses.      Heart sounds:  Normal heart sounds. No murmur heard.     No friction rub. No gallop.   Pulmonary:      Effort: Pulmonary effort is normal.      Breath sounds: Normal breath sounds. No wheezing or rhonchi.   Abdominal:      General: Abdomen is flat. Bowel sounds are normal. There is no distension.      Palpations: Abdomen is soft. There is no mass.      Tenderness: There is no abdominal tenderness. There is no guarding or rebound.      Hernia: No hernia is present.   Musculoskeletal:         General: Normal range of motion.   Skin:     General: Skin is warm and dry.      Capillary Refill: Capillary refill takes less than 2 seconds.   Neurological:      General: No focal deficit present.      Mental Status: He is alert and oriented to person, place, and time.      Cranial Nerves: No cranial nerve deficit.   Psychiatric:         Mood and Affect: Mood and affect normal.         Behavior: Behavior normal.         Thought Content: Thought content normal.         Judgment: Judgment normal.        Result Review :   The following data was reviewed by: Adi Kaminski MD on 01/26/2024:                      Assessment and Plan    Diagnoses and all orders for this visit:    1. Primary hypertension (Primary)  -     lisinopril (PRINIVIL,ZESTRIL) 10 MG tablet; Take 1 tablet by mouth Daily.  Dispense: 90 tablet; Refill: 1  -     CBC Auto Differential  -     Comprehensive Metabolic Panel  -     Lipid Panel  -     TSH+Free T4    2. Insomnia, unspecified type  -     traZODone (DESYREL) 50 MG tablet; Take 1 tablet by mouth every night at bedtime.  Dispense: 90 tablet; Refill: 1    3. Benign prostatic hyperplasia without lower urinary tract symptoms  -     tamsulosin (FLOMAX) 0.4 MG capsule 24 hr capsule; Take 2 capsules by mouth Daily.  Dispense: 180 capsule; Refill: 1    4. Gastroesophageal reflux disease without esophagitis  -     lansoprazole (PREVACID) 30 MG capsule; Take 1 capsule by mouth Every Morning Before Breakfast.  Dispense: 90 capsule;  Refill: 1    5. Mixed hyperlipidemia  -     Lipid Panel    6. Screening PSA (prostate specific antigen)  -     PSA Screen    7. Erectile dysfunction, unspecified erectile dysfunction type  -     tadalafil (Cialis) 20 MG tablet; Take 1 tablet by mouth Daily As Needed for Erectile Dysfunction.  Dispense: 90 tablet; Refill: 1  -     Prolactin    8. Screen for colon cancer  -     Ambulatory Referral For Screening Colonoscopy    9. Need for vaccination for pneumococcus  -     Pneumococcal Conjugate Vaccine 20-Valent (PCV20)            Follow Up   Return in about 6 months (around 7/26/2024) for Recheck, Medicare Wellness.  Patient was given instructions and counseling regarding his condition or for health maintenance advice. Please see specific information pulled into the AVS if appropriate.

## 2024-01-29 NOTE — PROGRESS NOTES
Labs show mildly elevated cholesterol.  Really watch diet and exercise.  Follow-up if you have any questions.

## 2024-05-17 ENCOUNTER — OFFICE VISIT (OUTPATIENT)
Dept: FAMILY MEDICINE CLINIC | Facility: CLINIC | Age: 64
End: 2024-05-17
Payer: MEDICARE

## 2024-05-17 VITALS
WEIGHT: 156.8 LBS | BODY MASS INDEX: 20.13 KG/M2 | OXYGEN SATURATION: 97 % | TEMPERATURE: 98.1 F | HEART RATE: 86 BPM | SYSTOLIC BLOOD PRESSURE: 118 MMHG | DIASTOLIC BLOOD PRESSURE: 78 MMHG

## 2024-05-17 DIAGNOSIS — M79.644 FINGER PAIN, RIGHT: Primary | ICD-10-CM

## 2024-05-17 PROCEDURE — 3074F SYST BP LT 130 MM HG: CPT | Performed by: FAMILY MEDICINE

## 2024-05-17 PROCEDURE — 3078F DIAST BP <80 MM HG: CPT | Performed by: FAMILY MEDICINE

## 2024-05-17 PROCEDURE — 99213 OFFICE O/P EST LOW 20 MIN: CPT | Performed by: FAMILY MEDICINE

## 2024-05-17 PROCEDURE — 1159F MED LIST DOCD IN RCRD: CPT | Performed by: FAMILY MEDICINE

## 2024-05-17 PROCEDURE — 1126F AMNT PAIN NOTED NONE PRSNT: CPT | Performed by: FAMILY MEDICINE

## 2024-05-17 PROCEDURE — 1160F RVW MEDS BY RX/DR IN RCRD: CPT | Performed by: FAMILY MEDICINE

## 2024-05-17 NOTE — PROGRESS NOTES
Call pt:  xrays show no fxs but there is cyst in hand- hand surgery may take care of this, but this is likely not causing your symptoms.

## 2024-05-17 NOTE — PROGRESS NOTES
Chief Complaint  Hand Pain (Locking up x2 weeks)    Subjective          Sathya Benavides presents to [facility]  History of Present Illness  Right ring finger locking up x 10 days- no known injury- dec ROM of finger- sudden onset- symptoms unchanged      BMI is within normal parameters. No other follow-up for BMI required.       Objective   Allergies   Allergen Reactions    Adhesive Tape Rash    Amoxicillin-Pot Clavulanate Other (See Comments) and GI Intolerance     Upset stomach     Immunization History   Administered Date(s) Administered    Flu Vaccine Quad PF >36MO 10/17/2017    Pneumococcal Conjugate 20-Valent (PCV20) 01/26/2024    Tdap 05/17/2017     Past Medical History:   Diagnosis Date    Acid reflux     Enlarged prostate     Headache, tension-type     Hyperlipidemia     Lung nodule     PFO (patent foramen ovale)     Stroke 03/2021    LOSS OF PERIPHERAL VISION      Past Surgical History:   Procedure Laterality Date    APPENDECTOMY      BRONCHOSCOPY Bilateral 4/8/2021    Procedure: BRONCHOSCOPY ENDOBRONCHIAL ULTRASOUND WITH FNA'S;  Surgeon: Sam Tony MD;  Location: [facility] ENDOSCOPY;  Service: Pulmonary;  Laterality: Bilateral;  PRE- LYMPHADENOPATHY  POST- SAME    CARDIAC CATHETERIZATION N/A 10/29/2021    Procedure: Patent foramen ovale closure  ABBOTT;  Surgeon: Sivakumar Chatman MD;  Location: [facility] CATH INVASIVE LOCATION;  Service: Cardiology;  Laterality: N/A;    KNEE ARTHROSCOPY      SHOULDER ARTHROSCOPY        Social History     Socioeconomic History    Marital status:    Tobacco Use    Smoking status: Every Day     Current packs/day: 1.00     Average packs/day: 1 pack/day for 43.4 years (43.4 ttl pk-yrs)     Types: Cigarettes     Start date: 1981    Smokeless tobacco: Never   Vaping Use    Vaping status: Never Used   Substance and Sexual Activity    Alcohol use: Yes     Comment: occasionally    Drug use: Not Currently    Sexual activity: Defer         Current Outpatient Medications:     aspirin (aspirin) 81 MG EC tablet, Take 1 tablet by mouth Daily., Disp: 30 tablet, Rfl: 6    lansoprazole (PREVACID) 30 MG capsule, Take 1 capsule by mouth Every Morning Before Breakfast., Disp: 90 capsule, Rfl: 1    tamsulosin (FLOMAX) 0.4 MG capsule 24 hr capsule, Take 2 capsules by mouth Daily., Disp: 180 capsule, Rfl: 1    traZODone (DESYREL) 50 MG tablet, Take 1 tablet by mouth every night at bedtime., Disp: 90 tablet, Rfl: 1    lisinopril (PRINIVIL,ZESTRIL) 10 MG tablet, Take 1 tablet by mouth Daily. (Patient not taking: Reported on 2/22/2024), Disp: 90 tablet, Rfl: 1    tadalafil (Cialis) 20 MG tablet, Take 1 tablet by mouth Daily As Needed for Erectile Dysfunction. (Patient not taking: Reported on 5/17/2024), Disp: 90 tablet, Rfl: 1   Family History   Problem Relation Age of Onset    No Known Problems Mother     No Known Problems Father     Malig Hyperthermia Neg Hx           Vital Signs:   Vitals:    05/17/24 1418   BP: 118/78   Pulse: 86   Temp: 98.1 °F (36.7 °C)   SpO2: 97%   Weight: 71.1 kg (156 lb 12.8 oz)       Review of Systems   Constitutional:  Negative for fatigue and fever.   HENT:  Negative for sore throat.    Eyes:  Negative for visual disturbance.   Respiratory:  Negative for cough, chest tightness, shortness of breath and wheezing.    Cardiovascular:  Negative for chest pain, palpitations and leg swelling.   Gastrointestinal:  Negative for abdominal pain, diarrhea, nausea and vomiting.   Neurological:  Negative for light-headedness and headaches.      Physical Exam  Vitals reviewed.   Constitutional:       Appearance: Normal appearance. He is well-developed.   HENT:      Head: Normocephalic and atraumatic.      Right Ear: External ear normal.      Left Ear: External ear normal.      Mouth/Throat:      Pharynx: No oropharyngeal exudate.   Eyes:      Conjunctiva/sclera: Conjunctivae normal.      Pupils: Pupils are equal, round, and reactive to light.    Cardiovascular:      Rate and Rhythm: Normal rate and regular rhythm.      Pulses: Normal pulses.      Heart sounds: Normal heart sounds. No murmur heard.     No friction rub. No gallop.   Pulmonary:      Effort: Pulmonary effort is normal.      Breath sounds: Normal breath sounds. No wheezing or rhonchi.   Abdominal:      General: Abdomen is flat. Bowel sounds are normal. There is no distension.      Palpations: Abdomen is soft. There is no mass.      Tenderness: There is no abdominal tenderness. There is no guarding or rebound.      Hernia: No hernia is present.   Musculoskeletal:      Comments: Right #4 finger has tenderness at base of finger- has dec ROM of finger, no redness, warmth, swelling, or bruising.   Skin:     General: Skin is warm and dry.      Capillary Refill: Capillary refill takes less than 2 seconds.   Neurological:      General: No focal deficit present.      Mental Status: He is alert and oriented to person, place, and time.      Cranial Nerves: No cranial nerve deficit.   Psychiatric:         Mood and Affect: Mood and affect normal.         Behavior: Behavior normal.         Thought Content: Thought content normal.         Judgment: Judgment normal.        Result Review :   I viewed and interpreted 3 views right hand- no fxs.              Assessment and Plan    Diagnoses and all orders for this visit:    1. Finger pain, right (Primary)  -     XR Hand 3+ View Right (In Office)  -     Cancel: Ambulatory Referral to Hand Surgery  -     Ambulatory Referral to Hand Surgery            Follow Up   Return in about 8 weeks (around 7/11/2024) for Recheck, Medicare Wellness.  Patient was given instructions and counseling regarding his condition or for health maintenance advice. Please see specific information pulled into the AVS if appropriate.

## 2024-07-23 DIAGNOSIS — N40.0 BENIGN PROSTATIC HYPERPLASIA WITHOUT LOWER URINARY TRACT SYMPTOMS: ICD-10-CM

## 2024-07-23 DIAGNOSIS — K21.9 GASTROESOPHAGEAL REFLUX DISEASE WITHOUT ESOPHAGITIS: ICD-10-CM

## 2024-07-23 DIAGNOSIS — G47.00 INSOMNIA, UNSPECIFIED TYPE: ICD-10-CM

## 2024-07-23 RX ORDER — TRAZODONE HYDROCHLORIDE 50 MG/1
50 TABLET ORAL
Qty: 90 TABLET | Refills: 0 | Status: SHIPPED | OUTPATIENT
Start: 2024-07-23

## 2024-07-23 RX ORDER — LANSOPRAZOLE 30 MG/1
30 CAPSULE, DELAYED RELEASE ORAL
Qty: 90 CAPSULE | Refills: 0 | Status: SHIPPED | OUTPATIENT
Start: 2024-07-23

## 2024-07-23 RX ORDER — TAMSULOSIN HYDROCHLORIDE 0.4 MG/1
2 CAPSULE ORAL DAILY
Qty: 180 CAPSULE | Refills: 0 | Status: SHIPPED | OUTPATIENT
Start: 2024-07-23

## 2024-08-08 ENCOUNTER — TELEPHONE (OUTPATIENT)
Dept: FAMILY MEDICINE CLINIC | Facility: CLINIC | Age: 64
End: 2024-08-08

## 2024-08-08 ENCOUNTER — READMISSION MANAGEMENT (OUTPATIENT)
Dept: CALL CENTER | Facility: HOSPITAL | Age: 64
End: 2024-08-08
Payer: COMMERCIAL

## 2024-08-08 ENCOUNTER — CLINICAL SUPPORT (OUTPATIENT)
Dept: FAMILY MEDICINE CLINIC | Facility: CLINIC | Age: 64
End: 2024-08-08
Payer: MEDICARE

## 2024-08-08 DIAGNOSIS — Z79.01 ANTICOAGULATED: Primary | ICD-10-CM

## 2024-08-08 LAB
INR PPP: 2.27 (ref 0.86–1.15)
PROTHROMBIN TIME: 25.4 SECONDS (ref 11.8–14.9)

## 2024-08-08 PROCEDURE — 85610 PROTHROMBIN TIME: CPT | Performed by: FAMILY MEDICINE

## 2024-08-08 PROCEDURE — 36415 COLL VENOUS BLD VENIPUNCTURE: CPT | Performed by: FAMILY MEDICINE

## 2024-08-08 NOTE — OUTREACH NOTE
Prep Survey      Flowsheet Row Responses   [facility] facility patient discharged from? Non-BH   Is LACE score < 7 ? Non-BH Discharge   Eligibility [facility] IN [place]   Date of Admission 08/01/24   Date of Discharge 08/07/24   Discharge Disposition Home or Self Care   Discharge diagnosis Stroke   Does the patient have one of the following disease processes/diagnoses(primary or secondary)? Stroke   Prep survey completed? Yes            Constance ONEAL - Registered Nurse

## 2024-08-08 NOTE — PROGRESS NOTES
..  Venipuncture Blood Specimen Collection  Venipuncture performed in LT arm by Nicole Smith MA with good hemostasis. Patient tolerated the procedure well without complications.   08/08/24   Nicole Smith MA

## 2024-08-08 NOTE — TELEPHONE ENCOUNTER
Pt came in for his INR and wanted to know if you could write an order for him to have them checked at [facility].  He drives almost an hour to get here and wanted to know if they could get it checked there in the future instead of coming all the way here.

## 2024-08-08 NOTE — TELEPHONE ENCOUNTER
Caller: JUAN M PALENCIA NOT ON VERBAL    Relationship: Spouse    Best call back number: [phone]     What was the call regarding: PATIENT'S WIFE STATES THAT THE PATIENT HAS JUST BEEN RELEASED FROM [facility] FOLLOWING A STAY AT [facility] FOR A STROKE. SHE STATES THAT DURING THIS HE WAS PRESCRIBED WARFARIN AND THEY WERE TOLD THAT HE WOULD NEED TO GET HIS INR CHECKED BUT WERE NOT TOLD WHEN OR HOW OFTEN.

## 2024-08-08 NOTE — TELEPHONE ENCOUNTER
Caller: JUAN M PALENCIA    Relationship to patient: Spouse    Best call back number:     [phone]       New or established patient?  [] New  [x] Established    Date of discharge:8.7.24    Facility discharged from: [facility] IN [place]     Diagnosis/Symptoms: STROKE    Length of stay (If applicable): 7.24.24-7.31.24 AT  [facility]     8.1.24-8.7.24 AT [facility]

## 2024-08-08 NOTE — TELEPHONE ENCOUNTER
Caller: JUAN M PALENCIA NOT ON VERBAL    Relationship: Spouse    Best call back number: [phone]     What was the call regarding: PATIENT'S WIFE STATES THAT THE PATIENT HAS JUST BEEN RELEASED FROM [facility] FOLLOWING A WEEK LONG STAY AT [facility] FOR A STROKE.     SHE STATES THAT DURING THIS TIME THE PATIENT WAS PRESCRIBED ATORVASTATIN 40 MG TWICE A DAY AND THAT THIS IS NOT COVERED BY HIS INSURANCE.     SHE STATES THAT THE INSURANCE WILL ONLY COVER ONE PILL A DAY.     SHE STATES THAT THEY NEED A PRIOR AUTHORIZATION COMPLETED FOR THIS OR THEY NEED TO KNOW IF THE PATIENT CAN GET A PRESCRIPTION FOR AT LEAST ONE PILL A DAY.

## 2024-08-09 ENCOUNTER — TELEPHONE (OUTPATIENT)
Dept: FAMILY MEDICINE CLINIC | Facility: CLINIC | Age: 64
End: 2024-08-09

## 2024-08-09 ENCOUNTER — TRANSITIONAL CARE MANAGEMENT TELEPHONE ENCOUNTER (OUTPATIENT)
Dept: CALL CENTER | Facility: HOSPITAL | Age: 64
End: 2024-08-09
Payer: COMMERCIAL

## 2024-08-09 DIAGNOSIS — Z79.01 ANTICOAGULATED: ICD-10-CM

## 2024-08-09 DIAGNOSIS — E78.2 MIXED HYPERLIPIDEMIA: Primary | ICD-10-CM

## 2024-08-09 RX ORDER — ATORVASTATIN CALCIUM 80 MG/1
80 TABLET, FILM COATED ORAL DAILY
Qty: 90 TABLET | Refills: 0 | Status: SHIPPED | OUTPATIENT
Start: 2024-08-09

## 2024-08-09 NOTE — OUTREACH NOTE
Call Center TCM Note      Flowsheet Row Responses   [facility] patient discharged from? Non-  [[facility] IN [place]]   Does the patient have one of the following disease processes/diagnoses(primary or secondary)? Other   TCM attempt successful? Yes   Call start time 1506   Call end time 1520   Discharge diagnosis Stroke   Is patient permission given to speak with other caregiver? Yes   Person spoke with today (if not patient) and relationship Nichelle Benavides, spouse (Confirmed with office that they had the updated verbal release listed wife as of yesterday 8/8)   Medication alerts for this patient Patient had INR check done yesterday. Awaiting to hear from PCP office on medication instruction   Meds reviewed with patient/caregiver? Yes   Does the patient have all medications ordered at discharge? No   What is keeping the patient from filling the prescriptions? --  [Wife reports that they were unable to get the atorvastatin ordered 80mg every day, as insurance did not want to cover that dose.]   Nursing Interventions --  [Message routed to office.]   Is the patient taking all medications as directed (includes completed medication regime)? No   What is preventing the patient from taking all medications as directed? Other  [see above]   Medication comments Spoke to PCP office staff, they will contact patient/wife with updated instructions for blood thinner.   Comments PCP Dr Kaminski. Hospital follow up appt in place for 8/15/24  130pm.   Does the patient have an appointment with their PCP within 7-14 days of discharge? Yes   Has home health visited the patient within 72 hours of discharge? N/A   Psychosocial issues? No   Did the patient receive a copy of their discharge instructions? Yes   What is the patient's perception of their health status since discharge? Same  [Wife reports patient currently taking nap.]   Is the patient/caregiver able to teach back signs and symptoms related to disease process  for when to call PCP? Yes   TCM call completed? Yes   Call end time 1520   Would this patient benefit from a Referral to [facility] Social Work? No   Is the patient interested in additional calls from an ambulatory ? No            Kristen Daily RN    8/9/2024, 15:25 EDT

## 2024-08-09 NOTE — TELEPHONE ENCOUNTER
Caller: JUAN M KING - NOT ON VERBAL    Relationship: Emergency Contact    Best call back number: [phone]     Caller requesting test results: YES    What test was performed: LABS    When was the test performed: 08.08.2024    Where was the test performed: IN OFFICE    Additional notes: CALLER STATES SHE HAS NOT HEARD ABOUT PATIENT'S LAB RESULTS YET. THAT SHE WAS ABLE TO SEE THEM IN MYCHART BUT DOESN'T UNDERSTAND THEM. PLEASE ADVISE.

## 2024-08-12 ENCOUNTER — LAB (OUTPATIENT)
Dept: LAB | Facility: HOSPITAL | Age: 64
End: 2024-08-12
Payer: MEDICARE

## 2024-08-12 DIAGNOSIS — Z79.01 ANTICOAGULATED: ICD-10-CM

## 2024-08-12 LAB
INR PPP: 4.16 (ref 2–3)
PROTHROMBIN TIME: 40.8 SECONDS (ref 19.4–28.5)

## 2024-08-12 PROCEDURE — 85610 PROTHROMBIN TIME: CPT

## 2024-08-12 PROCEDURE — 36415 COLL VENOUS BLD VENIPUNCTURE: CPT

## 2024-08-13 ENCOUNTER — TELEPHONE (OUTPATIENT)
Dept: FAMILY MEDICINE CLINIC | Facility: CLINIC | Age: 64
End: 2024-08-13

## 2024-08-13 ENCOUNTER — TELEPHONE (OUTPATIENT)
Dept: FAMILY MEDICINE CLINIC | Facility: CLINIC | Age: 64
End: 2024-08-13
Payer: COMMERCIAL

## 2024-08-13 ENCOUNTER — LAB (OUTPATIENT)
Dept: LAB | Facility: HOSPITAL | Age: 64
End: 2024-08-13
Payer: MEDICARE

## 2024-08-13 DIAGNOSIS — Z79.01 ANTICOAGULATED: ICD-10-CM

## 2024-08-13 DIAGNOSIS — Z79.01 ANTICOAGULATED: Primary | ICD-10-CM

## 2024-08-13 LAB
INR PPP: 3.05 (ref 0.93–1.1)
PROTHROMBIN TIME: 30.6 SECONDS (ref 9.6–11.7)

## 2024-08-13 PROCEDURE — 85610 PROTHROMBIN TIME: CPT

## 2024-08-13 NOTE — TELEPHONE ENCOUNTER
Caller: JUAN M KING- NOT ON VERBAL    Relationship: Emergency Contact    Best call back number: [phone]     Caller requesting test results: SPOUSE    What test was performed: INR    When was the test performed: 8.13.2024    Where was the test performed:     Additional notes: NEEDS TO BE ADVISED IF PATIENT IS TO TAKE BLOOD THINNERS TODAY.

## 2024-08-14 ENCOUNTER — TELEPHONE (OUTPATIENT)
Dept: FAMILY MEDICINE CLINIC | Facility: CLINIC | Age: 64
End: 2024-08-14
Payer: MEDICARE

## 2024-08-14 ENCOUNTER — LAB (OUTPATIENT)
Dept: LAB | Facility: HOSPITAL | Age: 64
End: 2024-08-14
Payer: MEDICARE

## 2024-08-14 DIAGNOSIS — Z79.01 ANTICOAGULATED: ICD-10-CM

## 2024-08-14 LAB
INR PPP: 1.82 (ref 0.93–1.1)
PROTHROMBIN TIME: 19 SECONDS (ref 9.6–11.7)

## 2024-08-14 PROCEDURE — 85610 PROTHROMBIN TIME: CPT

## 2024-08-14 NOTE — TELEPHONE ENCOUNTER
I would have him restart his med at the routine dose and repeat on 8.16.24. Let me know if any questions. Thank you!

## 2024-08-15 ENCOUNTER — OFFICE VISIT (OUTPATIENT)
Dept: FAMILY MEDICINE CLINIC | Facility: CLINIC | Age: 64
End: 2024-08-15
Payer: MEDICARE

## 2024-08-15 VITALS
OXYGEN SATURATION: 97 % | BODY MASS INDEX: 19.57 KG/M2 | DIASTOLIC BLOOD PRESSURE: 70 MMHG | HEART RATE: 90 BPM | WEIGHT: 152.5 LBS | SYSTOLIC BLOOD PRESSURE: 140 MMHG | HEIGHT: 74 IN | TEMPERATURE: 97.7 F

## 2024-08-15 DIAGNOSIS — Z12.11 SCREEN FOR COLON CANCER: ICD-10-CM

## 2024-08-15 DIAGNOSIS — I10 PRIMARY HYPERTENSION: ICD-10-CM

## 2024-08-15 DIAGNOSIS — F32.A ANXIETY AND DEPRESSION: ICD-10-CM

## 2024-08-15 DIAGNOSIS — Z86.73 RECENT CEREBROVASCULAR ACCIDENT (CVA): ICD-10-CM

## 2024-08-15 DIAGNOSIS — M79.604 PAIN IN BOTH LOWER EXTREMITIES: ICD-10-CM

## 2024-08-15 DIAGNOSIS — Z72.0 TOBACCO ABUSE: ICD-10-CM

## 2024-08-15 DIAGNOSIS — Z86.73 HISTORY OF CVA (CEREBROVASCULAR ACCIDENT): ICD-10-CM

## 2024-08-15 DIAGNOSIS — K21.9 GASTROESOPHAGEAL REFLUX DISEASE, UNSPECIFIED WHETHER ESOPHAGITIS PRESENT: ICD-10-CM

## 2024-08-15 DIAGNOSIS — Z87.891 PERSONAL HISTORY OF NICOTINE DEPENDENCE: ICD-10-CM

## 2024-08-15 DIAGNOSIS — M79.605 PAIN IN BOTH LOWER EXTREMITIES: ICD-10-CM

## 2024-08-15 DIAGNOSIS — Z09 HOSPITAL DISCHARGE FOLLOW-UP: ICD-10-CM

## 2024-08-15 DIAGNOSIS — I74.10 AORTIC THROMBUS: ICD-10-CM

## 2024-08-15 DIAGNOSIS — F41.9 ANXIETY AND DEPRESSION: ICD-10-CM

## 2024-08-15 DIAGNOSIS — Z12.2 SCREENING FOR LUNG CANCER: ICD-10-CM

## 2024-08-15 DIAGNOSIS — Z00.00 MEDICARE ANNUAL WELLNESS VISIT, SUBSEQUENT: Primary | ICD-10-CM

## 2024-08-15 LAB
T4 FREE SERPL-MCNC: 1.26 NG/DL (ref 0.92–1.68)
TSH SERPL DL<=0.05 MIU/L-ACNC: 3.57 UIU/ML (ref 0.27–4.2)

## 2024-08-15 PROCEDURE — 84443 ASSAY THYROID STIM HORMONE: CPT | Performed by: FAMILY MEDICINE

## 2024-08-15 PROCEDURE — 84439 ASSAY OF FREE THYROXINE: CPT | Performed by: FAMILY MEDICINE

## 2024-08-15 RX ORDER — PANTOPRAZOLE SODIUM 40 MG/1
40 TABLET, DELAYED RELEASE ORAL DAILY
COMMUNITY
Start: 2024-08-07 | End: 2024-08-15 | Stop reason: SDUPTHER

## 2024-08-15 RX ORDER — BUPROPION HYDROCHLORIDE 150 MG/1
150 TABLET, FILM COATED, EXTENDED RELEASE ORAL 2 TIMES DAILY
Qty: 60 TABLET | Refills: 1 | Status: SHIPPED | OUTPATIENT
Start: 2024-08-15

## 2024-08-15 RX ORDER — PANTOPRAZOLE SODIUM 40 MG/1
40 TABLET, DELAYED RELEASE ORAL DAILY
Qty: 90 TABLET | Refills: 1 | Status: SHIPPED | OUTPATIENT
Start: 2024-08-15

## 2024-08-15 RX ORDER — MEMANTINE HYDROCHLORIDE 5 MG/1
1 TABLET ORAL EVERY 12 HOURS SCHEDULED
COMMUNITY
Start: 2024-08-07 | End: 2024-08-15 | Stop reason: SDUPTHER

## 2024-08-15 RX ORDER — DOCUSATE SODIUM AND SENNOSIDES 50; 8.6 MG/1; MG/1
1 TABLET ORAL EVERY 12 HOURS SCHEDULED
COMMUNITY
Start: 2024-08-07

## 2024-08-15 RX ORDER — HYDROXYZINE HYDROCHLORIDE 25 MG/1
25 TABLET, FILM COATED ORAL EVERY 8 HOURS PRN
Qty: 360 TABLET | Refills: 1 | Status: SHIPPED | OUTPATIENT
Start: 2024-08-15

## 2024-08-15 RX ORDER — FLUOXETINE HYDROCHLORIDE 20 MG/1
20 CAPSULE ORAL DAILY
Qty: 90 CAPSULE | Refills: 1 | Status: SHIPPED | OUTPATIENT
Start: 2024-08-15

## 2024-08-15 RX ORDER — WARFARIN SODIUM 4 MG/1
1 TABLET ORAL DAILY
COMMUNITY
Start: 2024-08-07

## 2024-08-15 RX ORDER — WARFARIN SODIUM 2 MG/1
TABLET ORAL
Qty: 30 TABLET | Refills: 5 | Status: SHIPPED | OUTPATIENT
Start: 2024-08-15

## 2024-08-15 RX ORDER — HYDROCODONE BITARTRATE AND ACETAMINOPHEN 5; 325 MG/1; MG/1
1 TABLET ORAL EVERY 4 HOURS PRN
COMMUNITY
Start: 2024-08-07 | End: 2024-08-15 | Stop reason: SDUPTHER

## 2024-08-15 RX ORDER — MEMANTINE HYDROCHLORIDE 5 MG/1
5 TABLET ORAL EVERY 12 HOURS SCHEDULED
Qty: 60 TABLET | Refills: 0 | Status: SHIPPED | OUTPATIENT
Start: 2024-08-15

## 2024-08-15 RX ORDER — HYDROXYZINE HYDROCHLORIDE 25 MG/1
25 TABLET, FILM COATED ORAL EVERY 6 HOURS PRN
COMMUNITY
Start: 2024-08-07 | End: 2024-08-15 | Stop reason: SDUPTHER

## 2024-08-15 RX ORDER — FLUOXETINE HYDROCHLORIDE 20 MG/1
20 CAPSULE ORAL DAILY
COMMUNITY
Start: 2024-08-07 | End: 2024-08-15 | Stop reason: SDUPTHER

## 2024-08-15 RX ORDER — HYDROCODONE BITARTRATE AND ACETAMINOPHEN 5; 325 MG/1; MG/1
1 TABLET ORAL DAILY PRN
Qty: 30 TABLET | Refills: 0 | Status: SHIPPED | OUTPATIENT
Start: 2024-08-15

## 2024-08-15 NOTE — PROGRESS NOTES
Subjective   The ABCs of the Annual Wellness Visit  Medicare Wellness Visit      Sathya Benavides is a 64 y.o. patient who presents for a Medicare Wellness Visit.    The following portions of the patient's history were reviewed and   updated as appropriate: He  has a past medical history of Acid reflux, Enlarged prostate, Headache, tension-type, Hyperlipidemia, Lung nodule, PFO (patent foramen ovale), Stroke (03/2021), and Stroke.  He does not have any pertinent problems on file.  He  has a past surgical history that includes Appendectomy; Knee arthroscopy; Shoulder arthroscopy; Bronchoscopy (Bilateral, 4/8/2021); and Cardiac catheterization (N/A, 10/29/2021).  His family history includes Colon cancer in his brother; Dementia in his father; Diabetes in his mother; Heart disease in his mother; No Known Problems in his daughter, daughter, daughter, and son.  He  reports that he has been smoking cigarettes. He started smoking about 43 years ago. He has a 43.6 pack-year smoking history. He has never used smokeless tobacco. He reports current alcohol use. He reports that he does not currently use drugs.  Current Outpatient Medications   Medication Sig Dispense Refill   • atorvastatin (Lipitor) 80 MG tablet Take 1 tablet by mouth Daily. 90 tablet 0   • EQ Stool Softener/Laxative 8.6-50 MG per tablet Take 1 tablet by mouth Every 12 (Twelve) Hours.     • FLUoxetine (PROzac) 20 MG capsule Take 1 capsule by mouth Daily. 90 capsule 1   • HYDROcodone-acetaminophen (NORCO) 5-325 MG per tablet Take 1 tablet by mouth Daily As Needed for Moderate Pain. 30 tablet 0   • hydrOXYzine (ATARAX) 25 MG tablet Take 1 tablet by mouth Every 8 (Eight) Hours As Needed for Anxiety. 360 tablet 1   • memantine (NAMENDA) 5 MG tablet Take 1 tablet by mouth Every 12 (Twelve) Hours. 60 tablet 0   • pantoprazole (PROTONIX) 40 MG EC tablet Take 1 tablet by mouth Daily. 90 tablet 1   • tamsulosin (FLOMAX) 0.4 MG capsule 24 hr capsule Take 2 capsules by  mouth once daily 180 capsule 0   • traZODone (DESYREL) 50 MG tablet TAKE 1 TABLET BY MOUTH EVERY DAY AT BEDTIME 90 tablet 0   • warfarin (COUMADIN) 4 MG tablet Take 1 tablet by mouth Daily.     • buPROPion (ZYBAN) 150 MG 12 hr tablet Take 150 mg by mouth 2 (Two) Times a Day. 60 tablet 1   • warfarin (COUMADIN) 2 MG tablet Take daily 30 tablet 5     No current facility-administered medications for this visit.     Current Outpatient Medications on File Prior to Visit   Medication Sig   • atorvastatin (Lipitor) 80 MG tablet Take 1 tablet by mouth Daily.   • EQ Stool Softener/Laxative 8.6-50 MG per tablet Take 1 tablet by mouth Every 12 (Twelve) Hours.   • tamsulosin (FLOMAX) 0.4 MG capsule 24 hr capsule Take 2 capsules by mouth once daily   • traZODone (DESYREL) 50 MG tablet TAKE 1 TABLET BY MOUTH EVERY DAY AT BEDTIME   • warfarin (COUMADIN) 4 MG tablet Take 1 tablet by mouth Daily.   • [DISCONTINUED] FLUoxetine (PROzac) 20 MG capsule Take 1 capsule by mouth Daily.   • [DISCONTINUED] HYDROcodone-acetaminophen (NORCO) 5-325 MG per tablet Take 1 tablet by mouth Every 4 (Four) Hours As Needed.   • [DISCONTINUED] hydrOXYzine (ATARAX) 25 MG tablet Take 1 tablet by mouth Every 6 (Six) Hours As Needed.   • [DISCONTINUED] memantine (NAMENDA) 5 MG tablet Take 1 tablet by mouth Every 12 (Twelve) Hours.   • [DISCONTINUED] pantoprazole (PROTONIX) 40 MG EC tablet Take 1 tablet by mouth Daily.   • [DISCONTINUED] aspirin (aspirin) 81 MG EC tablet Take 1 tablet by mouth Daily. (Patient not taking: Reported on 8/15/2024)   • [DISCONTINUED] lansoprazole (PREVACID) 30 MG capsule TAKE 1 CAPSULE BY MOUTH ONCE DAILY IN THE MORNING BEFORE BREAKFAST (Patient not taking: Reported on 8/15/2024)   • [DISCONTINUED] lisinopril (PRINIVIL,ZESTRIL) 10 MG tablet Take 1 tablet by mouth Daily. (Patient not taking: Reported on 2/22/2024)   • [DISCONTINUED] tadalafil (Cialis) 20 MG tablet Take 1 tablet by mouth Daily As Needed for Erectile Dysfunction.  (Patient not taking: Reported on 5/17/2024)     No current facility-administered medications on file prior to visit.     He is allergic to adhesive tape and amoxicillin-pot clavulanate..    Compared to one year ago, the patient's physical   health is worse.  Compared to one year ago, the patient's mental   health is the same.    Recent Hospitalizations:  He was admitted within the past 365 days at [facility].     Current Medical Providers:  Patient Care Team:  Adi Kaminski MD as PCP - General (Family Medicine)  Edson Hua III, MD as Consulting Physician (Cardiology)    Outpatient Medications Prior to Visit   Medication Sig Dispense Refill   • atorvastatin (Lipitor) 80 MG tablet Take 1 tablet by mouth Daily. 90 tablet 0   • EQ Stool Softener/Laxative 8.6-50 MG per tablet Take 1 tablet by mouth Every 12 (Twelve) Hours.     • tamsulosin (FLOMAX) 0.4 MG capsule 24 hr capsule Take 2 capsules by mouth once daily 180 capsule 0   • traZODone (DESYREL) 50 MG tablet TAKE 1 TABLET BY MOUTH EVERY DAY AT BEDTIME 90 tablet 0   • warfarin (COUMADIN) 4 MG tablet Take 1 tablet by mouth Daily.     • FLUoxetine (PROzac) 20 MG capsule Take 1 capsule by mouth Daily.     • HYDROcodone-acetaminophen (NORCO) 5-325 MG per tablet Take 1 tablet by mouth Every 4 (Four) Hours As Needed.     • hydrOXYzine (ATARAX) 25 MG tablet Take 1 tablet by mouth Every 6 (Six) Hours As Needed.     • memantine (NAMENDA) 5 MG tablet Take 1 tablet by mouth Every 12 (Twelve) Hours.     • pantoprazole (PROTONIX) 40 MG EC tablet Take 1 tablet by mouth Daily.     • aspirin (aspirin) 81 MG EC tablet Take 1 tablet by mouth Daily. (Patient not taking: Reported on 8/15/2024) 30 tablet 6   • lansoprazole (PREVACID) 30 MG capsule TAKE 1 CAPSULE BY MOUTH ONCE DAILY IN THE MORNING BEFORE BREAKFAST (Patient not taking: Reported on 8/15/2024) 90 capsule 0   • lisinopril (PRINIVIL,ZESTRIL) 10 MG tablet Take 1 tablet by mouth Daily. (Patient not taking: Reported  "on 2/22/2024) 90 tablet 1   • tadalafil (Cialis) 20 MG tablet Take 1 tablet by mouth Daily As Needed for Erectile Dysfunction. (Patient not taking: Reported on 5/17/2024) 90 tablet 1     No facility-administered medications prior to visit.     Opioid medication/s are on active medication list.  and I have evaluated his active treatment plan and pain score trends (see table).  There were no vitals filed for this visit.  I have reviewed the chart for potential of high risk medication and harmful drug interactions in the elderly.        Aspirin is on active medication list. Aspirin use is not indicated based on review of current medical condition/s. Risk of harm outweighs potential benefits. Patient instructed to discontinue this medication.  .      Patient Active Problem List   Diagnosis   • CVA (cerebral vascular accident)   • Cryptogenic stroke   • Mediastinal adenopathy   • PFO (patent foramen ovale)   • Tobacco abuse   • Hyperlipidemia LDL goal <70   • Acid reflux   • Anxiety   • Primary hypertension   • BPH (benign prostatic hyperplasia)   • Depression   • Hypothyroidism   • Lung mass   • Prostate disorder   • Insomnia     Advance Care Planning Advance Directive is not on file.  ACP discussion was held with the patient during this visit. Patient does not have an advance directive, information provided.            Objective   Vitals:    08/15/24 1337   BP: 140/70   Pulse: 90   Temp: 97.7 °F (36.5 °C)   SpO2: 97%   Weight: 69.2 kg (152 lb 8 oz)   Height: 188 cm (74\")       Estimated body mass index is 19.58 kg/m² as calculated from the following:    Height as of this encounter: 188 cm (74\").    Weight as of this encounter: 69.2 kg (152 lb 8 oz).    BMI is within normal parameters. No other follow-up for BMI required.       Does the patient have evidence of cognitive impairment? No                                                                                                Health  Risk Assessment    Smoking " Status:  Social History     Tobacco Use   Smoking Status Every Day   • Current packs/day: 1.00   • Average packs/day: 1 pack/day for 43.6 years (43.6 ttl pk-yrs)   • Types: Cigarettes   • Start date:    Smokeless Tobacco Never     Alcohol Consumption:  Social History     Substance and Sexual Activity   Alcohol Use Yes    Comment: occasionally       Fall Risk Screen  DOMINIKADI Fall Risk Assessment was completed, and patient is at MODERATE risk for falls. Assessment completed on:8/15/2024    Depression Screenin/15/2024     1:00 PM   PHQ-2/PHQ-9 Depression Screening   Little Interest or Pleasure in Doing Things 0-->not at all   Feeling Down, Depressed or Hopeless 1-->several days   PHQ-9: Brief Depression Severity Measure Score 1     Health Habits and Functional and Cognitive Screenin/15/2024     1:00 PM   Functional & Cognitive Status   Do you have difficulty preparing food and eating? Yes   Do you have difficulty bathing yourself, getting dressed or grooming yourself? Yes   Do you have difficulty using the toilet? No   Do you have difficulty moving around from place to place? Yes   Do you have trouble with steps or getting out of a bed or a chair? Yes   Current Diet Well Balanced Diet   Dental Exam Not up to date   Eye Exam Up to date   Exercise (times per week) 0 times per week   Current Exercises Include No Regular Exercise   Do you need help using the phone?  No   Are you deaf or do you have serious difficulty hearing?  No   Do you need help to go to places out of walking distance? No   Do you need help shopping? Yes   Do you need help preparing meals?  Yes   Do you need help with housework?  Yes   Do you need help with laundry? Yes   Do you need help taking your medications? Yes   Do you need help managing money? No   Do you ever drive or ride in a car without wearing a seat belt? No   Have you felt unusual stress, anger or loneliness in the last month? No   Who do you live with? Spouse   If  you need help, do you have trouble finding someone available to you? No   Have you been bothered in the last four weeks by sexual problems? No   Do you have difficulty concentrating, remembering or making decisions? Yes           Age-appropriate Screening Schedule:  Refer to the list below for future screening recommendations based on patient's age, sex and/or medical conditions. Orders for these recommended tests are listed in the plan section. The patient has been provided with a written plan.    Health Maintenance List  Health Maintenance   Topic Date Due   • COLORECTAL CANCER SCREENING  Never done   • ZOSTER VACCINE (1 of 2) Never done   • LUNG CANCER SCREENING  09/02/2022   • COVID-19 Vaccine (1 - 2023-24 season) Never done   • INFLUENZA VACCINE  08/01/2024   • HEPATITIS C SCREENING  08/15/2025 (Originally 3/31/2021)   • LIPID PANEL  01/26/2025   • ANNUAL WELLNESS VISIT  08/15/2025   • TDAP/TD VACCINES (2 - Td or Tdap) 05/17/2027   • Pneumococcal Vaccine 65+  Completed   • AAA SCREEN (ONE-TIME)  Completed                                                                                                                                                CMS Preventative Services Quick Reference  Risk Factors Identified During Encounter  Immunizations Discussed/Encouraged: Influenza and Shingrix  Inactivity/Sedentary: Patient was advised to exercise at least 150 minutes a week per CDC recommendations.  Tobacco Use/Dependance Risk (use dotphrase .tobaccocessation for documentation)    The above risks/problems have been discussed with the patient.  Pertinent information has been shared with the patient in the After Visit Summary.  An After Visit Summary and PPPS were made available to the patient.    Follow Up:   Next Medicare Wellness visit to be scheduled in 1 year.         Additional E&M Note during same encounter follows:  Patient has additional, significant, and separately identifiable condition(s)/problem(s) that  require work above and beyond the Medicare Wellness Visit     Chief Complaint  Hospital Follow Up Visit (7/24/24 [facility]- Right sided weakness/), Medicare Wellness-subsequent, Hyperlipidemia, Hypothyroidism, and Headache (Since stroke)    Subjective   Pt recently hospitalized for stroke- just released from in-patient rehab- reviewed records from hospitalization    Pt has recent CVA- has deficits to right arm, trouble finding words  Pt will follow-up with stroke team in 6 weeks  INR 1.82- will decrease coumadin to 2mg on Saturday and Tuesday and 4mg all other days- recheck INR in 1 week    Pt is smoking 1ppd    Pt is on norco for leg pain since stroke- takes daily    Hypothyroidism  This is a chronic problem. The current episode started more than 1 year ago. The problem occurs constantly. The problem has been gradually improving. Pertinent negatives include no abdominal pain, anorexia, arthralgias, change in bowel habit, chest pain, chills, congestion, coughing, diaphoresis, fatigue, fever, headaches, joint swelling, myalgias, nausea, neck pain, numbness, rash, sore throat, swollen glands, urinary symptoms, vertigo, visual change, vomiting or weakness. Nothing aggravates the symptoms. He has tried nothing for the symptoms. The treatment provided significant relief.   Depression  Presents for follow-up visit. Patient is not experiencing: compulsions, confusion, decreased concentration, depressed mood, dry mouth, excessive worry, feelings of hopelessness, feelings of worthlessness, hypersomnia, hyperventilation, insomnia, irritability, muscle tension, nervousness/anxiety, obsessions, palpitations, panic, psychomotor agitation, psychomotor retardation, shortness of breath, suicidal ideas, suicidal planning, thoughts of death, weight gain, weight loss, chest pain, feeling of choking, dizziness, malaise/fatigue, nausea and difficulty controlling mood.Symptoms occur occasionally.  The severity of symptoms is moderate.  The  "quality of sleep is good. Awakens seldom during the night. His past medical history is significant for depression. Past treatments include SSRI. The treatment provides significant relief. Compliance with medications is %. Treatment side effects: no side effects.     Yariel is also being seen today for an annual adult preventative physical exam.  and Yariel is also being seen today for additional medical problem/s.    Review of Systems   Constitutional:  Negative for chills, diaphoresis, fatigue, fever, irritability, malaise/fatigue, unexpected weight gain and unexpected weight loss.   HENT:  Negative for congestion, sore throat and swollen glands.    Eyes:  Negative for visual disturbance.   Respiratory:  Negative for cough, chest tightness, shortness of breath and wheezing.    Cardiovascular:  Negative for chest pain, palpitations and leg swelling.   Gastrointestinal:  Negative for abdominal pain, anorexia, change in bowel habit, diarrhea, nausea and vomiting.   Musculoskeletal:  Negative for arthralgias, joint swelling, myalgias and neck pain.   Skin:  Negative for rash.   Neurological:  Negative for dizziness, vertigo, weakness, light-headedness, numbness and confusion.   Psychiatric/Behavioral:  Negative for decreased concentration, dysphoric mood, sleep disturbance, suicidal ideas and depressed mood. The patient is not nervous/anxious and does not have insomnia.               Objective   Vital Signs:  /70   Pulse 90   Temp 97.7 °F (36.5 °C)   Ht 188 cm (74\")   Wt 69.2 kg (152 lb 8 oz)   SpO2 97%   BMI 19.58 kg/m²   Physical Exam  Vitals reviewed.   Constitutional:       Appearance: Normal appearance. He is well-developed.   HENT:      Head: Normocephalic and atraumatic.      Right Ear: External ear normal.      Left Ear: External ear normal.      Mouth/Throat:      Pharynx: No oropharyngeal exudate.   Eyes:      Conjunctiva/sclera: Conjunctivae normal.      Pupils: Pupils are equal, round, and " reactive to light.   Cardiovascular:      Rate and Rhythm: Normal rate and regular rhythm.      Pulses: Normal pulses.      Heart sounds: Normal heart sounds. No murmur heard.     No friction rub. No gallop.   Pulmonary:      Effort: Pulmonary effort is normal.      Breath sounds: Normal breath sounds. No wheezing or rhonchi.   Abdominal:      General: Abdomen is flat. Bowel sounds are normal. There is no distension.      Palpations: Abdomen is soft. There is no mass.      Tenderness: There is no abdominal tenderness. There is no guarding or rebound.      Hernia: No hernia is present.   Musculoskeletal:         General: Normal range of motion.   Skin:     General: Skin is warm and dry.      Capillary Refill: Capillary refill takes less than 2 seconds.   Neurological:      General: No focal deficit present.      Mental Status: He is alert and oriented to person, place, and time.      Cranial Nerves: No cranial nerve deficit.   Psychiatric:         Mood and Affect: Mood and affect normal.         Behavior: Behavior normal.         Thought Content: Thought content normal.         Judgment: Judgment normal.       The following data was reviewed by: Adi Kaminski MD on 08/15/2024:        CMP          1/26/2024    13:50   CMP   Glucose 80    BUN 7    Creatinine 1.11    EGFR 73.7    Sodium 139    Potassium 4.0    Chloride 102    Calcium 9.4    Total Protein 7.2    Albumin 4.3    Globulin 2.9    Total Bilirubin 0.6    Alkaline Phosphatase 95    AST (SGOT) 16    ALT (SGPT) 10    Albumin/Globulin Ratio 1.5    BUN/Creatinine Ratio 6.3    Anion Gap 11.0      CBC          1/26/2024    13:50   CBC   WBC 6.73    RBC 5.23    Hemoglobin 15.4    Hematocrit 45.4    MCV 86.8    MCH 29.4    MCHC 33.9    RDW 12.6    Platelets 237      Lipid Panel          1/26/2024    13:50   Lipid Panel   Total Cholesterol 193    Triglycerides 90    HDL Cholesterol 31    VLDL Cholesterol 17    LDL Cholesterol  145    LDL/HDL Ratio 4.65      TSH           1/26/2024    13:50   TSH   TSH 3.540        PSA          1/26/2024    13:50   PSA   PSA 0.750          Assessment and Plan Additional age appropriate preventative wellness advice topics were discussed during today's preventative wellness exam(some topics already addressed during AWV portion of the note above):   Nutrition: Discussed nutrition plan with patient. Information shared in after visit summary. Goal is for a well balanced diet to enhance overall health.              Medicare annual wellness visit, subsequent    Hospital discharge follow-up    Recent cerebrovascular accident (CVA)    History of CVA (cerebrovascular accident)    Aortic thrombus    Primary hypertension  Hypertension is stable and controlled  Continue current treatment regimen.  Blood pressure will be reassessed in 6 months.  Anxiety and depression  Patient's depression is a single episode that is mild without psychosis. Depression is in full remission and stable.    Plan:   Continue current medication therapy     Followup in 6 months.   Gastroesophageal reflux disease, unspecified whether esophagitis present    Tobacco abuse    Screening for lung cancer    Screen for colon cancer    Personal history of nicotine dependence    Pain in both lower extremities      Orders Placed This Encounter   Procedures   • Home INR Monitor     Order Specific Question:   Length of Need     Answer:   99 Months = Lifetime   •  CT Chest Low Dose Cancer Screening WO     Standing Status:   Future     Standing Expiration Date:   8/15/2025     Order Specific Question:   The patient is age 50-80 (Medicare coverage 50-77)     Answer:   64     Order Specific Question:   The patient is a current smoker?     Answer:   Yes     Order Specific Question:   Does the patient have a smoking history of 20 pack-years or greater? (If the answer to this is no they do not meet criteria for this exam)     Answer:   Yes     Order Specific Question:   Actual pack - year smoking  history (number):     Answer:   30     Order Specific Question:   Has the Patient had a Chest CT scan within the past 12 months?     Answer:   No     Order Specific Question:   Does the patient have any clinical signs/symptoms of lung cancer?     Answer:   No     Order Specific Question:   The patient was engaged in shared decision-making for this test:     Answer:   Yes   • TSH+Free T4     Order Specific Question:   Release to patient     Answer:   Routine Release [[id number]]   • Ambulatory Referral to Physical Therapy for Evaluation & Treatment     Referral Priority:   Routine     Referral Type:   Physical Therapy     Referral Reason:   Specialty Services Required     Requested Specialty:   Physical Therapy     Number of Visits Requested:   1   • Ambulatory Referral to Occupational Therapy for Evaluation & Treatment     Referral Priority:   Routine     Referral Type:   Occupational Therapy     Referral Reason:   Specialty Services Required     Requested Specialty:   Occupational Therapy     Number of Visits Requested:   1   • Ambulatory Referral to Speech Therapy for Evaluation & Treatment     Referral Priority:   Routine     Referral Type:   Speech Pathology     Referral Reason:   Specialty Services Required     Requested Specialty:   Speech Pathology     Number of Visits Requested:   1   • Ambulatory Referral to Cardiology     Referral Priority:   Routine     Referral Type:   Consultation     Referral Reason:   Specialty Services Required     Requested Specialty:   Cardiology     Number of Visits Requested:   1   • Ambulatory Referral to Neurology     Referral Priority:   Routine     Referral Type:   Consultation     Referral Reason:   Specialty Services Required     Requested Specialty:   Neurology     Number of Visits Requested:   1   • Ambulatory Referral For Screening Colonoscopy     Referral Priority:   Routine     Referral Type:   Diagnostic Medical     Referral Reason:   Specialty Services Required      Number of Visits Requested:   1     New Medications Ordered This Visit   Medications   • warfarin (COUMADIN) 2 MG tablet     Sig: Take daily     Dispense:  30 tablet     Refill:  5   • FLUoxetine (PROzac) 20 MG capsule     Sig: Take 1 capsule by mouth Daily.     Dispense:  90 capsule     Refill:  1   • hydrOXYzine (ATARAX) 25 MG tablet     Sig: Take 1 tablet by mouth Every 8 (Eight) Hours As Needed for Anxiety.     Dispense:  360 tablet     Refill:  1   • pantoprazole (PROTONIX) 40 MG EC tablet     Sig: Take 1 tablet by mouth Daily.     Dispense:  90 tablet     Refill:  1   • memantine (NAMENDA) 5 MG tablet     Sig: Take 1 tablet by mouth Every 12 (Twelve) Hours.     Dispense:  60 tablet     Refill:  0   • buPROPion (ZYBAN) 150 MG 12 hr tablet     Sig: Take 150 mg by mouth 2 (Two) Times a Day.     Dispense:  60 tablet     Refill:  1   • HYDROcodone-acetaminophen (NORCO) 5-325 MG per tablet     Sig: Take 1 tablet by mouth Daily As Needed for Moderate Pain.     Dispense:  30 tablet     Refill:  0          Follow Up   Return in about 1 month (around 9/15/2024) for Recheck.  Patient was given instructions and counseling regarding his condition or for health maintenance advice. Please see specific information pulled into the AVS if appropriate.    Will start lower BP in 1 week- gave parameters when to go to ER

## 2024-08-15 NOTE — PROGRESS NOTES
..  Venipuncture Blood Specimen Collection  Venipuncture performed in LT arm by Nicole Smith MA with good hemostasis. Patient tolerated the procedure well without complications.   08/15/24   Nicole Smith MA

## 2024-08-22 ENCOUNTER — CLINICAL SUPPORT (OUTPATIENT)
Dept: FAMILY MEDICINE CLINIC | Facility: CLINIC | Age: 64
End: 2024-08-22
Payer: MEDICARE

## 2024-08-22 ENCOUNTER — TELEPHONE (OUTPATIENT)
Dept: FAMILY MEDICINE CLINIC | Facility: CLINIC | Age: 64
End: 2024-08-22
Payer: MEDICARE

## 2024-08-22 DIAGNOSIS — Z86.73 RECENT CEREBROVASCULAR ACCIDENT (CVA): Primary | ICD-10-CM

## 2024-08-22 DIAGNOSIS — Z79.01 ANTICOAGULATED: ICD-10-CM

## 2024-08-22 LAB
INR PPP: 2.54 (ref 0.86–1.15)
PROTHROMBIN TIME: 27.7 SECONDS (ref 11.8–14.9)

## 2024-08-22 PROCEDURE — 85610 PROTHROMBIN TIME: CPT | Performed by: FAMILY MEDICINE

## 2024-08-22 PROCEDURE — 36415 COLL VENOUS BLD VENIPUNCTURE: CPT | Performed by: FAMILY MEDICINE

## 2024-08-22 NOTE — PROGRESS NOTES
..  Venipuncture Blood Specimen Collection  Venipuncture performed in Lt arm by Nicole Smith MA with good hemostasis. Patient tolerated the procedure well without complications.   08/22/24   Nicole Smith MA

## 2024-08-22 NOTE — TELEPHONE ENCOUNTER
CALLER NOT ON  VERBAL    Caller: JUAN M KING    Relationship to patient: Emergency Contact    Best call back number: [phone]    Patient is needing: CALLER WOULD LIKE TO HAVE PATIENT COME INTO THE OFFICE TO HAVE PT/INR DRAWN INSTEAD OF GOING TO [facility].    PLEASE CONTACT CALLER TO ADVISE.        ZAK NO, CALL PREFERRED MAY LEAVE VOICEMAIL.

## 2024-08-26 ENCOUNTER — HOSPITAL ENCOUNTER (OUTPATIENT)
Dept: CT IMAGING | Facility: HOSPITAL | Age: 64
Discharge: HOME | End: 2024-08-26
Admitting: FAMILY MEDICINE
Payer: MEDICARE

## 2024-08-26 DIAGNOSIS — Z12.2 SCREENING FOR LUNG CANCER: ICD-10-CM

## 2024-08-26 DIAGNOSIS — Z87.891 PERSONAL HISTORY OF NICOTINE DEPENDENCE: ICD-10-CM

## 2024-08-26 PROCEDURE — 71271 CT THORAX LUNG CANCER SCR C-: CPT

## 2024-08-28 ENCOUNTER — TELEPHONE (OUTPATIENT)
Dept: FAMILY MEDICINE CLINIC | Facility: CLINIC | Age: 64
End: 2024-08-28

## 2024-08-29 ENCOUNTER — OFFICE VISIT (OUTPATIENT)
Dept: FAMILY MEDICINE CLINIC | Facility: CLINIC | Age: 64
End: 2024-08-29
Payer: MEDICARE

## 2024-08-29 VITALS
WEIGHT: 148.3 LBS | SYSTOLIC BLOOD PRESSURE: 138 MMHG | BODY MASS INDEX: 19.04 KG/M2 | OXYGEN SATURATION: 96 % | HEART RATE: 85 BPM | TEMPERATURE: 98.9 F | DIASTOLIC BLOOD PRESSURE: 70 MMHG

## 2024-08-29 DIAGNOSIS — Z79.01 ANTICOAGULATED: ICD-10-CM

## 2024-08-29 DIAGNOSIS — F41.9 ANXIETY AND DEPRESSION: ICD-10-CM

## 2024-08-29 DIAGNOSIS — R73.03 PREDIABETES: ICD-10-CM

## 2024-08-29 DIAGNOSIS — L20.89 OTHER ATOPIC DERMATITIS: ICD-10-CM

## 2024-08-29 DIAGNOSIS — F32.A ANXIETY AND DEPRESSION: ICD-10-CM

## 2024-08-29 DIAGNOSIS — L50.9 HIVES: Primary | ICD-10-CM

## 2024-08-29 DIAGNOSIS — R19.7 DIARRHEA, UNSPECIFIED TYPE: ICD-10-CM

## 2024-08-29 DIAGNOSIS — R53.83 FATIGUE, UNSPECIFIED TYPE: ICD-10-CM

## 2024-08-29 DIAGNOSIS — J02.9 PHARYNGITIS, UNSPECIFIED ETIOLOGY: ICD-10-CM

## 2024-08-29 LAB
ALBUMIN SERPL-MCNC: 3.9 G/DL (ref 3.5–5.2)
ALBUMIN/GLOB SERPL: 1.2 G/DL
ALP SERPL-CCNC: 141 U/L (ref 39–117)
ALT SERPL W P-5'-P-CCNC: 11 U/L (ref 1–41)
ANION GAP SERPL CALCULATED.3IONS-SCNC: 14.4 MMOL/L (ref 5–15)
AST SERPL-CCNC: 18 U/L (ref 1–40)
BASOPHILS # BLD AUTO: 0.02 10*3/MM3 (ref 0–0.2)
BASOPHILS NFR BLD AUTO: 0.2 % (ref 0–1.5)
BILIRUB SERPL-MCNC: 1.3 MG/DL (ref 0–1.2)
BUN SERPL-MCNC: 6 MG/DL (ref 8–23)
BUN/CREAT SERPL: 6.8 (ref 7–25)
CALCIUM SPEC-SCNC: 9.5 MG/DL (ref 8.6–10.5)
CHLORIDE SERPL-SCNC: 97 MMOL/L (ref 98–107)
CO2 SERPL-SCNC: 24.6 MMOL/L (ref 22–29)
CREAT SERPL-MCNC: 0.88 MG/DL (ref 0.76–1.27)
DEPRECATED RDW RBC AUTO: 36.5 FL (ref 37–54)
EGFRCR SERPLBLD CKD-EPI 2021: 94.8 ML/MIN/1.73
EOSINOPHIL # BLD AUTO: 0.08 10*3/MM3 (ref 0–0.4)
EOSINOPHIL NFR BLD AUTO: 0.9 % (ref 0.3–6.2)
ERYTHROCYTE [DISTWIDTH] IN BLOOD BY AUTOMATED COUNT: 12.1 % (ref 12.3–15.4)
EXPIRATION DATE: NORMAL
EXPIRATION DATE: NORMAL
FLUAV AG UPPER RESP QL IA.RAPID: NOT DETECTED
FLUBV AG UPPER RESP QL IA.RAPID: NOT DETECTED
FOLATE SERPL-MCNC: 6.28 NG/ML (ref 4.78–24.2)
GLOBULIN UR ELPH-MCNC: 3.2 GM/DL
GLUCOSE SERPL-MCNC: 103 MG/DL (ref 65–99)
HBA1C MFR BLD: 5.6 % (ref 4.8–5.6)
HCT VFR BLD AUTO: 41.5 % (ref 37.5–51)
HGB BLD-MCNC: 14.2 G/DL (ref 13–17.7)
IMM GRANULOCYTES # BLD AUTO: 0.03 10*3/MM3 (ref 0–0.05)
IMM GRANULOCYTES NFR BLD AUTO: 0.3 % (ref 0–0.5)
INR PPP: 4.13 (ref 0.86–1.15)
INTERNAL CONTROL: NORMAL
INTERNAL CONTROL: NORMAL
LYMPHOCYTES # BLD AUTO: 1.58 10*3/MM3 (ref 0.7–3.1)
LYMPHOCYTES NFR BLD AUTO: 16.8 % (ref 19.6–45.3)
Lab: NORMAL
Lab: NORMAL
MAGNESIUM SERPL-MCNC: 1.3 MG/DL (ref 1.6–2.4)
MCH RBC QN AUTO: 28.9 PG (ref 26.6–33)
MCHC RBC AUTO-ENTMCNC: 34.2 G/DL (ref 31.5–35.7)
MCV RBC AUTO: 84.5 FL (ref 79–97)
MONOCYTES # BLD AUTO: 0.54 10*3/MM3 (ref 0.1–0.9)
MONOCYTES NFR BLD AUTO: 5.8 % (ref 5–12)
NEUTROPHILS NFR BLD AUTO: 7.13 10*3/MM3 (ref 1.7–7)
NEUTROPHILS NFR BLD AUTO: 76 % (ref 42.7–76)
NRBC BLD AUTO-RTO: 0 /100 WBC (ref 0–0.2)
PLATELET # BLD AUTO: 256 10*3/MM3 (ref 140–450)
PMV BLD AUTO: 9.1 FL (ref 6–12)
POTASSIUM SERPL-SCNC: 3 MMOL/L (ref 3.5–5.2)
PROT SERPL-MCNC: 7.1 G/DL (ref 6–8.5)
PROTHROMBIN TIME: 40.6 SECONDS (ref 11.8–14.9)
RBC # BLD AUTO: 4.91 10*6/MM3 (ref 4.14–5.8)
S PYO AG THROAT QL: NEGATIVE
SARS-COV-2 AG UPPER RESP QL IA.RAPID: NOT DETECTED
SODIUM SERPL-SCNC: 136 MMOL/L (ref 136–145)
T4 FREE SERPL-MCNC: 1.41 NG/DL (ref 0.92–1.68)
TSH SERPL DL<=0.05 MIU/L-ACNC: 3.78 UIU/ML (ref 0.27–4.2)
VIT B12 BLD-MCNC: 675 PG/ML (ref 211–946)
WBC NRBC COR # BLD AUTO: 9.38 10*3/MM3 (ref 3.4–10.8)

## 2024-08-29 PROCEDURE — 83735 ASSAY OF MAGNESIUM: CPT | Performed by: FAMILY MEDICINE

## 2024-08-29 PROCEDURE — 85025 COMPLETE CBC W/AUTO DIFF WBC: CPT | Performed by: FAMILY MEDICINE

## 2024-08-29 PROCEDURE — 85610 PROTHROMBIN TIME: CPT | Performed by: FAMILY MEDICINE

## 2024-08-29 PROCEDURE — 80053 COMPREHEN METABOLIC PANEL: CPT | Performed by: FAMILY MEDICINE

## 2024-08-29 PROCEDURE — 86008 ALLG SPEC IGE RECOMB EA: CPT | Performed by: FAMILY MEDICINE

## 2024-08-29 PROCEDURE — 86003 ALLG SPEC IGE CRUDE XTRC EA: CPT | Performed by: FAMILY MEDICINE

## 2024-08-29 PROCEDURE — 83036 HEMOGLOBIN GLYCOSYLATED A1C: CPT | Performed by: FAMILY MEDICINE

## 2024-08-29 PROCEDURE — 82746 ASSAY OF FOLIC ACID SERUM: CPT | Performed by: FAMILY MEDICINE

## 2024-08-29 PROCEDURE — 82785 ASSAY OF IGE: CPT | Performed by: FAMILY MEDICINE

## 2024-08-29 PROCEDURE — 84443 ASSAY THYROID STIM HORMONE: CPT | Performed by: FAMILY MEDICINE

## 2024-08-29 PROCEDURE — 82607 VITAMIN B-12: CPT | Performed by: FAMILY MEDICINE

## 2024-08-29 PROCEDURE — 84439 ASSAY OF FREE THYROXINE: CPT | Performed by: FAMILY MEDICINE

## 2024-08-29 PROCEDURE — 82530 CORTISOL FREE: CPT | Performed by: FAMILY MEDICINE

## 2024-08-29 RX ORDER — PREDNISONE 20 MG/1
40 TABLET ORAL DAILY
Qty: 10 TABLET | Refills: 0 | Status: SHIPPED | OUTPATIENT
Start: 2024-08-29 | End: 2024-09-03

## 2024-08-29 RX ORDER — FLUOXETINE 40 MG/1
40 CAPSULE ORAL DAILY
Qty: 30 CAPSULE | Refills: 1 | Status: SHIPPED | OUTPATIENT
Start: 2024-08-29

## 2024-08-29 RX ORDER — METHYLPREDNISOLONE SODIUM SUCCINATE 40 MG/ML
40 INJECTION, POWDER, LYOPHILIZED, FOR SOLUTION INTRAMUSCULAR; INTRAVENOUS ONCE
Status: COMPLETED | OUTPATIENT
Start: 2024-08-29 | End: 2024-08-29

## 2024-08-29 RX ORDER — DIPHENHYDRAMINE HYDROCHLORIDE, ZINC ACETATE 2; .1 G/100G; G/100G
1 CREAM TOPICAL
COMMUNITY
Start: 2024-07-31

## 2024-08-29 RX ADMIN — METHYLPREDNISOLONE SODIUM SUCCINATE 40 MG: 40 INJECTION, POWDER, LYOPHILIZED, FOR SOLUTION INTRAMUSCULAR; INTRAVENOUS at 09:41

## 2024-08-29 NOTE — PROGRESS NOTES
Call pt: INR is 4.13.  Have him hold his coumadin.  Recheck INR tomorrow and let us know.  How much coumadin is he on currently?

## 2024-08-29 NOTE — PROGRESS NOTES
Venipuncture Blood Specimen Collection  Venipuncture performed in left arm by Johnny Mcmahan with good hemostasis. Patient tolerated the procedure well without complications.   08/29/24   Johnny Mcmahan

## 2024-08-29 NOTE — PROGRESS NOTES
Chief Complaint  Rash, decreased appetite, Fatigue (Slept for the last 3 days), and Shaking (X3 days)    Subjective          Sathya Benavides presents to [facility]  History of Present Illness  Pt has had symptoms x 3 days- sudden onset- worsening symptoms- rash, fatigue, decreased appetite  Pt has had intermittent watery brown diarrhea    Pt says that he has not had any recent tick bites.  Rash  This is a new problem. The current episode started in the past 7 days. The problem is unchanged. The rash is diffuse. The rash is characterized by itchiness (papules). He was exposed to nothing. Associated symptoms include diarrhea and fatigue. Pertinent negatives include no cough, eye pain, facial edema, fever, joint pain, nail changes, rhinorrhea, shortness of breath, sore throat or vomiting. Past treatments include nothing.       BMI is within normal parameters. No other follow-up for BMI required.       Objective   Allergies   Allergen Reactions    Adhesive Tape Rash    Amoxicillin-Pot Clavulanate Other (See Comments) and GI Intolerance     Upset stomach     Immunization History   Administered Date(s) Administered    Flu Vaccine Quad PF >36MO 10/17/2017    Pneumococcal Conjugate 20-Valent (PCV20) 01/26/2024    Tdap 05/17/2017     Past Medical History:   Diagnosis Date    Acid reflux     Enlarged prostate     Headache, tension-type     Hyperlipidemia     Lung nodule     PFO (patent foramen ovale)     Stroke 03/2021    LOSS OF PERIPHERAL VISION    Stroke     7/2024      Past Surgical History:   Procedure Laterality Date    APPENDECTOMY      BRONCHOSCOPY Bilateral 4/8/2021    Procedure: BRONCHOSCOPY ENDOBRONCHIAL ULTRASOUND WITH FNA'S;  Surgeon: Sam Tony MD;  Location: [facility] ENDOSCOPY;  Service: Pulmonary;  Laterality: Bilateral;  PRE- LYMPHADENOPATHY  POST- SAME    CARDIAC CATHETERIZATION N/A 10/29/2021    Procedure: Patent foramen ovale closure  ABBOTT;  Surgeon: Lurdes  Sivakumar ZHANG MD;  Location: [facility] INVASIVE LOCATION;  Service: Cardiology;  Laterality: N/A;    KNEE ARTHROSCOPY      SHOULDER ARTHROSCOPY        Social History     Socioeconomic History    Marital status:    Tobacco Use    Smoking status: Every Day     Current packs/day: 1.00     Average packs/day: 1 pack/day for 43.7 years (43.7 ttl pk-yrs)     Types: Cigarettes     Start date: 1981    Smokeless tobacco: Never   Vaping Use    Vaping status: Never Used   Substance and Sexual Activity    Alcohol use: Yes     Comment: occasionally    Drug use: Not Currently    Sexual activity: Defer        Current Outpatient Medications:     atorvastatin (Lipitor) 80 MG tablet, Take 1 tablet by mouth Daily., Disp: 90 tablet, Rfl: 0    buPROPion (ZYBAN) 150 MG 12 hr tablet, Take 150 mg by mouth 2 (Two) Times a Day., Disp: 60 tablet, Rfl: 1    diphenhydrAMINE-zinc acetate 2-0.1 % cream, Apply 1 Application topically to the appropriate area as directed., Disp: , Rfl:     EQ Stool Softener/Laxative 8.6-50 MG per tablet, Take 1 tablet by mouth Every 12 (Twelve) Hours., Disp: , Rfl:     FLUoxetine (PROzac) 40 MG capsule, Take 1 capsule by mouth Daily., Disp: 30 capsule, Rfl: 1    HYDROcodone-acetaminophen (NORCO) 5-325 MG per tablet, Take 1 tablet by mouth Daily As Needed for Moderate Pain., Disp: 30 tablet, Rfl: 0    hydrOXYzine (ATARAX) 25 MG tablet, Take 1 tablet by mouth Every 8 (Eight) Hours As Needed for Anxiety., Disp: 360 tablet, Rfl: 1    memantine (NAMENDA) 5 MG tablet, Take 1 tablet by mouth Every 12 (Twelve) Hours., Disp: 60 tablet, Rfl: 0    pantoprazole (PROTONIX) 40 MG EC tablet, Take 1 tablet by mouth Daily., Disp: 90 tablet, Rfl: 1    tamsulosin (FLOMAX) 0.4 MG capsule 24 hr capsule, Take 2 capsules by mouth once daily, Disp: 180 capsule, Rfl: 0    traZODone (DESYREL) 50 MG tablet, TAKE 1 TABLET BY MOUTH EVERY DAY AT BEDTIME, Disp: 90 tablet, Rfl: 0    warfarin (COUMADIN) 2 MG tablet, Take daily, Disp: 30  tablet, Rfl: 5    warfarin (COUMADIN) 4 MG tablet, Take 1 tablet by mouth Daily., Disp: , Rfl:     predniSONE (DELTASONE) 20 MG tablet, Take 2 tablets by mouth Daily for 5 days., Disp: 10 tablet, Rfl: 0  No current facility-administered medications for this visit.   Family History   Problem Relation Age of Onset    Heart disease Mother     Diabetes Mother     Dementia Father     Colon cancer Brother     No Known Problems Daughter     No Known Problems Daughter     No Known Problems Daughter     No Known Problems Son     Teja Hyperthermia Neg Hx           Vital Signs:   Vitals:    08/29/24 0859   BP: 138/70   Pulse: 85   Temp: 98.9 °F (37.2 °C)   SpO2: 96%   Weight: 67.3 kg (148 lb 4.8 oz)       Review of Systems   Constitutional:  Positive for fatigue. Negative for fever.   HENT:  Negative for rhinorrhea and sore throat.    Eyes:  Negative for pain and visual disturbance.   Respiratory:  Negative for cough, chest tightness, shortness of breath and wheezing.    Cardiovascular:  Negative for chest pain, palpitations and leg swelling.   Gastrointestinal:  Positive for diarrhea. Negative for abdominal pain, nausea and vomiting.   Musculoskeletal:  Negative for joint pain.   Skin:  Positive for rash. Negative for nail changes.   Neurological:  Negative for dizziness, syncope, light-headedness and headaches.      Physical Exam  Vitals reviewed.   Constitutional:       Appearance: Normal appearance. He is well-developed.   HENT:      Head: Normocephalic and atraumatic.      Right Ear: Tympanic membrane, ear canal and external ear normal.      Left Ear: Tympanic membrane, ear canal and external ear normal.      Nose: Nose normal.      Mouth/Throat:      Mouth: Mucous membranes are moist.      Pharynx: Oropharynx is clear. Posterior oropharyngeal erythema present. No oropharyngeal exudate.   Eyes:      Conjunctiva/sclera: Conjunctivae normal.      Pupils: Pupils are equal, round, and reactive to light.   Cardiovascular:       Rate and Rhythm: Normal rate and regular rhythm.      Pulses: Normal pulses.      Heart sounds: Normal heart sounds. No murmur heard.     No friction rub. No gallop.   Pulmonary:      Effort: Pulmonary effort is normal.      Breath sounds: Normal breath sounds. No wheezing or rhonchi.   Abdominal:      General: Abdomen is flat. Bowel sounds are normal. There is no distension.      Palpations: Abdomen is soft. There is no mass.      Tenderness: There is no abdominal tenderness. There is no guarding or rebound.      Hernia: No hernia is present.   Musculoskeletal:         General: Normal range of motion.      Cervical back: Normal range of motion and neck supple.   Skin:     General: Skin is warm and dry.      Capillary Refill: Capillary refill takes less than 2 seconds.      Comments: Papules and wheals on back and chest and arms bilaterally.   Neurological:      General: No focal deficit present.      Mental Status: He is alert and oriented to person, place, and time.      Cranial Nerves: No cranial nerve deficit.   Psychiatric:         Mood and Affect: Mood and affect normal.         Behavior: Behavior normal.         Thought Content: Thought content normal.         Judgment: Judgment normal.        Result Review :   The following data was reviewed by: Adi Kaminski MD on 08/29/2024:  CMP          1/26/2024    13:50   CMP   Glucose 80    BUN 7    Creatinine 1.11    EGFR 73.7    Sodium 139    Potassium 4.0    Chloride 102    Calcium 9.4    Total Protein 7.2    Albumin 4.3    Globulin 2.9    Total Bilirubin 0.6    Alkaline Phosphatase 95    AST (SGOT) 16    ALT (SGPT) 10    Albumin/Globulin Ratio 1.5    BUN/Creatinine Ratio 6.3    Anion Gap 11.0      CBC          1/26/2024    13:50   CBC   WBC 6.73    RBC 5.23    Hemoglobin 15.4    Hematocrit 45.4    MCV 86.8    MCH 29.4    MCHC 33.9    RDW 12.6    Platelets 237      Lipid Panel          1/26/2024    13:50   Lipid Panel   Total Cholesterol 193    Triglycerides 90     HDL Cholesterol 31    VLDL Cholesterol 17    LDL Cholesterol  145    LDL/HDL Ratio 4.65      TSH          1/26/2024    13:50 8/15/2024    14:45   TSH   TSH 3.540  3.570        PSA          1/26/2024    13:50   PSA   PSA 0.750                Assessment and Plan    Diagnoses and all orders for this visit:    1. Hives (Primary)  -     Alpha-Gal IgE Panel  -     Ambulatory Referral to Allergy  -     methylPREDNISolone sodium succinate (SOLU-Medrol) injection 40 mg  -     predniSONE (DELTASONE) 20 MG tablet; Take 2 tablets by mouth Daily for 5 days.  Dispense: 10 tablet; Refill: 0    2. Fatigue, unspecified type  -     CBC Auto Differential  -     Comprehensive Metabolic Panel  -     Vitamin B12 & Folate  -     TSH+Free T4  -     Cortisol, Free  -     POCT rapid strep A  -     POCT SARS-CoV-2 Antigen KALEB + Flu    3. Diarrhea, unspecified type  -     Comprehensive Metabolic Panel  -     Magnesium  -     Cortisol, Free  -     POCT SARS-CoV-2 Antigen KALEB + Flu    4. Anxiety and depression  -     FLUoxetine (PROzac) 40 MG capsule; Take 1 capsule by mouth Daily.  Dispense: 30 capsule; Refill: 1    5. Prediabetes  -     Hemoglobin A1c    6. Other atopic dermatitis  -     Alpha-Gal IgE Panel    7. Anticoagulated  -     Protime-INR    8. Pharyngitis, unspecified etiology  -     POCT rapid strep A  -     POCT SARS-CoV-2 Antigen KALEB + Flu            Follow Up   Return in about 5 days (around 9/3/2024) for Recheck.  Patient was given instructions and counseling regarding his condition or for health maintenance advice. Please see specific information pulled into the AVS if appropriate.

## 2024-08-30 ENCOUNTER — TELEPHONE (OUTPATIENT)
Dept: FAMILY MEDICINE CLINIC | Facility: CLINIC | Age: 64
End: 2024-08-30
Payer: MEDICARE

## 2024-08-30 ENCOUNTER — LAB (OUTPATIENT)
Dept: LAB | Facility: HOSPITAL | Age: 64
End: 2024-08-30
Payer: MEDICARE

## 2024-08-30 ENCOUNTER — HOSPITAL ENCOUNTER (EMERGENCY)
Facility: HOSPITAL | Age: 64
Discharge: HOME | End: 2024-08-30
Attending: EMERGENCY MEDICINE
Payer: MEDICARE

## 2024-08-30 VITALS
TEMPERATURE: 98 F | OXYGEN SATURATION: 98 % | BODY MASS INDEX: 20.01 KG/M2 | RESPIRATION RATE: 15 BRPM | WEIGHT: 147.71 LBS | SYSTOLIC BLOOD PRESSURE: 146 MMHG | HEART RATE: 78 BPM | DIASTOLIC BLOOD PRESSURE: 69 MMHG | HEIGHT: 72 IN

## 2024-08-30 DIAGNOSIS — Z79.01 ANTICOAGULATED: Primary | ICD-10-CM

## 2024-08-30 DIAGNOSIS — Z79.01 ANTICOAGULATED: ICD-10-CM

## 2024-08-30 DIAGNOSIS — E83.42 HYPOMAGNESEMIA: ICD-10-CM

## 2024-08-30 DIAGNOSIS — E87.6 HYPOKALEMIA: Primary | ICD-10-CM

## 2024-08-30 DIAGNOSIS — R79.1 ELEVATED INR: Primary | ICD-10-CM

## 2024-08-30 DIAGNOSIS — E87.6 HYPOKALEMIA: ICD-10-CM

## 2024-08-30 LAB
ALBUMIN SERPL-MCNC: 3.9 G/DL (ref 3.5–5.2)
ALBUMIN/GLOB SERPL: 1.2 G/DL
ALP SERPL-CCNC: 135 U/L (ref 39–117)
ALT SERPL W P-5'-P-CCNC: 16 U/L (ref 1–41)
ANION GAP SERPL CALCULATED.3IONS-SCNC: 12.1 MMOL/L (ref 5–15)
AST SERPL-CCNC: 32 U/L (ref 1–40)
BASOPHILS # BLD AUTO: 0.01 10*3/MM3 (ref 0–0.2)
BASOPHILS NFR BLD AUTO: 0.1 % (ref 0–1.5)
BILIRUB SERPL-MCNC: 0.5 MG/DL (ref 0–1.2)
BUN SERPL-MCNC: 6 MG/DL (ref 8–23)
BUN/CREAT SERPL: 7.7 (ref 7–25)
CALCIUM SPEC-SCNC: 9.1 MG/DL (ref 8.6–10.5)
CHLORIDE SERPL-SCNC: 100 MMOL/L (ref 98–107)
CO2 SERPL-SCNC: 24.9 MMOL/L (ref 22–29)
CREAT SERPL-MCNC: 0.78 MG/DL (ref 0.76–1.27)
DEPRECATED RDW RBC AUTO: 39.6 FL (ref 37–54)
EGFRCR SERPLBLD CKD-EPI 2021: 98.4 ML/MIN/1.73
EOSINOPHIL # BLD AUTO: 0.03 10*3/MM3 (ref 0–0.4)
EOSINOPHIL NFR BLD AUTO: 0.3 % (ref 0.3–6.2)
ERYTHROCYTE [DISTWIDTH] IN BLOOD BY AUTOMATED COUNT: 12.8 % (ref 12.3–15.4)
GLOBULIN UR ELPH-MCNC: 3.2 GM/DL
GLUCOSE SERPL-MCNC: 142 MG/DL (ref 65–99)
HCT VFR BLD AUTO: 39.6 % (ref 37.5–51)
HGB BLD-MCNC: 13.5 G/DL (ref 13–17.7)
IMM GRANULOCYTES # BLD AUTO: 0.02 10*3/MM3 (ref 0–0.05)
IMM GRANULOCYTES NFR BLD AUTO: 0.2 % (ref 0–0.5)
INR PPP: 6.06 (ref 0.93–1.1)
LYMPHOCYTES # BLD AUTO: 0.72 10*3/MM3 (ref 0.7–3.1)
LYMPHOCYTES NFR BLD AUTO: 7.9 % (ref 19.6–45.3)
MAGNESIUM SERPL-MCNC: 1.2 MG/DL (ref 1.6–2.4)
MCH RBC QN AUTO: 29.2 PG (ref 26.6–33)
MCHC RBC AUTO-ENTMCNC: 34.1 G/DL (ref 31.5–35.7)
MCV RBC AUTO: 85.7 FL (ref 79–97)
MONOCYTES # BLD AUTO: 0.18 10*3/MM3 (ref 0.1–0.9)
MONOCYTES NFR BLD AUTO: 2 % (ref 5–12)
NEUTROPHILS NFR BLD AUTO: 8.19 10*3/MM3 (ref 1.7–7)
NEUTROPHILS NFR BLD AUTO: 89.5 % (ref 42.7–76)
NRBC BLD AUTO-RTO: 0 /100 WBC (ref 0–0.2)
PLATELET # BLD AUTO: 225 10*3/MM3 (ref 140–450)
PMV BLD AUTO: 8.3 FL (ref 6–12)
POTASSIUM SERPL-SCNC: 3 MMOL/L (ref 3.5–5.2)
PROT SERPL-MCNC: 7.1 G/DL (ref 6–8.5)
PROTHROMBIN TIME: 57.8 SECONDS (ref 9.6–11.7)
RBC # BLD AUTO: 4.62 10*6/MM3 (ref 4.14–5.8)
SODIUM SERPL-SCNC: 137 MMOL/L (ref 136–145)
WBC NRBC COR # BLD AUTO: 9.15 10*3/MM3 (ref 3.4–10.8)

## 2024-08-30 PROCEDURE — 96366 THER/PROPH/DIAG IV INF ADDON: CPT

## 2024-08-30 PROCEDURE — 25010000002 MAGNESIUM SULFATE 2 GM/50ML SOLUTION: Performed by: NURSE PRACTITIONER

## 2024-08-30 PROCEDURE — 36415 COLL VENOUS BLD VENIPUNCTURE: CPT

## 2024-08-30 PROCEDURE — 85025 COMPLETE CBC W/AUTO DIFF WBC: CPT | Performed by: NURSE PRACTITIONER

## 2024-08-30 PROCEDURE — 85610 PROTHROMBIN TIME: CPT

## 2024-08-30 PROCEDURE — 96365 THER/PROPH/DIAG IV INF INIT: CPT

## 2024-08-30 PROCEDURE — 83735 ASSAY OF MAGNESIUM: CPT | Performed by: NURSE PRACTITIONER

## 2024-08-30 PROCEDURE — 93005 ELECTROCARDIOGRAM TRACING: CPT | Performed by: NURSE PRACTITIONER

## 2024-08-30 PROCEDURE — 99283 EMERGENCY DEPT VISIT LOW MDM: CPT

## 2024-08-30 PROCEDURE — 25010000002 PHYTONADIONE 10 MG/ML SOLUTION: Performed by: NURSE PRACTITIONER

## 2024-08-30 PROCEDURE — 80053 COMPREHEN METABOLIC PANEL: CPT | Performed by: NURSE PRACTITIONER

## 2024-08-30 RX ORDER — MAGNESIUM SULFATE HEPTAHYDRATE 40 MG/ML
2 INJECTION, SOLUTION INTRAVENOUS ONCE
Status: COMPLETED | OUTPATIENT
Start: 2024-08-30 | End: 2024-08-30

## 2024-08-30 RX ORDER — POTASSIUM CHLORIDE 750 MG/1
10 CAPSULE, EXTENDED RELEASE ORAL DAILY
Qty: 30 CAPSULE | Refills: 0 | Status: SHIPPED | OUTPATIENT
Start: 2024-08-30

## 2024-08-30 RX ORDER — PHYTONADIONE 2 MG/ML
5 INJECTION, EMULSION INTRAMUSCULAR; INTRAVENOUS; SUBCUTANEOUS ONCE
Status: COMPLETED | OUTPATIENT
Start: 2024-08-30 | End: 2024-08-30

## 2024-08-30 RX ORDER — LANOLIN ALCOHOL/MO/W.PET/CERES
400 CREAM (GRAM) TOPICAL DAILY
Qty: 30 TABLET | Refills: 0 | Status: SHIPPED | OUTPATIENT
Start: 2024-08-30

## 2024-08-30 RX ORDER — POTASSIUM CHLORIDE 1500 MG/1
40 TABLET, EXTENDED RELEASE ORAL DAILY
Status: DISCONTINUED | OUTPATIENT
Start: 2024-08-30 | End: 2024-08-30 | Stop reason: HOSPADM

## 2024-08-30 RX ORDER — SODIUM CHLORIDE 0.9 % (FLUSH) 0.9 %
10 SYRINGE (ML) INJECTION AS NEEDED
Status: DISCONTINUED | OUTPATIENT
Start: 2024-08-30 | End: 2024-08-30 | Stop reason: HOSPADM

## 2024-08-30 RX ADMIN — PHYTONADIONE 5 MG: 10 INJECTION, EMULSION INTRAMUSCULAR; INTRAVENOUS; SUBCUTANEOUS at 17:20

## 2024-08-30 RX ADMIN — POTASSIUM CHLORIDE 40 MEQ: 1500 TABLET, EXTENDED RELEASE ORAL at 17:19

## 2024-08-30 RX ADMIN — MAGNESIUM SULFATE HEPTAHYDRATE 2 G: 40 INJECTION, SOLUTION INTRAVENOUS at 17:20

## 2024-08-30 NOTE — ED PROVIDER NOTES
Subjective   Chief Complaint   Patient presents with    Abnormal Lab     Pt has elevated, INR 6.06 denies any bleeding told by PMD to come to ER.         History of Present Illness  Patient is a 64-year-old male who presents to the emergency department for evaluation of an elevated INR, patient is on warfarin secondary to previous stroke, he has a history of PFO.  No history of mechanical heart valve.    Patient reports he was referred by his PCP.  He has no signs of bleeding.  No injuries.  No bloody stools.  Review of Systems  Per HPI  Past Medical History:   Diagnosis Date    Acid reflux     Enlarged prostate     Headache, tension-type     Hyperlipidemia     Lung nodule     PFO (patent foramen ovale)     Stroke 03/2021    LOSS OF PERIPHERAL VISION    Stroke     7/2024       Allergies   Allergen Reactions    Adhesive Tape Rash    Amoxicillin-Pot Clavulanate Other (See Comments) and GI Intolerance     Upset stomach       Past Surgical History:   Procedure Laterality Date    APPENDECTOMY      BRONCHOSCOPY Bilateral 4/8/2021    Procedure: BRONCHOSCOPY ENDOBRONCHIAL ULTRASOUND WITH FNA'S;  Surgeon: Sam Tony MD;  Location: [facility] ENDOSCOPY;  Service: Pulmonary;  Laterality: Bilateral;  PRE- LYMPHADENOPATHY  POST- SAME    CARDIAC CATHETERIZATION N/A 10/29/2021    Procedure: Patent foramen ovale closure  ABBOTT;  Surgeon: Sivakumar Chatman MD;  Location: [facility] CATH INVASIVE LOCATION;  Service: Cardiology;  Laterality: N/A;    KNEE ARTHROSCOPY      SHOULDER ARTHROSCOPY         Family History   Problem Relation Age of Onset    Heart disease Mother     Diabetes Mother     Dementia Father     Colon cancer Brother     No Known Problems Daughter     No Known Problems Daughter     No Known Problems Daughter     No Known Problems Son     Malig Hyperthermia Neg Hx        Social History     Socioeconomic History    Marital status:    Tobacco Use    Smoking status: Every Day     Current packs/day: 1.00      Average packs/day: 1 pack/day for 43.7 years (43.7 ttl pk-yrs)     Types: Cigarettes     Start date: 1981    Smokeless tobacco: Never   Vaping Use    Vaping status: Never Used   Substance and Sexual Activity    Alcohol use: Yes     Comment: occasionally    Drug use: Not Currently    Sexual activity: Defer           Objective   Physical Exam  Vitals and nursing note reviewed.   Constitutional:       Appearance: Normal appearance.   HENT:      Head: Normocephalic and atraumatic.      Nose: Nose normal.      Mouth/Throat:      Pharynx: Oropharynx is clear.   Eyes:      Extraocular Movements: Extraocular movements intact.      Conjunctiva/sclera: Conjunctivae normal.      Pupils: Pupils are equal, round, and reactive to light.   Cardiovascular:      Rate and Rhythm: Normal rate and regular rhythm.      Heart sounds: Normal heart sounds. No murmur heard.     No friction rub. No gallop.   Pulmonary:      Effort: Pulmonary effort is normal.      Breath sounds: Normal breath sounds.   Abdominal:      General: Bowel sounds are normal.      Palpations: Abdomen is soft.   Musculoskeletal:         General: Normal range of motion.      Cervical back: Normal range of motion and neck supple.   Skin:     General: Skin is warm and dry.      Capillary Refill: Capillary refill takes less than 2 seconds.      Findings: No rash.   Neurological:      Mental Status: He is alert and oriented to person, place, and time.         Procedures           ED Course      Vitals:    08/30/24 1518   BP: 143/71   Pulse: 101   Resp: 18   Temp: 98.3 °F (36.8 °C)   SpO2: 97%     Medications   sodium chloride 0.9 % flush 10 mL (has no administration in time range)   potassium chloride (KLOR-CON M20) CR tablet 40 mEq (40 mEq Oral Given 8/30/24 1719)   vitamin K1 (PHYTONADIONE) 1 MG/1 ML oral solution 5 mg (5 mg Oral Given 8/30/24 1720)   magnesium sulfate 2g/50 mL (PREMIX) infusion (2 g Intravenous New Bag 8/30/24 1720)                                INR  today on chart review 6.06            Medical Decision Making  Problems Addressed:  Elevated INR: complicated acute illness or injury  Hypokalemia: complicated acute illness or injury  Hypomagnesemia: complicated acute illness or injury    Amount and/or Complexity of Data Reviewed  Labs: ordered.  ECG/medicine tests: ordered.    Risk  Prescription drug management.    Discussed with Dr. Quiroz  Chart Review: Office visit with elevated INR.  Imaging: No radiology results for the last day    EKG interpreted independently per ED attending physican-sinus rhythm rate 63.  Compared to previous 3/25/2021.  No acute ST change.  PACs noted  Pt was Placed on appropriate monitoring.  Patient presents to the ED for the above complaint, underwent the above, exam and workup notable for, INR 6.06 on outpatient labs.  Also noted be hypokalemic potassium 3.0, magnesium 1.2.  Replaced in ED.  Patient did have electrolyte imbalance in the office, his PCP sent him in medications but he was not able to pick them up yet, would not build to get him till tomorrow.  His INR is now to be 606.  No signs of bleeding.  He does not have a mechanical heart valve.  Patient given 5 mg vitamin K.  Advised to hold his warfarin tonight, and he will have a recheck of INR here at [facility] outpatient on Sunday.  Primary care is going to follow, I discussed with him via secure chat, and the on-call provider will check INR.  I discussed my plan of care with the patient and his visitor at bedside, they verbalized understanding        Disposition: I discussed my findings, plan of care, discharge instructions, the importance of follow up with their PCP/ and or specialist for repeat evaluation and to discuss any abnormal findings in labs or imaging that warrant further outpatient evaluation. We discussed that although a definitive diagnosis is not always found in the ED, it is believed emergent conditions have been ruled out, and patient is safe for  discharge at this time.  We discussed return precautions for the emergency department.  Patient verbalizes understandings, and agrees with current plan of care.  Note Disclaimer: At [facility], we believe that sharing information builds trust and better relationships. You are receiving this note because you recently visited [facility]. It is possible you will see health information before a provider has talked with you about it. This kind of information can be easy to misunderstand. To help you fully understand what it means for your health, we urge you to discuss this note with your provider  Note dictated utilizing Dragon Dictation.  Appropriate PPE worn during patient interactions.      Final diagnoses:   Elevated INR   Hypokalemia   Hypomagnesemia       ED Disposition  ED Disposition       ED Disposition   Discharge    Condition   Stable    Comment   --               Adi Kaminski MD  [address] [place] [address] [address] [place] [address] [phone]          [facility] EMERGENCY DEPARTMENT  [facility] [address] [phone]             Medication List      No changes were made to your prescriptions during this visit.            Natali Macias, APRJOVANNY  08/30/24 180

## 2024-08-30 NOTE — PROGRESS NOTES
Call pt: labs show low potassium and magnesium.  For this I sent in oral magnesium and potassium to your pharmacy at [facility].  Recheck labs in 1 week. 

## 2024-08-30 NOTE — TELEPHONE ENCOUNTER
The main lab called with a critical value of an INR of 6.06.  I did check and the labs are in the chart.

## 2024-08-30 NOTE — DISCHARGE INSTRUCTIONS
Please hold your warfarin tonight  Follow up for a recheck INR here at [facility] on Sunday through outpatient lab  Return to ed for new or worsening symptoms  Keep your follow up appointment with your pcp for lab recheck

## 2024-08-30 NOTE — PROGRESS NOTES
Patient has been called and him and his wife have the results and he will be going straight to ER for this along with his electrolyte abnormalities.

## 2024-08-31 LAB
QT INTERVAL: 437 MS
QTC INTERVAL: 444 MS

## 2024-09-01 ENCOUNTER — HOSPITAL ENCOUNTER (EMERGENCY)
Facility: HOSPITAL | Age: 64
End: 2024-09-01
Payer: MEDICARE

## 2024-09-01 ENCOUNTER — LAB (OUTPATIENT)
Dept: LAB | Facility: HOSPITAL | Age: 64
End: 2024-09-01
Payer: MEDICARE

## 2024-09-01 DIAGNOSIS — R79.1 ELEVATED INR: ICD-10-CM

## 2024-09-01 LAB
INR PPP: 1.11 (ref 2–3)
PROTHROMBIN TIME: 12 SECONDS (ref 19.4–28.5)

## 2024-09-01 PROCEDURE — 36415 COLL VENOUS BLD VENIPUNCTURE: CPT

## 2024-09-01 PROCEDURE — 85610 PROTHROMBIN TIME: CPT

## 2024-09-03 ENCOUNTER — TELEPHONE (OUTPATIENT)
Dept: FAMILY MEDICINE CLINIC | Facility: CLINIC | Age: 64
End: 2024-09-03
Payer: MEDICARE

## 2024-09-03 NOTE — TELEPHONE ENCOUNTER
At 4:39PM on 9/1/2024 I spoke with the patient's wife, Nichelle, regarding INR of 1.1.  INR had previously been greater than 6 and he went to the emergency room for vitamin K administration.  That was on Friday afternoon.  On Sunday INR returned at 1.1.  Patient was instructed to restart Coumadin taking 4 mg on Monday, Wednesday, and Friday, and 2 mg on all other days.  Repeat INR on Monday 9/9/2024.      Information discussed with Dr. Kaminski this morning.

## 2024-09-05 LAB
ALPHA-GAL IGE QN: 3.55 KU/L
BEEF IGE QN: 1.68 KU/L
CONV CLASS DESCRIPTION: ABNORMAL
IGE SERPL-ACNC: 286 IU/ML (ref 6–495)
LAMB IGE QN: 1.1 KU/L
PORK IGE QN: 1.01 KU/L

## 2024-09-08 LAB — CORTIS F SERPL-MCNC: 1.2 UG/DL

## 2024-09-09 ENCOUNTER — OFFICE VISIT (OUTPATIENT)
Dept: FAMILY MEDICINE CLINIC | Facility: CLINIC | Age: 64
End: 2024-09-09
Payer: MEDICARE

## 2024-09-09 VITALS
HEART RATE: 105 BPM | TEMPERATURE: 99 F | OXYGEN SATURATION: 99 % | DIASTOLIC BLOOD PRESSURE: 60 MMHG | BODY MASS INDEX: 19.71 KG/M2 | WEIGHT: 145.3 LBS | SYSTOLIC BLOOD PRESSURE: 110 MMHG

## 2024-09-09 DIAGNOSIS — E83.42 HYPOMAGNESEMIA: ICD-10-CM

## 2024-09-09 DIAGNOSIS — R06.02 SHORTNESS OF BREATH: ICD-10-CM

## 2024-09-09 DIAGNOSIS — E87.6 HYPOKALEMIA: ICD-10-CM

## 2024-09-09 DIAGNOSIS — T78.1XXA ALLERGIC REACTION TO ALPHA-GAL: ICD-10-CM

## 2024-09-09 DIAGNOSIS — F41.9 ANXIETY AND DEPRESSION: Primary | ICD-10-CM

## 2024-09-09 DIAGNOSIS — I10 PRIMARY HYPERTENSION: ICD-10-CM

## 2024-09-09 DIAGNOSIS — L50.9 HIVES: ICD-10-CM

## 2024-09-09 DIAGNOSIS — F32.A ANXIETY AND DEPRESSION: Primary | ICD-10-CM

## 2024-09-09 DIAGNOSIS — Z79.01 ANTICOAGULATED: ICD-10-CM

## 2024-09-09 LAB
ALBUMIN SERPL-MCNC: 4 G/DL (ref 3.5–5.2)
ALBUMIN/GLOB SERPL: 1 G/DL
ALP SERPL-CCNC: 161 U/L (ref 39–117)
ALT SERPL W P-5'-P-CCNC: 21 U/L (ref 1–41)
ANION GAP SERPL CALCULATED.3IONS-SCNC: 13.4 MMOL/L (ref 5–15)
AST SERPL-CCNC: 27 U/L (ref 1–40)
BASOPHILS # BLD AUTO: 0.07 10*3/MM3 (ref 0–0.2)
BASOPHILS NFR BLD AUTO: 0.6 % (ref 0–1.5)
BILIRUB SERPL-MCNC: 0.7 MG/DL (ref 0–1.2)
BUN SERPL-MCNC: 5 MG/DL (ref 8–23)
BUN/CREAT SERPL: 6.6 (ref 7–25)
CALCIUM SPEC-SCNC: 9.8 MG/DL (ref 8.6–10.5)
CHLORIDE SERPL-SCNC: 97 MMOL/L (ref 98–107)
CO2 SERPL-SCNC: 25.6 MMOL/L (ref 22–29)
CREAT SERPL-MCNC: 0.76 MG/DL (ref 0.76–1.27)
D DIMER PPP FEU-MCNC: 0.52 MCGFEU/ML (ref 0–0.66)
DEPRECATED RDW RBC AUTO: 37.8 FL (ref 37–54)
EGFRCR SERPLBLD CKD-EPI 2021: 99.1 ML/MIN/1.73
EOSINOPHIL # BLD AUTO: 0.23 10*3/MM3 (ref 0–0.4)
EOSINOPHIL NFR BLD AUTO: 2.1 % (ref 0.3–6.2)
ERYTHROCYTE [DISTWIDTH] IN BLOOD BY AUTOMATED COUNT: 12.1 % (ref 12.3–15.4)
GLOBULIN UR ELPH-MCNC: 3.9 GM/DL
GLUCOSE SERPL-MCNC: 89 MG/DL (ref 65–99)
HCT VFR BLD AUTO: 45.6 % (ref 37.5–51)
HGB BLD-MCNC: 15.3 G/DL (ref 13–17.7)
IMM GRANULOCYTES # BLD AUTO: 0.04 10*3/MM3 (ref 0–0.05)
IMM GRANULOCYTES NFR BLD AUTO: 0.4 % (ref 0–0.5)
INR PPP: 1.73 (ref 0.86–1.15)
LYMPHOCYTES # BLD AUTO: 1.97 10*3/MM3 (ref 0.7–3.1)
LYMPHOCYTES NFR BLD AUTO: 18 % (ref 19.6–45.3)
MAGNESIUM SERPL-MCNC: 1.5 MG/DL (ref 1.6–2.4)
MCH RBC QN AUTO: 28.9 PG (ref 26.6–33)
MCHC RBC AUTO-ENTMCNC: 33.6 G/DL (ref 31.5–35.7)
MCV RBC AUTO: 86.2 FL (ref 79–97)
MONOCYTES # BLD AUTO: 1.04 10*3/MM3 (ref 0.1–0.9)
MONOCYTES NFR BLD AUTO: 9.5 % (ref 5–12)
NEUTROPHILS NFR BLD AUTO: 69.4 % (ref 42.7–76)
NEUTROPHILS NFR BLD AUTO: 7.62 10*3/MM3 (ref 1.7–7)
NRBC BLD AUTO-RTO: 0 /100 WBC (ref 0–0.2)
PLATELET # BLD AUTO: 391 10*3/MM3 (ref 140–450)
PMV BLD AUTO: 8.6 FL (ref 6–12)
POTASSIUM SERPL-SCNC: 3.6 MMOL/L (ref 3.5–5.2)
PROT SERPL-MCNC: 7.9 G/DL (ref 6–8.5)
PROTHROMBIN TIME: 20.5 SECONDS (ref 11.8–14.9)
RBC # BLD AUTO: 5.29 10*6/MM3 (ref 4.14–5.8)
SODIUM SERPL-SCNC: 136 MMOL/L (ref 136–145)
WBC NRBC COR # BLD AUTO: 10.97 10*3/MM3 (ref 3.4–10.8)

## 2024-09-09 PROCEDURE — 3078F DIAST BP <80 MM HG: CPT | Performed by: FAMILY MEDICINE

## 2024-09-09 PROCEDURE — 80053 COMPREHEN METABOLIC PANEL: CPT | Performed by: FAMILY MEDICINE

## 2024-09-09 PROCEDURE — 36415 COLL VENOUS BLD VENIPUNCTURE: CPT | Performed by: FAMILY MEDICINE

## 2024-09-09 PROCEDURE — 85025 COMPLETE CBC W/AUTO DIFF WBC: CPT | Performed by: FAMILY MEDICINE

## 2024-09-09 PROCEDURE — 99214 OFFICE O/P EST MOD 30 MIN: CPT | Performed by: FAMILY MEDICINE

## 2024-09-09 PROCEDURE — 1159F MED LIST DOCD IN RCRD: CPT | Performed by: FAMILY MEDICINE

## 2024-09-09 PROCEDURE — 1126F AMNT PAIN NOTED NONE PRSNT: CPT | Performed by: FAMILY MEDICINE

## 2024-09-09 PROCEDURE — G2211 COMPLEX E/M VISIT ADD ON: HCPCS | Performed by: FAMILY MEDICINE

## 2024-09-09 PROCEDURE — 85610 PROTHROMBIN TIME: CPT | Performed by: FAMILY MEDICINE

## 2024-09-09 PROCEDURE — 85379 FIBRIN DEGRADATION QUANT: CPT | Performed by: FAMILY MEDICINE

## 2024-09-09 PROCEDURE — 83735 ASSAY OF MAGNESIUM: CPT | Performed by: FAMILY MEDICINE

## 2024-09-09 PROCEDURE — 1160F RVW MEDS BY RX/DR IN RCRD: CPT | Performed by: FAMILY MEDICINE

## 2024-09-09 PROCEDURE — 3074F SYST BP LT 130 MM HG: CPT | Performed by: FAMILY MEDICINE

## 2024-09-09 RX ORDER — UBIDECARENONE 75 MG
100 CAPSULE ORAL DAILY
COMMUNITY
Start: 2024-09-07

## 2024-09-09 RX ORDER — FLUOXETINE 10 MG/1
10 CAPSULE ORAL DAILY
Qty: 30 CAPSULE | Refills: 1 | Status: SHIPPED | OUTPATIENT
Start: 2024-09-09

## 2024-09-09 RX ORDER — EPINEPHRINE 0.3 MG/.3ML
0.3 INJECTION SUBCUTANEOUS ONCE
Qty: 1 EACH | Refills: 0 | Status: SHIPPED | OUTPATIENT
Start: 2024-09-09 | End: 2024-09-09

## 2024-09-09 RX ORDER — ALBUTEROL SULFATE 90 UG/1
2 AEROSOL, METERED RESPIRATORY (INHALATION) EVERY 4 HOURS PRN
Qty: 18 G | Refills: 3 | Status: SHIPPED | OUTPATIENT
Start: 2024-09-09

## 2024-09-09 RX ORDER — LISINOPRIL 10 MG/1
10 TABLET ORAL DAILY
COMMUNITY
Start: 2024-09-07 | End: 2024-09-09 | Stop reason: SDUPTHER

## 2024-09-09 RX ORDER — MIRTAZAPINE 15 MG/1
15 TABLET, FILM COATED ORAL NIGHTLY
Qty: 30 TABLET | Refills: 1 | Status: SHIPPED | OUTPATIENT
Start: 2024-09-09

## 2024-09-09 RX ORDER — LISINOPRIL 5 MG/1
5 TABLET ORAL DAILY
Qty: 30 TABLET | Refills: 1 | Status: SHIPPED | OUTPATIENT
Start: 2024-09-09

## 2024-09-09 NOTE — PROGRESS NOTES
Chief Complaint  alpha gal, abnormal lab results, Stroke (Needs Pt/INR checked), decreased appetite, Fatigue, and Extremity Weakness    Subjective          Sathya Benavides presents to [facility]  History of Present Illness  Discussed labs  Pt has alpha gal  Pt is taking oral magnesium for hypomagnesemia and oral potassium for hypokalemia- needs labs rechecked    Pt has had decreased appetite  Pt sleeping better.    Stroke clinic is [phone]    Pt is on Coumadin taking 4 mg on Monday, Wednesday, and Friday, and 2 mg on all other days.        BMI is within normal parameters. No other follow-up for BMI required.       Objective   Allergies   Allergen Reactions    Alpha-Gal Hives    Adhesive Tape Rash    Amoxicillin-Pot Clavulanate Other (See Comments) and GI Intolerance     Upset stomach     Immunization History   Administered Date(s) Administered    Flu Vaccine Quad PF >36MO 10/17/2017    Pneumococcal Conjugate 20-Valent (PCV20) 01/26/2024    Tdap 05/17/2017     Past Medical History:   Diagnosis Date    Acid reflux     Enlarged prostate     Headache, tension-type     Hyperlipidemia     Lung nodule     PFO (patent foramen ovale)     Stroke 03/2021    LOSS OF PERIPHERAL VISION    Stroke     7/2024      Past Surgical History:   Procedure Laterality Date    APPENDECTOMY      BRONCHOSCOPY Bilateral 4/8/2021    Procedure: BRONCHOSCOPY ENDOBRONCHIAL ULTRASOUND WITH FNA'S;  Surgeon: Sam Tony MD;  Location: [facility] ENDOSCOPY;  Service: Pulmonary;  Laterality: Bilateral;  PRE- LYMPHADENOPATHY  POST- SAME    CARDIAC CATHETERIZATION N/A 10/29/2021    Procedure: Patent foramen ovale closure  ABBOTT;  Surgeon: Sivakumar Chatman MD;  Location: [facility] CATH INVASIVE LOCATION;  Service: Cardiology;  Laterality: N/A;    KNEE ARTHROSCOPY      SHOULDER ARTHROSCOPY        Social History     Socioeconomic History    Marital status:    Tobacco Use    Smoking status: Every Day      Current packs/day: 1.00     Average packs/day: 1 pack/day for 43.7 years (43.7 ttl pk-yrs)     Types: Cigarettes     Start date: 1981    Smokeless tobacco: Never   Vaping Use    Vaping status: Never Used   Substance and Sexual Activity    Alcohol use: Yes     Comment: occasionally    Drug use: Not Currently    Sexual activity: Defer        Current Outpatient Medications:     atorvastatin (Lipitor) 80 MG tablet, Take 1 tablet by mouth Daily., Disp: 90 tablet, Rfl: 0    diphenhydrAMINE-zinc acetate 2-0.1 % cream, Apply 1 Application topically to the appropriate area as directed., Disp: , Rfl:     EQ Stool Softener/Laxative 8.6-50 MG per tablet, Take 1 tablet by mouth Every 12 (Twelve) Hours., Disp: , Rfl:     FLUoxetine (PROzac) 10 MG capsule, Take 1 capsule by mouth Daily., Disp: 30 capsule, Rfl: 1    HYDROcodone-acetaminophen (NORCO) 5-325 MG per tablet, Take 1 tablet by mouth Daily As Needed for Moderate Pain., Disp: 30 tablet, Rfl: 0    hydrOXYzine (ATARAX) 25 MG tablet, Take 1 tablet by mouth Every 8 (Eight) Hours As Needed for Anxiety., Disp: 360 tablet, Rfl: 1    lisinopril (PRINIVIL,ZESTRIL) 5 MG tablet, Take 1 tablet by mouth Daily., Disp: 30 tablet, Rfl: 1    Magnesium Oxide -Mg Supplement (MAGnesium-Oxide) 400 (240 Mg) MG tablet, Take 1 tablet by mouth Daily., Disp: 30 tablet, Rfl: 0    memantine (NAMENDA) 5 MG tablet, Take 1 tablet by mouth Every 12 (Twelve) Hours., Disp: 60 tablet, Rfl: 0    pantoprazole (PROTONIX) 40 MG EC tablet, Take 1 tablet by mouth Daily., Disp: 90 tablet, Rfl: 1    potassium chloride (MICRO-K) 10 MEQ CR capsule, Take 1 capsule by mouth Daily., Disp: 30 capsule, Rfl: 0    tamsulosin (FLOMAX) 0.4 MG capsule 24 hr capsule, Take 2 capsules by mouth once daily, Disp: 180 capsule, Rfl: 0    traZODone (DESYREL) 50 MG tablet, TAKE 1 TABLET BY MOUTH EVERY DAY AT BEDTIME, Disp: 90 tablet, Rfl: 0    vitamin B-12 (CYANOCOBALAMIN) 100 MCG tablet, Take 1 tablet by mouth Daily., Disp: , Rfl:      warfarin (COUMADIN) 2 MG tablet, Take daily, Disp: 30 tablet, Rfl: 5    warfarin (COUMADIN) 4 MG tablet, Take 1 tablet by mouth Daily., Disp: , Rfl:     albuterol sulfate  (90 Base) MCG/ACT inhaler, Inhale 2 puffs Every 4 (Four) Hours As Needed for Wheezing., Disp: 18 g, Rfl: 3    EPINEPHrine (EpiPen 2-Héctor) 0.3 MG/0.3ML solution auto-injector injection, Inject 0.3 mL into the appropriate muscle as directed by prescriber 1 (One) Time for 1 dose., Disp: 1 each, Rfl: 0    mirtazapine (Remeron) 15 MG tablet, Take 1 tablet by mouth Every Night., Disp: 30 tablet, Rfl: 1   Family History   Problem Relation Age of Onset    Heart disease Mother     Diabetes Mother     Dementia Father     Colon cancer Brother     No Known Problems Daughter     No Known Problems Daughter     No Known Problems Daughter     No Known Problems Son     Teja Hyperthermia Neg Hx           Vital Signs:   Vitals:    09/09/24 1411   BP: 110/60   Pulse: 105   Temp: 99 °F (37.2 °C)   SpO2: 99%   Weight: 65.9 kg (145 lb 4.8 oz)       Review of Systems   Constitutional:  Positive for fatigue. Negative for fever.   HENT:  Negative for sore throat.    Eyes:  Negative for visual disturbance.   Respiratory:  Positive for shortness of breath. Negative for cough, chest tightness and wheezing.    Cardiovascular:  Negative for chest pain, palpitations and leg swelling.   Gastrointestinal:  Negative for abdominal pain, diarrhea, nausea and vomiting.   Neurological:  Negative for dizziness, light-headedness and headaches.   Psychiatric/Behavioral:  Positive for dysphoric mood. Negative for decreased concentration, sleep disturbance and suicidal ideas. The patient is not nervous/anxious.       Physical Exam  Vitals reviewed.   Constitutional:       Appearance: Normal appearance. He is well-developed.   HENT:      Head: Normocephalic and atraumatic.      Right Ear: External ear normal.      Left Ear: External ear normal.      Mouth/Throat:      Pharynx: No  oropharyngeal exudate.   Eyes:      Conjunctiva/sclera: Conjunctivae normal.      Pupils: Pupils are equal, round, and reactive to light.   Cardiovascular:      Rate and Rhythm: Normal rate and regular rhythm.      Pulses: Normal pulses.      Heart sounds: Normal heart sounds. No murmur heard.     No friction rub. No gallop.   Pulmonary:      Effort: Pulmonary effort is normal.      Breath sounds: Normal breath sounds. No wheezing or rhonchi.   Abdominal:      General: Abdomen is flat. Bowel sounds are normal. There is no distension.      Palpations: Abdomen is soft. There is no mass.      Tenderness: There is no abdominal tenderness. There is no guarding or rebound.      Hernia: No hernia is present.   Musculoskeletal:         General: Normal range of motion.   Skin:     General: Skin is warm and dry.      Capillary Refill: Capillary refill takes less than 2 seconds.   Neurological:      General: No focal deficit present.      Mental Status: He is alert and oriented to person, place, and time.      Cranial Nerves: No cranial nerve deficit.   Psychiatric:         Mood and Affect: Mood and affect normal.         Behavior: Behavior normal.         Thought Content: Thought content normal.         Judgment: Judgment normal.        Result Review :   The following data was reviewed by: Adi Kaminski MD on 09/09/2024:  CMP          1/26/2024    13:50 8/29/2024    09:26 8/30/2024    16:11   CMP   Glucose 80  103  142    BUN 7  6  6    Creatinine 1.11  0.88  0.78    EGFR 73.7  94.8  98.4    Sodium 139  136  137    Potassium 4.0  3.0  3.0    Chloride 102  97  100    Calcium 9.4  9.5  9.1    Total Protein 7.2  7.1  7.1    Albumin 4.3  3.9  3.9    Globulin 2.9  3.2  3.2    Total Bilirubin 0.6  1.3  0.5    Alkaline Phosphatase 95  141  135    AST (SGOT) 16  18  32    ALT (SGPT) 10  11  16    Albumin/Globulin Ratio 1.5  1.2  1.2    BUN/Creatinine Ratio 6.3  6.8  7.7    Anion Gap 11.0  14.4  12.1      CBC          1/26/2024     13:50 8/29/2024    09:26 8/30/2024    16:11   CBC   WBC 6.73  9.38  9.15    RBC 5.23  4.91  4.62    Hemoglobin 15.4  14.2  13.5    Hematocrit 45.4  41.5  39.6    MCV 86.8  84.5  85.7    MCH 29.4  28.9  29.2    MCHC 33.9  34.2  34.1    RDW 12.6  12.1  12.8    Platelets 237  256  225      Lipid Panel          1/26/2024    13:50   Lipid Panel   Total Cholesterol 193    Triglycerides 90    HDL Cholesterol 31    VLDL Cholesterol 17    LDL Cholesterol  145    LDL/HDL Ratio 4.65      TSH          1/26/2024    13:50 8/15/2024    14:45 8/29/2024    09:26   TSH   TSH 3.540  3.570  3.780                Assessment and Plan    Diagnoses and all orders for this visit:    1. Anxiety and depression (Primary)  -     mirtazapine (Remeron) 15 MG tablet; Take 1 tablet by mouth Every Night.  Dispense: 30 tablet; Refill: 1  -     FLUoxetine (PROzac) 10 MG capsule; Take 1 capsule by mouth Daily.  Dispense: 30 capsule; Refill: 1  -     Ambulatory Referral to Psychiatry    2. Allergic reaction to alpha-gal  -     Ambulatory Referral to Allergy  -     EPINEPHrine (EpiPen 2-Héctor) 0.3 MG/0.3ML solution auto-injector injection; Inject 0.3 mL into the appropriate muscle as directed by prescriber 1 (One) Time for 1 dose.  Dispense: 1 each; Refill: 0    3. Hives  -     Ambulatory Referral to Allergy    4. Hypokalemia  -     Comprehensive Metabolic Panel    5. Hypomagnesemia  -     Magnesium    6. Primary hypertension  -     lisinopril (PRINIVIL,ZESTRIL) 5 MG tablet; Take 1 tablet by mouth Daily.  Dispense: 30 tablet; Refill: 1  -     CBC Auto Differential    7. Anticoagulated  -     Protime-INR    8. Shortness of breath  -     D-dimer, Quantitative; Future  -     albuterol sulfate  (90 Base) MCG/ACT inhaler; Inhale 2 puffs Every 4 (Four) Hours As Needed for Wheezing.  Dispense: 18 g; Refill: 3  -     D-dimer, Quantitative            Follow Up   Return in about 1 week (around 9/16/2024) for Recheck.  Patient was given instructions and  counseling regarding his condition or for health maintenance advice. Please see specific information pulled into the AVS if appropriate.

## 2024-09-09 NOTE — PROGRESS NOTES
Venipuncture Blood Specimen Collection  Venipuncture performed in left arm by Johnny Mcmahan with good hemostasis. Patient tolerated the procedure well without complications.   09/09/24   Johnny Mcmahan

## 2024-09-10 NOTE — PROGRESS NOTES
Call pt: labs show INR up to 1.73- increase coumadin to 4mg on Monday, Tuesday, Wednesday and Friday and 2mg all other days.  Recheck INR in 1 week.  Labs show elevated alkaline phosphatase and slightly elevated WBC's. Follow-up in 1 week to discuss and recheck.  Electrolytes coming back to normal.  Continue current treatments.

## 2024-09-12 ENCOUNTER — TELEPHONE (OUTPATIENT)
Dept: FAMILY MEDICINE CLINIC | Facility: CLINIC | Age: 64
End: 2024-09-12
Payer: MEDICARE

## 2024-09-12 NOTE — TELEPHONE ENCOUNTER
Patient is scheduled with Ju Hastings at [facility] on 9/13/24 @ 8. [address]. Patients wife was informed about appointment

## 2024-09-16 ENCOUNTER — OFFICE VISIT (OUTPATIENT)
Dept: FAMILY MEDICINE CLINIC | Facility: CLINIC | Age: 64
End: 2024-09-16
Payer: MEDICARE

## 2024-09-16 VITALS
BODY MASS INDEX: 19.91 KG/M2 | WEIGHT: 146.8 LBS | SYSTOLIC BLOOD PRESSURE: 128 MMHG | TEMPERATURE: 97.8 F | DIASTOLIC BLOOD PRESSURE: 70 MMHG | OXYGEN SATURATION: 96 % | HEART RATE: 100 BPM

## 2024-09-16 DIAGNOSIS — D72.829 LEUKOCYTOSIS, UNSPECIFIED TYPE: Primary | ICD-10-CM

## 2024-09-16 DIAGNOSIS — E83.42 HYPOMAGNESEMIA: ICD-10-CM

## 2024-09-16 DIAGNOSIS — Z79.01 ANTICOAGULATED: ICD-10-CM

## 2024-09-16 DIAGNOSIS — R74.8 ELEVATED ALKALINE PHOSPHATASE LEVEL: ICD-10-CM

## 2024-09-16 LAB
ALBUMIN SERPL-MCNC: 3.8 G/DL (ref 3.5–5.2)
ALBUMIN/GLOB SERPL: 1.2 G/DL
ALP SERPL-CCNC: 157 U/L (ref 39–117)
ALT SERPL W P-5'-P-CCNC: 15 U/L (ref 1–41)
ANION GAP SERPL CALCULATED.3IONS-SCNC: 8.6 MMOL/L (ref 5–15)
AST SERPL-CCNC: 21 U/L (ref 1–40)
BASOPHILS # BLD AUTO: 0.09 10*3/MM3 (ref 0–0.2)
BASOPHILS NFR BLD AUTO: 1.1 % (ref 0–1.5)
BILIRUB SERPL-MCNC: 0.5 MG/DL (ref 0–1.2)
BUN SERPL-MCNC: 5 MG/DL (ref 8–23)
BUN/CREAT SERPL: 6.2 (ref 7–25)
CALCIUM SPEC-SCNC: 9.2 MG/DL (ref 8.6–10.5)
CERULOPLASMIN SERPL-MCNC: 26 MG/DL (ref 16–31)
CHLORIDE SERPL-SCNC: 101 MMOL/L (ref 98–107)
CO2 SERPL-SCNC: 28.4 MMOL/L (ref 22–29)
CREAT SERPL-MCNC: 0.81 MG/DL (ref 0.76–1.27)
DEPRECATED RDW RBC AUTO: 39.2 FL (ref 37–54)
EGFRCR SERPLBLD CKD-EPI 2021: 97.2 ML/MIN/1.73
EOSINOPHIL # BLD AUTO: 0.36 10*3/MM3 (ref 0–0.4)
EOSINOPHIL NFR BLD AUTO: 4.4 % (ref 0.3–6.2)
ERYTHROCYTE [DISTWIDTH] IN BLOOD BY AUTOMATED COUNT: 12.1 % (ref 12.3–15.4)
FERRITIN SERPL-MCNC: 115 NG/ML (ref 30–400)
GLOBULIN UR ELPH-MCNC: 3.3 GM/DL
GLUCOSE SERPL-MCNC: 78 MG/DL (ref 65–99)
HCT VFR BLD AUTO: 44.5 % (ref 37.5–51)
HGB BLD-MCNC: 14.5 G/DL (ref 13–17.7)
IMM GRANULOCYTES # BLD AUTO: 0.02 10*3/MM3 (ref 0–0.05)
IMM GRANULOCYTES NFR BLD AUTO: 0.2 % (ref 0–0.5)
INR PPP: 1.47 (ref 0.86–1.15)
IRON 24H UR-MRATE: 41 MCG/DL (ref 59–158)
IRON SATN MFR SERPL: 13 % (ref 20–50)
LYMPHOCYTES # BLD AUTO: 2.18 10*3/MM3 (ref 0.7–3.1)
LYMPHOCYTES NFR BLD AUTO: 26.8 % (ref 19.6–45.3)
MAGNESIUM SERPL-MCNC: 1.5 MG/DL (ref 1.6–2.4)
MCH RBC QN AUTO: 28.7 PG (ref 26.6–33)
MCHC RBC AUTO-ENTMCNC: 32.6 G/DL (ref 31.5–35.7)
MCV RBC AUTO: 88.1 FL (ref 79–97)
MONOCYTES # BLD AUTO: 0.86 10*3/MM3 (ref 0.1–0.9)
MONOCYTES NFR BLD AUTO: 10.6 % (ref 5–12)
NEUTROPHILS NFR BLD AUTO: 4.62 10*3/MM3 (ref 1.7–7)
NEUTROPHILS NFR BLD AUTO: 56.9 % (ref 42.7–76)
NRBC BLD AUTO-RTO: 0 /100 WBC (ref 0–0.2)
PLATELET # BLD AUTO: 345 10*3/MM3 (ref 140–450)
PMV BLD AUTO: 8.8 FL (ref 6–12)
POTASSIUM SERPL-SCNC: 3.3 MMOL/L (ref 3.5–5.2)
PROT SERPL-MCNC: 7.1 G/DL (ref 6–8.5)
PROTHROMBIN TIME: 18.1 SECONDS (ref 11.8–14.9)
PTH-INTACT SERPL-MCNC: 39 PG/ML (ref 15–65)
RBC # BLD AUTO: 5.05 10*6/MM3 (ref 4.14–5.8)
SODIUM SERPL-SCNC: 138 MMOL/L (ref 136–145)
TIBC SERPL-MCNC: 307 MCG/DL (ref 298–536)
TRANSFERRIN SERPL-MCNC: 206 MG/DL (ref 200–360)
WBC NRBC COR # BLD AUTO: 8.13 10*3/MM3 (ref 3.4–10.8)

## 2024-09-16 PROCEDURE — 86015 ACTIN ANTIBODY EACH: CPT | Performed by: FAMILY MEDICINE

## 2024-09-16 PROCEDURE — 82103 ALPHA-1-ANTITRYPSIN TOTAL: CPT | Performed by: FAMILY MEDICINE

## 2024-09-16 PROCEDURE — 1126F AMNT PAIN NOTED NONE PRSNT: CPT | Performed by: FAMILY MEDICINE

## 2024-09-16 PROCEDURE — 36415 COLL VENOUS BLD VENIPUNCTURE: CPT | Performed by: FAMILY MEDICINE

## 2024-09-16 PROCEDURE — 85025 COMPLETE CBC W/AUTO DIFF WBC: CPT | Performed by: FAMILY MEDICINE

## 2024-09-16 PROCEDURE — 86225 DNA ANTIBODY NATIVE: CPT | Performed by: FAMILY MEDICINE

## 2024-09-16 PROCEDURE — 3078F DIAST BP <80 MM HG: CPT | Performed by: FAMILY MEDICINE

## 2024-09-16 PROCEDURE — 86381 MITOCHONDRIAL ANTIBODY EACH: CPT | Performed by: FAMILY MEDICINE

## 2024-09-16 PROCEDURE — 82390 ASSAY OF CERULOPLASMIN: CPT | Performed by: FAMILY MEDICINE

## 2024-09-16 PROCEDURE — 84466 ASSAY OF TRANSFERRIN: CPT | Performed by: FAMILY MEDICINE

## 2024-09-16 PROCEDURE — 3074F SYST BP LT 130 MM HG: CPT | Performed by: FAMILY MEDICINE

## 2024-09-16 PROCEDURE — 1160F RVW MEDS BY RX/DR IN RCRD: CPT | Performed by: FAMILY MEDICINE

## 2024-09-16 PROCEDURE — 80053 COMPREHEN METABOLIC PANEL: CPT | Performed by: FAMILY MEDICINE

## 2024-09-16 PROCEDURE — 99213 OFFICE O/P EST LOW 20 MIN: CPT | Performed by: FAMILY MEDICINE

## 2024-09-16 PROCEDURE — 1159F MED LIST DOCD IN RCRD: CPT | Performed by: FAMILY MEDICINE

## 2024-09-16 PROCEDURE — 85610 PROTHROMBIN TIME: CPT | Performed by: FAMILY MEDICINE

## 2024-09-16 PROCEDURE — 82104 ALPHA-1-ANTITRYPSIN PHENO: CPT | Performed by: FAMILY MEDICINE

## 2024-09-16 PROCEDURE — G2211 COMPLEX E/M VISIT ADD ON: HCPCS | Performed by: FAMILY MEDICINE

## 2024-09-16 PROCEDURE — 83970 ASSAY OF PARATHORMONE: CPT | Performed by: FAMILY MEDICINE

## 2024-09-16 PROCEDURE — 82728 ASSAY OF FERRITIN: CPT | Performed by: FAMILY MEDICINE

## 2024-09-16 PROCEDURE — 83735 ASSAY OF MAGNESIUM: CPT | Performed by: FAMILY MEDICINE

## 2024-09-16 PROCEDURE — 83540 ASSAY OF IRON: CPT | Performed by: FAMILY MEDICINE

## 2024-09-16 PROCEDURE — 86038 ANTINUCLEAR ANTIBODIES: CPT | Performed by: FAMILY MEDICINE

## 2024-09-17 LAB
MITOCHONDRIA M2 IGG SER-ACNC: <20 UNITS (ref 0–20)
SMA IGG SER-ACNC: 7 UNITS (ref 0–19)

## 2024-09-18 LAB
ANA SER QL: POSITIVE
DSDNA AB SER-ACNC: 2 IU/ML (ref 0–9)
Lab: NORMAL

## 2024-09-19 DIAGNOSIS — R53.83 FATIGUE, UNSPECIFIED TYPE: ICD-10-CM

## 2024-09-19 DIAGNOSIS — R76.8 POSITIVE ANA (ANTINUCLEAR ANTIBODY): Primary | ICD-10-CM

## 2024-09-19 DIAGNOSIS — E87.6 HYPOKALEMIA: ICD-10-CM

## 2024-09-19 DIAGNOSIS — M19.90 ARTHRITIS: ICD-10-CM

## 2024-09-19 DIAGNOSIS — E83.42 HYPOMAGNESEMIA: ICD-10-CM

## 2024-09-19 DIAGNOSIS — E61.1 IRON DEFICIENCY: ICD-10-CM

## 2024-09-19 RX ORDER — FERROUS SULFATE 325(65) MG
325 TABLET ORAL
Qty: 30 TABLET | Refills: 2 | Status: SHIPPED | OUTPATIENT
Start: 2024-09-19

## 2024-09-19 RX ORDER — POTASSIUM CHLORIDE 750 MG/1
10 CAPSULE, EXTENDED RELEASE ORAL 2 TIMES DAILY
Qty: 60 CAPSULE | Refills: 0 | Status: SHIPPED | OUTPATIENT
Start: 2024-09-19

## 2024-09-19 RX ORDER — LANOLIN ALCOHOL/MO/W.PET/CERES
400 CREAM (GRAM) TOPICAL 2 TIMES DAILY
Qty: 60 TABLET | Refills: 0 | Status: SHIPPED | OUTPATIENT
Start: 2024-09-19

## 2024-09-25 LAB
A1AT PHENOTYP SERPL IFE: ABNORMAL
A1AT SERPL-MCNC: 228 MG/DL (ref 101–187)

## 2024-09-30 ENCOUNTER — HOSPITAL ENCOUNTER (OUTPATIENT)
Dept: ULTRASOUND IMAGING | Facility: HOSPITAL | Age: 64
Discharge: HOME | End: 2024-09-30
Admitting: FAMILY MEDICINE
Payer: MEDICARE

## 2024-09-30 ENCOUNTER — CLINICAL SUPPORT (OUTPATIENT)
Dept: FAMILY MEDICINE CLINIC | Facility: CLINIC | Age: 64
End: 2024-09-30
Payer: MEDICARE

## 2024-09-30 DIAGNOSIS — Z86.73 RECENT CEREBROVASCULAR ACCIDENT (CVA): ICD-10-CM

## 2024-09-30 DIAGNOSIS — R74.8 ELEVATED ALKALINE PHOSPHATASE LEVEL: ICD-10-CM

## 2024-09-30 DIAGNOSIS — Z79.01 ANTICOAGULATED: Primary | ICD-10-CM

## 2024-09-30 LAB
INR PPP: 2.6 (ref 0.86–1.15)
PROTHROMBIN TIME: 28.3 SECONDS (ref 11.8–14.9)

## 2024-09-30 PROCEDURE — 36415 COLL VENOUS BLD VENIPUNCTURE: CPT | Performed by: FAMILY MEDICINE

## 2024-09-30 PROCEDURE — 76705 ECHO EXAM OF ABDOMEN: CPT

## 2024-09-30 PROCEDURE — 85610 PROTHROMBIN TIME: CPT | Performed by: FAMILY MEDICINE

## 2024-09-30 RX ORDER — WARFARIN SODIUM 4 MG/1
4 TABLET ORAL DAILY
Qty: 90 TABLET | Refills: 0 | Status: SHIPPED | OUTPATIENT
Start: 2024-09-30

## 2024-09-30 NOTE — PROGRESS NOTES
..  Venipuncture Blood Specimen Collection  Venipuncture performed in LT arm by Antonia Armas with good hemostasis. Patient tolerated the procedure well without complications.   09/30/24   Nicole Smith MA

## 2024-10-07 ENCOUNTER — OFFICE VISIT (OUTPATIENT)
Dept: FAMILY MEDICINE CLINIC | Facility: CLINIC | Age: 64
End: 2024-10-07
Payer: MEDICARE

## 2024-10-07 VITALS
DIASTOLIC BLOOD PRESSURE: 70 MMHG | WEIGHT: 145.7 LBS | HEART RATE: 101 BPM | BODY MASS INDEX: 19.76 KG/M2 | OXYGEN SATURATION: 95 % | SYSTOLIC BLOOD PRESSURE: 126 MMHG | TEMPERATURE: 97.6 F

## 2024-10-07 DIAGNOSIS — Z72.0 TOBACCO ABUSE: ICD-10-CM

## 2024-10-07 DIAGNOSIS — E87.6 HYPOKALEMIA: Primary | ICD-10-CM

## 2024-10-07 DIAGNOSIS — R53.1 RIGHT SIDED WEAKNESS: ICD-10-CM

## 2024-10-07 DIAGNOSIS — F32.A ANXIETY AND DEPRESSION: ICD-10-CM

## 2024-10-07 DIAGNOSIS — Z09 HOSPITAL DISCHARGE FOLLOW-UP: ICD-10-CM

## 2024-10-07 DIAGNOSIS — I74.10 AORTIC THROMBUS: ICD-10-CM

## 2024-10-07 DIAGNOSIS — E83.42 HYPOMAGNESEMIA: ICD-10-CM

## 2024-10-07 DIAGNOSIS — R47.01 APHASIA: ICD-10-CM

## 2024-10-07 DIAGNOSIS — F41.9 ANXIETY AND DEPRESSION: ICD-10-CM

## 2024-10-07 DIAGNOSIS — Z86.73 HISTORY OF CVA (CEREBROVASCULAR ACCIDENT): ICD-10-CM

## 2024-10-07 LAB
ANION GAP SERPL CALCULATED.3IONS-SCNC: 8 MMOL/L (ref 5–15)
BUN SERPL-MCNC: 7 MG/DL (ref 8–23)
BUN/CREAT SERPL: 8.2 (ref 7–25)
CALCIUM SPEC-SCNC: 9.3 MG/DL (ref 8.6–10.5)
CHLORIDE SERPL-SCNC: 100 MMOL/L (ref 98–107)
CO2 SERPL-SCNC: 29 MMOL/L (ref 22–29)
CREAT SERPL-MCNC: 0.85 MG/DL (ref 0.76–1.27)
EGFRCR SERPLBLD CKD-EPI 2021: 95.8 ML/MIN/1.73
GLUCOSE SERPL-MCNC: 85 MG/DL (ref 65–99)
INR PPP: 2.65 (ref 0.86–1.15)
MAGNESIUM SERPL-MCNC: 1.7 MG/DL (ref 1.6–2.4)
POTASSIUM SERPL-SCNC: 3.6 MMOL/L (ref 3.5–5.2)
PROTHROMBIN TIME: 28.7 SECONDS (ref 11.8–14.9)
SODIUM SERPL-SCNC: 137 MMOL/L (ref 136–145)

## 2024-10-07 PROCEDURE — 1126F AMNT PAIN NOTED NONE PRSNT: CPT | Performed by: FAMILY MEDICINE

## 2024-10-07 PROCEDURE — 3078F DIAST BP <80 MM HG: CPT | Performed by: FAMILY MEDICINE

## 2024-10-07 PROCEDURE — 1159F MED LIST DOCD IN RCRD: CPT | Performed by: FAMILY MEDICINE

## 2024-10-07 PROCEDURE — 83735 ASSAY OF MAGNESIUM: CPT | Performed by: FAMILY MEDICINE

## 2024-10-07 PROCEDURE — 1160F RVW MEDS BY RX/DR IN RCRD: CPT | Performed by: FAMILY MEDICINE

## 2024-10-07 PROCEDURE — 3074F SYST BP LT 130 MM HG: CPT | Performed by: FAMILY MEDICINE

## 2024-10-07 PROCEDURE — 80048 BASIC METABOLIC PNL TOTAL CA: CPT | Performed by: FAMILY MEDICINE

## 2024-10-07 PROCEDURE — 85610 PROTHROMBIN TIME: CPT | Performed by: FAMILY MEDICINE

## 2024-10-07 RX ORDER — MEMANTINE HYDROCHLORIDE 5 MG/1
5 TABLET ORAL EVERY 12 HOURS SCHEDULED
Qty: 60 TABLET | Refills: 0 | Status: SHIPPED | OUTPATIENT
Start: 2024-10-07

## 2024-10-07 RX ORDER — MIRTAZAPINE 30 MG/1
30 TABLET, FILM COATED ORAL NIGHTLY
Qty: 30 TABLET | Refills: 1 | Status: SHIPPED | OUTPATIENT
Start: 2024-10-07

## 2024-10-07 RX ORDER — UBIDECARENONE 75 MG
100 CAPSULE ORAL DAILY
Status: CANCELLED | OUTPATIENT
Start: 2024-10-07

## 2024-10-07 RX ORDER — BUPROPION HYDROCHLORIDE 150 MG/1
150 TABLET, FILM COATED, EXTENDED RELEASE ORAL 2 TIMES DAILY
Qty: 60 TABLET | Refills: 0 | OUTPATIENT
Start: 2024-10-07

## 2024-10-07 RX ORDER — WARFARIN SODIUM 2 MG/1
TABLET ORAL
Qty: 30 TABLET | Refills: 5 | Status: SHIPPED | OUTPATIENT
Start: 2024-10-07

## 2024-10-07 NOTE — PROGRESS NOTES
Chief Complaint  low potassium and low magnesium    Subjective          Sathya Benavides presents to [facility]  History of Present Illness  Discussed labs  Hypokalemia  Hypomagnesemia    Pt needs INR checked- goal 2-3- pt is on coumadin to 2mg on Thursday, Sunday and 4mg all other days  Pt still has aphasia and slight right facial droop- his insurance has refused to pay for any outpatient physical or sppech therapy- he is going to try to call his insurance to see who they will pay for or when they will pay for it      BMI is within normal parameters. No other follow-up for BMI required.       Objective   Allergies   Allergen Reactions    Alpha-Gal Hives    Adhesive Tape Rash    Amoxicillin-Pot Clavulanate Other (See Comments) and GI Intolerance     Upset stomach     Immunization History   Administered Date(s) Administered    Flu Vaccine Quad PF >36MO 10/17/2017    Pneumococcal Conjugate 20-Valent (PCV20) 01/26/2024    Tdap 05/17/2017     Past Medical History:   Diagnosis Date    Acid reflux     Enlarged prostate     Headache, tension-type     Hyperlipidemia     Lung nodule     PFO (patent foramen ovale)     Stroke 03/2021    LOSS OF PERIPHERAL VISION    Stroke     7/2024      Past Surgical History:   Procedure Laterality Date    APPENDECTOMY      BRONCHOSCOPY Bilateral 4/8/2021    Procedure: BRONCHOSCOPY ENDOBRONCHIAL ULTRASOUND WITH FNA'S;  Surgeon: Sam Tony MD;  Location: [facility] ENDOSCOPY;  Service: Pulmonary;  Laterality: Bilateral;  PRE- LYMPHADENOPATHY  POST- SAME    CARDIAC CATHETERIZATION N/A 10/29/2021    Procedure: Patent foramen ovale closure  ABBOTT;  Surgeon: Sivakumar Chatman MD;  Location: [facility] CATH INVASIVE LOCATION;  Service: Cardiology;  Laterality: N/A;    KNEE ARTHROSCOPY      SHOULDER ARTHROSCOPY        Social History     Socioeconomic History    Marital status:    Tobacco Use    Smoking status: Every Day     Current packs/day: 1.00      Average packs/day: 1 pack/day for 43.8 years (43.8 ttl pk-yrs)     Types: Cigarettes     Start date: 1981    Smokeless tobacco: Never   Vaping Use    Vaping status: Never Used   Substance and Sexual Activity    Alcohol use: Yes     Comment: occasionally    Drug use: Not Currently    Sexual activity: Defer        Current Outpatient Medications:     albuterol sulfate  (90 Base) MCG/ACT inhaler, Inhale 2 puffs Every 4 (Four) Hours As Needed for Wheezing., Disp: 18 g, Rfl: 3    atorvastatin (Lipitor) 80 MG tablet, Take 1 tablet by mouth Daily., Disp: 90 tablet, Rfl: 0    diphenhydrAMINE-zinc acetate 2-0.1 % cream, Apply 1 Application topically to the appropriate area as directed., Disp: , Rfl:     ferrous sulfate 325 (65 FE) MG tablet, Take 1 tablet by mouth Daily With Breakfast., Disp: 30 tablet, Rfl: 2    HYDROcodone-acetaminophen (NORCO) 5-325 MG per tablet, Take 1 tablet by mouth Daily As Needed for Moderate Pain., Disp: 30 tablet, Rfl: 0    hydrOXYzine (ATARAX) 25 MG tablet, Take 1 tablet by mouth Every 8 (Eight) Hours As Needed for Anxiety., Disp: 360 tablet, Rfl: 1    lisinopril (PRINIVIL,ZESTRIL) 5 MG tablet, Take 1 tablet by mouth Daily., Disp: 30 tablet, Rfl: 1    Magnesium Oxide -Mg Supplement (MAGnesium-Oxide) 400 (240 Mg) MG tablet, Take 1 tablet by mouth 2 (Two) Times a Day., Disp: 60 tablet, Rfl: 0    memantine (NAMENDA) 5 MG tablet, Take 1 tablet by mouth Every 12 (Twelve) Hours., Disp: 60 tablet, Rfl: 0    mirtazapine (Remeron) 30 MG tablet, Take 1 tablet by mouth Every Night., Disp: 30 tablet, Rfl: 1    pantoprazole (PROTONIX) 40 MG EC tablet, Take 1 tablet by mouth Daily., Disp: 90 tablet, Rfl: 1    potassium chloride (MICRO-K) 10 MEQ CR capsule, Take 1 capsule by mouth 2 (Two) Times a Day., Disp: 60 capsule, Rfl: 0    tamsulosin (FLOMAX) 0.4 MG capsule 24 hr capsule, Take 2 capsules by mouth once daily, Disp: 180 capsule, Rfl: 0    traZODone (DESYREL) 50 MG tablet, TAKE 1 TABLET BY MOUTH EVERY  DAY AT BEDTIME, Disp: 90 tablet, Rfl: 0    vitamin B-12 (CYANOCOBALAMIN) 100 MCG tablet, Take 1 tablet by mouth Daily., Disp: , Rfl:     warfarin (COUMADIN) 2 MG tablet, Take daily, Disp: 30 tablet, Rfl: 5    warfarin (COUMADIN) 4 MG tablet, Take 1 tablet by mouth Daily., Disp: 90 tablet, Rfl: 0   Family History   Problem Relation Age of Onset    Heart disease Mother     Diabetes Mother     Dementia Father     Colon cancer Brother     No Known Problems Daughter     No Known Problems Daughter     No Known Problems Daughter     No Known Problems Son     Teja Hyperthermia Neg Hx           Vital Signs:   Vitals:    10/07/24 1426   BP: 126/70   Pulse: 101   Temp: 97.6 °F (36.4 °C)   SpO2: 95%   Weight: 66.1 kg (145 lb 11.2 oz)       Review of Systems   Constitutional:  Negative for fatigue and fever.   HENT:  Negative for sore throat.    Eyes:  Negative for visual disturbance.   Respiratory:  Negative for cough, chest tightness, shortness of breath and wheezing.    Cardiovascular:  Negative for chest pain, palpitations and leg swelling.   Gastrointestinal:  Negative for abdominal pain, diarrhea, nausea and vomiting.   Skin:  Negative for rash.   Neurological:  Negative for dizziness, light-headedness and headaches.      Physical Exam  Vitals reviewed.   Constitutional:       Appearance: Normal appearance. He is well-developed.   HENT:      Head: Normocephalic and atraumatic.      Right Ear: External ear normal.      Left Ear: External ear normal.      Mouth/Throat:      Pharynx: No oropharyngeal exudate.   Eyes:      Conjunctiva/sclera: Conjunctivae normal.      Pupils: Pupils are equal, round, and reactive to light.   Cardiovascular:      Rate and Rhythm: Normal rate and regular rhythm.      Pulses: Normal pulses.      Heart sounds: Normal heart sounds. No murmur heard.     No friction rub. No gallop.   Pulmonary:      Effort: Pulmonary effort is normal.      Breath sounds: Normal breath sounds. No wheezing or rhonchi.    Abdominal:      General: Abdomen is flat. Bowel sounds are normal. There is no distension.      Palpations: Abdomen is soft. There is no mass.      Tenderness: There is no abdominal tenderness. There is no guarding or rebound.      Hernia: No hernia is present.   Musculoskeletal:         General: Normal range of motion.      Comments: Strength 5/5 in all ext- still has slight right facial droop, has aphasia.   Skin:     General: Skin is warm and dry.      Capillary Refill: Capillary refill takes less than 2 seconds.   Neurological:      General: No focal deficit present.      Mental Status: He is alert and oriented to person, place, and time.      Cranial Nerves: No cranial nerve deficit.      Sensory: No sensory deficit.      Motor: No weakness.      Coordination: Coordination normal.      Gait: Gait normal.   Psychiatric:         Mood and Affect: Mood and affect normal.         Behavior: Behavior normal.         Thought Content: Thought content normal.         Judgment: Judgment normal.        Result Review :   The following data was reviewed by: Adi Kaminski MD on 10/07/2024:  CMP          8/30/2024    16:11 9/9/2024    15:33 9/16/2024    17:05   CMP   Glucose 142  89  78    BUN 6  5  5    Creatinine 0.78  0.76  0.81    EGFR 98.4  99.1  97.2    Sodium 137  136  138    Potassium 3.0  3.6  3.3    Chloride 100  97  101    Calcium 9.1  9.8  9.2    Total Protein 7.1  7.9  7.1    Albumin 3.9  4.0  3.8    Globulin 3.2  3.9  3.3    Total Bilirubin 0.5  0.7  0.5    Alkaline Phosphatase 135  161  157    AST (SGOT) 32  27  21    ALT (SGPT) 16  21  15    Albumin/Globulin Ratio 1.2  1.0  1.2    BUN/Creatinine Ratio 7.7  6.6  6.2    Anion Gap 12.1  13.4  8.6      CBC          8/30/2024    16:11 9/9/2024    15:33 9/16/2024    17:05   CBC   WBC 9.15  10.97  8.13    RBC 4.62  5.29  5.05    Hemoglobin 13.5  15.3  14.5    Hematocrit 39.6  45.6  44.5    MCV 85.7  86.2  88.1    MCH 29.2  28.9  28.7    MCHC 34.1  33.6  32.6     RDW 12.8  12.1  12.1    Platelets 225  391  345      Lipid Panel          1/26/2024    13:50   Lipid Panel   Total Cholesterol 193    Triglycerides 90    HDL Cholesterol 31    VLDL Cholesterol 17    LDL Cholesterol  145    LDL/HDL Ratio 4.65      TSH          1/26/2024    13:50 8/15/2024    14:45 8/29/2024    09:26   TSH   TSH 3.540  3.570  3.780                Assessment and Plan    Diagnoses and all orders for this visit:    1. Hypokalemia (Primary)  -     Basic Metabolic Panel    2. History of CVA (cerebrovascular accident)  -     memantine (NAMENDA) 5 MG tablet; Take 1 tablet by mouth Every 12 (Twelve) Hours.  Dispense: 60 tablet; Refill: 0  -     Protime-INR  -     Ambulatory Referral to Neurology    3. Aortic thrombus  -     warfarin (COUMADIN) 2 MG tablet; Take daily  Dispense: 30 tablet; Refill: 5  -     Protime-INR    4. Anxiety and depression  -     mirtazapine (Remeron) 30 MG tablet; Take 1 tablet by mouth Every Night.  Dispense: 30 tablet; Refill: 1    5. Hypomagnesemia  -     Magnesium    6. Aphasia  -     Ambulatory Referral to Neurology    7. Right sided weakness  -     Ambulatory Referral to Neurology    8. Hospital discharge follow-up  -     Ambulatory Referral to Neurology            Follow Up   Return in about 1 month (around 11/7/2024) for Recheck.  Patient was given instructions and counseling regarding his condition or for health maintenance advice. Please see specific information pulled into the AVS if appropriate.        breastfeeding exclusively

## 2024-10-07 NOTE — PROGRESS NOTES
..  Venipuncture Blood Specimen Collection  Venipuncture performed in LT arm by Antonia Armas with good hemostasis. Patient tolerated the procedure well without complications.   10/07/24   Nicole Smith MA

## 2024-10-08 NOTE — PROGRESS NOTES
Call pt: labs show electrolytes are back to normal.  INR is 2.65, so continue current dose of coumadin as you have been doing and recheck INR in 2 weeks.

## 2024-10-11 RX ORDER — WARFARIN SODIUM 4 MG/1
4 TABLET ORAL DAILY
Qty: 90 TABLET | Refills: 0 | Status: SHIPPED | OUTPATIENT
Start: 2024-10-11

## 2024-10-22 ENCOUNTER — TELEPHONE (OUTPATIENT)
Dept: FAMILY MEDICINE CLINIC | Facility: CLINIC | Age: 64
End: 2024-10-22
Payer: MEDICARE

## 2024-10-22 DIAGNOSIS — I74.10 AORTIC THROMBUS: Primary | ICD-10-CM

## 2024-10-22 NOTE — TELEPHONE ENCOUNTER
Pt's wife called and asked if we would put in an order in for an INR.    She also asked if his bp reading of 112/58 was okay. She checked his blood pressure because she said he is feeling weak. I told her it was okay, but to go to ER if symptoms worsen or make an apt for tomorrow if he still feels bad.

## 2024-10-23 ENCOUNTER — LAB (OUTPATIENT)
Dept: LAB | Facility: HOSPITAL | Age: 64
End: 2024-10-23
Payer: MEDICARE

## 2024-10-23 ENCOUNTER — TELEPHONE (OUTPATIENT)
Dept: FAMILY MEDICINE CLINIC | Facility: CLINIC | Age: 64
End: 2024-10-23
Payer: MEDICARE

## 2024-10-23 DIAGNOSIS — R79.1 ELEVATED INR: Primary | ICD-10-CM

## 2024-10-23 LAB
INR PPP: 4.55 (ref 0.93–1.1)
PROTHROMBIN TIME: 44.3 SECONDS (ref 9.6–11.7)

## 2024-10-23 PROCEDURE — 85610 PROTHROMBIN TIME: CPT | Performed by: FAMILY MEDICINE

## 2024-10-23 NOTE — TELEPHONE ENCOUNTER
I spoke with pt and he is taking 2 mg Thur and Sunday and 4 mg all other days.  . He will not take any today and recheck tomorrow

## 2024-10-23 NOTE — PROGRESS NOTES
Call pt: INR is 4.55.  He needs to hold hi coumadin and eat some green vegetables. If he has any headache or bleeding, or black stools, then he needs to go straight to ER.

## 2024-10-24 ENCOUNTER — HOSPITAL ENCOUNTER (OUTPATIENT)
Facility: HOSPITAL | Age: 64
Setting detail: OBSERVATION
Discharge: HOME | End: 2024-10-25
Attending: EMERGENCY MEDICINE | Admitting: STUDENT IN AN ORGANIZED HEALTH CARE EDUCATION/TRAINING PROGRAM
Payer: MEDICARE

## 2024-10-24 ENCOUNTER — APPOINTMENT (OUTPATIENT)
Dept: GENERAL RADIOLOGY | Facility: HOSPITAL | Age: 64
End: 2024-10-24
Payer: MEDICARE

## 2024-10-24 ENCOUNTER — NURSE TRIAGE (OUTPATIENT)
Dept: CALL CENTER | Facility: HOSPITAL | Age: 64
End: 2024-10-24
Payer: MEDICARE

## 2024-10-24 ENCOUNTER — LAB (OUTPATIENT)
Dept: LAB | Facility: HOSPITAL | Age: 64
End: 2024-10-24
Payer: MEDICARE

## 2024-10-24 ENCOUNTER — TELEPHONE (OUTPATIENT)
Dept: FAMILY MEDICINE CLINIC | Facility: CLINIC | Age: 64
End: 2024-10-24

## 2024-10-24 DIAGNOSIS — I48.0 PAROXYSMAL ATRIAL FIBRILLATION: Primary | ICD-10-CM

## 2024-10-24 DIAGNOSIS — R79.1 ELEVATED INR: ICD-10-CM

## 2024-10-24 DIAGNOSIS — R79.89 ELEVATED TROPONIN: ICD-10-CM

## 2024-10-24 PROBLEM — I48.91 NEW ONSET A-FIB: Status: ACTIVE | Noted: 2024-10-24

## 2024-10-24 LAB
ANION GAP SERPL CALCULATED.3IONS-SCNC: 7.8 MMOL/L (ref 5–15)
ANION GAP SERPL CALCULATED.3IONS-SCNC: 9.6 MMOL/L (ref 5–15)
BASOPHILS # BLD AUTO: 0.07 10*3/MM3 (ref 0–0.2)
BASOPHILS # BLD AUTO: 0.11 10*3/MM3 (ref 0–0.2)
BASOPHILS NFR BLD AUTO: 0.8 % (ref 0–1.5)
BASOPHILS NFR BLD AUTO: 1 % (ref 0–1.5)
BUN SERPL-MCNC: 7 MG/DL (ref 8–23)
BUN SERPL-MCNC: 8 MG/DL (ref 8–23)
BUN/CREAT SERPL: 6.3 (ref 7–25)
BUN/CREAT SERPL: 9.8 (ref 7–25)
CALCIUM SPEC-SCNC: 8.7 MG/DL (ref 8.6–10.5)
CALCIUM SPEC-SCNC: 9.3 MG/DL (ref 8.6–10.5)
CHLORIDE SERPL-SCNC: 101 MMOL/L (ref 98–107)
CHLORIDE SERPL-SCNC: 98 MMOL/L (ref 98–107)
CHOLEST SERPL-MCNC: 107 MG/DL (ref 0–200)
CO2 SERPL-SCNC: 26.2 MMOL/L (ref 22–29)
CO2 SERPL-SCNC: 26.4 MMOL/L (ref 22–29)
CREAT SERPL-MCNC: 0.82 MG/DL (ref 0.76–1.27)
CREAT SERPL-MCNC: 1.12 MG/DL (ref 0.76–1.27)
DEPRECATED RDW RBC AUTO: 41 FL (ref 37–54)
DEPRECATED RDW RBC AUTO: 41 FL (ref 37–54)
EGFRCR SERPLBLD CKD-EPI 2021: 72.5 ML/MIN/1.73
EGFRCR SERPLBLD CKD-EPI 2021: 96.9 ML/MIN/1.73
EOSINOPHIL # BLD AUTO: 0.14 10*3/MM3 (ref 0–0.4)
EOSINOPHIL # BLD AUTO: 0.27 10*3/MM3 (ref 0–0.4)
EOSINOPHIL NFR BLD AUTO: 1.3 % (ref 0.3–6.2)
EOSINOPHIL NFR BLD AUTO: 3.1 % (ref 0.3–6.2)
ERYTHROCYTE [DISTWIDTH] IN BLOOD BY AUTOMATED COUNT: 12.4 % (ref 12.3–15.4)
ERYTHROCYTE [DISTWIDTH] IN BLOOD BY AUTOMATED COUNT: 12.6 % (ref 12.3–15.4)
GEN 5 2HR TROPONIN T REFLEX: 29 NG/L
GLUCOSE SERPL-MCNC: 122 MG/DL (ref 65–99)
GLUCOSE SERPL-MCNC: 141 MG/DL (ref 65–99)
HBA1C MFR BLD: 5.96 % (ref 4.8–5.6)
HCT VFR BLD AUTO: 39.3 % (ref 37.5–51)
HCT VFR BLD AUTO: 47.3 % (ref 37.5–51)
HDLC SERPL-MCNC: 28 MG/DL (ref 40–60)
HGB BLD-MCNC: 12.9 G/DL (ref 13–17.7)
HGB BLD-MCNC: 15 G/DL (ref 13–17.7)
HOLD SPECIMEN: NORMAL
HOLD SPECIMEN: NORMAL
IMM GRANULOCYTES # BLD AUTO: 0.02 10*3/MM3 (ref 0–0.05)
IMM GRANULOCYTES # BLD AUTO: 0.03 10*3/MM3 (ref 0–0.05)
IMM GRANULOCYTES NFR BLD AUTO: 0.2 % (ref 0–0.5)
IMM GRANULOCYTES NFR BLD AUTO: 0.3 % (ref 0–0.5)
INR PPP: 2.8 (ref 2–3)
INR PPP: 2.87 (ref 0.93–1.1)
LDLC SERPL CALC-MCNC: 56 MG/DL (ref 0–100)
LDLC/HDLC SERPL: 1.91 {RATIO}
LYMPHOCYTES # BLD AUTO: 1.81 10*3/MM3 (ref 0.7–3.1)
LYMPHOCYTES # BLD AUTO: 2.37 10*3/MM3 (ref 0.7–3.1)
LYMPHOCYTES NFR BLD AUTO: 16.2 % (ref 19.6–45.3)
LYMPHOCYTES NFR BLD AUTO: 27 % (ref 19.6–45.3)
MAGNESIUM SERPL-MCNC: 1.7 MG/DL (ref 1.6–2.4)
MAGNESIUM SERPL-MCNC: 1.7 MG/DL (ref 1.6–2.4)
MCH RBC QN AUTO: 27.9 PG (ref 26.6–33)
MCH RBC QN AUTO: 29.3 PG (ref 26.6–33)
MCHC RBC AUTO-ENTMCNC: 31.7 G/DL (ref 31.5–35.7)
MCHC RBC AUTO-ENTMCNC: 32.8 G/DL (ref 31.5–35.7)
MCV RBC AUTO: 88.1 FL (ref 79–97)
MCV RBC AUTO: 89.1 FL (ref 79–97)
MONOCYTES # BLD AUTO: 0.76 10*3/MM3 (ref 0.1–0.9)
MONOCYTES # BLD AUTO: 0.92 10*3/MM3 (ref 0.1–0.9)
MONOCYTES NFR BLD AUTO: 8.2 % (ref 5–12)
MONOCYTES NFR BLD AUTO: 8.7 % (ref 5–12)
NEUTROPHILS NFR BLD AUTO: 5.29 10*3/MM3 (ref 1.7–7)
NEUTROPHILS NFR BLD AUTO: 60.2 % (ref 42.7–76)
NEUTROPHILS NFR BLD AUTO: 73 % (ref 42.7–76)
NEUTROPHILS NFR BLD AUTO: 8.16 10*3/MM3 (ref 1.7–7)
NRBC BLD AUTO-RTO: 0 /100 WBC (ref 0–0.2)
NRBC BLD AUTO-RTO: 0 /100 WBC (ref 0–0.2)
PLATELET # BLD AUTO: 219 10*3/MM3 (ref 140–450)
PLATELET # BLD AUTO: 267 10*3/MM3 (ref 140–450)
PMV BLD AUTO: 8.5 FL (ref 6–12)
PMV BLD AUTO: 8.6 FL (ref 6–12)
POTASSIUM SERPL-SCNC: 3.7 MMOL/L (ref 3.5–5.2)
POTASSIUM SERPL-SCNC: 4 MMOL/L (ref 3.5–5.2)
PROTHROMBIN TIME: 28.3 SECONDS (ref 19.4–28.5)
PROTHROMBIN TIME: 28.9 SECONDS (ref 9.6–11.7)
RBC # BLD AUTO: 4.41 10*6/MM3 (ref 4.14–5.8)
RBC # BLD AUTO: 5.37 10*6/MM3 (ref 4.14–5.8)
SODIUM SERPL-SCNC: 134 MMOL/L (ref 136–145)
SODIUM SERPL-SCNC: 135 MMOL/L (ref 136–145)
T4 FREE SERPL-MCNC: 1.15 NG/DL (ref 0.93–1.7)
TRIGL SERPL-MCNC: 128 MG/DL (ref 0–150)
TROPONIN T DELTA: 10 NG/L
TROPONIN T SERPL HS-MCNC: 19 NG/L
TROPONIN T SERPL HS-MCNC: 37 NG/L
TSH SERPL DL<=0.05 MIU/L-ACNC: 3.5 UIU/ML (ref 0.27–4.2)
VLDLC SERPL-MCNC: 23 MG/DL (ref 5–40)
WBC NRBC COR # BLD AUTO: 11.17 10*3/MM3 (ref 3.4–10.8)
WBC NRBC COR # BLD AUTO: 8.78 10*3/MM3 (ref 3.4–10.8)
WHOLE BLOOD HOLD COAG: NORMAL
WHOLE BLOOD HOLD SPECIMEN: NORMAL

## 2024-10-24 PROCEDURE — 87481 CANDIDA DNA AMP PROBE: CPT | Performed by: NURSE PRACTITIONER

## 2024-10-24 PROCEDURE — 83735 ASSAY OF MAGNESIUM: CPT | Performed by: NURSE PRACTITIONER

## 2024-10-24 PROCEDURE — 82550 ASSAY OF CK (CPK): CPT | Performed by: NURSE PRACTITIONER

## 2024-10-24 PROCEDURE — 85610 PROTHROMBIN TIME: CPT | Performed by: NURSE PRACTITIONER

## 2024-10-24 PROCEDURE — 99285 EMERGENCY DEPT VISIT HI MDM: CPT

## 2024-10-24 PROCEDURE — G0378 HOSPITAL OBSERVATION PER HR: HCPCS

## 2024-10-24 PROCEDURE — 83735 ASSAY OF MAGNESIUM: CPT | Performed by: EMERGENCY MEDICINE

## 2024-10-24 PROCEDURE — 85025 COMPLETE CBC W/AUTO DIFF WBC: CPT | Performed by: EMERGENCY MEDICINE

## 2024-10-24 PROCEDURE — 84484 ASSAY OF TROPONIN QUANT: CPT | Performed by: NURSE PRACTITIONER

## 2024-10-24 PROCEDURE — 71045 X-RAY EXAM CHEST 1 VIEW: CPT

## 2024-10-24 PROCEDURE — 93005 ELECTROCARDIOGRAM TRACING: CPT | Performed by: EMERGENCY MEDICINE

## 2024-10-24 PROCEDURE — 85025 COMPLETE CBC W/AUTO DIFF WBC: CPT | Performed by: NURSE PRACTITIONER

## 2024-10-24 PROCEDURE — 84443 ASSAY THYROID STIM HORMONE: CPT | Performed by: EMERGENCY MEDICINE

## 2024-10-24 PROCEDURE — 83036 HEMOGLOBIN GLYCOSYLATED A1C: CPT | Performed by: NURSE PRACTITIONER

## 2024-10-24 PROCEDURE — 84439 ASSAY OF FREE THYROXINE: CPT | Performed by: EMERGENCY MEDICINE

## 2024-10-24 PROCEDURE — 85610 PROTHROMBIN TIME: CPT

## 2024-10-24 PROCEDURE — 25810000003 LACTATED RINGERS SOLUTION: Performed by: EMERGENCY MEDICINE

## 2024-10-24 PROCEDURE — 80048 BASIC METABOLIC PNL TOTAL CA: CPT | Performed by: NURSE PRACTITIONER

## 2024-10-24 PROCEDURE — 84484 ASSAY OF TROPONIN QUANT: CPT | Performed by: EMERGENCY MEDICINE

## 2024-10-24 PROCEDURE — 80061 LIPID PANEL: CPT | Performed by: NURSE PRACTITIONER

## 2024-10-24 PROCEDURE — 80048 BASIC METABOLIC PNL TOTAL CA: CPT | Performed by: EMERGENCY MEDICINE

## 2024-10-24 PROCEDURE — 36415 COLL VENOUS BLD VENIPUNCTURE: CPT

## 2024-10-24 RX ORDER — LISINOPRIL 5 MG/1
5 TABLET ORAL DAILY
Status: DISCONTINUED | OUTPATIENT
Start: 2024-10-25 | End: 2024-10-25

## 2024-10-24 RX ORDER — TAMSULOSIN HYDROCHLORIDE 0.4 MG/1
0.8 CAPSULE ORAL DAILY
Status: DISCONTINUED | OUTPATIENT
Start: 2024-10-25 | End: 2024-10-25 | Stop reason: HOSPADM

## 2024-10-24 RX ORDER — SODIUM CHLORIDE 0.9 % (FLUSH) 0.9 %
10 SYRINGE (ML) INJECTION AS NEEDED
Status: DISCONTINUED | OUTPATIENT
Start: 2024-10-24 | End: 2024-10-25 | Stop reason: HOSPADM

## 2024-10-24 RX ORDER — PANTOPRAZOLE SODIUM 40 MG/1
40 TABLET, DELAYED RELEASE ORAL DAILY
Status: DISCONTINUED | OUTPATIENT
Start: 2024-10-24 | End: 2024-10-25 | Stop reason: HOSPADM

## 2024-10-24 RX ORDER — ACETAMINOPHEN 325 MG/1
650 TABLET ORAL EVERY 4 HOURS PRN
Status: DISCONTINUED | OUTPATIENT
Start: 2024-10-24 | End: 2024-10-25 | Stop reason: HOSPADM

## 2024-10-24 RX ORDER — ATORVASTATIN CALCIUM 40 MG/1
80 TABLET, FILM COATED ORAL DAILY
Status: DISCONTINUED | OUTPATIENT
Start: 2024-10-25 | End: 2024-10-25 | Stop reason: HOSPADM

## 2024-10-24 RX ORDER — NITROGLYCERIN 0.4 MG/1
0.4 TABLET SUBLINGUAL
Status: DISCONTINUED | OUTPATIENT
Start: 2024-10-24 | End: 2024-10-25 | Stop reason: HOSPADM

## 2024-10-24 RX ORDER — NICOTINE 21 MG/24HR
1 PATCH, TRANSDERMAL 24 HOURS TRANSDERMAL NIGHTLY
Status: DISCONTINUED | OUTPATIENT
Start: 2024-10-24 | End: 2024-10-25 | Stop reason: HOSPADM

## 2024-10-24 RX ORDER — MEMANTINE HYDROCHLORIDE 5 MG/1
5 TABLET ORAL EVERY 12 HOURS SCHEDULED
Status: DISCONTINUED | OUTPATIENT
Start: 2024-10-24 | End: 2024-10-25 | Stop reason: HOSPADM

## 2024-10-24 RX ORDER — HYDROXYZINE HYDROCHLORIDE 25 MG/1
25 TABLET, FILM COATED ORAL EVERY 8 HOURS PRN
Status: DISCONTINUED | OUTPATIENT
Start: 2024-10-24 | End: 2024-10-25 | Stop reason: HOSPADM

## 2024-10-24 RX ORDER — WARFARIN SODIUM 2 MG/1
2 TABLET ORAL 3 TIMES WEEKLY
COMMUNITY

## 2024-10-24 RX ORDER — WARFARIN SODIUM 4 MG/1
4 TABLET ORAL
COMMUNITY

## 2024-10-24 RX ORDER — TRAZODONE HYDROCHLORIDE 50 MG/1
50 TABLET, FILM COATED ORAL NIGHTLY PRN
Status: DISCONTINUED | OUTPATIENT
Start: 2024-10-24 | End: 2024-10-25 | Stop reason: HOSPADM

## 2024-10-24 RX ORDER — BISACODYL 5 MG/1
5 TABLET, DELAYED RELEASE ORAL DAILY PRN
Status: DISCONTINUED | OUTPATIENT
Start: 2024-10-24 | End: 2024-10-24

## 2024-10-24 RX ORDER — MIRTAZAPINE 15 MG/1
30 TABLET, FILM COATED ORAL NIGHTLY
Status: DISCONTINUED | OUTPATIENT
Start: 2024-10-24 | End: 2024-10-25 | Stop reason: HOSPADM

## 2024-10-24 RX ORDER — POLYETHYLENE GLYCOL 3350 17 G/17G
17 POWDER, FOR SOLUTION ORAL DAILY PRN
Status: DISCONTINUED | OUTPATIENT
Start: 2024-10-24 | End: 2024-10-24

## 2024-10-24 RX ORDER — AMOXICILLIN 250 MG
2 CAPSULE ORAL 2 TIMES DAILY PRN
Status: DISCONTINUED | OUTPATIENT
Start: 2024-10-24 | End: 2024-10-24

## 2024-10-24 RX ORDER — BISACODYL 10 MG
10 SUPPOSITORY, RECTAL RECTAL DAILY PRN
Status: DISCONTINUED | OUTPATIENT
Start: 2024-10-24 | End: 2024-10-24

## 2024-10-24 RX ORDER — SODIUM CHLORIDE 0.9 % (FLUSH) 0.9 %
10 SYRINGE (ML) INJECTION EVERY 12 HOURS SCHEDULED
Status: DISCONTINUED | OUTPATIENT
Start: 2024-10-24 | End: 2024-10-25 | Stop reason: HOSPADM

## 2024-10-24 RX ORDER — ONDANSETRON 2 MG/ML
4 INJECTION INTRAMUSCULAR; INTRAVENOUS EVERY 6 HOURS PRN
Status: DISCONTINUED | OUTPATIENT
Start: 2024-10-24 | End: 2024-10-25 | Stop reason: HOSPADM

## 2024-10-24 RX ORDER — FERROUS SULFATE 324(65)MG
324 TABLET, DELAYED RELEASE (ENTERIC COATED) ORAL
Status: DISCONTINUED | OUTPATIENT
Start: 2024-10-25 | End: 2024-10-25 | Stop reason: HOSPADM

## 2024-10-24 RX ADMIN — NICOTINE 1 PATCH: 21 PATCH TRANSDERMAL at 22:15

## 2024-10-24 RX ADMIN — TRAZODONE HYDROCHLORIDE 50 MG: 50 TABLET ORAL at 22:15

## 2024-10-24 RX ADMIN — Medication 10 ML: at 22:15

## 2024-10-24 RX ADMIN — MEMANTINE HYDROCHLORIDE 5 MG: 5 TABLET ORAL at 22:15

## 2024-10-24 RX ADMIN — SODIUM CHLORIDE, POTASSIUM CHLORIDE, SODIUM LACTATE AND CALCIUM CHLORIDE 500 ML: 600; 310; 30; 20 INJECTION, SOLUTION INTRAVENOUS at 17:16

## 2024-10-24 RX ADMIN — MIRTAZAPINE 30 MG: 15 TABLET, FILM COATED ORAL at 22:15

## 2024-10-24 NOTE — Clinical Note
Level of Care: Telemetry [5]   Diagnosis: New onset a-fib [636920]   Admitting Physician: LLANES ALVAREZ, CARLOS [[id number]]   Attending Physician: LLANES ALVAREZ, CARLOS [[id number]]

## 2024-10-24 NOTE — TELEPHONE ENCOUNTER
Patients wife called stating when patient had his INR drawn today at [facility] he almost passed out. Patient was given orange juice and vomited that. Patient also had an episode of diarrhea during this. Patient was advised with his symptoms and past medical history that he needed to be seen in the ER. Patient and his wife agreed to go to [facility] ER.

## 2024-10-24 NOTE — ED NOTES
Patient had blood drawn this morning and had a syncopal episode at that time. Patient went home and called MD and MD told them to come get assessed at the ER. Patient is tachycardiac upon arrival but no other complaints.

## 2024-10-24 NOTE — TELEPHONE ENCOUNTER
"Episode of vomiting, incontinent diarrhea, confusion while getting blood drawn at lab today. Patient's wife reports near-syncope. Patient weak. Feeling somewhat better but not at baseline. Reviewed protocol with patient's wife. Wishes to speak with Dr. Kaminski's office. Connected patient's wife with Dr. Kaminski's office.    Reason for Disposition   [1] All other patients AND [2] now alert and feels fine  (Exception: SIMPLE FAINT due to stress, pain, prolonged standing, or suddenly standing)    Additional Information   Negative: Still unconscious   Negative: Difficult to awaken or acting confused (e.g., disoriented, slurred speech)   Negative: Shock suspected (e.g., cold/pale/clammy skin, too weak to stand, low BP, rapid pulse)   Negative: Difficulty breathing   Negative: Bluish (or gray) lips or face now   Negative: Chest pain   Negative: Extra heartbeats, irregular heart beating, or heart is beating very fast  (i.e., \"palpitations\")   Negative: Bleeding (e.g., vomiting blood, rectal bleeding or tarry stools, severe vaginal bleeding)  (Exception: Fainted from sight of small amount of blood; small cut or abrasion.)   Negative: Fainted suddenly after medicine, allergic food or bee sting   Negative: Age > 50 years  (Exception: Occurred > 1 hour ago AND now feels completely fine.)   Negative: History of heart problems (e.g., congestive heart failure, heart attack)   Negative: [1] Fainted > 15 minutes ago AND [2] still feels too weak or dizzy to stand   Negative: Sounds like a life-threatening emergency to the triager   Negative: [1] Diabetes mellitus AND [2] fainting from low blood glucose (i.e.,  70 mg/dl [3.9 mmol/l] or below)   Negative: Seizure suspected (e.g., muscle jerking or shaking followed by confusion)   Negative: Heat exhaustion suspected (i.e., dehydration from heat exposure)   Negative: [1] Fainted > 15 minutes ago AND [2] still looks pale (pale skin, pallor)   Negative: [1] Fainted > 15 minutes ago AND [2] " "still feels weak or dizzy   Negative: Occurred during exercise   Negative: Any head or face injury   Negative: Pregnant or possibly pregnant   Negative: Fainted 2 times in one day   Negative: [1] Drinking very little AND [2] dehydration suspected (e.g., no urine > 12 hours, very dry mouth, very lightheaded)   Negative: [1] Age > 50 years  AND [2] now alert and feels fine   Negative: Patient sounds very sick or weak to the triager    Answer Assessment - Initial Assessment Questions  1. ONSET: \"How long were you unconscious?\" (minutes) \"When did it happen?\"      Did not lose consciousness, near-syncope  2. CONTENT: \"What happened during period of unconsciousness?\" (e.g., seizure activity)       Patient felt \"out of it,\" vomited, incontinent of brooks  3. MENTAL STATUS: \"Alert and oriented now?\" (oriented x 3 = name, month, location)       Alert and oriented now  4. TRIGGER: \"What do you think caused the fainting?\" \"What were you doing just before you fainted?\"  (e.g., exercise, sudden standing up, prolonged standing)      Was getting blood drawn  5. RECURRENT SYMPTOM: \"Have you ever passed out before?\" If Yes, ask: \"When was the last time?\" and \"What happened that time?\"       No   6. INJURY: \"Did you sustain any injury during the fall?\"       No fall  7. CARDIAC SYMPTOMS: \"Have you had any of the following symptoms: chest pain, difficulty breathing, palpitations?\"      Difficulty breathing  8. NEUROLOGIC SYMPTOMS: \"Have you had any of the following symptoms: headache, numbness, vertigo, weakness?\"      Generalized weakness  9. GI SYMPTOMS: \"Have you had any of the following symptoms: abdomen pain, vomiting, diarrhea, blood in stools?\"      Vomiting, diarrhea  10. OTHER SYMPTOMS: \"Do you have any other symptoms?\"        No   11. PREGNANCY: \"Is there any chance you are pregnant?\" \"When was your last menstrual period?\"        N/A    Protocols used: Fainting-ADULT-AH    "

## 2024-10-25 ENCOUNTER — APPOINTMENT (OUTPATIENT)
Dept: CARDIOLOGY | Facility: HOSPITAL | Age: 64
End: 2024-10-25
Payer: MEDICARE

## 2024-10-25 ENCOUNTER — TELEPHONE (OUTPATIENT)
Dept: FAMILY MEDICINE CLINIC | Facility: CLINIC | Age: 64
End: 2024-10-25
Payer: MEDICARE

## 2024-10-25 ENCOUNTER — READMISSION MANAGEMENT (OUTPATIENT)
Dept: CALL CENTER | Facility: HOSPITAL | Age: 64
End: 2024-10-25
Payer: MEDICARE

## 2024-10-25 VITALS
BODY MASS INDEX: 18.61 KG/M2 | DIASTOLIC BLOOD PRESSURE: 64 MMHG | HEIGHT: 74 IN | TEMPERATURE: 98.7 F | OXYGEN SATURATION: 97 % | WEIGHT: 145 LBS | SYSTOLIC BLOOD PRESSURE: 147 MMHG | HEART RATE: 75 BPM | RESPIRATION RATE: 10 BRPM

## 2024-10-25 LAB
AORTIC DIMENSIONLESS INDEX: 1.01 (DI)
BACTERIA UR QL AUTO: ABNORMAL /HPF
BH CV ECHO LEFT VENTRICLE GLOBAL LONGITUDINAL STRAIN: -15.5 %
BH CV ECHO MEAS - ACS: 1.9 CM
BH CV ECHO MEAS - AO MAX PG: 4.4 MMHG
BH CV ECHO MEAS - AO MEAN PG: 3 MMHG
BH CV ECHO MEAS - AO ROOT DIAM: 3.1 CM
BH CV ECHO MEAS - AO V2 MAX: 105 CM/SEC
BH CV ECHO MEAS - AO V2 VTI: 20.1 CM
BH CV ECHO MEAS - AVA(I,D): 3.3 CM2
BH CV ECHO MEAS - EDV(CUBED): 68.9 ML
BH CV ECHO MEAS - EDV(MOD-SP4): 91.5 ML
BH CV ECHO MEAS - EF(MOD-BP): 56.9 %
BH CV ECHO MEAS - EF(MOD-SP4): 56.9 %
BH CV ECHO MEAS - ESV(CUBED): 22 ML
BH CV ECHO MEAS - ESV(MOD-SP4): 39.4 ML
BH CV ECHO MEAS - FS: 31.7 %
BH CV ECHO MEAS - IVS/LVPW: 1.13 CM
BH CV ECHO MEAS - IVSD: 0.9 CM
BH CV ECHO MEAS - LA DIMENSION: 2.5 CM
BH CV ECHO MEAS - LAT PEAK E' VEL: 15.1 CM/SEC
BH CV ECHO MEAS - LV DIASTOLIC VOL/BSA (35-75): 266.8 CM2
BH CV ECHO MEAS - LV MASS(C)D: 105.6 GRAMS
BH CV ECHO MEAS - LV MAX PG: 4.2 MMHG
BH CV ECHO MEAS - LV MEAN PG: 2 MMHG
BH CV ECHO MEAS - LV SYSTOLIC VOL/BSA (12-30): 114.9 CM2
BH CV ECHO MEAS - LV V1 MAX: 102 CM/SEC
BH CV ECHO MEAS - LV V1 VTI: 20.9 CM
BH CV ECHO MEAS - LVIDD: 4.1 CM
BH CV ECHO MEAS - LVIDS: 2.8 CM
BH CV ECHO MEAS - LVOT AREA: 3.1 CM2
BH CV ECHO MEAS - LVOT DIAM: 2 CM
BH CV ECHO MEAS - LVPWD: 0.8 CM
BH CV ECHO MEAS - MED PEAK E' VEL: 10.3 CM/SEC
BH CV ECHO MEAS - MR MAX PG: 35.8 MMHG
BH CV ECHO MEAS - MR MAX VEL: 299 CM/SEC
BH CV ECHO MEAS - MV A MAX VEL: 55.5 CM/SEC
BH CV ECHO MEAS - MV DEC SLOPE: 389 CM/SEC2
BH CV ECHO MEAS - MV DEC TIME: 0.24 SEC
BH CV ECHO MEAS - MV E MAX VEL: 48.7 CM/SEC
BH CV ECHO MEAS - MV E/A: 0.88
BH CV ECHO MEAS - MV MAX PG: 2.7 MMHG
BH CV ECHO MEAS - MV MEAN PG: 1 MMHG
BH CV ECHO MEAS - MV P1/2T: 70.3 MSEC
BH CV ECHO MEAS - MV V2 VTI: 23.3 CM
BH CV ECHO MEAS - MVA(P1/2T): 3.1 CM2
BH CV ECHO MEAS - MVA(VTI): 2.8 CM2
BH CV ECHO MEAS - PA ACC TIME: 0.14 SEC
BH CV ECHO MEAS - PA V2 MAX: 76.5 CM/SEC
BH CV ECHO MEAS - PULM A REVS DUR: 0.12 SEC
BH CV ECHO MEAS - PULM A REVS VEL: 34 CM/SEC
BH CV ECHO MEAS - PULM DIAS VEL: 43.3 CM/SEC
BH CV ECHO MEAS - PULM S/D: 1.01
BH CV ECHO MEAS - PULM SYS VEL: 43.8 CM/SEC
BH CV ECHO MEAS - RAP SYSTOLE: 3 MMHG
BH CV ECHO MEAS - RV MAX PG: 1.2 MMHG
BH CV ECHO MEAS - RV V1 MAX: 54.3 CM/SEC
BH CV ECHO MEAS - RV V1 VTI: 10.6 CM
BH CV ECHO MEAS - RVDD: 2.6 CM
BH CV ECHO MEAS - RVSP: 29 MMHG
BH CV ECHO MEAS - SV(LVOT): 65.7 ML
BH CV ECHO MEAS - SV(MOD-SP4): 52.1 ML
BH CV ECHO MEAS - SVI(LVOT): 191.5 ML/M2
BH CV ECHO MEAS - SVI(MOD-SP4): 151.9 ML/M2
BH CV ECHO MEAS - TAPSE (>1.6): 2.9 CM
BH CV ECHO MEAS - TR MAX PG: 26 MMHG
BH CV ECHO MEAS - TR MAX VEL: 255 CM/SEC
BH CV ECHO MEASUREMENTS AVERAGE E/E' RATIO: 3.83
BH CV XLRA - RV BASE: 3.6 CM
BH CV XLRA - RV LENGTH: 5.2 CM
BH CV XLRA - RV MID: 3.1 CM
BH CV XLRA - TDI S': 12.5 CM/SEC
BH CV XLRA MEAS LEFT DIST CCA EDV: -21.7 CM/SEC
BH CV XLRA MEAS LEFT DIST CCA PSV: -105 CM/SEC
BH CV XLRA MEAS LEFT DIST ICA EDV: -45.9 CM/SEC
BH CV XLRA MEAS LEFT DIST ICA PSV: -159 CM/SEC
BH CV XLRA MEAS LEFT ICA/CCA RATIO: -1.2
BH CV XLRA MEAS LEFT PROX CCA EDV: 19.1 CM/SEC
BH CV XLRA MEAS LEFT PROX CCA PSV: 133 CM/SEC
BH CV XLRA MEAS LEFT PROX ECA PSV: -171 CM/SEC
BH CV XLRA MEAS LEFT PROX ICA EDV: -19.1 CM/SEC
BH CV XLRA MEAS LEFT PROX ICA PSV: -138 CM/SEC
BH CV XLRA MEAS LEFT PROX SCLA PSV: 170 CM/SEC
BH CV XLRA MEAS LEFT VERTEBRAL A EDV: -31.2 CM/SEC
BH CV XLRA MEAS LEFT VERTEBRAL A PSV: -116 CM/SEC
BH CV XLRA MEAS RIGHT DIST CCA EDV: -13.7 CM/SEC
BH CV XLRA MEAS RIGHT DIST CCA PSV: -78.9 CM/SEC
BH CV XLRA MEAS RIGHT DIST ICA EDV: -41.5 CM/SEC
BH CV XLRA MEAS RIGHT DIST ICA PSV: -168 CM/SEC
BH CV XLRA MEAS RIGHT ICA/CCA RATIO: -1.78
BH CV XLRA MEAS RIGHT PROX CCA EDV: 14.9 CM/SEC
BH CV XLRA MEAS RIGHT PROX CCA PSV: 94.4 CM/SEC
BH CV XLRA MEAS RIGHT PROX ECA PSV: -69.6 CM/SEC
BH CV XLRA MEAS RIGHT PROX ICA EDV: -32.9 CM/SEC
BH CV XLRA MEAS RIGHT PROX ICA PSV: -120 CM/SEC
BH CV XLRA MEAS RIGHT PROX SCLA PSV: 235 CM/SEC
BH CV XLRA MEAS RIGHT VERTEBRAL A EDV: 18 CM/SEC
BH CV XLRA MEAS RIGHT VERTEBRAL A PSV: 76.8 CM/SEC
BILIRUB UR QL STRIP: NEGATIVE
CK SERPL-CCNC: 90 U/L (ref 20–200)
CLARITY UR: ABNORMAL
COLOR UR: YELLOW
GLUCOSE UR STRIP-MCNC: NEGATIVE MG/DL
HGB UR QL STRIP.AUTO: NEGATIVE
HYALINE CASTS UR QL AUTO: ABNORMAL /LPF
KETONES UR QL STRIP: NEGATIVE
LEFT ARM BP: NORMAL MMHG
LEFT ATRIUM VOLUME INDEX: 102.9 ML/M2
LEUKOCYTE ESTERASE UR QL STRIP.AUTO: ABNORMAL
NITRITE UR QL STRIP: NEGATIVE
PH UR STRIP.AUTO: 6.5 [PH] (ref 5–8)
PROT UR QL STRIP: NEGATIVE
QT INTERVAL: 308 MS
QT INTERVAL: 353 MS
QTC INTERVAL: 418 MS
QTC INTERVAL: 460 MS
RBC # UR STRIP: ABNORMAL /HPF
REF LAB TEST METHOD: ABNORMAL
RIGHT ARM BP: NORMAL MMHG
SINUS: 3.1 CM
SP GR UR STRIP: 1.01 (ref 1–1.03)
SQUAMOUS #/AREA URNS HPF: ABNORMAL /HPF
STJ: 2.5 CM
TROPONIN T SERPL HS-MCNC: 34 NG/L
UROBILINOGEN UR QL STRIP: ABNORMAL
WBC # UR STRIP: ABNORMAL /HPF

## 2024-10-25 PROCEDURE — 93010 ELECTROCARDIOGRAM REPORT: CPT | Performed by: INTERNAL MEDICINE

## 2024-10-25 PROCEDURE — 93880 EXTRACRANIAL BILAT STUDY: CPT | Performed by: SURGERY

## 2024-10-25 PROCEDURE — 81001 URINALYSIS AUTO W/SCOPE: CPT | Performed by: NURSE PRACTITIONER

## 2024-10-25 PROCEDURE — 93356 MYOCRD STRAIN IMG SPCKL TRCK: CPT

## 2024-10-25 PROCEDURE — 93356 MYOCRD STRAIN IMG SPCKL TRCK: CPT | Performed by: INTERNAL MEDICINE

## 2024-10-25 PROCEDURE — G0378 HOSPITAL OBSERVATION PER HR: HCPCS

## 2024-10-25 PROCEDURE — 93880 EXTRACRANIAL BILAT STUDY: CPT

## 2024-10-25 PROCEDURE — 93306 TTE W/DOPPLER COMPLETE: CPT

## 2024-10-25 PROCEDURE — 99214 OFFICE O/P EST MOD 30 MIN: CPT | Performed by: INTERNAL MEDICINE

## 2024-10-25 PROCEDURE — 84484 ASSAY OF TROPONIN QUANT: CPT | Performed by: NURSE PRACTITIONER

## 2024-10-25 PROCEDURE — 93005 ELECTROCARDIOGRAM TRACING: CPT | Performed by: NURSE PRACTITIONER

## 2024-10-25 PROCEDURE — 93306 TTE W/DOPPLER COMPLETE: CPT | Performed by: INTERNAL MEDICINE

## 2024-10-25 PROCEDURE — 97162 PT EVAL MOD COMPLEX 30 MIN: CPT

## 2024-10-25 RX ORDER — POTASSIUM CHLORIDE 1500 MG/1
20 TABLET, EXTENDED RELEASE ORAL ONCE
Status: COMPLETED | OUTPATIENT
Start: 2024-10-25 | End: 2024-10-25

## 2024-10-25 RX ORDER — METOPROLOL TARTRATE 25 MG/1
25 TABLET, FILM COATED ORAL 2 TIMES DAILY
Qty: 60 TABLET | Refills: 0 | Status: SHIPPED | OUTPATIENT
Start: 2024-10-25 | End: 2024-11-24

## 2024-10-25 RX ORDER — METOPROLOL TARTRATE 25 MG/1
25 TABLET, FILM COATED ORAL EVERY 12 HOURS SCHEDULED
Status: DISCONTINUED | OUTPATIENT
Start: 2024-10-25 | End: 2024-10-25

## 2024-10-25 RX ADMIN — POTASSIUM CHLORIDE 20 MEQ: 1500 TABLET, EXTENDED RELEASE ORAL at 15:14

## 2024-10-25 RX ADMIN — Medication 10 ML: at 15:14

## 2024-10-25 RX ADMIN — FERROUS SULFATE TAB EC 324 MG (65 MG FE EQUIVALENT) 324 MG: 324 (65 FE) TABLET DELAYED RESPONSE at 15:13

## 2024-10-25 RX ADMIN — ATORVASTATIN CALCIUM 80 MG: 40 TABLET, FILM COATED ORAL at 15:14

## 2024-10-25 RX ADMIN — MEMANTINE HYDROCHLORIDE 5 MG: 5 TABLET ORAL at 15:14

## 2024-10-25 NOTE — PLAN OF CARE
Problem: Adult Inpatient Plan of Care  Goal: Plan of Care Review  Outcome: Progressing  Flowsheets (Taken 10/24/2024 2133)  Progress: no change  Plan of Care Reviewed With: patient  Goal: Patient-Specific Goal (Individualized)  Outcome: Progressing  Goal: Absence of Hospital-Acquired Illness or Injury  Outcome: Progressing  Goal: Optimal Comfort and Wellbeing  Outcome: Progressing  Goal: Readiness for Transition of Care  Outcome: Progressing     Problem: Dysrhythmia  Goal: Normalized Cardiac Rhythm  Outcome: Progressing   Goal Outcome Evaluation:  Plan of Care Reviewed With: patient        Progress: no change

## 2024-10-25 NOTE — DISCHARGE SUMMARY
Carmita Sellers MD  Physician  Hospitalist          Date of Service: 10/25/24 1725  Creation Time: 10/25/24 1725     Signed       Expand All [facility]  Discharge Summary     Date of Service: 10/25/2024  Patient Name: Sathya Benavides  : 1960  MRN: [id number]     Date of Admission: 10/24/2024  Discharge Diagnosis:      Atrial fibrillation  Near-syncope  Hypertension  History of aortic thrombus on Coumadin  History of stroke  Anxiety     Date of Discharge: 10/25/2024  Primary Care Physician: Adi Kaminski MD        Presenting Problem:   Paroxysmal atrial fibrillation [I48.0]  New onset a-fib [I48.91]  Elevated troponin [R79.89]     Active and Resolved Hospital Problems:       Active Hospital Problems     Diagnosis POA    **New onset a-fib [I48.91] Yes       Resolved Hospital Problems   No resolved problems to display.            Hospital Course            Hospital Course:  This patient is a 64-year-old gentleman who presented with a near syncopal episode.  He wwent to his outpatient clinic for INR check when he experienced generalized weakness and a near syncopal episode.  He presented to the emergency room and was found to be in atrial fibrillation with rapid ventricular response with heart rates in the 140s to 150s.  He spontaneously converted to normal sinus rhythm.  Cardiology was consulted and echocardiogram was ordered.  See summary of echo report below:-  Left Ventricle Left ventricular ejection fraction appears to be 61 - 65%.   Normal global longitudinal LV strain (GLS) = -15.5%. Left ventricle strain data was reviewed by the physician and found to be accurate. Normal left ventricular cavity size and wall thickness noted. All left ventricular wall segments contract normally. Left ventricular diastolic function was normal.   Right Ventricle Normal right ventricular cavity size, wall thickness, systolic function and septal motion noted.    Left Atrium Normal left atrial cavity size noted.   Right Atrium Normal right atrial cavity size noted.   Aortic Valve No aortic valve regurgitation or stenosis is present. The aortic valve is abnormal in structure. The aortic valve exhibits sclerosis. There is thickening of the aortic valve.   Mitral Valve The mitral valve is structurally normal with no regurgitation or significant stenosis present.   Tricuspid Valve The tricuspid valve is normal in structure. Trace tricuspid valve regurgitation is present. Estimated right ventricular systolic pressure from tricuspid regurgitation is normal (<35 mmHg).   Pulmonic Valve The pulmonic valve is not well visualized.   Greater Vessels No dilation of the aortic root is present. No dilation of the sinuses of Valsalva is present.   Pericardium The pericardium is normal. There is no evidence of pericardial effusion. .      Orthostatic vital signs where checked and were unremarkable.  He has remained in normal sinus rhythm and will be discharged home at this time.  He is to follow-up with cardiology in the outpatient clinic.  He is being discharged home on metoprolol to maintain rate control of paroxysmal atrial fibrillation.  He is to continue his home dose of Coumadin which he is on for history of aortic thrombus.       DISCHARGE Follow Up Recommendations for labs and diagnostics: Follow-up PCP in 1 week, follow-up with cardiology in 1 week.           Day of Discharge      Vital Signs:  Temp:  [97.5 °F (36.4 °C)-98.7 °F (37.1 °C)] 98.7 °F (37.1 °C)  Heart Rate:  [75-95] 75  Resp:  [10-25] 10  BP: ()/(44-71) 147/64     Physical Exam:  Physical Exam  Constitutional:       Appearance: Normal appearance.   HENT:      Head: Normocephalic and atraumatic.      Nose: Nose normal.      Mouth/Throat:      Mouth: Mucous membranes are moist.   Eyes:      Extraocular Movements: Extraocular movements intact.      Conjunctiva/sclera: Conjunctivae normal.      Pupils: Pupils are  equal, round, and reactive to light.   Cardiovascular:      Rate and Rhythm: Normal rate and regular rhythm.      Pulses: Normal pulses.      Heart sounds: Normal heart sounds.   Pulmonary:      Effort: Pulmonary effort is normal.      Breath sounds: Normal breath sounds.   Abdominal:      General: Abdomen is flat. Bowel sounds are normal.      Palpations: Abdomen is soft.   Musculoskeletal:         General: Normal range of motion.      Cervical back: Normal range of motion and neck supple.   Skin:     General: Skin is warm and dry.      Capillary Refill: Capillary refill takes less than 2 seconds.   Neurological:      General: No focal deficit present.      Mental Status: He is alert and oriented to person, place, and time.   Psychiatric:         Mood and Affect: Mood normal.         Behavior: Behavior normal.         Thought Content: Thought content normal.         Judgment: Judgment normal.               Pertinent  and/or Most Recent Results      LAB RESULTS:             Lab 10/24/24  2233 10/24/24  1645 10/24/24  1307 10/23/24  1259   WBC 8.78 11.17*  --   --    HEMOGLOBIN 12.9* 15.0  --   --    HEMATOCRIT 39.3 47.3  --   --    PLATELETS 219 267  --   --    NEUTROS ABS 5.29 8.16*  --   --    IMMATURE GRANS (ABS) 0.02 0.03  --   --    LYMPHS ABS 2.37 1.81  --   --    MONOS ABS 0.76 0.92*  --   --    EOS ABS 0.27 0.14  --   --    MCV 89.1 88.1  --   --    PROTIME 28.3  --  28.9* 44.3*               Lab 10/24/24  2233 10/24/24  1645   SODIUM 135* 134*   POTASSIUM 3.7 4.0   CHLORIDE 101 98   CO2 26.2 26.4   ANION GAP 7.8 9.6   BUN 8 7*   CREATININE 0.82 1.12   EGFR 96.9 72.5   GLUCOSE 122* 141*   CALCIUM 8.7 9.3   MAGNESIUM 1.7 1.7   HEMOGLOBIN A1C 5.96*  --    TSH  --  3.500                       Lab 10/25/24  0353 10/24/24  2233 10/24/24  1852 10/24/24  1645 10/24/24  1307 10/23/24  1259   HSTROP T 34* 37* 29* 19  --   --    PROTIME  --  28.3  --   --  28.9* 44.3*   INR  --  2.80  --   --  2.87* 4.55*               Lab 10/24/24  1852   CHOLESTEROL 107   LDL CHOL 56   HDL CHOL 28*   TRIGLYCERIDES 128              Brief Urine Lab Results  (Last result in the past 365 days)          Color   Clarity   Blood   Leuk Est   Nitrite   Protein   CREAT   Urine HCG         10/25/24 0009 Yellow    Cloudy    Negative    Small (1+)    Negative    Negative                          Microbiology Results (last 10 days)         ** No results found for the last 240 hours. **                  Last 30 day radiology impressions   XR Chest 1 View     Result Date: 10/24/2024  Impression: Impression: Pulmonary hyperinflation in keeping with the patient's known emphysematous lung disease. No acute process identified. Electronically Signed: Mathew Gates MD  10/24/2024 5:23 PM EDT  Workstation ID: [id number]     US Liver     Result Date: 10/1/2024  Impression: Impression: 1. No significant sonographic abnormality. 2. Cholecystectomy. Electronically Signed: Heather Badillo MD  10/1/2024 2:46 PM EDT  Workstation ID: [id number]         Results for orders placed during the hospital encounter of 10/24/24     Duplex Carotid Ultrasound CAR     Interpretation Summary    Right internal carotid artery demonstrates a less than 50% stenosis.    Antegrade right vertebral flow.    Antegrade right vertebral flow.    Left internal carotid artery demonstrates a less than 50% stenosis.    Antegrade left vertebral flow.    Antegrade left vertebral flow.        Results for orders placed during the hospital encounter of 10/24/24     Duplex Carotid Ultrasound CAR     Interpretation Summary    Right internal carotid artery demonstrates a less than 50% stenosis.    Antegrade right vertebral flow.    Antegrade right vertebral flow.    Left internal carotid artery demonstrates a less than 50% stenosis.    Antegrade left vertebral flow.    Antegrade left vertebral flow.        Results for orders placed during the hospital encounter of 10/24/24     Adult Transthoracic Echo Complete W/  Cont if Necessary Per Protocol     Interpretation Summary    Left ventricular ejection fraction appears to be 61 - 65%.    Left ventricular diastolic function was normal.    Estimated right ventricular systolic pressure from tricuspid regurgitation is normal (<35 mmHg).     Conclusion        Normal LV size and contractility EF of 60 to 65%  Normal RV size  Normal atrial size  Pulmonic valve is not well visualized.  Aortic valve leaflets are thickened and mildly sclerosed.  Dopplers are normal.  Mitral valve, tricuspid valve appears structurally normal, trace tricuspid regurgitation seen.  Calculated RV systolic pressure of 29 mmHg  No pericardial effusion seen.  Proximal aorta appears normal in size.        Labs Pending at Discharge:  Pending Labs         Order Current Status     [facility] PCR - Swab, Axilla Right, Axilla Left and Groin In process                Procedures Performed           Consults:   Consults         Date and Time Order Name Status Description     10/25/2024 12:10 AM Inpatient Cardiology Consult Completed       10/24/2024  7:39 PM Hospitalist (on-call MD unless specified)                       Discharge Details          Discharge Medications          New Medications         Instructions Start Date   metoprolol tartrate 25 MG tablet  Commonly known as: LOPRESSOR    25 mg, Oral, 2 Times Daily                  Continue These Medications         Instructions Start Date   albuterol sulfate  (90 Base) MCG/ACT inhaler  Commonly known as: PROVENTIL HFA;VENTOLIN HFA;PROAIR HFA    2 puffs, Inhalation, Every 4 Hours PRN        atorvastatin 80 MG tablet  Commonly known as: Lipitor    80 mg, Oral, Daily        ferrous sulfate 325 (65 FE) MG tablet    325 mg, Oral, Daily With Breakfast        hydrOXYzine 25 MG tablet  Commonly known as: ATARAX    25 mg, Oral, Every 8 Hours PRN        lisinopril 5 MG tablet  Commonly known as: PRINIVIL,ZESTRIL    5 mg, Oral, Daily        Magnesium Oxide -Mg  Supplement 400 (240 Mg) MG tablet  Commonly known as: MAGnesium-Oxide    400 mg, Oral, 2 Times Daily        memantine 5 MG tablet  Commonly known as: NAMENDA    5 mg, Oral, Every 12 Hours Scheduled        mirtazapine 30 MG tablet  Commonly known as: Remeron    30 mg, Oral, Nightly        pantoprazole 40 MG EC tablet  Commonly known as: PROTONIX    40 mg, Oral, Daily        potassium chloride 10 MEQ CR capsule  Commonly known as: MICRO-K    10 mEq, Oral, 2 Times Daily        tamsulosin 0.4 MG capsule 24 hr capsule  Commonly known as: FLOMAX    0.8 mg, Oral, Daily        traZODone 50 MG tablet  Commonly known as: DESYREL    50 mg, Oral, Every Night at Bedtime        vitamin B-12 100 MCG tablet  Commonly known as: CYANOCOBALAMIN    100 mcg, Oral, Daily        warfarin 2 MG tablet  Commonly known as: COUMADIN    2 mg, 3 Times Weekly        warfarin 4 MG tablet  Commonly known as: COUMADIN    4 mg, 4 Times Weekly                     Allergies         Allergies   Allergen Reactions    Alpha-Gal Hives    Adhesive Tape Rash    Amoxicillin-Pot Clavulanate Other (See Comments) and GI Intolerance       Upset stomach               Discharge Disposition: Home  Home or Self Care     Diet:  Hospital:      Diet Order   Procedures    Diet: Cardiac; Healthy Heart (2-3 Na+); Fluid Consistency: Thin (IDDSI 0)            Discharge Activity:   Activity Instructions         Activity as Tolerated                     CODE STATUS:      Code Status and Medical Interventions: CPR (Attempt to Resuscitate); Full Support   Ordered at: 10/24/24 2033     Level Of Support Discussed With:     Patient     Code Status (Patient has no pulse and is not breathing):     CPR (Attempt to Resuscitate)     Medical Interventions (Patient has pulse or is breathing):     Full Support                   Future Appointments   Date Time Provider Department Center   1/6/2025  2:00 PM Mary Quiroz APRN [facility]            Time spent on Discharge  including face to face service: Greater than 30 minutes     Signature: Electronically signed by Carmita Sellers MD, 10/25/24, 17:25 EDT.  [facility] Hospitalist Team

## 2024-10-25 NOTE — OUTREACH NOTE
Prep Survey      Flowsheet Row Responses   [facility] facility patient discharged from? [facility]   Is LACE score < 7 ? Yes   Eligibility [facility]   Date of Admission 10/24/24   Date of Discharge 10/25/24   Discharge Disposition Home or Self Care   Discharge diagnosis New onset a-fib   Does the patient have one of the following disease processes/diagnoses(primary or secondary)? Other   Does the patient have Home health ordered? No   Is there a DME ordered? No   Prep survey completed? Yes            XIOMARA A - Registered Nurse

## 2024-10-25 NOTE — TELEPHONE ENCOUNTER
10/25/24: 1805 hrs: I just called pt and let him know that his INR is 2.89, so he can restart coumadin at 2mg on Tuesday, Thursday, and Sunday and 4mg all other days and recheck INR in 1 week.  He voiced understanding and said he would start new dose and get INR rechecked.

## 2024-10-25 NOTE — PROGRESS NOTES
[facility] Medicine Services  Discharge Summary    Date of Service: 10/25/2024  Patient Name: Sathya Benavides  : 1960  MRN: [id number]    Date of Admission: 10/24/2024  Discharge Diagnosis:     Atrial fibrillation  Near-syncope  Hypertension  History of aortic thrombus on Coumadin  History of stroke  Anxiety    Date of Discharge: 10/25/2024  Primary Care Physician: Adi Kaminski MD      Presenting Problem:   Paroxysmal atrial fibrillation [I48.0]  New onset a-fib [I48.91]  Elevated troponin [R79.89]    Active and Resolved Hospital Problems:  Active Hospital Problems    Diagnosis POA    **New onset a-fib [I48.91] Yes      Resolved Hospital Problems   No resolved problems to display.         Hospital Course         Hospital Course:  This patient is a 64-year-old gentleman who presented with a near syncopal episode.  He wwent to his outpatient clinic for INR check when he experienced generalized weakness and a near syncopal episode.  He presented to the emergency room and was found to be in atrial fibrillation with rapid ventricular response with heart rates in the 140s to 150s.  He spontaneously converted to normal sinus rhythm.  Cardiology was consulted and echocardiogram was ordered.  See summary of echo report below:-  Left Ventricle Left ventricular ejection fraction appears to be 61 - 65%.   Normal global longitudinal LV strain (GLS) = -15.5%. Left ventricle strain data was reviewed by the physician and found to be accurate. Normal left ventricular cavity size and wall thickness noted. All left ventricular wall segments contract normally. Left ventricular diastolic function was normal.   Right Ventricle Normal right ventricular cavity size, wall thickness, systolic function and septal motion noted.   Left Atrium Normal left atrial cavity size noted.   Right Atrium Normal right atrial cavity size noted.   Aortic Valve No aortic valve regurgitation or stenosis is present. The aortic  valve is abnormal in structure. The aortic valve exhibits sclerosis. There is thickening of the aortic valve.   Mitral Valve The mitral valve is structurally normal with no regurgitation or significant stenosis present.   Tricuspid Valve The tricuspid valve is normal in structure. Trace tricuspid valve regurgitation is present. Estimated right ventricular systolic pressure from tricuspid regurgitation is normal (<35 mmHg).   Pulmonic Valve The pulmonic valve is not well visualized.   Greater Vessels No dilation of the aortic root is present. No dilation of the sinuses of Valsalva is present.   Pericardium The pericardium is normal. There is no evidence of pericardial effusion. .     Orthostatic vital signs where checked and were unremarkable.  He has remained in normal sinus rhythm and will be discharged home at this time.  He is to follow-up with cardiology in the outpatient clinic.  He is being discharged home on metoprolol to maintain rate control of paroxysmal atrial fibrillation.  He is to continue his home dose of Coumadin which he is on for history of aortic thrombus.      DISCHARGE Follow Up Recommendations for labs and diagnostics: Follow-up PCP in 1 week, follow-up with cardiology in 1 week.        Day of Discharge     Vital Signs:  Temp:  [97.5 °F (36.4 °C)-98.7 °F (37.1 °C)] 98.7 °F (37.1 °C)  Heart Rate:  [75-95] 75  Resp:  [10-25] 10  BP: ()/(44-71) 147/64    Physical Exam:  Physical Exam  Constitutional:       Appearance: Normal appearance.   HENT:      Head: Normocephalic and atraumatic.      Nose: Nose normal.      Mouth/Throat:      Mouth: Mucous membranes are moist.   Eyes:      Extraocular Movements: Extraocular movements intact.      Conjunctiva/sclera: Conjunctivae normal.      Pupils: Pupils are equal, round, and reactive to light.   Cardiovascular:      Rate and Rhythm: Normal rate and regular rhythm.      Pulses: Normal pulses.      Heart sounds: Normal heart sounds.   Pulmonary:       Effort: Pulmonary effort is normal.      Breath sounds: Normal breath sounds.   Abdominal:      General: Abdomen is flat. Bowel sounds are normal.      Palpations: Abdomen is soft.   Musculoskeletal:         General: Normal range of motion.      Cervical back: Normal range of motion and neck supple.   Skin:     General: Skin is warm and dry.      Capillary Refill: Capillary refill takes less than 2 seconds.   Neurological:      General: No focal deficit present.      Mental Status: He is alert and oriented to person, place, and time.   Psychiatric:         Mood and Affect: Mood normal.         Behavior: Behavior normal.         Thought Content: Thought content normal.         Judgment: Judgment normal.           Pertinent  and/or Most Recent Results     LAB RESULTS:      Lab 10/24/24  2233 10/24/24  1645 10/24/24  1307 10/23/24  1259   WBC 8.78 11.17*  --   --    HEMOGLOBIN 12.9* 15.0  --   --    HEMATOCRIT 39.3 47.3  --   --    PLATELETS 219 267  --   --    NEUTROS ABS 5.29 8.16*  --   --    IMMATURE GRANS (ABS) 0.02 0.03  --   --    LYMPHS ABS 2.37 1.81  --   --    MONOS ABS 0.76 0.92*  --   --    EOS ABS 0.27 0.14  --   --    MCV 89.1 88.1  --   --    PROTIME 28.3  --  28.9* 44.3*         Lab 10/24/24  2233 10/24/24  1645   SODIUM 135* 134*   POTASSIUM 3.7 4.0   CHLORIDE 101 98   CO2 26.2 26.4   ANION GAP 7.8 9.6   BUN 8 7*   CREATININE 0.82 1.12   EGFR 96.9 72.5   GLUCOSE 122* 141*   CALCIUM 8.7 9.3   MAGNESIUM 1.7 1.7   HEMOGLOBIN A1C 5.96*  --    TSH  --  3.500             Lab 10/25/24  0353 10/24/24  2233 10/24/24  1852 10/24/24  1645 10/24/24  1307 10/23/24  1259   HSTROP T 34* 37* 29* 19  --   --    PROTIME  --  28.3  --   --  28.9* 44.3*   INR  --  2.80  --   --  2.87* 4.55*         Lab 10/24/24  1852   CHOLESTEROL 107   LDL CHOL 56   HDL CHOL 28*   TRIGLYCERIDES 128             Brief Urine Lab Results  (Last result in the past 365 days)        Color   Clarity   Blood   Leuk Est   Nitrite   Protein   CREAT    Urine HCG        10/25/24 0009 Yellow   Cloudy   Negative   Small (1+)   Negative   Negative                 Microbiology Results (last 10 days)       ** No results found for the last 240 hours. **            XR Chest 1 View    Result Date: 10/24/2024  Impression: Impression: Pulmonary hyperinflation in keeping with the patient's known emphysematous lung disease. No acute process identified. Electronically Signed: Mathew Gates MD  10/24/2024 5:23 PM EDT  Workstation ID: [id number]    US Liver    Result Date: 10/1/2024  Impression: Impression: 1. No significant sonographic abnormality. 2. Cholecystectomy. Electronically Signed: Heather Badillo MD  10/1/2024 2:46 PM EDT  Workstation ID: [id number]     Results for orders placed during the hospital encounter of 10/24/24    Duplex Carotid Ultrasound CAR    Interpretation Summary    Right internal carotid artery demonstrates a less than 50% stenosis.    Antegrade right vertebral flow.    Antegrade right vertebral flow.    Left internal carotid artery demonstrates a less than 50% stenosis.    Antegrade left vertebral flow.    Antegrade left vertebral flow.      Results for orders placed during the hospital encounter of 10/24/24    Duplex Carotid Ultrasound CAR    Interpretation Summary    Right internal carotid artery demonstrates a less than 50% stenosis.    Antegrade right vertebral flow.    Antegrade right vertebral flow.    Left internal carotid artery demonstrates a less than 50% stenosis.    Antegrade left vertebral flow.    Antegrade left vertebral flow.      Results for orders placed during the hospital encounter of 10/24/24    Adult Transthoracic Echo Complete W/ Cont if Necessary Per Protocol    Interpretation Summary    Left ventricular ejection fraction appears to be 61 - 65%.    Left ventricular diastolic function was normal.    Estimated right ventricular systolic pressure from tricuspid regurgitation is normal (<35 mmHg).    Conclusion      Normal LV size and  contractility EF of 60 to 65%  Normal RV size  Normal atrial size  Pulmonic valve is not well visualized.  Aortic valve leaflets are thickened and mildly sclerosed.  Dopplers are normal.  Mitral valve, tricuspid valve appears structurally normal, trace tricuspid regurgitation seen.  Calculated RV systolic pressure of 29 mmHg  No pericardial effusion seen.  Proximal aorta appears normal in size.      Labs Pending at Discharge:  Pending Labs       Order Current Status    [facility] PCR - Swab, Axilla Right, Axilla Left and Groin In process            Procedures Performed           Consults:   Consults       Date and Time Order Name Status Description    10/25/2024 12:10 AM Inpatient Cardiology Consult Completed     10/24/2024  7:39 PM Hospitalist (on-call MD unless specified)                Discharge Details        Discharge Medications        New Medications        Instructions Start Date   metoprolol tartrate 25 MG tablet  Commonly known as: LOPRESSOR   25 mg, Oral, 2 Times Daily             Continue These Medications        Instructions Start Date   albuterol sulfate  (90 Base) MCG/ACT inhaler  Commonly known as: PROVENTIL HFA;VENTOLIN HFA;PROAIR HFA   2 puffs, Inhalation, Every 4 Hours PRN      atorvastatin 80 MG tablet  Commonly known as: Lipitor   80 mg, Oral, Daily      ferrous sulfate 325 (65 FE) MG tablet   325 mg, Oral, Daily With Breakfast      hydrOXYzine 25 MG tablet  Commonly known as: ATARAX   25 mg, Oral, Every 8 Hours PRN      lisinopril 5 MG tablet  Commonly known as: PRINIVIL,ZESTRIL   5 mg, Oral, Daily      Magnesium Oxide -Mg Supplement 400 (240 Mg) MG tablet  Commonly known as: MAGnesium-Oxide   400 mg, Oral, 2 Times Daily      memantine 5 MG tablet  Commonly known as: NAMENDA   5 mg, Oral, Every 12 Hours Scheduled      mirtazapine 30 MG tablet  Commonly known as: Remeron   30 mg, Oral, Nightly      pantoprazole 40 MG EC tablet  Commonly known as: PROTONIX   40 mg, Oral, Daily       potassium chloride 10 MEQ CR capsule  Commonly known as: MICRO-K   10 mEq, Oral, 2 Times Daily      tamsulosin 0.4 MG capsule 24 hr capsule  Commonly known as: FLOMAX   0.8 mg, Oral, Daily      traZODone 50 MG tablet  Commonly known as: DESYREL   50 mg, Oral, Every Night at Bedtime      vitamin B-12 100 MCG tablet  Commonly known as: CYANOCOBALAMIN   100 mcg, Oral, Daily      warfarin 2 MG tablet  Commonly known as: COUMADIN   2 mg, 3 Times Weekly      warfarin 4 MG tablet  Commonly known as: COUMADIN   4 mg, 4 Times Weekly               Allergies   Allergen Reactions    Alpha-Gal Hives    Adhesive Tape Rash    Amoxicillin-Pot Clavulanate Other (See Comments) and GI Intolerance     Upset stomach         Discharge Disposition: Home  Home or Self Care    Diet:  Hospital:  Diet Order   Procedures    Diet: Cardiac; Healthy Heart (2-3 Na+); Fluid Consistency: Thin (IDDSI 0)         Discharge Activity:   Activity Instructions       Activity as Tolerated                CODE STATUS:  Code Status and Medical Interventions: CPR (Attempt to Resuscitate); Full Support   Ordered at: 10/24/24 2033     Level Of Support Discussed With:    Patient     Code Status (Patient has no pulse and is not breathing):    CPR (Attempt to Resuscitate)     Medical Interventions (Patient has pulse or is breathing):    Full Support         Future Appointments   Date Time Provider Department Center   1/6/2025  2:00 PM Mary Quiroz, [facility]         Time spent on Discharge including face to face service: Greater than 30 minutes    Signature: Electronically signed by Carmita Sellers MD, 10/25/24, 17:25 EDT.  [facility], operated by [facility] Hospitalist Team

## 2024-10-25 NOTE — H&P
[facility] Medicine Services  History & Physical    Patient Name: Sathya Benavides  : 1960  MRN: [id number]  Primary Care Physician:  Adi Kaminski MD  Date of admission: 10/24/2024  Date and Time of Service: 10/24/2024 at 2010    Subjective      Chief Complaint: weakness, near syncope    History of Present Illness: Sathya Benavides is a 64 y.o. male with a CMH of emphysema, PFO, CVA on warfarin, anxiety, HTN, hypothyroidism, GERD, aortic thrombus on warfarin who presented to [facility] on 10/24/2024 with weakness, near syncopal episode. Lives at home with wife.   Presented to OP clinic this AM for repeat INR level. During blood draw with weakness, diaphoresis, near syncope. Attempted to drink OJ followed by emesis, was able to drink apple juice. Returned home with wife endorsed ongoing malaise, fatigue, weakness. Called PCP who referred to ED for evaluation. On arrival to ED, VS: 97.8-83-/74-94% room air. While in ED with -150's. EKG revealed Afib rate 133. Was given  ml fluid bolus and self converted to SR. Repeat EKG SR rate 84. Denies previous history of Afib.     Upon evaluation, awake, alert, resting in bed. Current VS: 76-/68-96% room air. Bedside heart monitoring reveals SR no ectopy. Denies palpitations, dyspnea, CP, lightheadedness.   CXR negative  Blood work reveals INR 2.87, WBC 11.17, Hgb 15, Hct 47.3, glucose 141, BUN 7, creatinine 1.12, sodium 34, TSH 3.5, free T4 was normal at 1.15, magnesium 1.7,   Troponin 19 -> 29      Review of Systems   Constitutional:  Positive for activity change and fatigue. Negative for fever and unexpected weight change.   Respiratory:  Negative for shortness of breath.    Cardiovascular:  Negative for chest pain, palpitations and leg swelling.   Gastrointestinal:  Negative for abdominal pain.   Genitourinary:  Negative for dysuria.   Neurological:  Positive for weakness.       Personal History     Past Medical History:    Diagnosis Date    Acid reflux     Enlarged prostate     Headache, tension-type     Hyperlipidemia     Lung nodule     PFO (patent foramen ovale)     Stroke 03/2021    LOSS OF PERIPHERAL VISION    Stroke     7/2024       Past Surgical History:   Procedure Laterality Date    APPENDECTOMY      BRONCHOSCOPY Bilateral 4/8/2021    Procedure: BRONCHOSCOPY ENDOBRONCHIAL ULTRASOUND WITH FNA'S;  Surgeon: Sam Tony MD;  Location: [facility] ENDOSCOPY;  Service: Pulmonary;  Laterality: Bilateral;  PRE- LYMPHADENOPATHY  POST- SAME    CARDIAC CATHETERIZATION N/A 10/29/2021    Procedure: Patent foramen ovale closure  ABBOTT;  Surgeon: Sivakumar Chatman MD;  Location: [facility] CATH INVASIVE LOCATION;  Service: Cardiology;  Laterality: N/A;    KNEE ARTHROSCOPY      SHOULDER ARTHROSCOPY         Family History: family history includes Colon cancer in his brother; Dementia in his father; Diabetes in his mother; Heart disease in his mother; No Known Problems in his daughter, daughter, daughter, and son. Otherwise pertinent FHx was reviewed and not pertinent to current issue.    Social History:  reports that he has been smoking cigarettes. He started smoking about 43 years ago. He has a 43.8 pack-year smoking history. He has never used smokeless tobacco. He reports current alcohol use. He reports that he does not currently use drugs.    Home Medications:  Prior to Admission Medications       Prescriptions Last Dose Informant Patient Reported? Taking?    albuterol sulfate  (90 Base) MCG/ACT inhaler   No Yes    Inhale 2 puffs Every 4 (Four) Hours As Needed for Wheezing.    atorvastatin (Lipitor) 80 MG tablet 10/23/2024  No Yes    Take 1 tablet by mouth Daily.    cyanocobalamin 100 MCG tablet 10/24/2024  No Yes    GIVE 1 TABLET BY MOUTH ONCE DAILY    ferrous sulfate 325 (65 FE) MG tablet 10/24/2024  No Yes    Take 1 tablet by mouth Daily With Breakfast.    hydrOXYzine (ATARAX) 25 MG tablet 10/23/2024  No Yes    Take 1 tablet  by mouth Every 8 (Eight) Hours As Needed for Anxiety.    lisinopril (PRINIVIL,ZESTRIL) 5 MG tablet 10/24/2024  No Yes    Take 1 tablet by mouth Daily.    Magnesium Oxide -Mg Supplement (MAGnesium-Oxide) 400 (240 Mg) MG tablet 10/23/2024  No Yes    Take 1 tablet by mouth 2 (Two) Times a Day.    memantine (NAMENDA) 5 MG tablet 10/23/2024  No Yes    Take 1 tablet by mouth Every 12 (Twelve) Hours.    mirtazapine (Remeron) 30 MG tablet 10/23/2024  No Yes    Take 1 tablet by mouth Every Night.    pantoprazole (PROTONIX) 40 MG EC tablet 10/24/2024  No Yes    Take 1 tablet by mouth Daily.    potassium chloride (MICRO-K) 10 MEQ CR capsule 10/23/2024  No Yes    Take 1 capsule by mouth 2 (Two) Times a Day.    tamsulosin (FLOMAX) 0.4 MG capsule 24 hr capsule 10/24/2024  No Yes    Take 2 capsules by mouth once daily    traZODone (DESYREL) 50 MG tablet 10/23/2024  No Yes    TAKE 1 TABLET BY MOUTH EVERY DAY AT BEDTIME    warfarin (COUMADIN) 2 MG tablet   Yes Yes    Take 1 tablet by mouth 3 (Three) Times a Week. Tues, Thur, Sat    warfarin (COUMADIN) 4 MG tablet   Yes Yes    Take 1 tablet by mouth 4 (Four) Times a Week. Mon, Wed, Fri, Sun              Allergies:  Allergies   Allergen Reactions    Alpha-Gal Hives    Adhesive Tape Rash    Amoxicillin-Pot Clavulanate Other (See Comments) and GI Intolerance     Upset stomach       Objective      Vitals:   Temp:  [97.8 °F (36.6 °C)] 97.8 °F (36.6 °C)  Heart Rate:  [] 91  Resp:  [13] 13  BP: ()/(44-74) 130/71  Body mass index is 18.34 kg/m².  Physical Exam  Constitutional:       Appearance: Normal appearance.   HENT:      Head: Normocephalic and atraumatic.      Mouth/Throat:      Mouth: Mucous membranes are moist.      Comments: Poor dentition.   Eyes:      General: No scleral icterus.     Extraocular Movements: Extraocular movements intact.      Pupils: Pupils are equal, round, and reactive to light.   Cardiovascular:      Rate and Rhythm: Normal rate and regular rhythm.       Pulses: Normal pulses.      Heart sounds: Normal heart sounds.      Comments: Denies reproducible chest pain. Denies palpitations.   Pulmonary:      Effort: Pulmonary effort is normal.      Breath sounds: Normal breath sounds.   Abdominal:      General: Bowel sounds are normal. There is no distension.      Palpations: Abdomen is soft.      Tenderness: There is no abdominal tenderness.   Musculoskeletal:      Right lower leg: No edema.      Left lower leg: No edema.   Skin:     General: Skin is warm and dry.   Neurological:      Mental Status: He is alert and oriented to person, place, and time. Mental status is at baseline.      Motor: Weakness present.      Comments: Delayed thought process since CVA  Mild residual right sided weakness. Denies recent falls.          Diagnostic Data:  Lab Results (last 24 hours)       Procedure Component Value Units Date/Time    High Sensitivity Troponin T 2Hr [[id number]]  (Abnormal) Collected: 10/24/24 1852    Specimen: Blood Updated: 10/24/24 1921     HS Troponin T 29 ng/L      Troponin T Delta 10 ng/L     Narrative:      High Sensitive Troponin T Reference Range:  <14.0 ng/L- Negative Female for AMI  <22.0 ng/L- Negative Male for AMI  >=14 - Abnormal Female indicating possible myocardial injury.  >=22 - Abnormal Male indicating possible myocardial injury.   Clinicians would have to utilize clinical acumen, EKG, Troponin, and serial changes to determine if it is an Acute Myocardial Infarction or myocardial injury due to an underlying chronic condition.         Basic Metabolic Panel [[id number]]  (Abnormal) Collected: 10/24/24 1645    Specimen: Blood Updated: 10/24/24 1723     Glucose 141 mg/dL      BUN 7 mg/dL      Creatinine 1.12 mg/dL      Sodium 134 mmol/L      Potassium 4.0 mmol/L      Chloride 98 mmol/L      CO2 26.4 mmol/L      Calcium 9.3 mg/dL      BUN/Creatinine Ratio 6.3     Anion Gap 9.6 mmol/L      eGFR 72.5 mL/min/1.73     Narrative:      GFR Normal >60  Chronic  Kidney Disease <60  Kidney Failure <15      High Sensitivity Troponin T [[id number]]  (Normal) Collected: 10/24/24 1645    Specimen: Blood Updated: 10/24/24 1723     HS Troponin T 19 ng/L     Narrative:      High Sensitive Troponin T Reference Range:  <14.0 ng/L- Negative Female for AMI  <22.0 ng/L- Negative Male for AMI  >=14 - Abnormal Female indicating possible myocardial injury.  >=22 - Abnormal Male indicating possible myocardial injury.   Clinicians would have to utilize clinical acumen, EKG, Troponin, and serial changes to determine if it is an Acute Myocardial Infarction or myocardial injury due to an underlying chronic condition.         T4, Free [[id number]]  (Normal) Collected: 10/24/24 1645    Specimen: Blood Updated: 10/24/24 1723     Free T4 1.15 ng/dL     TSH [[id number]]  (Normal) Collected: 10/24/24 1645    Specimen: Blood Updated: 10/24/24 1723     TSH 3.500 uIU/mL     Magnesium [[id number]]  (Normal) Collected: 10/24/24 1645    Specimen: Blood Updated: 10/24/24 1723     Magnesium 1.7 mg/dL     CBC & Differential [[id number]]  (Abnormal) Collected: 10/24/24 1645    Specimen: Blood Updated: 10/24/24 1705    Narrative:      The following orders were created for panel order CBC & Differential.  Procedure                               Abnormality         Status                     ---------                               -----------         ------                     CBC Auto Differential[[id number]]        Abnormal            Final result                 Please view results for these tests on the individual orders.    CBC Auto Differential [[id number]]  (Abnormal) Collected: 10/24/24 1645    Specimen: Blood Updated: 10/24/24 1705     WBC 11.17 10*3/mm3      RBC 5.37 10*6/mm3      Hemoglobin 15.0 g/dL      Hematocrit 47.3 %      MCV 88.1 fL      MCH 27.9 pg      MCHC 31.7 g/dL      RDW 12.6 %      RDW-SD 41.0 fl      MPV 8.6 fL      Platelets 267 10*3/mm3      Neutrophil % 73.0 %      Lymphocyte % 16.2 %       Monocyte % 8.2 %      Eosinophil % 1.3 %      Basophil % 1.0 %      Immature Grans % 0.3 %      Neutrophils, Absolute 8.16 10*3/mm3      Lymphocytes, Absolute 1.81 10*3/mm3      Monocytes, Absolute 0.92 10*3/mm3      Eosinophils, Absolute 0.14 10*3/mm3      Basophils, Absolute 0.11 10*3/mm3      Immature Grans, Absolute 0.03 10*3/mm3      nRBC 0.0 /100 WBC     [place] Draw [[id number]] Collected: 10/24/24 1645    Specimen: Blood Updated: 10/24/24 1701    Narrative:      The following orders were created for panel order [place] Draw.  Procedure                               Abnormality         Status                     ---------                               -----------         ------                     Green Top (Gel)[[id number]]                                  Final result               Lavender Top[[id number]]                                     Final result               Gold Top - SST[[id number]]                                   Final result               Light Blue Top[[id number]]                                   Final result                 Please view results for these tests on the individual orders.    Green Top (Gel) [[id number]] Collected: 10/24/24 1645    Specimen: Blood Updated: 10/24/24 1701     Extra Tube Hold for add-ons.     Comment: Auto resulted.       Lavender Top [[id number]] Collected: 10/24/24 1645    Specimen: Blood Updated: 10/24/24 1701     Extra Tube hold for add-on     Comment: Auto resulted       Gold Top - SST [[id number]] Collected: 10/24/24 1645    Specimen: Blood Updated: 10/24/24 1701     Extra Tube Hold for add-ons.     Comment: Auto resulted.       Light Blue Top [[id number]] Collected: 10/24/24 1645    Specimen: Blood Updated: 10/24/24 1701     Extra Tube Hold for add-ons.     Comment: Auto resulted                Imaging Results (Last 24 Hours)       Procedure Component Value Units Date/Time    XR Chest 1 View [[id number]] Collected: 10/24/24 1721     Updated: 10/24/24 1725     Narrative:      XR CHEST 1 VW    Date of Exam: 10/24/2024 5:08 PM EDT    Indication: New onset atrial fibrillation history of previous stroke    Comparison: 2/11/2020    Findings:  Heart size is within normal limits. The pulmonary vascular pattern in the chest is normal. The lungs appear hyperinflated but clear. No acute infiltrates are identified. There are postop changes of prior ASD repair.      Impression:      Impression:  Pulmonary hyperinflation in keeping with the patient's known emphysematous lung disease. No acute process identified.      Electronically Signed: Mathew Gates MD    10/24/2024 5:23 PM EDT    Workstation ID: [id number]              Assessment & Plan        This is a 64 y.o. male with PMH of emphysema, PFO, CVA on warfarin, anxiety, HTN, hypothyroidism, GERD, aortic thrombus on warfarin who presented to ED after a near syncopal episode while getting blood draw. While in ED converted to Afib with RVR. Self converted back to SR after 500ml fluid bolus. Will be admitted for new onset afib.         Active and Resolved Problems  Active Hospital Problems    Diagnosis  POA    **New onset a-fib [I48.91]  Yes      Resolved Hospital Problems   No resolved problems to display.     New Onset Atrial Fibrillation  Elevated troponin.   -denies Hx of Afib, while in ED episode of Afib with RVR rate 130-150's. Self converted to SR after 500 mL fluid bolus. Remains in SR on admission.   -EKG: Afib, no acute ischemic changes identified. Rate 133. . Qtc 460  -repeat EKG SR rate 84. . Qtc 418  -serial troponins pending. Initial 19 -> 29  -TSH/T4 normal. A1c/lipid panel/UA pending.   -echocardiogram: [place] July 2024 revealed EF 60-65%, mild concentric LV hypertrophy, no thrombus in JOSE. Mild intrapulmonary shunt.  -defer to day hospitalist for repeat EKG  -continue telemetry monitoring  -HR well controlled currently at 70-80's on admission.   -[id number] score 5, will continue warfarin. Pharmacy to dose.    -consider Holter monitoring on discharge.       Near syncope:  -near syncopal episode PTA, with features suggestive of vasovagal syncope  -obtain orthostatic VS  -serial troponins x 3 to evaluate for ACS  -telemetry  -[place] July 2024 revealed EF 60-65%, mild concentric LV hypertrophy, no thrombus in JOSE. Mild intrapulmonary shunt.   -defer to day hospitalist for repeat in echo.   -lipid panel pending.   -carotid doppler pending.     Hypertension:  -BP controlled on admission  -trend BP  -continue lisinopril      Hx of CVA:   Hx of aortic thrombus  -s/p CVA in 2021 with mild residual right sided weakness  -on warfarin, pharmacy to dose  -daily INR.   -continue statin.     Anxiety:  -mood stable on admission  -continue mirtazapine with PRN hydroxyzine and trazodone per home dosing.     Nicotine dependence:  -current daily smoker  -nicotine patch daily          VTE Prophylaxis:  Pharmacologic & mechanical VTE prophylaxis orders are present.        The patient desires to be as follows:    CODE STATUS:    Level Of Support Discussed With: Patient  Code Status (Patient has no pulse and is not breathing): CPR (Attempt to Resuscitate)  Medical Interventions (Patient has pulse or is breathing): Full Support        Admission Status:  I believe this patient meets OBS status.    Expected Length of Stay: < 2 midnights    PDMP and Medication Dispenses via Sidebar reviewed and consistent with patient reported medications.    I discussed the patient's findings and my recommendations with patient and nursing staff.      Signature:     This document has been electronically signed by JEREMY Remy on October 24, 2024 21:00 EDT   [facility]ist Team

## 2024-10-25 NOTE — NURSING NOTE
Discharge order received from MD.  Discharge instructions reviewed with patient and spouse, and patient and spouse both verbalize understanding.  IV's discontinued without complication.  Patient discharged home via private vehicle accompanied by spouse.

## 2024-10-25 NOTE — CASE MANAGEMENT/SOCIAL WORK
Discharge Planning Assessment   [facility]     Patient Name: Sathya Benavides  MRN: [id number]  Today's Date: 10/25/2024    Admit Date: 10/24/2024    Plan: Return home with family   Discharge Needs Assessment       Row Name 10/25/24 1021       Living Environment    People in Home spouse    Name(s) of People in Home Frantz Steward    Current Living Arrangements home    Potentially Unsafe Housing Conditions none    In the past 12 months has the electric, gas, oil, or water company threatened to shut off services in your home? No    Primary Care Provided by self    Provides Primary Care For no one    Family Caregiver if Needed spouse    Family Caregiver Names Frantz Steward    Quality of Family Relationships helpful;involved;supportive    Able to Return to Prior Arrangements yes       Resource/Environmental Concerns    Resource/Environmental Concerns none    Transportation Concerns none       Transportation Needs    In the past 12 months, has lack of transportation kept you from medical appointments or from getting medications? no    In the past 12 months, has lack of transportation kept you from meetings, work, or from getting things needed for daily living? No       Food Insecurity    Within the past 12 months, you worried that your food would run out before you got the money to buy more. Never true    Within the past 12 months, the food you bought just didn't last and you didn't have money to get more. Never true       Transition Planning    Patient/Family Anticipates Transition to home with family    Patient/Family Anticipated Services at Transition none    Transportation Anticipated family or friend will provide       Discharge Needs Assessment    Readmission Within the Last 30 Days no previous admission in last 30 days    Equipment Currently Used at Home none    Concerns to be Addressed no discharge needs identified;denies needs/concerns at this time    Anticipated Changes Related to Illness none    Equipment Needed After  Discharge none    Provided Post Acute Provider List? N/A    Provided Post Acute Provider Quality & Resource List? N/A    Offered/Gave Vendor List no                   Discharge Plan       Row Name 10/25/24 1022       Plan    Plan Return home with family    Patient/Family in Agreement with Plan yes    Provided Post Acute Provider List? N/A    Provided Post Acute Provider Quality & Resource List? N/A    Plan Comments CM met with patient at bedside to discuss discharge planning. Patient lives at home with his wife Nichelle. He states he occ drives but wife primarily provides transportation. He is independent in ADLs at home but she does help with cooking/cleaning/shopping. PCP and pharmacy confirmed. Patient agreeable to [facility]. Denies any issues affording medications, food, and/or utilities. He has no DME at home and denies need for DME or HHC. His PCP Dr. Kaminski manages his INR and coumadin dosing. Wife Nichelle will transport at discharge.                Demographic Summary       Row Name 10/25/24 1021       General Information    Admission Type observation    Arrived From emergency department    Referral Source admission list    Reason for Consult care coordination/care conference;discharge planning    Preferred Language English       Contact Information    Permission Granted to Share Info With                    Functional Status       Row Name 10/25/24 1021       Functional Status    Usual Activity Tolerance good    Current Activity Tolerance good       Functional Status, IADL    Medications independent    Meal Preparation assistive person    Housekeeping assistive person    Laundry assistive person    Shopping assistive person               Olga Nj RN      [facility]  Phone # [phone]  Fax # [phone]

## 2024-10-25 NOTE — THERAPY EVALUATION
Patient Name: Sathya Benavides  : 1960    MRN: [id number]                              Today's Date: 10/25/2024       Admit Date: 10/24/2024    Visit Dx:     ICD-10-CM ICD-9-CM   1. Paroxysmal atrial fibrillation  I48.0 427.31   2. Elevated troponin  R79.89 790.6     Patient Active Problem List   Diagnosis    CVA (cerebral vascular accident)    Cryptogenic stroke    Mediastinal adenopathy    PFO (patent foramen ovale)    Tobacco abuse    Hyperlipidemia LDL goal <70    Acid reflux    Anxiety    Primary hypertension    BPH (benign prostatic hyperplasia)    Depression    Hypothyroidism    Lung mass    Prostate disorder    Insomnia    New onset a-fib     Past Medical History:   Diagnosis Date    Acid reflux     Enlarged prostate     Headache, tension-type     Hyperlipidemia     Lung nodule     PFO (patent foramen ovale)     Stroke 2021    LOSS OF PERIPHERAL VISION    Stroke     2024     Past Surgical History:   Procedure Laterality Date    APPENDECTOMY      BRONCHOSCOPY Bilateral 2021    Procedure: BRONCHOSCOPY ENDOBRONCHIAL ULTRASOUND WITH FNA'S;  Surgeon: Sam Tony MD;  Location: [facility] ENDOSCOPY;  Service: Pulmonary;  Laterality: Bilateral;  PRE- LYMPHADENOPATHY  POST- SAME    CARDIAC CATHETERIZATION N/A 10/29/2021    Procedure: Patent foramen ovale closure  ABBOTT;  Surgeon: Sivakumar Chatman MD;  Location: [facility] CATH INVASIVE LOCATION;  Service: Cardiology;  Laterality: N/A;    KNEE ARTHROSCOPY      SHOULDER ARTHROSCOPY        General Information       Row Name 10/25/24 1018          Physical Therapy Time and Intention    Document Type evaluation  -CL     Mode of Treatment individual therapy;physical therapy  -CL       Row Name 10/25/24 1018          General Information    Patient Profile Reviewed yes  -CL     Prior Level of Function independent:;ADL's;all household mobility;community mobility  can drive;usually doesn't as his wife drives  -CL     Existing Precautions/Restrictions fall   -CL     Barriers to Rehab none identified  -CL       Row Name 10/25/24 1018          Living Environment    People in Home spouse  -CL       Row Name 10/25/24 1018          Home Main Entrance    Number of Stairs, Main Entrance none  -CL       Row Name 10/25/24 1018          Stairs Within Home, Primary    Number of Stairs, Within Home, Primary none  -CL       Row Name 10/25/24 1018          Cognition    Orientation Status (Cognition) oriented to;person;place;situation  Knew month needed cues for year  -CL               User Key  (r) = Recorded By, (t) = Taken By, (c) = Cosigned By      Initials Name Provider Type    CL Elen Lua, PT Physical Therapist                   Mobility       Row Name 10/25/24 1020          Bed Mobility    Bed Mobility bed mobility (all) activities  -CL     All Activities, [place] (Bed Mobility) independent  -CL       Row Name 10/25/24 1020          Bed-Chair Transfer    Bed-Chair [place] (Transfers) independent  -CL       Row Name 10/25/24 1020          Sit-Stand Transfer    Sit-Stand [place] (Transfers) independent  -CL       Row Name 10/25/24 1020          Gait/Stairs (Locomotion)    [place] Level (Gait) independent  -CL     Patient was able to Ambulate yes  -CL     Distance in Feet (Gait) 200  -CL               User Key  (r) = Recorded By, (t) = Taken By, (c) = Cosigned By      Initials Name Provider Type    CL Elen Lua, PT Physical Therapist                   Obj/Interventions       Row Name 10/25/24 1020          Range of Motion Comprehensive    General Range of Motion no range of motion deficits identified  -CL       Row Name 10/25/24 1020          Strength Comprehensive (MMT)    General Manual Muscle Testing (MMT) Assessment no strength deficits identified  -CL       Row Name 10/25/24 1020          Motor Skills    Motor Skills coordination  -CL     Coordination fine motor deficit;right;upper extremity  -CL       Row Name 10/25/24 1020           Balance    Balance Assessment sitting static balance;sitting dynamic balance;standing static balance;standing dynamic balance  -CL     Static Sitting Balance independent  -CL     Dynamic Sitting Balance independent  -CL     Position, Sitting Balance sitting edge of bed  -CL     Static Standing Balance independent  -CL     Dynamic Standing Balance supervision  -CL               User Key  (r) = Recorded By, (t) = Taken By, (c) = Cosigned By      Initials Name Provider Type    Elen Hernandez, PT Physical Therapist                   Goals/Plan    No documentation.                  Clinical Impression       Row Name 10/25/24 1020          Pain    Pretreatment Pain Rating 0/10 - no pain  -CL     Posttreatment Pain Rating 0/10 - no pain  -CL       Row Name 10/25/24 1020          Plan of Care Review    Plan of Care Reviewed With patient  -CL     Outcome Evaluation Sathya Benavides is a 64 y.o. male with medical hx of emphysema, PFO, CVA, HTN, Hypothyroidism, aortic thrombus who presents with weakness and near syncope. At baseline, pt lives with wife in a H with no DOMINIK.  He is typically independent with mobility & ADLs without AD. He reports that he is right handed and has decreased coordination and strength in R hand from previous CVA.  At time of PT evaluation, he is A&O to all except current year.  He denies pain.  He is currently on 2L O2 at % saturation. He does not wear O2 at home so he was assessed on RA and remained at 98-99% throughout session. Pt is independent with bed mobility, transfers and ambulation without AD with stable vitals.  no further PT needs.  -CL       Row Name 10/25/24 1020          Therapy Assessment/Plan (PT)    Therapy Frequency (PT) evaluation only  -CL       Row Name 10/25/24 1020          Vital Signs    Pre Systolic BP Rehab 123  -CL     Pre Treatment Diastolic BP 64  -CL     Intra Systolic BP Rehab 127  -CL     Intra Treatment Diastolic BP 67  -CL     Post Systolic BP Rehab 127   -CL     Post Treatment Diastolic BP 67  -CL     Pretreatment Heart Rate (beats/min) 80  -CL     Intratreatment Heart Rate (beats/min) 77  -CL     Posttreatment Heart Rate (beats/min) 73  -CL     Pretreatment Resp Rate (breaths/min) 14  -CL     Intratreatment Resp Rate (breaths/min) 12  -CL     Posttreatment Resp Rate (breaths/min) 22  -CL     Pre SpO2 (%) 97  -CL     O2 Delivery Pre Treatment supplemental O2  -CL     Intra SpO2 (%) 99  -CL     O2 Delivery Intra Treatment room air  -CL     Post SpO2 (%) 98  -CL     O2 Delivery Post Treatment room air  -CL     Pre Patient Position Supine  -CL     Intra Patient Position Sitting  -CL     Post Patient Position Sitting  -CL       Row Name 10/25/24 1020          Positioning and Restraints    Pre-Treatment Position in bed  -CL     Post Treatment Position chair  -CL     In Chair notified nsg;sitting;call light within reach;encouraged to call for assist;exit alarm on  -CL               User Key  (r) = Recorded By, (t) = Taken By, (c) = Cosigned By      Initials Name Provider Type    Elen Hernandez, PT Physical Therapist                   Outcome Measures       Row Name 10/25/24 1024 10/25/24 0749       How much help from another person do you currently need...    Turning from your back to your side while in flat bed without using bedrails? 4  -CL 4  -DR    Moving from lying on back to sitting on the side of a flat bed without bedrails? 4  -CL 4  -DR    Moving to and from a bed to a chair (including a wheelchair)? 4  -CL 4  -DR    Standing up from a chair using your arms (e.g., wheelchair, bedside chair)? 4  -CL 4  -DR    Climbing 3-5 steps with a railing? 4  -CL 3  -DR    To walk in hospital room? 4  -CL 4  -DR    AM-PAC 6 Clicks Score (PT) 24  -CL 23  -DR      Row Name 10/25/24 0000          How much help from another person do you currently need...    Turning from your back to your side while in flat bed without using bedrails? 4  -DD     Moving from lying on back to  sitting on the side of a flat bed without bedrails? 4  -DD     Moving to and from a bed to a chair (including a wheelchair)? 4  -DD     Standing up from a chair using your arms (e.g., wheelchair, bedside chair)? 4  -DD     Climbing 3-5 steps with a railing? 3  -DD     To walk in hospital room? 4  -DD     AM-PAC 6 Clicks Score (PT) 23  -DD               User Key  (r) = Recorded By, (t) = Taken By, (c) = Cosigned By      Initials Name Provider Type    DD Deidra Tripathi LPN Licensed Nurse    Braulio Cintron, RN Registered Nurse    Elen Hernandez, PT Physical Therapist                                 Physical Therapy Education       Title: PT OT SLP Therapies (Done)       Topic: Physical Therapy (Done)       Point: Mobility training (Done)       Learning Progress Summary            Patient Acceptance, E,TB, VU by  at 10/25/2024 1024                                      User Key       Initials Effective Dates Name Provider Type Discipline     03/02/22 -  Elen Lua, PT Physical Therapist PT                  PT Recommendation and Plan     Outcome Evaluation: Sathya Benavides is a 64 y.o. male with medical hx of emphysema, PFO, CVA, HTN, Hypothyroidism, aortic thrombus who presents with weakness and near syncope. At baseline, pt lives with wife in a H with no DOMINIK.  He is typically independent with mobility & ADLs without AD. He reports that he is right handed and has decreased coordination and strength in R hand from previous CVA.  At time of PT evaluation, he is A&O to all except current year.  He denies pain.  He is currently on 2L O2 at % saturation. He does not wear O2 at home so he was assessed on RA and remained at 98-99% throughout session. Pt is independent with bed mobility, transfers and ambulation without AD with stable vitals.  no further PT needs.     Time Calculation:   PT Evaluation Complexity  History, PT Evaluation Complexity: 3 or more personal factors and/or  comorbidities  Examination of Body Systems (PT Eval Complexity): total of 3 or more elements  Clinical Presentation (PT Evaluation Complexity): evolving  Clinical Decision Making (PT Evaluation Complexity): moderate complexity  Overall Complexity (PT Evaluation Complexity): moderate complexity     PT Charges       Row Name 10/25/24 1026             Time Calculation    Start Time 0952  -CL      Stop Time 1011  -CL      Time Calculation (min) 19 min  -CL      PT Received On 10/25/24  -CL         Time Calculation- PT    Total Timed Code Minutes- PT 0 minute(s)  -CL                User Key  (r) = Recorded By, (t) = Taken By, (c) = Cosigned By      Initials Name Provider Type    CL Elen Lua, PT Physical Therapist                  Therapy Charges for Today       Code Description Service Date Service Provider Modifiers Qty    55681725418 HC PT EVAL MOD COMPLEXITY 4 10/25/2024 Elen Lua, PT GP 1            PT G-Codes  AM-PAC 6 Clicks Score (PT): 24  PT Discharge Summary  Anticipated Discharge Disposition (PT): home  Reason for Discharge: At baseline function    Elen Lua PT  10/25/2024

## 2024-10-25 NOTE — PROGRESS NOTES
[facility] MEDICINE SERVICE  DAILY PROGRESS NOTE    NAME: Sathya Benavides  : 1960  MRN: [id number]      LOS: 0 days     PROVIDER OF SERVICE: Carmita Sellers MD    Chief Complaint: New onset a-fib    Subjective:     Interval History:  History taken from: patient    No new complaint        Review of Systems:   Review of Systems   All other systems reviewed and are negative.      Objective:     Vital Signs  Temp:  [97.5 °F (36.4 °C)-98.2 °F (36.8 °C)] 98 °F (36.7 °C)  Heart Rate:  [] 75  Resp:  [13-25] 22  BP: ()/(44-74) 109/62   Body mass index is 18.62 kg/m².    Physical Exam  Physical Exam  Constitutional:       Appearance: Normal appearance.   HENT:      Head: Normocephalic and atraumatic.      Nose: Nose normal.      Mouth/Throat:      Mouth: Mucous membranes are moist.   Eyes:      Extraocular Movements: Extraocular movements intact.      Conjunctiva/sclera: Conjunctivae normal.      Pupils: Pupils are equal, round, and reactive to light.   Cardiovascular:      Rate and Rhythm: Normal rate and regular rhythm.      Pulses: Normal pulses.      Heart sounds: Normal heart sounds.   Pulmonary:      Effort: Pulmonary effort is normal.      Breath sounds: Normal breath sounds.   Abdominal:      General: Abdomen is flat. Bowel sounds are normal.      Palpations: Abdomen is soft.   Musculoskeletal:         General: Normal range of motion.      Cervical back: Normal range of motion and neck supple.   Skin:     General: Skin is warm and dry.      Capillary Refill: Capillary refill takes less than 2 seconds.   Neurological:      General: No focal deficit present.      Mental Status: He is alert and oriented to person, place, and time.   Psychiatric:         Mood and Affect: Mood normal.         Behavior: Behavior normal.         Thought Content: Thought content normal.         Judgment: Judgment normal.         Current Medications:  Scheduled Meds:atorvastatin, 80 mg, Oral, Daily  ferrous  sulfate, 324 mg, Oral, Daily With Breakfast  lisinopril, 5 mg, Oral, Daily  memantine, 5 mg, Oral, Q12H  mirtazapine, 30 mg, Oral, Nightly  nicotine, 1 patch, Transdermal, Nightly  pantoprazole, 40 mg, Oral, Daily  potassium chloride ER, 20 mEq, Oral, Once  sodium chloride, 10 mL, Intravenous, Q12H  tamsulosin, 0.8 mg, Oral, Daily      Continuous Infusions:Pharmacy to dose warfarin,       PRN Meds:.  acetaminophen    hydrOXYzine    Magnesium Standard Dose Replacement - Follow Nurse / BPA Driven Protocol    nitroglycerin    ondansetron    Pharmacy to dose warfarin    Phosphorus Replacement - Follow Nurse / BPA Driven Protocol    Potassium Replacement - Follow Nurse / BPA Driven Protocol    [COMPLETED] Insert Peripheral IV **AND** sodium chloride    sodium chloride    traZODone       Diagnostic Data    Results from last 7 days   Lab Units 10/24/24  2233   WBC 10*3/mm3 8.78   HEMOGLOBIN g/dL 12.9*   HEMATOCRIT % 39.3   PLATELETS 10*3/mm3 219   GLUCOSE mg/dL 122*   CREATININE mg/dL 0.82   BUN mg/dL 8   SODIUM mmol/L 135*   POTASSIUM mmol/L 3.7   ANION GAP mmol/L 7.8       XR Chest 1 View    Result Date: 10/24/2024  Impression: Pulmonary hyperinflation in keeping with the patient's known emphysematous lung disease. No acute process identified. Electronically Signed: Mathew Gates MD  10/24/2024 5:23 PM EDT  Workstation ID: [id number]       I reviewed the patient's new clinical results.    Assessment/Plan:     Active and Resolved Problems  Active Hospital Problems    Diagnosis  POA    **New onset a-fib [I48.91]  Yes      Resolved Hospital Problems   No resolved problems to display.       New Onset Atrial Fibrillation  Elevated troponin.   -Back in sinus rhythm.  On Coumadin with a therapeutic INR of 2.8.  Pharmacy dosing Coumadin.  Goal INR is 2-3.  Order low-dose metoprolol for rate control.  TSH normal.  Follow-up on echo ordered.  - Mildly elevated troponins and trending downwards/flat.  No chest pain.  Monitor.     Near  syncope:  -Orthostatic vital signs are unremarkable.  Follow-up on echo ordered.     Hypertension:  -Blood pressure is controlled.  Metoprolol.     Hx of CVA:   Hx of aortic thrombus  -s/p CVA in 2021 with mild residual right sided weakness  -on warfarin, pharmacy to dose  -INR therapeutic at 2.8.  Monitor.     Anxiety/? depression:  -continue mirtazapine with PRN hydroxyzine and trazodone per home dosing.      Nicotine dependence:  -Continue nicotine patch             VTE Prophylaxis:  Pharmacologic & mechanical VTE prophylaxis orders are present.             Disposition Planning:     Barriers to Discharge: Pending clinical improvement  Anticipated Date of Discharge: Pending clinical course but likely 10/27/2024  Place of Discharge: Home        Code Status and Medical Interventions: CPR (Attempt to Resuscitate); Full Support   Ordered at: 10/24/24 2033     Level Of Support Discussed With:    Patient     Code Status (Patient has no pulse and is not breathing):    CPR (Attempt to Resuscitate)     Medical Interventions (Patient has pulse or is breathing):    Full Support       Signature: Electronically signed by Carmita Sellers MD, 10/25/24, 11:34 EDT.  [facility] Hospitalist Team

## 2024-10-25 NOTE — PLAN OF CARE
Goal Outcome Evaluation:  Plan of Care Reviewed With: patient           Outcome Evaluation: Sathya Benavides is a 64 y.o. male with medical hx of emphysema, PFO, CVA, HTN, Hypothyroidism, aortic thrombus who presents with weakness and near syncope. At baseline, pt lives with wife in a H with no DOMINIK.  He is typically independent with mobility & ADLs without AD. He reports that he is right handed and has decreased coordination and strength in R hand from previous CVA.  At time of PT evaluation, he is A&O to all except current year.  He denies pain.  He is currently on 2L O2 at % saturation. He does not wear O2 at home so he was assessed on RA and remained at 98-99% throughout session. Pt is independent with bed mobility, transfers and ambulation without AD with stable vitals.  no further PT needs.    Anticipated Discharge Disposition (PT): home

## 2024-10-25 NOTE — PLAN OF CARE
Goal Outcome Evaluation:              Outcome Evaluation: Patient admitted for new afib.  Patient converted in the ER.  Troponin was elevated to 37 but has trended down 34.  NP contacted during shift regarding elevated troponin. Killian stayed in SR all night. Patient sleeping and stable at this time.

## 2024-10-25 NOTE — ED PROVIDER NOTES
Subjective   History of Present Illness  Chief complaint dizzy lightheaded felt faint    History of present illness this is a 64-year-old male who scatted history of PFO stroke on warfarin who had a INR drawn yesterday which was over 4 they held his warfarin and was in the lab getting a repeat 1 today it was a hard stick today it took about 3 attempts and they were digging around he states he became somewhat nauseous dizzy and lightheaded and felt faint.  The  gave him some orange juice and then he was sent to ER for evaluation.  He denies any specific chest pain although heart is beating fast.  He has no headache or visual changes or shortness of breath he states he feels better at this point.      Review of Systems   Constitutional:  Negative for chills and fever.   Respiratory:  Negative for chest tightness and shortness of breath.    Cardiovascular:  Positive for palpitations. Negative for chest pain.   Gastrointestinal:  Negative for abdominal pain and vomiting.   Genitourinary:  Negative for difficulty urinating.   Skin:  Negative for rash.   Neurological:  Positive for dizziness and light-headedness.   Psychiatric/Behavioral:  Negative for confusion.        Past Medical History:   Diagnosis Date    Acid reflux     Enlarged prostate     Headache, tension-type     Hyperlipidemia     Lung nodule     PFO (patent foramen ovale)     Stroke 03/2021    LOSS OF PERIPHERAL VISION    Stroke     7/2024       Allergies   Allergen Reactions    Alpha-Gal Hives    Adhesive Tape Rash    Amoxicillin-Pot Clavulanate Other (See Comments) and GI Intolerance     Upset stomach       Past Surgical History:   Procedure Laterality Date    APPENDECTOMY      BRONCHOSCOPY Bilateral 4/8/2021    Procedure: BRONCHOSCOPY ENDOBRONCHIAL ULTRASOUND WITH FNA'S;  Surgeon: Sam Tony MD;  Location: [facility] ENDOSCOPY;  Service: Pulmonary;  Laterality: Bilateral;  PRE- LYMPHADENOPATHY  POST- SAME    CARDIAC CATHETERIZATION N/A  10/29/2021    Procedure: Patent foramen ovale closure  ABBOTT;  Surgeon: Sivakumar Chatman MD;  Location: [facility] INVASIVE LOCATION;  Service: Cardiology;  Laterality: N/A;    KNEE ARTHROSCOPY      SHOULDER ARTHROSCOPY         Family History   Problem Relation Age of Onset    Heart disease Mother     Diabetes Mother     Dementia Father     Colon cancer Brother     No Known Problems Daughter     No Known Problems Daughter     No Known Problems Daughter     No Known Problems Son     Teja Hyperthermia Neg Hx        Social History     Socioeconomic History    Marital status:    Tobacco Use    Smoking status: Every Day     Current packs/day: 1.00     Average packs/day: 1 pack/day for 43.8 years (43.8 ttl pk-yrs)     Types: Cigarettes     Start date: 1981    Smokeless tobacco: Never   Vaping Use    Vaping status: Never Used   Substance and Sexual Activity    Alcohol use: Yes     Comment: occasionally    Drug use: Not Currently    Sexual activity: Defer     Prior to Admission medications    Medication Sig Start Date End Date Taking? Authorizing Provider   albuterol sulfate  (90 Base) MCG/ACT inhaler Inhale 2 puffs Every 4 (Four) Hours As Needed for Wheezing. 9/9/24  Yes Adi Kaminski MD   atorvastatin (Lipitor) 80 MG tablet Take 1 tablet by mouth Daily. 8/9/24  Yes Kaylie Mariscal APRN   cyanocobalamin 100 MCG tablet GIVE 1 TABLET BY MOUTH ONCE DAILY 10/11/24  Yes Adi Kaminski MD   ferrous sulfate 325 (65 FE) MG tablet Take 1 tablet by mouth Daily With Breakfast. 9/19/24  Yes Adi Kaminski MD   hydrOXYzine (ATARAX) 25 MG tablet Take 1 tablet by mouth Every 8 (Eight) Hours As Needed for Anxiety. 8/15/24  Yes Adi Kaminski MD   lisinopril (PRINIVIL,ZESTRIL) 5 MG tablet Take 1 tablet by mouth Daily. 9/9/24  Yes Adi Kaminski MD   Magnesium Oxide -Mg Supplement (MAGnesium-Oxide) 400 (240 Mg) MG tablet Take 1 tablet by mouth 2 (Two) Times a Day. 9/19/24  Yes  Adi Kaminski MD   memantine (NAMENDA) 5 MG tablet Take 1 tablet by mouth Every 12 (Twelve) Hours. 10/7/24  Yes Adi Kaminski MD   mirtazapine (Remeron) 30 MG tablet Take 1 tablet by mouth Every Night. 10/7/24  Yes Adi Kaminski MD   pantoprazole (PROTONIX) 40 MG EC tablet Take 1 tablet by mouth Daily. 8/15/24  Yes Adi Kaminski MD   potassium chloride (MICRO-K) 10 MEQ CR capsule Take 1 capsule by mouth 2 (Two) Times a Day. 9/19/24  Yes Adi Kaminski MD   tamsulosin (FLOMAX) 0.4 MG capsule 24 hr capsule Take 2 capsules by mouth once daily 7/23/24  Yes Adi Kaminski MD   traZODone (DESYREL) 50 MG tablet TAKE 1 TABLET BY MOUTH EVERY DAY AT BEDTIME 7/23/24  Yes Adi Kaminski MD   warfarin (COUMADIN) 2 MG tablet Take 1 tablet by mouth 3 (Three) Times a Week. Tues, Thur, Sat   Yes Chip Ascencio MD   warfarin (COUMADIN) 4 MG tablet Take 1 tablet by mouth 4 (Four) Times a Week. Mon, Wed, Fri, Sun   Yes Chip Ascencio MD          Objective   Physical Exam  Constitutional this is a 64-year-old male awake alert no acute distress resting comfortably in no distress but tachycardic on the monitor but in A-fib run about 140s.  HEENT extraocular muscles intact pupils equal round reactive  Neck supple no adenopathy no JV no bruits  Lungs clear no retractions.  Heart irregular tachycardic without murmur rub  Abdomen soft nontender good bowel sounds no pulsatile masses  Extremities pulses equal upper lower extremities no edema no cords no Homans' sign no evidence of DVT.  Skin is warm and dry without rashes or cellulitic changes  Neurologic awake alert follows commands motor strength normal without other focal weakness.  Procedures           ED Course      Results for orders placed or performed during the hospital encounter of 10/24/24   Basic Metabolic Panel    Collection Time: 10/24/24  4:45 PM    Specimen: Blood   Result Value Ref Range    Glucose 141 (H) 65 -  99 mg/dL    BUN 7 (L) 8 - 23 mg/dL    Creatinine 1.12 0.76 - 1.27 mg/dL    Sodium 134 (L) 136 - 145 mmol/L    Potassium 4.0 3.5 - 5.2 mmol/L    Chloride 98 98 - 107 mmol/L    CO2 26.4 22.0 - 29.0 mmol/L    Calcium 9.3 8.6 - 10.5 mg/dL    BUN/Creatinine Ratio 6.3 (L) 7.0 - 25.0    Anion Gap 9.6 5.0 - 15.0 mmol/L    eGFR 72.5 >60.0 mL/min/1.73   High Sensitivity Troponin T    Collection Time: 10/24/24  4:45 PM    Specimen: Blood   Result Value Ref Range    HS Troponin T 19 <22 ng/L   T4, Free    Collection Time: 10/24/24  4:45 PM    Specimen: Blood   Result Value Ref Range    Free T4 1.15 0.93 - 1.70 ng/dL   TSH    Collection Time: 10/24/24  4:45 PM    Specimen: Blood   Result Value Ref Range    TSH 3.500 0.270 - 4.200 uIU/mL   Magnesium    Collection Time: 10/24/24  4:45 PM    Specimen: Blood   Result Value Ref Range    Magnesium 1.7 1.6 - 2.4 mg/dL   CBC Auto Differential    Collection Time: 10/24/24  4:45 PM    Specimen: Blood   Result Value Ref Range    WBC 11.17 (H) 3.40 - 10.80 10*3/mm3    RBC 5.37 4.14 - 5.80 10*6/mm3    Hemoglobin 15.0 13.0 - 17.7 g/dL    Hematocrit 47.3 37.5 - 51.0 %    MCV 88.1 79.0 - 97.0 fL    MCH 27.9 26.6 - 33.0 pg    MCHC 31.7 31.5 - 35.7 g/dL    RDW 12.6 12.3 - 15.4 %    RDW-SD 41.0 37.0 - 54.0 fl    MPV 8.6 6.0 - 12.0 fL    Platelets 267 140 - 450 10*3/mm3    Neutrophil % 73.0 42.7 - 76.0 %    Lymphocyte % 16.2 (L) 19.6 - 45.3 %    Monocyte % 8.2 5.0 - 12.0 %    Eosinophil % 1.3 0.3 - 6.2 %    Basophil % 1.0 0.0 - 1.5 %    Immature Grans % 0.3 0.0 - 0.5 %    Neutrophils, Absolute 8.16 (H) 1.70 - 7.00 10*3/mm3    Lymphocytes, Absolute 1.81 0.70 - 3.10 10*3/mm3    Monocytes, Absolute 0.92 (H) 0.10 - 0.90 10*3/mm3    Eosinophils, Absolute 0.14 0.00 - 0.40 10*3/mm3    Basophils, Absolute 0.11 0.00 - 0.20 10*3/mm3    Immature Grans, Absolute 0.03 0.00 - 0.05 10*3/mm3    nRBC 0.0 0.0 - 0.2 /100 WBC   Green Top (Gel)    Collection Time: 10/24/24  4:45 PM   Result Value Ref Range    Extra  Tube Hold for add-ons.    Lavender Top    Collection Time: 10/24/24  4:45 PM   Result Value Ref Range    Extra Tube hold for add-on    Gold Top - SST    Collection Time: 10/24/24  4:45 PM   Result Value Ref Range    Extra Tube Hold for add-ons.    Light Blue Top    Collection Time: 10/24/24  4:45 PM   Result Value Ref Range    Extra Tube Hold for add-ons.    ECG 12 Lead Tachycardia    Collection Time: 10/24/24  4:49 PM   Result Value Ref Range    QT Interval 308 ms    QTC Interval 460 ms   ECG 12 Lead Tachycardia    Collection Time: 10/24/24  5:23 PM   Result Value Ref Range    QT Interval 353 ms    QTC Interval 418 ms   High Sensitivity Troponin T 2Hr    Collection Time: 10/24/24  6:52 PM    Specimen: Blood   Result Value Ref Range    HS Troponin T 29 (H) <22 ng/L    Troponin T Delta 10 (C) >=-4 - <+4 ng/L   Lipid Panel    Collection Time: 10/24/24  6:52 PM    Specimen: Blood   Result Value Ref Range    Total Cholesterol 107 0 - 200 mg/dL    Triglycerides 128 0 - 150 mg/dL    HDL Cholesterol 28 (L) 40 - 60 mg/dL    LDL Cholesterol  56 0 - 100 mg/dL    VLDL Cholesterol 23 5 - 40 mg/dL    LDL/HDL Ratio 1.91    High Sensitivity Troponin T    Collection Time: 10/24/24 10:33 PM    Specimen: Blood   Result Value Ref Range    HS Troponin T 37 (H) <22 ng/L   Basic Metabolic Panel    Collection Time: 10/24/24 10:33 PM    Specimen: Blood   Result Value Ref Range    Glucose 122 (H) 65 - 99 mg/dL    BUN 8 8 - 23 mg/dL    Creatinine 0.82 0.76 - 1.27 mg/dL    Sodium 135 (L) 136 - 145 mmol/L    Potassium 3.7 3.5 - 5.2 mmol/L    Chloride 101 98 - 107 mmol/L    CO2 26.2 22.0 - 29.0 mmol/L    Calcium 8.7 8.6 - 10.5 mg/dL    BUN/Creatinine Ratio 9.8 7.0 - 25.0    Anion Gap 7.8 5.0 - 15.0 mmol/L    eGFR 96.9 >60.0 mL/min/1.73   Magnesium    Collection Time: 10/24/24 10:33 PM    Specimen: Blood   Result Value Ref Range    Magnesium 1.7 1.6 - 2.4 mg/dL   Protime-INR    Collection Time: 10/24/24 10:33 PM    Specimen: Blood   Result Value  Ref Range    Protime 28.3 19.4 - 28.5 Seconds    INR 2.80 2.00 - 3.00   Hemoglobin A1c    Collection Time: 10/24/24 10:33 PM    Specimen: Blood   Result Value Ref Range    Hemoglobin A1C 5.96 (H) 4.80 - 5.60 %   CBC Auto Differential    Collection Time: 10/24/24 10:33 PM    Specimen: Blood   Result Value Ref Range    WBC 8.78 3.40 - 10.80 10*3/mm3    RBC 4.41 4.14 - 5.80 10*6/mm3    Hemoglobin 12.9 (L) 13.0 - 17.7 g/dL    Hematocrit 39.3 37.5 - 51.0 %    MCV 89.1 79.0 - 97.0 fL    MCH 29.3 26.6 - 33.0 pg    MCHC 32.8 31.5 - 35.7 g/dL    RDW 12.4 12.3 - 15.4 %    RDW-SD 41.0 37.0 - 54.0 fl    MPV 8.5 6.0 - 12.0 fL    Platelets 219 140 - 450 10*3/mm3    Neutrophil % 60.2 42.7 - 76.0 %    Lymphocyte % 27.0 19.6 - 45.3 %    Monocyte % 8.7 5.0 - 12.0 %    Eosinophil % 3.1 0.3 - 6.2 %    Basophil % 0.8 0.0 - 1.5 %    Immature Grans % 0.2 0.0 - 0.5 %    Neutrophils, Absolute 5.29 1.70 - 7.00 10*3/mm3    Lymphocytes, Absolute 2.37 0.70 - 3.10 10*3/mm3    Monocytes, Absolute 0.76 0.10 - 0.90 10*3/mm3    Eosinophils, Absolute 0.27 0.00 - 0.40 10*3/mm3    Basophils, Absolute 0.07 0.00 - 0.20 10*3/mm3    Immature Grans, Absolute 0.02 0.00 - 0.05 10*3/mm3    nRBC 0.0 0.0 - 0.2 /100 WBC   CK    Collection Time: 10/24/24 10:33 PM    Specimen: Blood   Result Value Ref Range    Creatine Kinase 90 20 - 200 U/L   Urinalysis With Microscopic If Indicated (No Culture) - Urine, Clean Catch    Collection Time: 10/25/24 12:09 AM    Specimen: Urine, Clean Catch   Result Value Ref Range    Color, UA Yellow Yellow, Straw    Appearance, UA Cloudy (A) Clear    pH, UA 6.5 5.0 - 8.0    Specific Gravity, UA 1.012 1.005 - 1.030    Glucose, UA Negative Negative    Ketones, UA Negative Negative    Bilirubin, UA Negative Negative    Blood, UA Negative Negative    Protein, UA Negative Negative    Leuk Esterase, UA Small (1+) (A) Negative    Nitrite, UA Negative Negative    Urobilinogen, UA 1.0 E.U./dL 0.2 - 1.0 E.U./dL   Urinalysis, Microscopic Only -  Urine, Clean Catch    Collection Time: 10/25/24 12:09 AM    Specimen: Urine, Clean Catch   Result Value Ref Range    RBC, UA 0-2 None Seen, 0-2 /HPF    WBC, UA 3-5 (A) None Seen, 0-2 /HPF    Bacteria, UA None Seen None Seen /HPF    Squamous Epithelial Cells, UA 0-2 None Seen, 0-2 /HPF    Hyaline Casts, UA 3-6 None Seen /LPF    Methodology Automated Microscopy      XR Chest 1 View    Result Date: 10/24/2024  Impression: Pulmonary hyperinflation in keeping with the patient's known emphysematous lung disease. No acute process identified. Electronically Signed: Mathew Gates MD  10/24/2024 5:23 PM EDT  Workstation ID: [id number]   Medications   sodium chloride 0.9 % flush 10 mL (has no administration in time range)   atorvastatin (LIPITOR) tablet 80 mg (has no administration in time range)   ferrous sulfate EC tablet 324 mg (has no administration in time range)   hydrOXYzine (ATARAX) tablet 25 mg (has no administration in time range)   lisinopril (PRINIVIL,ZESTRIL) tablet 5 mg (has no administration in time range)   memantine (NAMENDA) tablet 5 mg (5 mg Oral Given 10/24/24 2215)   pantoprazole (PROTONIX) EC tablet 40 mg (has no administration in time range)   tamsulosin (FLOMAX) 24 hr capsule 0.8 mg (has no administration in time range)   traZODone (DESYREL) tablet 50 mg (50 mg Oral Given 10/24/24 2215)   Pharmacy to dose warfarin (has no administration in time range)   sodium chloride 0.9 % flush 10 mL (10 mL Intravenous Given 10/24/24 2215)   sodium chloride 0.9 % flush 10 mL (has no administration in time range)   nitroglycerin (NITROSTAT) SL tablet 0.4 mg (has no administration in time range)   Potassium Replacement - Follow Nurse / BPA Driven Protocol (has no administration in time range)   Magnesium Standard Dose Replacement - Follow Nurse / BPA Driven Protocol (has no administration in time range)   Phosphorus Replacement - Follow Nurse / BPA Driven Protocol (has no administration in time range)   acetaminophen  (TYLENOL) tablet 650 mg (has no administration in time range)   ondansetron (ZOFRAN) injection 4 mg (has no administration in time range)   mirtazapine (REMERON) tablet 30 mg (30 mg Oral Given 10/24/24 2215)   nicotine (NICODERM CQ) 21 MG/24HR patch 1 patch (1 patch Transdermal Medication Applied 10/24/24 2215)   lactated ringers bolus 500 mL (0 mL Intravenous Stopped 10/24/24 2001)                                      EKG my interpretation atrial fibrillation rate of 133 normal axis hypertrophy QTc of 460 abnormal EKG changed from 8/30/2024 when patient was in normal sinus rhythm  EKG #2 my interpretation normal sinus rhythm rate of 84 normal axis hypertrophy QTc of 419 normal EKG and improved from the 1 done earlier today with atrial fibrillation with          Medical Decision Making  Medical decision making.  IV established monitor reviewed atrial fibrillation rapid ventricular response.  EKG my interpretation atrial fibrillation rate of 133 normal axis hypertrophy abnormal EKG unchanged from 8/30/2024 was in sinus rhythm.  Labs obtained by independent reviewed patient already had INR done it was 2.8 labs obtained by independent reviewed basic metabolic profile unremarkable blood sugar 141 sodium 134 thyroid unremarkable magnesium normal CBC white count 11.17 otherwise unremarkable.  Initial troponin was 19 repeat went up to 29 2 hours later.  The patient had a little bit soft blood pressure in the 90s initially had been given lactated ringer 500 cc bolus which improved him up to the 120s.  The patient spontaneously converted into a normal sinus rhythm and he had a rate down into the 80s on the monitor.  Repeat EKG interpreted by me normal sinus rhythm at 84 normal axis no hypertrophy was normal no acute ST changes and it was changed earlier atrial fibrillation and unremarkable EKG.  Patient did have a troponin with 19-29 which may just be related to atrial fibrillation.  He denies any current chest pain neck arm  jaw pain or shortness of breath.  I do not see evidence that suggest acute DVT pulmonary embolism or dissection acute ST elevation myocardial infarction pericarditis myocarditis sepsis although not a complete list of all possibilities.  I spoke with hospitalist nurse practitioner Case discussed and patient will be admitted for further care and further workup stable otherwise unremarkable ER course    Problems Addressed:  Elevated troponin: complicated acute illness or injury  Paroxysmal atrial fibrillation: complicated acute illness or injury    Amount and/or Complexity of Data Reviewed  Labs: ordered. Decision-making details documented in ED Course.  Radiology: ordered and independent interpretation performed. Decision-making details documented in ED Course.  ECG/medicine tests: ordered and independent interpretation performed. Decision-making details documented in ED Course.    Risk  Decision regarding hospitalization.        Final diagnoses:   Paroxysmal atrial fibrillation   Elevated troponin       ED Disposition  ED Disposition       ED Disposition   Decision to Admit    Condition   --    Comment   Level of Care: Telemetry [5]   Admitting Physician: LLANES ALVAREZ, CARLOS [[id number]]   Attending Physician: LLANES ALVAREZ, CARLOS [[id number]]                 No follow-up provider specified.       Medication List        ASK your doctor about these medications      * warfarin 2 MG tablet  Commonly known as: COUMADIN  Ask about: Which instructions should I use?     * warfarin 4 MG tablet  Commonly known as: COUMADIN  Ask about: Which instructions should I use?           * This list has 2 medication(s) that are the same as other medications prescribed for you. Read the directions carefully, and ask your doctor or other care provider to review them with you.                     Mathew Quiroz MD  10/25/24 0120

## 2024-10-25 NOTE — CONSULTS
Nutrition Services    Patient Name: Sathya Benavides  YOB: 1960  MRN: [id number]  Admission date: 10/24/2024    Comment:    Severe malnutrition related to possibly daily nicotine as evidenced by severe fat and muscle wasting per NFPE     Monitor/Encourage PO intake     CLINICAL NUTRITION ASSESSMENT      Reason for Assessment 10/25: BMI less than 19      H&P      Past Medical History:   Diagnosis Date    Acid reflux     Enlarged prostate     Headache, tension-type     Hyperlipidemia     Lung nodule     PFO (patent foramen ovale)     Stroke 03/2021    LOSS OF PERIPHERAL VISION    Stroke     7/2024       Past Surgical History:   Procedure Laterality Date    APPENDECTOMY      BRONCHOSCOPY Bilateral 4/8/2021    Procedure: BRONCHOSCOPY ENDOBRONCHIAL ULTRASOUND WITH FNA'S;  Surgeon: Sam Tony MD;  Location: [facility] ENDOSCOPY;  Service: Pulmonary;  Laterality: Bilateral;  PRE- LYMPHADENOPATHY  POST- SAME    CARDIAC CATHETERIZATION N/A 10/29/2021    Procedure: Patent foramen ovale closure  ABBOTT;  Surgeon: Sivakumar Chatman MD;  Location: [facility] CATH INVASIVE LOCATION;  Service: Cardiology;  Laterality: N/A;    KNEE ARTHROSCOPY      SHOULDER ARTHROSCOPY          Current Problems   New onset atrial fibrillation, Cardiology following   Elevated troponin   Near syncope  Hypertension  Hx of CVA  Hx of aortic thrombus   Anxiety/Depression  Nicotine dependence        Encounter Information        Trending Narrative     10/25: Pt was admitted with weakness, near syncope. He was found to have new onset A-fib, Cardiology following.     At my visit pt reports having a good appetite/intake, although no meals are documented yet. Pt denies recent weight loss. Although per chart review pt has lost 14# over 10 months (9% weight loss). Pt reports plans to discharge today, although there is no evidence of this per chart review. Alpha-gal allergy noted. NFPE completed, consistent with nutrition diagnosis of  "malnutrition using AND/ASPEN criteria. See MSA below.       Anthropometrics        Current Height, Weight Height: 188 cm (74\")  Weight: 65.8 kg (145 lb) (10/25/24 0759)       Usual Body Weight (UBW) Unknown        Trending Weight Hx     This admission: 10/25: 145#              PTA: 10/15: 14# loss over 10 months (9% weight change)     Wt Readings from Last 30 Encounters:   10/25/24 0759 65.8 kg (145 lb)   10/24/24 2104 66 kg (145 lb 8.1 oz)   10/24/24 1625 64.8 kg (142 lb 13.7 oz)   10/07/24 1426 66.1 kg (145 lb 11.2 oz)   09/16/24 1620 66.6 kg (146 lb 12.8 oz)   09/09/24 1411 65.9 kg (145 lb 4.8 oz)   08/30/24 1518 67 kg (147 lb 11.3 oz)   08/29/24 0859 67.3 kg (148 lb 4.8 oz)   08/15/24 1337 69.2 kg (152 lb 8 oz)   05/17/24 1418 71.1 kg (156 lb 12.8 oz)   01/26/24 1321 72.1 kg (159 lb)   07/10/23 1129 71.2 kg (157 lb)   06/28/22 0914 71.7 kg (158 lb)   12/20/21 1103 71.3 kg (157 lb 3.2 oz)   11/09/21 1452 71.5 kg (157 lb 9.6 oz)   10/29/21 0738 78 kg (172 lb)   10/18/21 0841 72.6 kg (160 lb)   10/01/21 1112 71.9 kg (158 lb 9.6 oz)   09/27/21 1434 76.2 kg (168 lb)   05/05/21 0749 76.2 kg (168 lb)   05/04/21 1026 75.3 kg (166 lb)   04/16/21 0000 76.8 kg (169 lb 4 oz)   04/08/21 1010 77.1 kg (170 lb)   03/29/21 1439 79.4 kg (175 lb)   03/26/21 0806 77.6 kg (171 lb)   03/25/21 1823 77.6 kg (171 lb)   03/25/21 1535 79.4 kg (175 lb)   08/04/20 0000 75 kg (165 lb 6 oz)   07/14/20 0000 75 kg (165 lb 4 oz)   07/06/20 0000 75 kg (165 lb 4 oz)   06/16/20 0000 75.5 kg (166 lb 6 oz)   06/08/20 0000 74.8 kg (165 lb)   03/02/20 0000 75.5 kg (166 lb 8 oz)   02/24/20 0000 76.4 kg (168 lb 8 oz)   02/13/20 0000 72.6 kg (160 lb)      BMI kg/m2 Body mass index is 18.62 kg/m².       Labs        Pertinent Labs Hyponatremia noted, management per MD    Results from last 7 days   Lab Units 10/24/24  2233 10/24/24  1645   SODIUM mmol/L 135* 134*   POTASSIUM mmol/L 3.7 4.0   CHLORIDE mmol/L 101 98   CO2 mmol/L 26.2 26.4   BUN mg/dL 8 7* "   CREATININE mg/dL 0.82 1.12   CALCIUM mg/dL 8.7 9.3   GLUCOSE mg/dL 122* 141*     Results from last 7 days   Lab Units 10/24/24  2233 10/24/24  1852 10/24/24  1645   MAGNESIUM mg/dL 1.7  --  1.7   HEMOGLOBIN g/dL 12.9*  --  15.0   HEMATOCRIT % 39.3  --  47.3   TRIGLYCERIDES mg/dL  --  128  --      Lab Results   Component Value Date    HGBA1C 5.96 (H) 10/24/2024        Medications    Scheduled Medications atorvastatin, 80 mg, Oral, Daily  ferrous sulfate, 324 mg, Oral, Daily With Breakfast  memantine, 5 mg, Oral, Q12H  metoprolol tartrate, 25 mg, Oral, Q12H  mirtazapine, 30 mg, Oral, Nightly  nicotine, 1 patch, Transdermal, Nightly  pantoprazole, 40 mg, Oral, Daily  potassium chloride ER, 20 mEq, Oral, Once  sodium chloride, 10 mL, Intravenous, Q12H  tamsulosin, 0.8 mg, Oral, Daily        Infusions Pharmacy to dose warfarin,         PRN Medications   acetaminophen    hydrOXYzine    Magnesium Standard Dose Replacement - Follow Nurse / BPA Driven Protocol    nitroglycerin    ondansetron    Pharmacy to dose warfarin    Phosphorus Replacement - Follow Nurse / BPA Driven Protocol    Potassium Replacement - Follow Nurse / BPA Driven Protocol    [COMPLETED] Insert Peripheral IV **AND** sodium chloride    sodium chloride    traZODone     Physical Findings        Trending Physical   Appearance, NFPE 10/25: NFPE completed, consistent with nutrition diagnosis of malnutrition using AND/ASPEN criteria. See MSA below.     --  Edema    No edema    Bowel Function   BM 10/24   Tubes   No tubes    Chewing/Swallowing   No issues    Skin   No wounds    --  Current Nutrition Orders & Evaluation of Intake       Oral Nutrition     Food Allergies Alpha-gal    Current PO Diet Diet: Cardiac; Healthy Heart (2-3 Na+); Fluid Consistency: Thin (IDDSI 0)   Supplement None ordered    PO Evaluation     Trending % PO Intake 10/25: no meals documented    --  Nutritional Risk Screening        NRS-2002 Score          Nutrition Diagnosis          Nutrition Dx Problem 1 Severe malnutrition related to possibly daily nicotine as evidenced by severe fat and muscle wasting per NFPE       Nutrition Dx Problem 2        Intervention Goal         Intervention Goal(s) To consume % of meals      Nutrition Intervention        RD Action NFPE complete, monitor/encourage PO intake, obtain further diet information regarding what pt can tolerate with alpha-gal at follow-up if able       Nutrition Prescription          Diet Prescription Cardiac, heart healthy    Supplement Prescription None    --  Monitor/Evaluation        Monitor PO intake     Malnutrition Severity Assessment      Patient meets criteria for : Severe Malnutrition  Malnutrition Type (Last 8 Hours)       Malnutrition Severity Assessment       Row Name 10/25/24 1343       Malnutrition Severity Assessment    Malnutrition Type Chronic Disease - Related Malnutrition      Row Name 10/25/24 1343       Muscle Loss    Loss of Muscle Mass Findings Severe    [place] Region Moderate - slight depression    Clavicle Bone Region Severe - protruding prominent bone    Dorsal Hand Region Severe - prominent depression    Anterior Thigh Region Moderate - mild depression on inner thigh    Posterior Calf Region Moderate - some roundness, slight firmness      Row Name 10/25/24 1343       Fat Loss    Subcutaneous Fat Loss Findings Severe    Orbital Region  Severe - pronounced hollowness/depression, dark circles, loose saggy skin    Upper Arm Region Severe - mostly skin, very little space between folds, fingers touch      Row Name 10/25/24 1343       Criteria Met (Must meet criteria for severity in at least 2 of these categories: M Wasting, Fat Loss, Fluid, Secondary Signs, Wt. Status, Intake)    Patient meets criteria for  Severe Malnutrition                        Electronically signed by:  Chiqui Kaminski RD  10/25/24 13:35 EDT

## 2024-10-25 NOTE — CONSULTS
"    Cardiology Consult Note    Patient Identification:  Name: Sathya Benavides  Age: 64 y.o.  Sex: male  :  1960  MRN: [id number]             Requesting Physician :  JEREMY Castillo Hospitalist     Reason for Consultation / Chief Complaint :   Paroxysmal atrial fibrillation, elevated troponin    History of Present Illness:      Mr. 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 Chatman (10/29/2021)  - lung nodule, lymphadenopathy  - smoker   - Dyslipidemia  - GERD  - Enlarged prostate  - Previous appendectomy previous bronchoscopy previous arthroscopy previous knee arthroscopy  -Allergies: Alpha-gal, adhesive tape, Augmentin    Presented to the emergency room with syncopal episode.  Patient was apparently getting repeat PT/INR as his INR was 4.55 on 10/23/2024 when he became diaphoretic vomited and had diarrhea and had near syncopal episode.  He was given juice and felt better and went home however his wife called his PCP due to feeling weak therefore advised to come to the emergency room.    In the ED patient was found A-fib RVR with a rate of 133.  He was given LR bolus and spontaneously converted to normal sinus rhythm.   Labs in the ED showed high-sensitivity troponin 19--29--37--34, labs unremarkable except glucose 141 hemoglobin A1c 5.6 WBC 11.17    Plan to repeat echocardiogram here as he had echocardiogram in July that showed mobile echogenic mass consistent with thrombus that was attached to the atherosclerotic plaque located in the aortic arch.          [place] conclusion 2024 [facility]  Well seated Amplatzer closure device noted in the interatrial septum with no   residual right to left shunt.     Moderate atherosclerotic plaque(s) in the aortic arch.   1.22 cm x 0.5 cm mobile echogenic mass, " consistent with thrombus, noted   attached to an atherosclorotic plaque located  in the aortic arch. This could   be the potential source of stroke.     Normal left ventricle intracavitary chamber size.   There is mild concentric left ventricular hypertrophy.   Estimated left ventricular ejection fraction of 60-65 %   LV diastolic dysfunction ( Impaired relaxation ).   The right ventricle is normal in size and function.   Normal left atrial intracavitary chamber size.   No thrombus is detected in the left atrial appendage.   Saline contrast performed with quiet respirations and abdominal compression.   Saline contrast unremarkable for ASD, VSD, or PFO.   Mild intrapulmonary shunt.     Further assessment and plan echocardiogram  Continue warfarin    Electronically signed by JEREMY Marks, 10/25/24, 12:37 PM EDT.    Cardiology attending addendum :    I have personally performed a face-to-face diagnostic evaluation, physical exam and reviewed data on this patient.  I have reviewed documentation done by me and nurse practitioner  and corrected as needed.  And agree with the different components of documentation.Greater than 50% of the time spent in the care of this patient was provided by attending consultant/me.       Assessment:  :    Near syncope  A-fib with RVR  Elevated troponin  Cryptogenic stroke 2021 with mild right hemiplegia.  PFO closure  Possible atrial mass and long-term warfarin therapy-therapeutic INR  Smoker  Dyslipidemia with low HDL of 28  Hyperglycemia/prediabetes A1c of 5.96  Cigarette smoker      Recommendations / Plan:        Patient presented with near syncope after becoming diaphoretic and vomiting and diarrhea.  Probably vasovagal versus cardiogenic from A-fib with RVR.  EKG done 10/24/2024 7 AM revealed A-fib with RVR at 133 bpm.  Rate control for A-fib with beta-blockers as tolerated.  Patient is in sinus rhythm currently but repeat EKG 10/25/2024.  Elevated [id number] score due to age  over 65, hypertension, CVA  [id number] SCORE   [id number] Score: 4 (10/25/2024 12:47 PM)     Will benefit from long-term anticoagulation to prevent thromboembolic events from A-fib.  But patient has established thrombus therefore needs long-term anticoagulation from that also.  Will check an echocardiogram.  Troponin is elevated at 37 has come down to 34.  Is nonspecific.  Probably type II MI due to demand ischemia from A-fib with RVR.  TSH is normal at 3.5  Will continue metoprolol, atorvastatin, warfarin as tolerated and keep INR between 2 and 3.  Follow-up with patient's primary cardiologist as outpatient.  Discussed with patient and family by bedside.  Discussed with RN taking care of patient to coordinate care           Diagnosis Plan   1. Paroxysmal atrial fibrillation        2. Elevated troponin                   Past Medical History:  Past Medical History:   Diagnosis Date    Acid reflux     Enlarged prostate     Headache, tension-type     Hyperlipidemia     Lung nodule     PFO (patent foramen ovale)     Stroke 03/2021    LOSS OF PERIPHERAL VISION    Stroke     7/2024     Past Surgical History:  Past Surgical History:   Procedure Laterality Date    APPENDECTOMY      BRONCHOSCOPY Bilateral 4/8/2021    Procedure: BRONCHOSCOPY ENDOBRONCHIAL ULTRASOUND WITH FNA'S;  Surgeon: Sam Tony MD;  Location: [facility] ENDOSCOPY;  Service: Pulmonary;  Laterality: Bilateral;  PRE- LYMPHADENOPATHY  POST- SAME    CARDIAC CATHETERIZATION N/A 10/29/2021    Procedure: Patent foramen ovale closure  ABBOTT;  Surgeon: Sivakumar Chatman MD;  Location: [facility] CATH INVASIVE LOCATION;  Service: Cardiology;  Laterality: N/A;    KNEE ARTHROSCOPY      SHOULDER ARTHROSCOPY        Allergies:  Allergies   Allergen Reactions    Alpha-Gal Hives    Adhesive Tape Rash    Amoxicillin-Pot Clavulanate Other (See Comments) and GI Intolerance     Upset stomach     Home Meds:  No medications prior to admission.     Current Meds:      Current Facility-Administered Medications:     acetaminophen (TYLENOL) tablet 650 mg, 650 mg, Oral, Q4H PRN, Rukhsana Kaur APRN    atorvastatin (LIPITOR) tablet 80 mg, 80 mg, Oral, Daily, Rukhsana Kaur APRN, 80 mg at 10/25/24 1514    ferrous sulfate EC tablet 324 mg, 324 mg, Oral, Daily With Breakfast, Rukhsana Kaur APRN, 324 mg at 10/25/24 1513    hydrOXYzine (ATARAX) tablet 25 mg, 25 mg, Oral, Q8H PRN, Rukhsana Kaur APRN    Magnesium Standard Dose Replacement - Follow Nurse / BPA Driven Protocol, , Does not apply, PRN, Rukhsana Kaur APRN    memantine (NAMENDA) tablet 5 mg, 5 mg, Oral, Q12H, Rukhsana Kaur APRN, 5 mg at 10/25/24 1514    mirtazapine (REMERON) tablet 30 mg, 30 mg, Oral, Nightly, Rukhsana Kaur APRN, 30 mg at 10/24/24 2215    nicotine (NICODERM CQ) 21 MG/24HR patch 1 patch, 1 patch, Transdermal, Nightly, Rukhsana Kaur APRN, 1 patch at 10/24/24 2215    nitroglycerin (NITROSTAT) SL tablet 0.4 mg, 0.4 mg, Sublingual, Q5 Min PRN, Rukhsana Kaur APRN    ondansetron (ZOFRAN) injection 4 mg, 4 mg, Intravenous, Q6H PRN, Rukhsana Kaur APRN    pantoprazole (PROTONIX) EC tablet 40 mg, 40 mg, Oral, Daily, Rukhsana Kaur APRN    Pharmacy to dose warfarin, , Does not apply, Continuous PRN, Rukhsana Kaur APRN    Phosphorus Replacement - Follow Nurse / BPA Driven Protocol, , Does not apply, PRN, Rukhsana Kaur APRN    Potassium Replacement - Follow Nurse / BPA Driven Protocol, , Does not apply, PRN, Rukhsana Kaur APRN    [COMPLETED] Insert Peripheral IV, , , Once **AND** sodium chloride 0.9 % flush 10 mL, 10 mL, Intravenous, PRN, Mathew Quiroz MD    sodium chloride 0.9 % flush 10 mL, 10 mL, Intravenous, Q12H, Rukhsana Kaur, JEREMY, 10 mL at 10/25/24 1514    sodium chloride 0.9 % flush 10 mL, 10 mL, Intravenous, PRN, Rukhsana Kaur, APRN    tamsulosin (FLOMAX) 24 hr capsule 0.8 mg, 0.8 mg, Oral, Daily, Rukhsana Kaur,  APRN    traZODone (DESYREL) tablet 50 mg, 50 mg, Oral, Nightly PRN, Rukhsana Kaur, APRN, 50 mg at 10/24/24 7298    Current Outpatient Medications:     albuterol sulfate  (90 Base) MCG/ACT inhaler, Inhale 2 puffs Every 4 (Four) Hours As Needed for Wheezing., Disp: 18 g, Rfl: 3    atorvastatin (Lipitor) 80 MG tablet, Take 1 tablet by mouth Daily., Disp: 90 tablet, Rfl: 0    cyanocobalamin 100 MCG tablet, GIVE 1 TABLET BY MOUTH ONCE DAILY, Disp: 30 tablet, Rfl: 0    ferrous sulfate 325 (65 FE) MG tablet, Take 1 tablet by mouth Daily With Breakfast., Disp: 30 tablet, Rfl: 2    hydrOXYzine (ATARAX) 25 MG tablet, Take 1 tablet by mouth Every 8 (Eight) Hours As Needed for Anxiety., Disp: 360 tablet, Rfl: 1    lisinopril (PRINIVIL,ZESTRIL) 5 MG tablet, Take 1 tablet by mouth Daily., Disp: 30 tablet, Rfl: 1    Magnesium Oxide -Mg Supplement (MAGnesium-Oxide) 400 (240 Mg) MG tablet, Take 1 tablet by mouth 2 (Two) Times a Day., Disp: 60 tablet, Rfl: 0    memantine (NAMENDA) 5 MG tablet, Take 1 tablet by mouth Every 12 (Twelve) Hours., Disp: 60 tablet, Rfl: 0    mirtazapine (Remeron) 30 MG tablet, Take 1 tablet by mouth Every Night., Disp: 30 tablet, Rfl: 1    pantoprazole (PROTONIX) 40 MG EC tablet, Take 1 tablet by mouth Daily., Disp: 90 tablet, Rfl: 1    potassium chloride (MICRO-K) 10 MEQ CR capsule, Take 1 capsule by mouth 2 (Two) Times a Day., Disp: 60 capsule, Rfl: 0    tamsulosin (FLOMAX) 0.4 MG capsule 24 hr capsule, Take 2 capsules by mouth once daily, Disp: 180 capsule, Rfl: 0    traZODone (DESYREL) 50 MG tablet, TAKE 1 TABLET BY MOUTH EVERY DAY AT BEDTIME, Disp: 90 tablet, Rfl: 0    warfarin (COUMADIN) 2 MG tablet, Take 1 tablet by mouth 3 (Three) Times a Week. Roberto Maldonado, Sat, Disp: , Rfl:     warfarin (COUMADIN) 4 MG tablet, Take 1 tablet by mouth 4 (Four) Times a Week. Mon, Wed, Fri, Sun, Disp: , Rfl:     metoprolol tartrate (LOPRESSOR) 25 MG tablet, Take 1 tablet by mouth 2 (Two) Times a Day for 30  "days., Disp: 60 tablet, Rfl: 0        Social History:   Social History     Tobacco Use    Smoking status: Every Day     Current packs/day: 1.00     Average packs/day: 1 pack/day for 43.8 years (43.8 ttl pk-yrs)     Types: Cigarettes     Start date: 1981    Smokeless tobacco: Never   Substance Use Topics    Alcohol use: Yes     Comment: occasionally      Family History:  Family History   Problem Relation Age of Onset    Heart disease Mother     Diabetes Mother     Dementia Father     Colon cancer Brother     No Known Problems Daughter     No Known Problems Daughter     No Known Problems Daughter     No Known Problems Son     Malig Hyperthermia Neg Hx         Review of Systems : Review of Systems   Constitutional: Positive for malaise/fatigue.   Cardiovascular:  Positive for near-syncope. Negative for chest pain and dyspnea on exertion.   Respiratory:  Negative for cough.    Gastrointestinal:  Positive for diarrhea and vomiting.   All other systems reviewed and are negative.               Constitutional:  Temp:  [97.5 °F (36.4 °C)-98.7 °F (37.1 °C)] 98.7 °F (37.1 °C)  Heart Rate:  [75] 75  Resp:  [10-25] 10  BP: ()/(46-65) 147/64    Physical Exam   /64 (BP Location: Left arm, Patient Position: Lying)   Pulse 75   Temp 98.7 °F (37.1 °C) (Oral)   Resp 10   Ht 188 cm (74\")   Wt 65.8 kg (145 lb)   SpO2 97%   BMI 18.62 kg/m²   Physical Exam  General:  Appears in no acute distress  Eyes: Sclerae are anicteric,  conjunctivae are clear   HEENT:  No JVD. Thyroid not visibly enlarged. No mucosal pallor or cyanosis  Respiratory: Respirations regular and unlabored at rest.  Bilaterally good breath sounds with good air entry in all fields. No crackles, rubs or wheezes auscultated  Cardiovascular: S1,S2 Regular rate and rhythm. No murmur, rub or gallop auscultated. No pretibial pitting edema  Gastrointestinal: Abdomen soft, flat, nontender. Bowel sounds present.   Musculoskeletal:  No abnormal " movements  Extremities: No digital clubbing or cyanosis  Skin: Color pink. Skin warm and dry to touch. No rashes  No xanthoma  Neuro: Alert and awake, no lateralizing deficits appreciated    Cardiographics  ECG: EKG tracing was  personally reviewed/interpreted by me  ECG 12 Lead Syncope   Preliminary Result   HEART RATE=84  bpm   RR Xljpaaqc=030  ms   WY Gyxjcyls=717  ms   P Horizontal Axis=-2  deg   P Front Axis=64  deg   QRSD Interval=93  ms   QT Mjqgntwd=261  ms   FPaL=512  ms   QRS Axis=77  deg   T Wave Axis=71  deg   - ABNORMAL ECG -   Sinus rhythm   Nonspecific T abnrm, anterolateral leads   Date and Time of Study:2024-10-25 06:29:37      ECG 12 Lead Tachycardia   Final Result   HEART RATE=84  bpm   RR Xvaspvtp=615  ms   WY Nwqkjpyu=675  ms   P Horizontal Axis=-8  deg   P Front Axis=53  deg   QRSD Interval=91  ms   QT Mwtpwnmq=395  ms   SCrX=389  ms   QRS Axis=75  deg   T Wave Axis=59  deg   - NORMAL ECG -   Sinus rhythm   When compared with ECG of 24-Oct-2024 16:49:12,   Significant change in rhythm: previously atrial fibrillation   Electronically Signed By: Mathew Quiroz (BINU) 2024-10-25 07:00:53   Date and Time of Study:2024-10-24 17:23:16      ECG 12 Lead Tachycardia   Final Result   HEART SSGE=357  bpm   RR Fsxnvxsu=160  ms   WY Interval=  ms   P Horizontal Axis=  deg   P Front Axis=  deg   QRSD Interval=88  ms   QT Epzucequ=509  ms   KAgS=100  ms   QRS Axis=75  deg   T Wave Axis=45  deg   - ABNORMAL ECG -   Atrial fibrillation   When compared with ECG of 30-Aug-2024 17:36:07,   Significant change in rhythm: previously sinus   Electronically Signed By: Mathew Quiroz (BINU) 2024-10-25 06:59:48   Date and Time of Study:2024-10-24 16:49:12      Telemetry Scan   Final Result      Telemetry Scan   Final Result      Telemetry Scan   Final Result      Telemetry Scan   Final Result      Telemetry Scan   Final Result          Telemetry: Sinus rhythm    Echocardiogram:   Results for orders placed during the hospital  encounter of 10/24/24    Adult Transthoracic Echo Complete W/ Cont if Necessary Per Protocol    Interpretation Summary    Left ventricular ejection fraction appears to be 61 - 65%.    Left ventricular diastolic function was normal.    Estimated right ventricular systolic pressure from tricuspid regurgitation is normal (<35 mmHg).    Conclusion      Normal LV size and contractility EF of 60 to 65%  Normal RV size  Normal atrial size  Pulmonic valve is not well visualized.  Aortic valve leaflets are thickened and mildly sclerosed.  Dopplers are normal.  Mitral valve, tricuspid valve appears structurally normal, trace tricuspid regurgitation seen.  Calculated RV systolic pressure of 29 mmHg  No pericardial effusion seen.  Proximal aorta appears normal in size.      Imaging  Chest X-ray:   Imaging Results (Last 24 Hours)       ** No results found for the last 24 hours. **            Lab Review: I have reviewed the labs  Results from last 7 days   Lab Units 10/25/24  0353 10/24/24  2233 10/24/24  1852   CK TOTAL U/L  --  90  --    HSTROP T ng/L 34* 37* 29*     Results from last 7 days   Lab Units 10/24/24  2233   MAGNESIUM mg/dL 1.7     Results from last 7 days   Lab Units 10/24/24  2233   SODIUM mmol/L 135*   POTASSIUM mmol/L 3.7   BUN mg/dL 8   CREATININE mg/dL 0.82   CALCIUM mg/dL 8.7             Results from last 7 days   Lab Units 10/24/24  2233 10/24/24  1645   WBC 10*3/mm3 8.78 11.17*   HEMOGLOBIN g/dL 12.9* 15.0   HEMATOCRIT % 39.3 47.3   PLATELETS 10*3/mm3 219 267     Results from last 7 days   Lab Units 10/24/24  2233 10/24/24  1307 10/23/24  1259   INR  2.80 2.87* 4.55*             Ludwig Flores MD  10/25/2024, 19:46 EDT      EMR Dragon/Transcription:   Dictated utilizing Dragon dictation

## 2024-10-26 LAB — C AURIS DNA SPEC QL NAA+NON-PROBE: NOT DETECTED

## 2024-10-27 LAB
QT INTERVAL: 385 MS
QTC INTERVAL: 456 MS

## 2024-10-28 ENCOUNTER — TRANSITIONAL CARE MANAGEMENT TELEPHONE ENCOUNTER (OUTPATIENT)
Dept: CALL CENTER | Facility: HOSPITAL | Age: 64
End: 2024-10-28
Payer: MEDICARE

## 2024-10-28 DIAGNOSIS — F41.9 ANXIETY AND DEPRESSION: ICD-10-CM

## 2024-10-28 DIAGNOSIS — F32.A ANXIETY AND DEPRESSION: ICD-10-CM

## 2024-10-28 RX ORDER — FLUOXETINE 10 MG/1
10 CAPSULE ORAL DAILY
Qty: 30 CAPSULE | Refills: 0 | Status: SHIPPED | OUTPATIENT
Start: 2024-10-28 | End: 2024-10-29

## 2024-10-28 RX ORDER — MIRTAZAPINE 15 MG/1
15 TABLET, FILM COATED ORAL NIGHTLY
Qty: 30 TABLET | Refills: 0 | Status: SHIPPED | OUTPATIENT
Start: 2024-10-28 | End: 2024-10-29

## 2024-10-28 NOTE — OUTREACH NOTE
Call Center TCM Note      Flowsheet Row Responses   [facility] patient discharged from? [facility]   Does the patient have one of the following disease processes/diagnoses(primary or secondary)? Other   TCM attempt successful? Yes  [VR for ray Steward]   Call start time 1209   Call end time 1227   Discharge diagnosis New onset a-fib   Person spoke with today (if not patient) and relationship ray Steward   Medication alerts for this patient Warfarin   Meds reviewed with patient/caregiver? Yes   Is the patient having any side effects they believe may be caused by any medication additions or changes? No   Does the patient have all medications ordered at discharge? Yes   Is the patient taking all medications as directed (includes completed medication regime)? No   What is preventing the patient from taking all medications as directed? Side effects  [wife reports that she has concerns with metoprolol dosing, pt has not taken since Saturday due to hypotension. Also reports told at discharge to discontinue lisinopril but not reflected on AVS.]   Nursing Interventions Notified provider, Nurse provided patient education  [Wife reports that she called PCP office over the weekend to report her concerns with his BP.]   Comments Hospital f/u 10/29/24@230pm   Does the patient have an appointment with their PCP within 7-14 days of discharge? Yes   Has home health visited the patient within 72 hours of discharge? N/A   Psychosocial issues? No   Comments Discussed snoring and ROMY, encouraged to discuss at f/u   Did the patient receive a copy of their discharge instructions? Yes   Nursing interventions Reviewed instructions with patient   What is the patient's perception of their health status since discharge? Improving  [Wife with concerns with BP as noted, reports his BP 94/44 with pulse of 74, reviewed readings while inpt and this is much lower.  Pt is asymtomatic with this lowered pressure.  Discussed need for meds to ensure  NSR,  may need dose changes.]   Is the patient/caregiver able to teach back signs and symptoms related to disease process for when to call PCP? Yes   Is the patient/caregiver able to teach back signs and symptoms related to disease process for when to call 911? Yes   Additional teach back comments Reviewed proper way to check BP and had wife recheck--pt /54 with pulse of 81 and this is without metoprolol in over 24 hours.  Wife concerned wih providing dosing--will send message.  This RN discussed s/s rapid afib and need to seek care,  v/u.   TCM call completed? Yes   Call end time [id number]            Zaina Paul, GERALD    10/28/2024, 12:37 EDT

## 2024-10-28 NOTE — PROGRESS NOTES
Left detailed message for patient to take metoprolol 25 mg bid and we will see him at his upcoming appointment.

## 2024-10-28 NOTE — CASE MANAGEMENT/SOCIAL WORK
Case Management Discharge Note      Final Note: Routine home    Provided Post Acute Provider List?: N/A  Provided Post Acute Provider Quality & Resource List?: N/A    Selected Continued Care - Discharged on 10/25/2024 Admission date: 10/24/2024 - Discharge disposition: Home or Self Care     Transportation Services  Private: Car    Final Discharge Disposition Code: 01 - home or self-care

## 2024-10-29 ENCOUNTER — OFFICE VISIT (OUTPATIENT)
Dept: FAMILY MEDICINE CLINIC | Facility: CLINIC | Age: 64
End: 2024-10-29
Payer: MEDICARE

## 2024-10-29 VITALS
SYSTOLIC BLOOD PRESSURE: 110 MMHG | BODY MASS INDEX: 18.37 KG/M2 | WEIGHT: 143.1 LBS | DIASTOLIC BLOOD PRESSURE: 70 MMHG | HEART RATE: 97 BPM | OXYGEN SATURATION: 96 % | TEMPERATURE: 98.1 F

## 2024-10-29 DIAGNOSIS — E87.6 HYPOKALEMIA: ICD-10-CM

## 2024-10-29 DIAGNOSIS — Z09 HOSPITAL DISCHARGE FOLLOW-UP: Primary | ICD-10-CM

## 2024-10-29 DIAGNOSIS — R53.83 FATIGUE, UNSPECIFIED TYPE: ICD-10-CM

## 2024-10-29 DIAGNOSIS — G47.00 INSOMNIA, UNSPECIFIED TYPE: ICD-10-CM

## 2024-10-29 DIAGNOSIS — I48.91 NEW ONSET A-FIB: ICD-10-CM

## 2024-10-29 DIAGNOSIS — Z79.01 ANTICOAGULATED: ICD-10-CM

## 2024-10-29 DIAGNOSIS — E83.42 HYPOMAGNESEMIA: ICD-10-CM

## 2024-10-29 LAB
ALBUMIN SERPL-MCNC: 4 G/DL (ref 3.5–5.2)
ALBUMIN/GLOB SERPL: 1.2 G/DL
ALP SERPL-CCNC: 141 U/L (ref 39–117)
ALT SERPL W P-5'-P-CCNC: 13 U/L (ref 1–41)
ANION GAP SERPL CALCULATED.3IONS-SCNC: 9.4 MMOL/L (ref 5–15)
AST SERPL-CCNC: 22 U/L (ref 1–40)
BILIRUB SERPL-MCNC: 0.3 MG/DL (ref 0–1.2)
BUN SERPL-MCNC: 8 MG/DL (ref 8–23)
BUN/CREAT SERPL: 9.2 (ref 7–25)
CALCIUM SPEC-SCNC: 9.1 MG/DL (ref 8.6–10.5)
CHLORIDE SERPL-SCNC: 101 MMOL/L (ref 98–107)
CO2 SERPL-SCNC: 26.6 MMOL/L (ref 22–29)
CREAT SERPL-MCNC: 0.87 MG/DL (ref 0.76–1.27)
EGFRCR SERPLBLD CKD-EPI 2021: 95.2 ML/MIN/1.73
GLOBULIN UR ELPH-MCNC: 3.4 GM/DL
GLUCOSE SERPL-MCNC: 95 MG/DL (ref 65–99)
INR PPP: 1.99 (ref 0.86–1.15)
MAGNESIUM SERPL-MCNC: 1.6 MG/DL (ref 1.6–2.4)
POTASSIUM SERPL-SCNC: 4.2 MMOL/L (ref 3.5–5.2)
PROT SERPL-MCNC: 7.4 G/DL (ref 6–8.5)
PROTHROMBIN TIME: 23 SECONDS (ref 11.8–14.9)
SODIUM SERPL-SCNC: 137 MMOL/L (ref 136–145)

## 2024-10-29 PROCEDURE — 36415 COLL VENOUS BLD VENIPUNCTURE: CPT | Performed by: FAMILY MEDICINE

## 2024-10-29 PROCEDURE — 85610 PROTHROMBIN TIME: CPT | Performed by: FAMILY MEDICINE

## 2024-10-29 PROCEDURE — 99495 TRANSJ CARE MGMT MOD F2F 14D: CPT | Performed by: FAMILY MEDICINE

## 2024-10-29 PROCEDURE — 80053 COMPREHEN METABOLIC PANEL: CPT | Performed by: FAMILY MEDICINE

## 2024-10-29 PROCEDURE — 1126F AMNT PAIN NOTED NONE PRSNT: CPT | Performed by: FAMILY MEDICINE

## 2024-10-29 PROCEDURE — 3074F SYST BP LT 130 MM HG: CPT | Performed by: FAMILY MEDICINE

## 2024-10-29 PROCEDURE — 83735 ASSAY OF MAGNESIUM: CPT | Performed by: FAMILY MEDICINE

## 2024-10-29 PROCEDURE — 1159F MED LIST DOCD IN RCRD: CPT | Performed by: FAMILY MEDICINE

## 2024-10-29 PROCEDURE — 1160F RVW MEDS BY RX/DR IN RCRD: CPT | Performed by: FAMILY MEDICINE

## 2024-10-29 PROCEDURE — 3078F DIAST BP <80 MM HG: CPT | Performed by: FAMILY MEDICINE

## 2024-10-29 PROCEDURE — 1111F DSCHRG MED/CURRENT MED MERGE: CPT | Performed by: FAMILY MEDICINE

## 2024-10-29 RX ORDER — QUETIAPINE FUMARATE 50 MG/1
50 TABLET, FILM COATED ORAL NIGHTLY PRN
Qty: 30 TABLET | Refills: 1 | Status: SHIPPED | OUTPATIENT
Start: 2024-10-29

## 2024-10-29 NOTE — PROGRESS NOTES
Venipuncture Blood Specimen Collection  Venipuncture performed in left arm  by Antonia Armas with good hemostasis. Patient tolerated the procedure well without complications.   10/29/24   Antonia Armas

## 2024-10-29 NOTE — PROGRESS NOTES
Transitional Care Follow Up Visit  Subjective     Sathya Benavides is a 64 y.o. male who presents for a transitional care management visit.    Within 48 business hours after discharge our office contacted him via telephone to coordinate his care and needs.      I reviewed and discussed the details of that call along with the discharge summary, hospital problems, inpatient lab results, inpatient diagnostic studies, and consultation reports with Sathya.     Current outpatient and discharge medications have been reconciled for the patient.  Reviewed by: Adi Kaminski MD          10/25/2024     7:00 PM   Date of TCM Phone Call   [facility]   Date of Admission 10/24/2024   Date of Discharge 10/25/2024   Discharge Disposition Home or Self Care     Risk for Readmission (LACE) Score: 3 (10/25/2024  6:00 AM)      History of Present Illness  Pt needs to follow-up with cardiology- pt has had lowered BP when on med so he is not taking med- pt had hypotension 90/40 with metoprolol    Pt doing better  Pt has insomnia but had recent a-fib- will DC trazodone and start Seroquel     Course During Hospital Stay:  Pt admitted for new onet a-fib with RVR- placed on metoprolol for rate control.  Pt already on blood thinner.     The following portions of the patient's history were reviewed and updated as appropriate: He  has a past medical history of Acid reflux, Enlarged prostate, Headache, tension-type, Hyperlipidemia, Lung nodule, PFO (patent foramen ovale), Stroke (03/2021), and Stroke.  He does not have any pertinent problems on file.  He  has a past surgical history that includes Appendectomy; Knee arthroscopy; Shoulder arthroscopy; Bronchoscopy (Bilateral, 4/8/2021); and Cardiac catheterization (N/A, 10/29/2021).  His family history includes Colon cancer in his brother; Dementia in his father; Diabetes in his mother; Heart disease in his mother; No Known Problems in his daughter, daughter, daughter, and son.  He   reports that he has been smoking cigarettes. He started smoking about 43 years ago. He has a 43.8 pack-year smoking history. He has never used smokeless tobacco. He reports current alcohol use. He reports that he does not currently use drugs.  Current Outpatient Medications   Medication Sig Dispense Refill    albuterol sulfate  (90 Base) MCG/ACT inhaler Inhale 2 puffs Every 4 (Four) Hours As Needed for Wheezing. 18 g 3    atorvastatin (Lipitor) 80 MG tablet Take 1 tablet by mouth Daily. 90 tablet 0    cyanocobalamin 100 MCG tablet GIVE 1 TABLET BY MOUTH ONCE DAILY 30 tablet 0    ferrous sulfate 325 (65 FE) MG tablet Take 1 tablet by mouth Daily With Breakfast. 30 tablet 2    hydrOXYzine (ATARAX) 25 MG tablet Take 1 tablet by mouth Every 8 (Eight) Hours As Needed for Anxiety. 360 tablet 1    Magnesium Oxide -Mg Supplement (MAGnesium-Oxide) 400 (240 Mg) MG tablet Take 1 tablet by mouth 2 (Two) Times a Day. 60 tablet 0    memantine (NAMENDA) 5 MG tablet Take 1 tablet by mouth Every 12 (Twelve) Hours. 60 tablet 0    metoprolol tartrate (LOPRESSOR) 25 MG tablet Take 1 tablet by mouth 2 (Two) Times a Day for 30 days. 60 tablet 0    mirtazapine (Remeron) 30 MG tablet Take 1 tablet by mouth Every Night. 30 tablet 1    pantoprazole (PROTONIX) 40 MG EC tablet Take 1 tablet by mouth Daily. 90 tablet 1    potassium chloride (MICRO-K) 10 MEQ CR capsule Take 1 capsule by mouth 2 (Two) Times a Day. 60 capsule 0    tamsulosin (FLOMAX) 0.4 MG capsule 24 hr capsule Take 2 capsules by mouth once daily 180 capsule 0    warfarin (COUMADIN) 2 MG tablet Take 1 tablet by mouth 3 (Three) Times a Week. Tues, Thur, Sat      warfarin (COUMADIN) 4 MG tablet Take 1 tablet by mouth 4 (Four) Times a Week. Mon, Wed, Fri, Sun      QUEtiapine (SEROquel) 50 MG tablet Take 1 tablet by mouth At Night As Needed (insomnia). 30 tablet 1     No current facility-administered medications for this visit.     Current Outpatient Medications on File Prior  to Visit   Medication Sig    albuterol sulfate  (90 Base) MCG/ACT inhaler Inhale 2 puffs Every 4 (Four) Hours As Needed for Wheezing.    atorvastatin (Lipitor) 80 MG tablet Take 1 tablet by mouth Daily.    cyanocobalamin 100 MCG tablet GIVE 1 TABLET BY MOUTH ONCE DAILY    ferrous sulfate 325 (65 FE) MG tablet Take 1 tablet by mouth Daily With Breakfast.    hydrOXYzine (ATARAX) 25 MG tablet Take 1 tablet by mouth Every 8 (Eight) Hours As Needed for Anxiety.    Magnesium Oxide -Mg Supplement (MAGnesium-Oxide) 400 (240 Mg) MG tablet Take 1 tablet by mouth 2 (Two) Times a Day.    memantine (NAMENDA) 5 MG tablet Take 1 tablet by mouth Every 12 (Twelve) Hours.    metoprolol tartrate (LOPRESSOR) 25 MG tablet Take 1 tablet by mouth 2 (Two) Times a Day for 30 days.    mirtazapine (Remeron) 30 MG tablet Take 1 tablet by mouth Every Night.    pantoprazole (PROTONIX) 40 MG EC tablet Take 1 tablet by mouth Daily.    potassium chloride (MICRO-K) 10 MEQ CR capsule Take 1 capsule by mouth 2 (Two) Times a Day.    tamsulosin (FLOMAX) 0.4 MG capsule 24 hr capsule Take 2 capsules by mouth once daily    warfarin (COUMADIN) 2 MG tablet Take 1 tablet by mouth 3 (Three) Times a Week. Tues, Thur, Sat    warfarin (COUMADIN) 4 MG tablet Take 1 tablet by mouth 4 (Four) Times a Week. Mon, Wed, Fri, Sun    [DISCONTINUED] traZODone (DESYREL) 50 MG tablet TAKE 1 TABLET BY MOUTH EVERY DAY AT BEDTIME    [DISCONTINUED] FLUoxetine (PROzac) 10 MG capsule Take 1 capsule by mouth once daily (Patient not taking: Reported on 10/29/2024)    [DISCONTINUED] lisinopril (PRINIVIL,ZESTRIL) 5 MG tablet Take 1 tablet by mouth Daily. (Patient not taking: Reported on 10/29/2024)    [DISCONTINUED] mirtazapine (REMERON) 15 MG tablet TAKE 1 TABLET BY MOUTH ONCE DAILY AT NIGHT (Patient not taking: Reported on 10/29/2024)     No current facility-administered medications on file prior to visit.     He is allergic to alpha-gal, adhesive tape, and amoxicillin-pot  clavulanate..    Review of Systems   Constitutional:  Negative for fatigue and fever.   HENT:  Negative for sore throat.    Eyes:  Negative for visual disturbance.   Respiratory:  Negative for cough, chest tightness, shortness of breath and wheezing.    Cardiovascular:  Negative for chest pain, palpitations and leg swelling.   Gastrointestinal:  Negative for abdominal pain, diarrhea, nausea and vomiting.   Neurological:  Negative for dizziness, light-headedness and headaches.       Objective   /70   Pulse 97   Temp 98.1 °F (36.7 °C)   Wt 64.9 kg (143 lb 1.6 oz)   SpO2 96%   BMI 18.37 kg/m²   Physical Exam  Vitals reviewed.   Constitutional:       Appearance: Normal appearance. He is well-developed.   HENT:      Head: Normocephalic and atraumatic.      Right Ear: External ear normal.      Left Ear: External ear normal.      Mouth/Throat:      Pharynx: No oropharyngeal exudate.   Eyes:      Conjunctiva/sclera: Conjunctivae normal.      Pupils: Pupils are equal, round, and reactive to light.   Cardiovascular:      Rate and Rhythm: Normal rate and regular rhythm.      Pulses: Normal pulses.      Heart sounds: Normal heart sounds. No murmur heard.     No friction rub. No gallop.   Pulmonary:      Effort: Pulmonary effort is normal.      Breath sounds: Normal breath sounds. No wheezing or rhonchi.   Abdominal:      General: Abdomen is flat. Bowel sounds are normal. There is no distension.      Palpations: Abdomen is soft. There is no mass.      Tenderness: There is no abdominal tenderness. There is no guarding or rebound.      Hernia: No hernia is present.   Musculoskeletal:         General: Normal range of motion.   Skin:     General: Skin is warm and dry.      Capillary Refill: Capillary refill takes less than 2 seconds.   Neurological:      General: No focal deficit present.      Mental Status: He is alert and oriented to person, place, and time.      Cranial Nerves: No cranial nerve deficit.   Psychiatric:          Mood and Affect: Mood and affect normal.         Behavior: Behavior normal.         Thought Content: Thought content normal.         Judgment: Judgment normal.         Assessment & Plan   Problems Addressed this Visit       Insomnia    Relevant Medications    QUEtiapine (SEROquel) 50 MG tablet    New onset a-fib    Relevant Orders    Ambulatory Referral to Cardiology     Other Visit Diagnoses       Hospital discharge follow-up    -  Primary    Relevant Orders    Ambulatory Referral to Cardiology    Anticoagulated        Relevant Orders    Protime-INR    Hypomagnesemia        Relevant Orders    Magnesium    Hypokalemia        Relevant Orders    Comprehensive Metabolic Panel    Fatigue, unspecified type        Relevant Orders    Ambulatory Referral to Sleep Medicine          Diagnoses         Codes Comments    Hospital discharge follow-up    -  Primary ICD-10-CM: Z09  ICD-9-CM: V67.59     Anticoagulated     ICD-10-CM: Z79.01  ICD-9-CM: V58.61     New onset a-fib     ICD-10-CM: I48.91  ICD-9-CM: 427.31     Hypomagnesemia     ICD-10-CM: E83.42  ICD-9-CM: 275.2     Hypokalemia     ICD-10-CM: E87.6  ICD-9-CM: 276.8     Insomnia, unspecified type     ICD-10-CM: G47.00  ICD-9-CM: 780.52     Fatigue, unspecified type     ICD-10-CM: R53.83  ICD-9-CM: 780.79

## 2024-10-30 ENCOUNTER — CLINICAL SUPPORT (OUTPATIENT)
Dept: FAMILY MEDICINE CLINIC | Facility: CLINIC | Age: 64
End: 2024-10-30
Payer: MEDICARE

## 2024-10-30 DIAGNOSIS — Z79.01 LONG TERM (CURRENT) USE OF ANTICOAGULANTS: Primary | ICD-10-CM

## 2024-10-30 LAB — INR PPP: 2.5 (ref 2–3)

## 2024-10-30 PROCEDURE — 93792 PT/CAREGIVER TRAING HOME INR: CPT | Performed by: FAMILY MEDICINE

## 2024-10-31 DIAGNOSIS — Z79.01 LONG TERM (CURRENT) USE OF ANTICOAGULANTS: Primary | ICD-10-CM

## 2024-10-31 RX ORDER — WARFARIN SODIUM 1 MG/1
TABLET ORAL
Qty: 90 TABLET | Refills: 1 | Status: SHIPPED | OUTPATIENT
Start: 2024-10-31

## 2024-10-31 NOTE — PROGRESS NOTES
Call pt: INR is 1.99, so change coumadin and take 2mg on Tuesday  and Thursday. Take 3mg on Sunday and 4mg all other days. Recheck INR in one week.  I have sent in 1mg Coumadin tablets to take with a 2mg tablet on Sunday to make 3mg to your pharmacy at [facility].

## 2024-11-01 ENCOUNTER — TELEPHONE (OUTPATIENT)
Dept: FAMILY MEDICINE CLINIC | Facility: CLINIC | Age: 64
End: 2024-11-01
Payer: MEDICARE

## 2024-11-01 DIAGNOSIS — Z86.73 HISTORY OF CVA (CEREBROVASCULAR ACCIDENT): ICD-10-CM

## 2024-11-01 NOTE — TELEPHONE ENCOUNTER
New INR machine Wednesday. INR was  2.5  aljennifer spoke with someone here and they said to keep the schedule the same     2mg tues thur sun .   4m mg all other days.   Pt checks every Wednesday

## 2024-11-02 DIAGNOSIS — E78.2 MIXED HYPERLIPIDEMIA: ICD-10-CM

## 2024-11-04 RX ORDER — ATORVASTATIN CALCIUM 80 MG/1
80 TABLET, FILM COATED ORAL DAILY
Qty: 90 TABLET | Refills: 0 | Status: SHIPPED | OUTPATIENT
Start: 2024-11-04

## 2024-11-04 RX ORDER — MEMANTINE HYDROCHLORIDE 5 MG/1
5 TABLET ORAL EVERY 12 HOURS SCHEDULED
Qty: 60 TABLET | Refills: 0 | Status: SHIPPED | OUTPATIENT
Start: 2024-11-04

## 2024-11-06 DIAGNOSIS — E78.2 MIXED HYPERLIPIDEMIA: ICD-10-CM

## 2024-11-06 RX ORDER — ATORVASTATIN CALCIUM 80 MG/1
80 TABLET, FILM COATED ORAL DAILY
Qty: 90 TABLET | Refills: 0 | OUTPATIENT
Start: 2024-11-06

## 2024-11-06 NOTE — PROGRESS NOTES
Date of Office Visit: 2024  Encounter Provider: Dr. Elías Smith  Place of Service: [facility] [place]  Patient Name: Sathya Benavides  :1960  Adi Kaminski MD    Chief Complaint   Patient presents with    Atrial Fibrillation    Hyperlipidemia    Consult     History of Present Illness:    I am pleased to see Mr. Benavides in my office today as a new consultation.    As you know, patient is 64-year-old white gentleman whose past medical history is significant for hyperlipidemia, paroxysmal atrial fibrillation, CVA, PFO, who came today to establish his cardiac care with me.    In  patient had TIA and CVA.  Patient was found to have PFO patient underwent closure of PFO percutaneously in [facility].  In 2024, patient was admitted at [facility] and was noted to have large left MCA stroke.  Patient underwent urgent clot retrieval percutaneously.  Patient was discharged home on warfarin.  In 2024, patient was admitted at [facility] with atrial fibrillation with RVR.  Patient spontaneously cardioverted into sinus rhythm.  Patient was started on Lopressor 25 mg twice daily.  Apparently it was discontinued because of low blood pressure.    Patient came today.  Patient is not very active.  Patient denies any chest pain or tightness or heaviness.  Patient has baseline shortness of breath.  Patient denies any orthopnea PND no syncope or presyncope.  No leg edema noted.    EKG showed sinus tachycardia with heart rate of 104 bpm    I will consider stress test in future.  I would start Lopressor 12.5 mg twice daily blood pressure monitoring is recommended continue statin therapy.  Patient is on Coumadin.        Past Medical History:   Diagnosis Date    Acid reflux     Atrial fibrillation     Enlarged prostate     Headache, tension-type     Hyperlipidemia     Lung nodule     PFO (patent foramen ovale)     Stroke 2021    LOSS OF  PERIPHERAL VISION    Stroke     7/2024         Past Surgical History:   Procedure Laterality Date    APPENDECTOMY      BRONCHOSCOPY Bilateral 4/8/2021    Procedure: BRONCHOSCOPY ENDOBRONCHIAL ULTRASOUND WITH FNA'S;  Surgeon: Sam Tony MD;  Location: [facility] ENDOSCOPY;  Service: Pulmonary;  Laterality: Bilateral;  PRE- LYMPHADENOPATHY  POST- SAME    CARDIAC CATHETERIZATION N/A 10/29/2021    Procedure: Patent foramen ovale closure  ABBOTT;  Surgeon: Sivakumar Chatman MD;  Location: [facility] CATH INVASIVE LOCATION;  Service: Cardiology;  Laterality: N/A;    KNEE ARTHROSCOPY      SHOULDER ARTHROSCOPY             Current Outpatient Medications:     albuterol sulfate  (90 Base) MCG/ACT inhaler, Inhale 2 puffs Every 4 (Four) Hours As Needed for Wheezing., Disp: 18 g, Rfl: 3    atorvastatin (LIPITOR) 80 MG tablet, Take 1 tablet by mouth once daily, Disp: 90 tablet, Rfl: 0    cyanocobalamin 100 MCG tablet, GIVE 1 TABLET BY MOUTH ONCE DAILY, Disp: 30 tablet, Rfl: 0    ferrous sulfate 325 (65 FE) MG tablet, Take 1 tablet by mouth Daily With Breakfast., Disp: 30 tablet, Rfl: 2    hydrOXYzine (ATARAX) 25 MG tablet, Take 1 tablet by mouth Every 8 (Eight) Hours As Needed for Anxiety., Disp: 360 tablet, Rfl: 1    Magnesium Oxide -Mg Supplement (MAGnesium-Oxide) 400 (240 Mg) MG tablet, Take 1 tablet by mouth 2 (Two) Times a Day., Disp: 60 tablet, Rfl: 0    memantine (NAMENDA) 5 MG tablet, TAKE 1 TABLET BY MOUTH EVERY 12 HOURS, Disp: 60 tablet, Rfl: 0    metoprolol tartrate (LOPRESSOR) 25 MG tablet, Take 0.5 tablets by mouth 2 (Two) Times a Day for 30 days., Disp: 30 tablet, Rfl: 0    mirtazapine (Remeron) 30 MG tablet, Take 1 tablet by mouth Every Night., Disp: 30 tablet, Rfl: 1    pantoprazole (PROTONIX) 40 MG EC tablet, Take 1 tablet by mouth Daily., Disp: 90 tablet, Rfl: 1    potassium chloride (MICRO-K) 10 MEQ CR capsule, Take 1 capsule by mouth 2 (Two) Times a Day., Disp: 60 capsule, Rfl: 0    QUEtiapine  (SEROquel) 50 MG tablet, Take 1 tablet by mouth At Night As Needed (insomnia)., Disp: 30 tablet, Rfl: 1    tamsulosin (FLOMAX) 0.4 MG capsule 24 hr capsule, Take 2 capsules by mouth once daily, Disp: 180 capsule, Rfl: 0    warfarin (COUMADIN) 1 MG tablet, Take one tablet by mouth daily, Disp: 90 tablet, Rfl: 1    warfarin (COUMADIN) 2 MG tablet, Take 1 tablet by mouth 3 (Three) Times a Week. Tues, Thur, Sat, Disp: , Rfl:     warfarin (COUMADIN) 4 MG tablet, Take 1 tablet by mouth 4 (Four) Times a Week. Mon, Wed, Fri, Sun, Disp: , Rfl:       Social History     Socioeconomic History    Marital status:    Tobacco Use    Smoking status: Every Day     Current packs/day: 1.00     Average packs/day: 1 pack/day for 43.9 years (43.9 ttl pk-yrs)     Types: Cigarettes     Start date: 1981    Smokeless tobacco: Never    Tobacco comments:     Patient advised to stop smoking   Vaping Use    Vaping status: Never Used   Substance and Sexual Activity    Alcohol use: Not Currently     Comment: occasionally    Drug use: Not Currently    Sexual activity: Defer         Review of Systems   Constitutional: Negative for chills and fever.   HENT:  Negative for ear discharge and nosebleeds.    Eyes:  Negative for discharge and redness.   Cardiovascular:  Negative for chest pain, orthopnea, palpitations, paroxysmal nocturnal dyspnea and syncope.   Respiratory:  Positive for shortness of breath. Negative for cough and wheezing.    Endocrine: Negative for heat intolerance.   Skin:  Negative for rash.   Musculoskeletal:  Negative for arthritis and myalgias.   Gastrointestinal:  Negative for abdominal pain, melena, nausea and vomiting.   Genitourinary:  Negative for dysuria and hematuria.   Neurological:  Negative for dizziness, light-headedness, numbness and tremors.   Psychiatric/Behavioral:  Negative for depression. The patient is not nervous/anxious.        Procedures      ECG 12 Lead    Date/Time: 11/8/2024 2:30 PM  Performed by:  "Elías Smith MD    Authorized by: Elías Smith MD  Comparison: compared with previous ECG   Similar to previous ECG  Rhythm: sinus rhythm    Clinical impression: normal ECG          ECG 12 Lead    (Results Pending)           Objective:    /77 (BP Location: Right arm, Patient Position: Sitting, Cuff Size: Large Adult)   Pulse 104   Resp 18   Ht 188 cm (74.02\")   Wt 65.8 kg (145 lb)   SpO2 93%   BMI 18.61 kg/m²         Constitutional:       Appearance: Well-developed.   Eyes:      General: No scleral icterus.        Right eye: No discharge.   HENT:      Head: Normocephalic and atraumatic.   Neck:      Thyroid: No thyromegaly.      Lymphadenopathy: No cervical adenopathy.   Pulmonary:      Effort: Pulmonary effort is normal. No respiratory distress.      Breath sounds: Normal breath sounds. No wheezing. No rales.   Cardiovascular:      Normal rate. Regular rhythm.      No gallop.    Edema:     Peripheral edema absent.   Abdominal:      Tenderness: There is no abdominal tenderness.   Skin:     Findings: No erythema or rash.   Neurological:      Mental Status: Alert and oriented to person, place, and time.             Assessment:       Diagnosis Plan   1. Primary hypertension  ECG 12 Lead    metoprolol tartrate (LOPRESSOR) 25 MG tablet      2. New onset a-fib  ECG 12 Lead    metoprolol tartrate (LOPRESSOR) 25 MG tablet      3. Cerebrovascular accident (CVA) due to bilateral embolism of posterior cerebral arteries  metoprolol tartrate (LOPRESSOR) 25 MG tablet      4. PFO (patent foramen ovale)  metoprolol tartrate (LOPRESSOR) 25 MG tablet      5. Sinus tachycardia  metoprolol tartrate (LOPRESSOR) 25 MG tablet               Plan:       MDM:    1.  Paroxysmal atrial fibrillation:    Patient is maintaining sinus rhythm.  Patient is on warfarin.  Start low-dose beta-blocker therapy    2.  Sinus tachycardia:    Etiology is unclear.  This may be deconditioning or underlying COPD start Lopressor 12.5 mg twice " daily    3.  Hypertension:    Patient blood pressure is fluctuating monitor the blood pressure at home low-dose beta-blocker therapy is recommended    4.  Shortness of breath:    Most likely it is due to underlying COPD but patient would need stress test in future    5.  Mixed hyperlipidemia:    Patient is on Lipitor.  Recent LDL is 56 which is desirable

## 2024-11-08 ENCOUNTER — OFFICE VISIT (OUTPATIENT)
Dept: CARDIOLOGY | Facility: CLINIC | Age: 64
End: 2024-11-08
Payer: MEDICARE

## 2024-11-08 VITALS
WEIGHT: 145 LBS | OXYGEN SATURATION: 93 % | DIASTOLIC BLOOD PRESSURE: 77 MMHG | BODY MASS INDEX: 18.61 KG/M2 | HEIGHT: 74 IN | HEART RATE: 104 BPM | SYSTOLIC BLOOD PRESSURE: 144 MMHG | RESPIRATION RATE: 18 BRPM

## 2024-11-08 DIAGNOSIS — Q21.12 PFO (PATENT FORAMEN OVALE): Chronic | ICD-10-CM

## 2024-11-08 DIAGNOSIS — I48.91 NEW ONSET A-FIB: ICD-10-CM

## 2024-11-08 DIAGNOSIS — I63.433 CEREBROVASCULAR ACCIDENT (CVA) DUE TO BILATERAL EMBOLISM OF POSTERIOR CEREBRAL ARTERIES: ICD-10-CM

## 2024-11-08 DIAGNOSIS — R00.0 SINUS TACHYCARDIA: ICD-10-CM

## 2024-11-08 DIAGNOSIS — I10 PRIMARY HYPERTENSION: Primary | ICD-10-CM

## 2024-11-08 RX ORDER — METOPROLOL TARTRATE 25 MG/1
12.5 TABLET, FILM COATED ORAL 2 TIMES DAILY
Qty: 30 TABLET | Refills: 0 | Status: SHIPPED | OUTPATIENT
Start: 2024-11-08 | End: 2024-12-08

## 2024-11-11 ENCOUNTER — CLINICAL SUPPORT (OUTPATIENT)
Dept: FAMILY MEDICINE CLINIC | Facility: CLINIC | Age: 64
End: 2024-11-11
Payer: MEDICARE

## 2024-11-11 ENCOUNTER — PATIENT ROUNDING (BHMG ONLY) (OUTPATIENT)
Dept: CARDIOLOGY | Facility: CLINIC | Age: 64
End: 2024-11-11
Payer: MEDICARE

## 2024-11-11 DIAGNOSIS — Z79.01 LONG TERM (CURRENT) USE OF ANTICOAGULANTS: Primary | ICD-10-CM

## 2024-11-11 LAB — INR PPP: 3.8

## 2024-11-11 PROCEDURE — 93793 ANTICOAG MGMT PT WARFARIN: CPT | Performed by: FAMILY MEDICINE

## 2024-11-15 ENCOUNTER — CLINICAL SUPPORT (OUTPATIENT)
Dept: FAMILY MEDICINE CLINIC | Facility: CLINIC | Age: 64
End: 2024-11-15
Payer: MEDICARE

## 2024-11-15 DIAGNOSIS — Z79.01 LONG TERM (CURRENT) USE OF ANTICOAGULANTS: Primary | ICD-10-CM

## 2024-11-15 LAB — INR PPP: 3.2 (ref 2–3)

## 2024-11-15 PROCEDURE — 93793 ANTICOAG MGMT PT WARFARIN: CPT | Performed by: FAMILY MEDICINE

## 2024-11-15 NOTE — PROGRESS NOTES
Faxed in INR of 3.2, hold today and recheck tomorrow. Call the office tomorrow before 11 am with the result. Patient is aware.

## 2024-11-16 ENCOUNTER — TELEPHONE (OUTPATIENT)
Dept: FAMILY MEDICINE CLINIC | Facility: CLINIC | Age: 64
End: 2024-11-16
Payer: MEDICARE

## 2024-11-16 NOTE — TELEPHONE ENCOUNTER
11/16/24: 11:35hrs- pt called- INR is 2.8.  Pt told to change coumadin to 2mg on Monday, Tuesday, Thursday, and Sunday and 4mg on Wednesday, Friday, and Saturday and recheck INR on Friday, 11/22/24 and call us with results before 5PM.  He said he would.

## 2024-11-21 DIAGNOSIS — E61.1 IRON DEFICIENCY: ICD-10-CM

## 2024-11-21 RX ORDER — FERROUS SULFATE 325(65) MG
1 TABLET ORAL
Qty: 30 TABLET | Refills: 0 | Status: SHIPPED | OUTPATIENT
Start: 2024-11-21

## 2024-11-26 ENCOUNTER — CLINICAL SUPPORT (OUTPATIENT)
Dept: FAMILY MEDICINE CLINIC | Facility: CLINIC | Age: 64
End: 2024-11-26
Payer: MEDICARE

## 2024-11-26 DIAGNOSIS — Z79.01 LONG TERM (CURRENT) USE OF ANTICOAGULANTS: Primary | ICD-10-CM

## 2024-11-26 LAB — INR PPP: 2.8

## 2024-11-26 PROCEDURE — 93793 ANTICOAG MGMT PT WARFARIN: CPT | Performed by: FAMILY MEDICINE

## 2024-11-26 NOTE — PROGRESS NOTES
Faxed in INR of 2.8, continue to take 2 mg on Mon, Tue, Thu, and Sunday, and 4 mg on Wed, Fri, and Sat. Recheck in 1 week.

## 2024-12-01 DIAGNOSIS — N40.0 BENIGN PROSTATIC HYPERPLASIA WITHOUT LOWER URINARY TRACT SYMPTOMS: ICD-10-CM

## 2024-12-02 RX ORDER — TAMSULOSIN HYDROCHLORIDE 0.4 MG/1
2 CAPSULE ORAL DAILY
Qty: 180 CAPSULE | Refills: 0 | Status: SHIPPED | OUTPATIENT
Start: 2024-12-02

## 2024-12-03 ENCOUNTER — CLINICAL SUPPORT (OUTPATIENT)
Dept: FAMILY MEDICINE CLINIC | Facility: CLINIC | Age: 64
End: 2024-12-03
Payer: MEDICARE

## 2024-12-03 DIAGNOSIS — Z79.01 LONG TERM (CURRENT) USE OF ANTICOAGULANTS: Primary | ICD-10-CM

## 2024-12-03 LAB — INR PPP: 2.5 (ref 2–3)

## 2024-12-03 PROCEDURE — 93793 ANTICOAG MGMT PT WARFARIN: CPT | Performed by: FAMILY MEDICINE

## 2024-12-03 NOTE — PROGRESS NOTES
Faxed in INR of 2.5, continue current regimen of 2 mg on Mon, Tues, Thur, and Sun, and 4 mg on Wed, Fri, and Sat. Recheck in 2 weeks.

## 2024-12-09 DIAGNOSIS — Z86.73 HISTORY OF CVA (CEREBROVASCULAR ACCIDENT): ICD-10-CM

## 2024-12-10 RX ORDER — MEMANTINE HYDROCHLORIDE 5 MG/1
5 TABLET ORAL EVERY 12 HOURS SCHEDULED
Qty: 60 TABLET | Refills: 0 | Status: SHIPPED | OUTPATIENT
Start: 2024-12-10

## 2024-12-13 DIAGNOSIS — I63.433 CEREBROVASCULAR ACCIDENT (CVA) DUE TO BILATERAL EMBOLISM OF POSTERIOR CEREBRAL ARTERIES: ICD-10-CM

## 2024-12-13 DIAGNOSIS — I10 PRIMARY HYPERTENSION: ICD-10-CM

## 2024-12-13 DIAGNOSIS — Q21.12 PFO (PATENT FORAMEN OVALE): Chronic | ICD-10-CM

## 2024-12-13 DIAGNOSIS — R00.0 SINUS TACHYCARDIA: ICD-10-CM

## 2024-12-13 DIAGNOSIS — I48.91 NEW ONSET A-FIB: ICD-10-CM

## 2024-12-13 RX ORDER — METOPROLOL TARTRATE 25 MG/1
TABLET, FILM COATED ORAL
Qty: 30 TABLET | Refills: 0 | Status: SHIPPED | OUTPATIENT
Start: 2024-12-13

## 2024-12-17 ENCOUNTER — OFFICE VISIT (OUTPATIENT)
Dept: FAMILY MEDICINE CLINIC | Facility: CLINIC | Age: 64
End: 2024-12-17
Payer: MEDICARE

## 2024-12-17 VITALS
SYSTOLIC BLOOD PRESSURE: 132 MMHG | HEART RATE: 84 BPM | WEIGHT: 147.7 LBS | OXYGEN SATURATION: 94 % | TEMPERATURE: 97.3 F | DIASTOLIC BLOOD PRESSURE: 80 MMHG | BODY MASS INDEX: 18.96 KG/M2

## 2024-12-17 DIAGNOSIS — G47.00 INSOMNIA, UNSPECIFIED TYPE: ICD-10-CM

## 2024-12-17 DIAGNOSIS — E87.6 HYPOKALEMIA: ICD-10-CM

## 2024-12-17 DIAGNOSIS — E61.1 IRON DEFICIENCY: Primary | ICD-10-CM

## 2024-12-17 DIAGNOSIS — F41.9 ANXIETY AND DEPRESSION: ICD-10-CM

## 2024-12-17 DIAGNOSIS — F32.A ANXIETY AND DEPRESSION: ICD-10-CM

## 2024-12-17 DIAGNOSIS — Z86.73 HISTORY OF CVA (CEREBROVASCULAR ACCIDENT): ICD-10-CM

## 2024-12-17 DIAGNOSIS — R53.83 FATIGUE, UNSPECIFIED TYPE: ICD-10-CM

## 2024-12-17 DIAGNOSIS — E83.42 HYPOMAGNESEMIA: ICD-10-CM

## 2024-12-17 LAB
ALBUMIN SERPL-MCNC: 3.7 G/DL (ref 3.5–5.2)
ALBUMIN/GLOB SERPL: 1.2 G/DL
ALP SERPL-CCNC: 127 U/L (ref 39–117)
ALT SERPL W P-5'-P-CCNC: 14 U/L (ref 1–41)
ANION GAP SERPL CALCULATED.3IONS-SCNC: 7.1 MMOL/L (ref 5–15)
AST SERPL-CCNC: 26 U/L (ref 1–40)
BASOPHILS # BLD AUTO: 0.1 10*3/MM3 (ref 0–0.2)
BASOPHILS NFR BLD AUTO: 1.4 % (ref 0–1.5)
BILIRUB SERPL-MCNC: 0.7 MG/DL (ref 0–1.2)
BUN SERPL-MCNC: 3 MG/DL (ref 8–23)
BUN/CREAT SERPL: 3.7 (ref 7–25)
CALCIUM SPEC-SCNC: 7.5 MG/DL (ref 8.6–10.5)
CHLORIDE SERPL-SCNC: 103 MMOL/L (ref 98–107)
CO2 SERPL-SCNC: 30.9 MMOL/L (ref 22–29)
CREAT SERPL-MCNC: 0.81 MG/DL (ref 0.76–1.27)
DEPRECATED RDW RBC AUTO: 40.4 FL (ref 37–54)
EGFRCR SERPLBLD CKD-EPI 2021: 97.2 ML/MIN/1.73
EOSINOPHIL # BLD AUTO: 0.41 10*3/MM3 (ref 0–0.4)
EOSINOPHIL NFR BLD AUTO: 5.6 % (ref 0.3–6.2)
ERYTHROCYTE [DISTWIDTH] IN BLOOD BY AUTOMATED COUNT: 13.1 % (ref 12.3–15.4)
FOLATE SERPL-MCNC: 3.05 NG/ML (ref 4.78–24.2)
GLOBULIN UR ELPH-MCNC: 3.1 GM/DL
GLUCOSE SERPL-MCNC: 92 MG/DL (ref 65–99)
HCT VFR BLD AUTO: 43.7 % (ref 37.5–51)
HGB BLD-MCNC: 14.1 G/DL (ref 13–17.7)
IMM GRANULOCYTES # BLD AUTO: 0.02 10*3/MM3 (ref 0–0.05)
IMM GRANULOCYTES NFR BLD AUTO: 0.3 % (ref 0–0.5)
IRON 24H UR-MRATE: 59 MCG/DL (ref 59–158)
IRON SATN MFR SERPL: 21 % (ref 20–50)
LYMPHOCYTES # BLD AUTO: 2.44 10*3/MM3 (ref 0.7–3.1)
LYMPHOCYTES NFR BLD AUTO: 33.2 % (ref 19.6–45.3)
MAGNESIUM SERPL-MCNC: 0.7 MG/DL (ref 1.6–2.4)
MCH RBC QN AUTO: 27.8 PG (ref 26.6–33)
MCHC RBC AUTO-ENTMCNC: 32.3 G/DL (ref 31.5–35.7)
MCV RBC AUTO: 86.2 FL (ref 79–97)
MONOCYTES # BLD AUTO: 0.74 10*3/MM3 (ref 0.1–0.9)
MONOCYTES NFR BLD AUTO: 10.1 % (ref 5–12)
NEUTROPHILS NFR BLD AUTO: 3.63 10*3/MM3 (ref 1.7–7)
NEUTROPHILS NFR BLD AUTO: 49.4 % (ref 42.7–76)
NRBC BLD AUTO-RTO: 0 /100 WBC (ref 0–0.2)
PLATELET # BLD AUTO: 223 10*3/MM3 (ref 140–450)
PMV BLD AUTO: 9.6 FL (ref 6–12)
POTASSIUM SERPL-SCNC: 3 MMOL/L (ref 3.5–5.2)
PROT SERPL-MCNC: 6.8 G/DL (ref 6–8.5)
RBC # BLD AUTO: 5.07 10*6/MM3 (ref 4.14–5.8)
SODIUM SERPL-SCNC: 141 MMOL/L (ref 136–145)
TIBC SERPL-MCNC: 279 MCG/DL (ref 298–536)
TRANSFERRIN SERPL-MCNC: 187 MG/DL (ref 200–360)
VIT B12 BLD-MCNC: 655 PG/ML (ref 211–946)
WBC NRBC COR # BLD AUTO: 7.34 10*3/MM3 (ref 3.4–10.8)

## 2024-12-17 PROCEDURE — 82607 VITAMIN B-12: CPT | Performed by: FAMILY MEDICINE

## 2024-12-17 PROCEDURE — 82746 ASSAY OF FOLIC ACID SERUM: CPT | Performed by: FAMILY MEDICINE

## 2024-12-17 PROCEDURE — 3075F SYST BP GE 130 - 139MM HG: CPT | Performed by: FAMILY MEDICINE

## 2024-12-17 PROCEDURE — 99214 OFFICE O/P EST MOD 30 MIN: CPT | Performed by: FAMILY MEDICINE

## 2024-12-17 PROCEDURE — 83735 ASSAY OF MAGNESIUM: CPT | Performed by: FAMILY MEDICINE

## 2024-12-17 PROCEDURE — 1159F MED LIST DOCD IN RCRD: CPT | Performed by: FAMILY MEDICINE

## 2024-12-17 PROCEDURE — 1160F RVW MEDS BY RX/DR IN RCRD: CPT | Performed by: FAMILY MEDICINE

## 2024-12-17 PROCEDURE — G2211 COMPLEX E/M VISIT ADD ON: HCPCS | Performed by: FAMILY MEDICINE

## 2024-12-17 PROCEDURE — 1126F AMNT PAIN NOTED NONE PRSNT: CPT | Performed by: FAMILY MEDICINE

## 2024-12-17 PROCEDURE — 3079F DIAST BP 80-89 MM HG: CPT | Performed by: FAMILY MEDICINE

## 2024-12-17 PROCEDURE — 85025 COMPLETE CBC W/AUTO DIFF WBC: CPT | Performed by: FAMILY MEDICINE

## 2024-12-17 PROCEDURE — 80053 COMPREHEN METABOLIC PANEL: CPT | Performed by: FAMILY MEDICINE

## 2024-12-17 PROCEDURE — 83540 ASSAY OF IRON: CPT | Performed by: FAMILY MEDICINE

## 2024-12-17 PROCEDURE — 84466 ASSAY OF TRANSFERRIN: CPT | Performed by: FAMILY MEDICINE

## 2024-12-17 PROCEDURE — 36415 COLL VENOUS BLD VENIPUNCTURE: CPT | Performed by: FAMILY MEDICINE

## 2024-12-17 RX ORDER — MIRTAZAPINE 30 MG/1
30 TABLET, FILM COATED ORAL NIGHTLY
Qty: 90 TABLET | Refills: 1 | Status: SHIPPED | OUTPATIENT
Start: 2024-12-17

## 2024-12-17 RX ORDER — POTASSIUM CHLORIDE 750 MG/1
10 CAPSULE, EXTENDED RELEASE ORAL 2 TIMES DAILY
Qty: 180 CAPSULE | Refills: 0 | Status: SHIPPED | OUTPATIENT
Start: 2024-12-17

## 2024-12-17 RX ORDER — QUETIAPINE FUMARATE 50 MG/1
50 TABLET, FILM COATED ORAL NIGHTLY PRN
Qty: 90 TABLET | Refills: 1 | Status: SHIPPED | OUTPATIENT
Start: 2024-12-17 | End: 2024-12-18

## 2024-12-17 RX ORDER — MEMANTINE HYDROCHLORIDE 5 MG/1
5 TABLET ORAL EVERY 12 HOURS SCHEDULED
Qty: 180 TABLET | Refills: 0 | Status: SHIPPED | OUTPATIENT
Start: 2024-12-17

## 2024-12-17 NOTE — PROGRESS NOTES
Chief Complaint  Hyperlipidemia, Hypertension, Hypothyroidism, and Atrial Fibrillation    Subjective          Sathya Benavides presents to [facility]  History of Present Illness  Pt's INR is 2.4 this AM- continue current dose of coumadin  Hyperlipidemia  Exacerbating diseases include hypothyroidism. Pertinent negatives include no chest pain or shortness of breath.   Hypertension  Pertinent negatives include no chest pain, headaches, palpitations or shortness of breath.   Hypothyroidism  Symptoms include fatigue. Patient reports no diarrhea or palpitations. His past medical history is significant for atrial fibrillation and hyperlipidemia.   Atrial Fibrillation  Symptoms are negative for chest pain, dizziness, palpitations and shortness of breath. Past medical history includes atrial fibrillation and hyperlipidemia.       BMI is within normal parameters. No other follow-up for BMI required.       Objective   Allergies   Allergen Reactions    Alpha-Gal Hives    Adhesive Tape Rash    Amoxicillin-Pot Clavulanate Other (See Comments) and GI Intolerance     Upset stomach     Immunization History   Administered Date(s) Administered    Flu Vaccine Quad PF >36MO 10/17/2017    Pneumococcal Conjugate 20-Valent (PCV20) 01/26/2024    Tdap 05/17/2017     Past Medical History:   Diagnosis Date    Acid reflux     Atrial fibrillation     Enlarged prostate     Headache, tension-type     Hyperlipidemia     Lung nodule     PFO (patent foramen ovale)     Stroke 03/2021    LOSS OF PERIPHERAL VISION    Stroke     7/2024      Past Surgical History:   Procedure Laterality Date    APPENDECTOMY      BRONCHOSCOPY Bilateral 4/8/2021    Procedure: BRONCHOSCOPY ENDOBRONCHIAL ULTRASOUND WITH FNA'S;  Surgeon: Sam Tony MD;  Location: [facility] ENDOSCOPY;  Service: Pulmonary;  Laterality: Bilateral;  PRE- LYMPHADENOPATHY  POST- SAME    CARDIAC CATHETERIZATION N/A 10/29/2021    Procedure: Patent foramen ovale  closure  FENG;  Surgeon: Sivakumar Chatman MD;  Location: [facility] INVASIVE LOCATION;  Service: Cardiology;  Laterality: N/A;    KNEE ARTHROSCOPY      SHOULDER ARTHROSCOPY        Social History     Socioeconomic History    Marital status:    Tobacco Use    Smoking status: Every Day     Current packs/day: 1.00     Average packs/day: 1 pack/day for 44.0 years (44.0 ttl pk-yrs)     Types: Cigarettes     Start date: 1981    Smokeless tobacco: Never    Tobacco comments:     Patient advised to stop smoking   Vaping Use    Vaping status: Never Used   Substance and Sexual Activity    Alcohol use: Not Currently     Comment: occasionally    Drug use: Not Currently    Sexual activity: Defer        Current Outpatient Medications:     albuterol sulfate  (90 Base) MCG/ACT inhaler, Inhale 2 puffs Every 4 (Four) Hours As Needed for Wheezing., Disp: 18 g, Rfl: 3    atorvastatin (LIPITOR) 80 MG tablet, Take 1 tablet by mouth once daily, Disp: 90 tablet, Rfl: 0    cyanocobalamin 100 MCG tablet, GIVE 1 TABLET BY MOUTH ONCE DAILY, Disp: 30 tablet, Rfl: 0    FeroSul 325 (65 Fe) MG tablet, Take 1 tablet by mouth once daily with breakfast, Disp: 30 tablet, Rfl: 0    hydrOXYzine (ATARAX) 25 MG tablet, Take 1 tablet by mouth Every 8 (Eight) Hours As Needed for Anxiety., Disp: 360 tablet, Rfl: 1    Magnesium Oxide -Mg Supplement (MAGnesium-Oxide) 400 (240 Mg) MG tablet, Take 1 tablet by mouth 2 (Two) Times a Day., Disp: 60 tablet, Rfl: 0    memantine (NAMENDA) 5 MG tablet, Take 1 tablet by mouth Every 12 (Twelve) Hours., Disp: 180 tablet, Rfl: 0    metoprolol tartrate (LOPRESSOR) 25 MG tablet, TAKE 1/2 (ONE-HALF) TABLET BY MOUTH TWICE DAILY FOR 30 DAYS, Disp: 30 tablet, Rfl: 0    mirtazapine (Remeron) 30 MG tablet, Take 1 tablet by mouth Every Night., Disp: 90 tablet, Rfl: 1    pantoprazole (PROTONIX) 40 MG EC tablet, Take 1 tablet by mouth Daily., Disp: 90 tablet, Rfl: 1    potassium chloride (MICRO-K) 10 MEQ CR  capsule, Take 1 capsule by mouth 2 (Two) Times a Day., Disp: 180 capsule, Rfl: 0    QUEtiapine (SEROquel) 50 MG tablet, Take 1 tablet by mouth At Night As Needed (insomnia)., Disp: 90 tablet, Rfl: 1    tamsulosin (FLOMAX) 0.4 MG capsule 24 hr capsule, Take 2 capsules by mouth once daily, Disp: 180 capsule, Rfl: 0    warfarin (COUMADIN) 1 MG tablet, Take one tablet by mouth daily, Disp: 90 tablet, Rfl: 1    warfarin (COUMADIN) 2 MG tablet, Take 1 tablet by mouth 3 (Three) Times a Week. Tues, Thur, Sat, Disp: , Rfl:     warfarin (COUMADIN) 4 MG tablet, Take 1 tablet by mouth 4 (Four) Times a Week. Mon, Wed, Fri, Sun, Disp: , Rfl:    Family History   Problem Relation Age of Onset    Heart disease Mother     Diabetes Mother     Dementia Father     Colon cancer Brother     No Known Problems Daughter     No Known Problems Daughter     No Known Problems Daughter     No Known Problems Son     Teja Hyperthermia Neg Hx           Vital Signs:   Vitals:    12/17/24 1336   BP: 132/80   Pulse: 84   Temp: 97.3 °F (36.3 °C)   SpO2: 94%   Weight: 67 kg (147 lb 11.2 oz)       Review of Systems   Constitutional:  Positive for fatigue. Negative for fever.   HENT:  Negative for sore throat.    Eyes:  Negative for visual disturbance.   Respiratory:  Negative for cough, chest tightness, shortness of breath and wheezing.    Cardiovascular:  Negative for chest pain, palpitations and leg swelling.   Gastrointestinal:  Negative for abdominal pain, diarrhea, nausea and vomiting.   Neurological:  Negative for dizziness, light-headedness and headaches.      Physical Exam  Vitals reviewed.   Constitutional:       Appearance: Normal appearance. He is well-developed.   HENT:      Head: Normocephalic and atraumatic.      Right Ear: External ear normal.      Left Ear: External ear normal.      Mouth/Throat:      Pharynx: No oropharyngeal exudate.   Eyes:      Conjunctiva/sclera: Conjunctivae normal.      Pupils: Pupils are equal, round, and reactive  to light.   Cardiovascular:      Rate and Rhythm: Normal rate and regular rhythm.      Pulses: Normal pulses.      Heart sounds: Normal heart sounds. No murmur heard.     No friction rub. No gallop.   Pulmonary:      Effort: Pulmonary effort is normal.      Breath sounds: Normal breath sounds. No wheezing or rhonchi.   Abdominal:      General: Abdomen is flat. Bowel sounds are normal. There is no distension.      Palpations: Abdomen is soft. There is no mass.      Tenderness: There is no abdominal tenderness. There is no guarding or rebound.      Hernia: No hernia is present.   Musculoskeletal:         General: Normal range of motion.   Skin:     General: Skin is warm and dry.      Capillary Refill: Capillary refill takes less than 2 seconds.   Neurological:      General: No focal deficit present.      Mental Status: He is alert and oriented to person, place, and time.      Cranial Nerves: No cranial nerve deficit.   Psychiatric:         Mood and Affect: Mood and affect normal.         Behavior: Behavior normal.         Thought Content: Thought content normal.         Judgment: Judgment normal.        Result Review :   The following data was reviewed by: Adi Kaminski MD on 12/17/2024:  CMP          10/7/2024    15:12 10/24/2024    16:45 10/24/2024    22:33 10/29/2024    15:51   CMP   Glucose 85  141  122  95    BUN 7  7  8  8    Creatinine 0.85  1.12  0.82  0.87    EGFR 95.8  72.5  96.9  95.2    Sodium 137  134  135  137    Potassium 3.6  4.0  3.7  4.2    Chloride 100  98  101  101    Calcium 9.3  9.3  8.7  9.1    Total Protein    7.4    Albumin    4.0    Globulin    3.4    Total Bilirubin    0.3    Alkaline Phosphatase    141    AST (SGOT)    22    ALT (SGPT)    13    Albumin/Globulin Ratio    1.2    BUN/Creatinine Ratio 8.2  6.3  9.8  9.2    Anion Gap 8.0  9.6  7.8  9.4      CBC          9/9/2024    15:33 9/16/2024    17:05 10/24/2024    16:45 10/24/2024    22:33   CBC   WBC 10.97  8.13  11.17  8.78    RBC 5.29   5.05  5.37  4.41    Hemoglobin 15.3  14.5  15.0  12.9    Hematocrit 45.6  44.5  47.3  39.3    MCV 86.2  88.1  88.1  89.1    MCH 28.9  28.7  27.9  29.3    MCHC 33.6  32.6  31.7  32.8    RDW 12.1  12.1  12.6  12.4    Platelets 391  345  267  219      Lipid Panel          1/26/2024    13:50 10/24/2024    18:52   Lipid Panel   Total Cholesterol 193  107    Triglycerides 90  128    HDL Cholesterol 31  28    VLDL Cholesterol 17  23    LDL Cholesterol  145  56    LDL/HDL Ratio 4.65  1.91      TSH          8/15/2024    14:45 8/29/2024    09:26 10/24/2024    16:45   TSH   TSH 3.570  3.780  3.500      Most Recent A1C          10/24/2024    22:33   HGBA1C Most Recent   Hemoglobin A1C 5.96                Assessment and Plan    Diagnoses and all orders for this visit:    1. Iron deficiency (Primary)  -     CBC Auto Differential  -     Iron Profile    2. History of CVA (cerebrovascular accident)  -     memantine (NAMENDA) 5 MG tablet; Take 1 tablet by mouth Every 12 (Twelve) Hours.  Dispense: 180 tablet; Refill: 0    3. Anxiety and depression  -     mirtazapine (Remeron) 30 MG tablet; Take 1 tablet by mouth Every Night.  Dispense: 90 tablet; Refill: 1    4. Hypokalemia  -     potassium chloride (MICRO-K) 10 MEQ CR capsule; Take 1 capsule by mouth 2 (Two) Times a Day.  Dispense: 180 capsule; Refill: 0  -     Comprehensive Metabolic Panel    5. Insomnia, unspecified type  -     QUEtiapine (SEROquel) 50 MG tablet; Take 1 tablet by mouth At Night As Needed (insomnia).  Dispense: 90 tablet; Refill: 1    6. Fatigue, unspecified type  -     CBC Auto Differential  -     Iron Profile  -     Vitamin B12 & Folate    7. Hypomagnesemia  -     Magnesium            Follow Up   Return in about 3 months (around 3/17/2025) for Recheck.  Patient was given instructions and counseling regarding his condition or for health maintenance advice. Please see specific information pulled into the AVS if appropriate.

## 2024-12-17 NOTE — PROGRESS NOTES
Venipuncture Blood Specimen Collection  Venipuncture performed in left arm  by Antonia Armas with good hemostasis. Patient tolerated the procedure well without complications.   12/17/24   Antonia Armas

## 2024-12-18 ENCOUNTER — HOSPITAL ENCOUNTER (OUTPATIENT)
Facility: HOSPITAL | Age: 64
Setting detail: OBSERVATION
Discharge: HOME | End: 2024-12-19
Attending: EMERGENCY MEDICINE | Admitting: EMERGENCY MEDICINE
Payer: MEDICARE

## 2024-12-18 DIAGNOSIS — E87.6 HYPOKALEMIA: Primary | ICD-10-CM

## 2024-12-18 DIAGNOSIS — E83.42 HYPOMAGNESEMIA: ICD-10-CM

## 2024-12-18 LAB
ALBUMIN SERPL-MCNC: 3.9 G/DL (ref 3.5–5.2)
ALBUMIN/GLOB SERPL: 1.3 G/DL
ALP SERPL-CCNC: 138 U/L (ref 39–117)
ALT SERPL W P-5'-P-CCNC: 15 U/L (ref 1–41)
ANION GAP SERPL CALCULATED.3IONS-SCNC: 10.1 MMOL/L (ref 5–15)
AST SERPL-CCNC: 34 U/L (ref 1–40)
BASOPHILS # BLD AUTO: 0.06 10*3/MM3 (ref 0–0.2)
BASOPHILS NFR BLD AUTO: 1.2 % (ref 0–1.5)
BILIRUB SERPL-MCNC: 0.8 MG/DL (ref 0–1.2)
BUN SERPL-MCNC: 4 MG/DL (ref 8–23)
BUN/CREAT SERPL: 4.9 (ref 7–25)
CALCIUM SPEC-SCNC: 8 MG/DL (ref 8.6–10.5)
CHLORIDE SERPL-SCNC: 103 MMOL/L (ref 98–107)
CO2 SERPL-SCNC: 30.9 MMOL/L (ref 22–29)
CREAT SERPL-MCNC: 0.82 MG/DL (ref 0.76–1.27)
DEPRECATED RDW RBC AUTO: 43.3 FL (ref 37–54)
EGFRCR SERPLBLD CKD-EPI 2021: 96.9 ML/MIN/1.73
EOSINOPHIL # BLD AUTO: 0.35 10*3/MM3 (ref 0–0.4)
EOSINOPHIL NFR BLD AUTO: 6.8 % (ref 0.3–6.2)
ERYTHROCYTE [DISTWIDTH] IN BLOOD BY AUTOMATED COUNT: 13.5 % (ref 12.3–15.4)
GLOBULIN UR ELPH-MCNC: 3.1 GM/DL
GLUCOSE SERPL-MCNC: 92 MG/DL (ref 65–99)
HCT VFR BLD AUTO: 44.7 % (ref 37.5–51)
HGB BLD-MCNC: 14.5 G/DL (ref 13–17.7)
HOLD SPECIMEN: NORMAL
IMM GRANULOCYTES # BLD AUTO: 0.01 10*3/MM3 (ref 0–0.05)
IMM GRANULOCYTES NFR BLD AUTO: 0.2 % (ref 0–0.5)
INR PPP: 2.42 (ref 2–3)
LYMPHOCYTES # BLD AUTO: 2.17 10*3/MM3 (ref 0.7–3.1)
LYMPHOCYTES NFR BLD AUTO: 42.1 % (ref 19.6–45.3)
MAGNESIUM SERPL-MCNC: 0.8 MG/DL (ref 1.6–2.4)
MCH RBC QN AUTO: 28.1 PG (ref 26.6–33)
MCHC RBC AUTO-ENTMCNC: 32.4 G/DL (ref 31.5–35.7)
MCV RBC AUTO: 86.6 FL (ref 79–97)
MONOCYTES # BLD AUTO: 0.54 10*3/MM3 (ref 0.1–0.9)
MONOCYTES NFR BLD AUTO: 10.5 % (ref 5–12)
NEUTROPHILS NFR BLD AUTO: 2.03 10*3/MM3 (ref 1.7–7)
NEUTROPHILS NFR BLD AUTO: 39.2 % (ref 42.7–76)
NRBC BLD AUTO-RTO: 0 /100 WBC (ref 0–0.2)
PLATELET # BLD AUTO: 189 10*3/MM3 (ref 140–450)
PMV BLD AUTO: 9.2 FL (ref 6–12)
POTASSIUM SERPL-SCNC: 3 MMOL/L (ref 3.5–5.2)
PROT SERPL-MCNC: 7 G/DL (ref 6–8.5)
PROTHROMBIN TIME: 26.4 SECONDS (ref 19.4–28.5)
RBC # BLD AUTO: 5.16 10*6/MM3 (ref 4.14–5.8)
SODIUM SERPL-SCNC: 144 MMOL/L (ref 136–145)
WBC NRBC COR # BLD AUTO: 5.16 10*3/MM3 (ref 3.4–10.8)
WHOLE BLOOD HOLD COAG: NORMAL

## 2024-12-18 PROCEDURE — 25010000002 MAGNESIUM SULFATE 2 GM/50ML SOLUTION: Performed by: EMERGENCY MEDICINE

## 2024-12-18 PROCEDURE — 96365 THER/PROPH/DIAG IV INF INIT: CPT

## 2024-12-18 PROCEDURE — 85025 COMPLETE CBC W/AUTO DIFF WBC: CPT | Performed by: PHYSICIAN ASSISTANT

## 2024-12-18 PROCEDURE — G0378 HOSPITAL OBSERVATION PER HR: HCPCS

## 2024-12-18 PROCEDURE — 85610 PROTHROMBIN TIME: CPT | Performed by: PHYSICIAN ASSISTANT

## 2024-12-18 PROCEDURE — 80053 COMPREHEN METABOLIC PANEL: CPT | Performed by: PHYSICIAN ASSISTANT

## 2024-12-18 PROCEDURE — 96366 THER/PROPH/DIAG IV INF ADDON: CPT

## 2024-12-18 PROCEDURE — 99285 EMERGENCY DEPT VISIT HI MDM: CPT

## 2024-12-18 PROCEDURE — 25010000002 MAGNESIUM SULFATE 2 GM/50ML SOLUTION: Performed by: PHYSICIAN ASSISTANT

## 2024-12-18 PROCEDURE — 83735 ASSAY OF MAGNESIUM: CPT | Performed by: PHYSICIAN ASSISTANT

## 2024-12-18 RX ORDER — HYDROXYZINE HYDROCHLORIDE 25 MG/1
25 TABLET, FILM COATED ORAL EVERY 8 HOURS PRN
Status: DISCONTINUED | OUTPATIENT
Start: 2024-12-18 | End: 2024-12-19 | Stop reason: HOSPADM

## 2024-12-18 RX ORDER — POTASSIUM CHLORIDE 1500 MG/1
40 TABLET, EXTENDED RELEASE ORAL EVERY 4 HOURS
Status: COMPLETED | OUTPATIENT
Start: 2024-12-18 | End: 2024-12-19

## 2024-12-18 RX ORDER — QUETIAPINE FUMARATE 25 MG/1
50 TABLET, FILM COATED ORAL NIGHTLY
Status: DISCONTINUED | OUTPATIENT
Start: 2024-12-18 | End: 2024-12-19 | Stop reason: HOSPADM

## 2024-12-18 RX ORDER — ONDANSETRON 4 MG/1
4 TABLET, ORALLY DISINTEGRATING ORAL EVERY 6 HOURS PRN
Status: DISCONTINUED | OUTPATIENT
Start: 2024-12-18 | End: 2024-12-19 | Stop reason: HOSPADM

## 2024-12-18 RX ORDER — SODIUM CHLORIDE 0.9 % (FLUSH) 0.9 %
10 SYRINGE (ML) INJECTION AS NEEDED
Status: DISCONTINUED | OUTPATIENT
Start: 2024-12-18 | End: 2024-12-19 | Stop reason: HOSPADM

## 2024-12-18 RX ORDER — NICOTINE 21 MG/24HR
1 PATCH, TRANSDERMAL 24 HOURS TRANSDERMAL
Status: DISCONTINUED | OUTPATIENT
Start: 2024-12-18 | End: 2024-12-19 | Stop reason: HOSPADM

## 2024-12-18 RX ORDER — PANTOPRAZOLE SODIUM 40 MG/1
40 TABLET, DELAYED RELEASE ORAL DAILY
Status: DISCONTINUED | OUTPATIENT
Start: 2024-12-19 | End: 2024-12-19 | Stop reason: HOSPADM

## 2024-12-18 RX ORDER — MAGNESIUM SULFATE HEPTAHYDRATE 40 MG/ML
2 INJECTION, SOLUTION INTRAVENOUS ONCE
Status: COMPLETED | OUTPATIENT
Start: 2024-12-18 | End: 2024-12-18

## 2024-12-18 RX ORDER — ALUMINA, MAGNESIA, AND SIMETHICONE 2400; 2400; 240 MG/30ML; MG/30ML; MG/30ML
15 SUSPENSION ORAL EVERY 6 HOURS PRN
Status: DISCONTINUED | OUTPATIENT
Start: 2024-12-18 | End: 2024-12-19 | Stop reason: HOSPADM

## 2024-12-18 RX ORDER — QUETIAPINE FUMARATE 50 MG/1
50 TABLET, FILM COATED ORAL NIGHTLY PRN
COMMUNITY

## 2024-12-18 RX ORDER — POLYETHYLENE GLYCOL 3350 17 G/17G
17 POWDER, FOR SOLUTION ORAL DAILY PRN
Status: DISCONTINUED | OUTPATIENT
Start: 2024-12-18 | End: 2024-12-18

## 2024-12-18 RX ORDER — BISACODYL 5 MG/1
5 TABLET, DELAYED RELEASE ORAL DAILY PRN
Status: DISCONTINUED | OUTPATIENT
Start: 2024-12-18 | End: 2024-12-18

## 2024-12-18 RX ORDER — ONDANSETRON 2 MG/ML
4 INJECTION INTRAMUSCULAR; INTRAVENOUS EVERY 6 HOURS PRN
Status: DISCONTINUED | OUTPATIENT
Start: 2024-12-18 | End: 2024-12-19 | Stop reason: HOSPADM

## 2024-12-18 RX ORDER — TAMSULOSIN HYDROCHLORIDE 0.4 MG/1
0.4 CAPSULE ORAL 2 TIMES DAILY
Status: DISCONTINUED | OUTPATIENT
Start: 2024-12-18 | End: 2024-12-19 | Stop reason: HOSPADM

## 2024-12-18 RX ORDER — ATORVASTATIN CALCIUM 40 MG/1
80 TABLET, FILM COATED ORAL DAILY
Status: DISCONTINUED | OUTPATIENT
Start: 2024-12-19 | End: 2024-12-19 | Stop reason: HOSPADM

## 2024-12-18 RX ORDER — SODIUM CHLORIDE 9 MG/ML
40 INJECTION, SOLUTION INTRAVENOUS AS NEEDED
Status: DISCONTINUED | OUTPATIENT
Start: 2024-12-18 | End: 2024-12-19 | Stop reason: HOSPADM

## 2024-12-18 RX ORDER — SODIUM CHLORIDE 0.9 % (FLUSH) 0.9 %
10 SYRINGE (ML) INJECTION EVERY 12 HOURS SCHEDULED
Status: DISCONTINUED | OUTPATIENT
Start: 2024-12-18 | End: 2024-12-19 | Stop reason: HOSPADM

## 2024-12-18 RX ORDER — POTASSIUM CHLORIDE 1500 MG/1
40 TABLET, EXTENDED RELEASE ORAL ONCE
Status: COMPLETED | OUTPATIENT
Start: 2024-12-18 | End: 2024-12-18

## 2024-12-18 RX ORDER — METOPROLOL TARTRATE 25 MG/1
12.5 TABLET, FILM COATED ORAL 2 TIMES DAILY
COMMUNITY

## 2024-12-18 RX ORDER — ALBUTEROL SULFATE 0.83 MG/ML
2.5 SOLUTION RESPIRATORY (INHALATION) EVERY 6 HOURS PRN
Status: DISCONTINUED | OUTPATIENT
Start: 2024-12-18 | End: 2024-12-19 | Stop reason: HOSPADM

## 2024-12-18 RX ORDER — MEMANTINE HYDROCHLORIDE 5 MG/1
5 TABLET ORAL EVERY 12 HOURS SCHEDULED
Status: DISCONTINUED | OUTPATIENT
Start: 2024-12-18 | End: 2024-12-19 | Stop reason: HOSPADM

## 2024-12-18 RX ORDER — TAMSULOSIN HYDROCHLORIDE 0.4 MG/1
1 CAPSULE ORAL 2 TIMES DAILY
COMMUNITY

## 2024-12-18 RX ORDER — BISACODYL 10 MG
10 SUPPOSITORY, RECTAL RECTAL DAILY PRN
Status: DISCONTINUED | OUTPATIENT
Start: 2024-12-18 | End: 2024-12-18

## 2024-12-18 RX ORDER — WARFARIN SODIUM 4 MG/1
4 TABLET ORAL
Status: DISCONTINUED | OUTPATIENT
Start: 2024-12-18 | End: 2024-12-19 | Stop reason: HOSPADM

## 2024-12-18 RX ORDER — MIRTAZAPINE 15 MG/1
30 TABLET, FILM COATED ORAL NIGHTLY
Status: DISCONTINUED | OUTPATIENT
Start: 2024-12-18 | End: 2024-12-19 | Stop reason: HOSPADM

## 2024-12-18 RX ORDER — MAGNESIUM SULFATE HEPTAHYDRATE 40 MG/ML
4 INJECTION, SOLUTION INTRAVENOUS EVERY 4 HOURS
Status: COMPLETED | OUTPATIENT
Start: 2024-12-18 | End: 2024-12-19

## 2024-12-18 RX ORDER — WARFARIN SODIUM 2 MG/1
2 TABLET ORAL
Status: DISCONTINUED | OUTPATIENT
Start: 2024-12-19 | End: 2024-12-19 | Stop reason: HOSPADM

## 2024-12-18 RX ORDER — AMOXICILLIN 250 MG
2 CAPSULE ORAL 2 TIMES DAILY PRN
Status: DISCONTINUED | OUTPATIENT
Start: 2024-12-18 | End: 2024-12-18

## 2024-12-18 RX ORDER — MAGNESIUM SULFATE HEPTAHYDRATE 40 MG/ML
2 INJECTION, SOLUTION INTRAVENOUS
Status: COMPLETED | OUTPATIENT
Start: 2024-12-18 | End: 2024-12-18

## 2024-12-18 RX ORDER — FERROUS SULFATE 325(65) MG
325 TABLET ORAL
Status: DISCONTINUED | OUTPATIENT
Start: 2024-12-19 | End: 2024-12-19 | Stop reason: HOSPADM

## 2024-12-18 RX ADMIN — MAGNESIUM SULFATE HEPTAHYDRATE 2 G: 40 INJECTION, SOLUTION INTRAVENOUS at 23:08

## 2024-12-18 RX ADMIN — QUETIAPINE FUMARATE 50 MG: 25 TABLET ORAL at 21:17

## 2024-12-18 RX ADMIN — NICOTINE 1 PATCH: 21 PATCH TRANSDERMAL at 16:02

## 2024-12-18 RX ADMIN — Medication 12.5 MG: at 21:17

## 2024-12-18 RX ADMIN — POTASSIUM CHLORIDE 40 MEQ: 1500 TABLET, EXTENDED RELEASE ORAL at 15:34

## 2024-12-18 RX ADMIN — WARFARIN SODIUM 4 MG: 4 TABLET ORAL at 21:18

## 2024-12-18 RX ADMIN — MIRTAZAPINE 30 MG: 15 TABLET, FILM COATED ORAL at 21:17

## 2024-12-18 RX ADMIN — TAMSULOSIN HYDROCHLORIDE 0.4 MG: 0.4 CAPSULE ORAL at 21:18

## 2024-12-18 RX ADMIN — MAGNESIUM SULFATE IN WATER FOR 2 G: 40 INJECTION INTRAVENOUS at 15:23

## 2024-12-18 RX ADMIN — MEMANTINE HYDROCHLORIDE 5 MG: 5 TABLET, FILM COATED ORAL at 21:17

## 2024-12-18 NOTE — ED PROVIDER NOTES
Subjective   History of Present Illness  64-year-old male presents emergency room with complaints of low magnesium.  Patient has no complaints.  No paresthesias of skin no numbness tingling..  Patient states that he ran out of his magnesium supplement at home        Review of Systems   Constitutional:  Negative for chills, fatigue and fever.   Neurological:  Negative for numbness.       Past Medical History:   Diagnosis Date    Acid reflux     Atrial fibrillation     Enlarged prostate     Headache, tension-type     Hyperlipidemia     Lung nodule     PFO (patent foramen ovale)     Stroke 03/2021    LOSS OF PERIPHERAL VISION    Stroke     7/2024       Allergies   Allergen Reactions    Alpha-Gal Hives    Adhesive Tape Rash    Amoxicillin-Pot Clavulanate Other (See Comments) and GI Intolerance     Upset stomach       Past Surgical History:   Procedure Laterality Date    APPENDECTOMY      BRONCHOSCOPY Bilateral 4/8/2021    Procedure: BRONCHOSCOPY ENDOBRONCHIAL ULTRASOUND WITH FNA'S;  Surgeon: Sam Tony MD;  Location: [facility] ENDOSCOPY;  Service: Pulmonary;  Laterality: Bilateral;  PRE- LYMPHADENOPATHY  POST- SAME    CARDIAC CATHETERIZATION N/A 10/29/2021    Procedure: Patent foramen ovale closure  ABBOTT;  Surgeon: Sivakumar Chatman MD;  Location: [facility] CATH INVASIVE LOCATION;  Service: Cardiology;  Laterality: N/A;    KNEE ARTHROSCOPY      SHOULDER ARTHROSCOPY         Family History   Problem Relation Age of Onset    Heart disease Mother     Diabetes Mother     Dementia Father     Colon cancer Brother     No Known Problems Daughter     No Known Problems Daughter     No Known Problems Daughter     No Known Problems Son     Malig Hyperthermia Neg Hx        Social History     Socioeconomic History    Marital status:    Tobacco Use    Smoking status: Every Day     Current packs/day: 1.00     Average packs/day: 1 pack/day for 44.0 years (44.0 ttl pk-yrs)     Types: Cigarettes     Start date: 1981     "Smokeless tobacco: Never    Tobacco comments:     Patient advised to stop smoking   Vaping Use    Vaping status: Never Used   Substance and Sexual Activity    Alcohol use: Not Currently     Comment: occasionally    Drug use: Not Currently    Sexual activity: Defer           Objective   Physical Exam  Vitals and nursing note reviewed.   Constitutional:       Appearance: Normal appearance.   HENT:      Head: Normocephalic and atraumatic.   Eyes:      General: No scleral icterus.     Pupils: Pupils are equal, round, and reactive to light.   Cardiovascular:      Rate and Rhythm: Normal rate and regular rhythm.   Pulmonary:      Effort: Pulmonary effort is normal.      Breath sounds: Normal breath sounds.   Abdominal:      General: Bowel sounds are normal.      Palpations: Abdomen is soft.   Skin:     General: Skin is warm and dry.   Neurological:      General: No focal deficit present.      Mental Status: He is alert and oriented to person, place, and time.   Psychiatric:         Mood and Affect: Mood normal.         Behavior: Behavior normal.         Procedures           ED Course    /67 (BP Location: Right arm, Patient Position: Sitting)   Pulse 70   Temp 98 °F (36.7 °C) (Oral)   Resp 17   Ht 188 cm (74\")   Wt 67.1 kg (147 lb 14.9 oz)   SpO2 97%   BMI 18.99 kg/m²   Labs Reviewed   COMPREHENSIVE METABOLIC PANEL - Abnormal; Notable for the following components:       Result Value    BUN 4 (*)     Potassium 3.0 (*)     CO2 30.9 (*)     Calcium 8.0 (*)     Alkaline Phosphatase 138 (*)     BUN/Creatinine Ratio 4.9 (*)     All other components within normal limits    Narrative:     GFR Categories in Chronic Kidney Disease (CKD)      GFR Category          GFR (mL/min/1.73)    Interpretation  G1                     90 or greater         Normal or high (1)  G2                      60-89                Mild decrease (1)  G3a                   45-59                Mild to moderate decrease  G3b                   " 30-44                Moderate to severe decrease  G4                    15-29                Severe decrease  G5                    14 or less           Kidney failure          (1)In the absence of evidence of kidney disease, neither GFR category G1 or G2 fulfill the criteria for CKD.    eGFR calculation 2021 CKD-EPI creatinine equation, which does not include race as a factor   MAGNESIUM - Abnormal; Notable for the following components:    Magnesium 0.8 (*)     All other components within normal limits   CBC WITH AUTO DIFFERENTIAL - Abnormal; Notable for the following components:    Neutrophil % 39.2 (*)     Eosinophil % 6.8 (*)     All other components within normal limits   CBC AND DIFFERENTIAL    Narrative:     The following orders were created for panel order CBC & Differential.  Procedure                               Abnormality         Status                     ---------                               -----------         ------                     CBC Auto Differential[[id number]]        Abnormal            Final result                 Please view results for these tests on the individual orders.   EXTRA TUBES    Narrative:     The following orders were created for panel order Extra Tubes.  Procedure                               Abnormality         Status                     ---------                               -----------         ------                     Gold Top - SST[[id number]]                                   Final result               Light Blue Top[[id number]]                                   Final result                 Please view results for these tests on the individual orders.   GOLD TOP - SST   LIGHT BLUE TOP     Medications   nicotine (NICODERM CQ) 21 MG/24HR patch 1 patch (1 patch Transdermal Medication Applied 12/18/24 1602)   Potassium Replacement - Follow Nurse / BPA Driven Protocol (has no administration in time range)   Magnesium Standard Dose Replacement - Follow Nurse / BPA Driven  Protocol (has no administration in time range)   Phosphorus Replacement - Follow Nurse / BPA Driven Protocol (has no administration in time range)   Calcium Replacement - Follow Nurse / BPA Driven Protocol (has no administration in time range)   magnesium sulfate 2g/50 mL (PREMIX) infusion (2 g Intravenous New Bag 12/18/24 1523)   potassium chloride (KLOR-CON M20) CR tablet 40 mEq (40 mEq Oral Given 12/18/24 1534)      No radiology results for the last day                                                     Medical Decision Making  64-year-old male was present to the emergency room with complaints of abnormal labs.  Patient states that his family doctor checked his labs yesterday and his magnesium and potassium were low.  Patient has no complaints.  Physical exam HEENT is normocephalic and nontraumatic lungs are clear to auscultation bilaterally heart regular rate and rhythm without murmur abdomen soft nontender nondistended.  Skin is warm and dry on cranial nerves II to XII are grossly intact.  All labs from yesterday were reviewed new labs from today CBC WBC is 5.16 hemoglobin 14.5 hematocrit is 44.7 with platelets of 189.  Magnesium is 0.8 CMP glucose of 92 BUN 4 creatinine is 0.82 sodium 144 potassium is 3.0.  Due to the decrease in magnesium may decrease her potassium magnesium and potassium supplements were given in the ER setting.   2 g of magnesium and 40 mill equivalents of potassium given.  Discussed diagnosis of hypomagnesia and hypokalemia with patient advised admission to recheck labs in morning patient agreed subsequently patient was admitted to observation in stable condition.  Patient's ER course treatment plan and disposition discussed with Dr Cy Marie.    Problems Addressed:  Hypokalemia: complicated acute illness or injury  Hypomagnesemia: complicated acute illness or injury    Amount and/or Complexity of Data Reviewed  Labs: ordered.    Risk  OTC drugs.  Prescription drug management.  Decision  regarding hospitalization.        Final diagnoses:   Hypokalemia   Hypomagnesemia       ED Disposition  ED Disposition       ED Disposition   Decision to Admit    Condition   --    Comment   --               No follow-up provider specified.       Medication List      No changes were made to your prescriptions during this visit.            Froilan Chacon PA-C  12/18/24 4912

## 2024-12-19 ENCOUNTER — READMISSION MANAGEMENT (OUTPATIENT)
Dept: CALL CENTER | Facility: HOSPITAL | Age: 64
End: 2024-12-19
Payer: MEDICARE

## 2024-12-19 VITALS
BODY MASS INDEX: 20.03 KG/M2 | SYSTOLIC BLOOD PRESSURE: 149 MMHG | HEART RATE: 71 BPM | RESPIRATION RATE: 16 BRPM | OXYGEN SATURATION: 96 % | WEIGHT: 156.09 LBS | DIASTOLIC BLOOD PRESSURE: 68 MMHG | TEMPERATURE: 98 F | HEIGHT: 74 IN

## 2024-12-19 DIAGNOSIS — E83.42 HYPOMAGNESEMIA: ICD-10-CM

## 2024-12-19 LAB
ANION GAP SERPL CALCULATED.3IONS-SCNC: 8.2 MMOL/L (ref 5–15)
BASOPHILS # BLD AUTO: 0.09 10*3/MM3 (ref 0–0.2)
BASOPHILS NFR BLD AUTO: 1.2 % (ref 0–1.5)
BUN SERPL-MCNC: 3 MG/DL (ref 8–23)
BUN/CREAT SERPL: 4.2 (ref 7–25)
CALCIUM SPEC-SCNC: 8.1 MG/DL (ref 8.6–10.5)
CHLORIDE SERPL-SCNC: 105 MMOL/L (ref 98–107)
CO2 SERPL-SCNC: 26.8 MMOL/L (ref 22–29)
CREAT SERPL-MCNC: 0.72 MG/DL (ref 0.76–1.27)
DEPRECATED RDW RBC AUTO: 42 FL (ref 37–54)
EGFRCR SERPLBLD CKD-EPI 2021: 100.8 ML/MIN/1.73
EOSINOPHIL # BLD AUTO: 0.4 10*3/MM3 (ref 0–0.4)
EOSINOPHIL NFR BLD AUTO: 5.3 % (ref 0.3–6.2)
ERYTHROCYTE [DISTWIDTH] IN BLOOD BY AUTOMATED COUNT: 13.2 % (ref 12.3–15.4)
GLUCOSE SERPL-MCNC: 120 MG/DL (ref 65–99)
HCT VFR BLD AUTO: 41.9 % (ref 37.5–51)
HGB BLD-MCNC: 14 G/DL (ref 13–17.7)
IMM GRANULOCYTES # BLD AUTO: 0.01 10*3/MM3 (ref 0–0.05)
IMM GRANULOCYTES NFR BLD AUTO: 0.1 % (ref 0–0.5)
INR PPP: 2.13 (ref 2–3)
LYMPHOCYTES # BLD AUTO: 1.99 10*3/MM3 (ref 0.7–3.1)
LYMPHOCYTES NFR BLD AUTO: 26.4 % (ref 19.6–45.3)
MAGNESIUM SERPL-MCNC: 2.9 MG/DL (ref 1.6–2.4)
MCH RBC QN AUTO: 28.8 PG (ref 26.6–33)
MCHC RBC AUTO-ENTMCNC: 33.4 G/DL (ref 31.5–35.7)
MCV RBC AUTO: 86.2 FL (ref 79–97)
MONOCYTES # BLD AUTO: 0.78 10*3/MM3 (ref 0.1–0.9)
MONOCYTES NFR BLD AUTO: 10.4 % (ref 5–12)
NEUTROPHILS NFR BLD AUTO: 4.26 10*3/MM3 (ref 1.7–7)
NEUTROPHILS NFR BLD AUTO: 56.6 % (ref 42.7–76)
NRBC BLD AUTO-RTO: 0 /100 WBC (ref 0–0.2)
PLATELET # BLD AUTO: 196 10*3/MM3 (ref 140–450)
PMV BLD AUTO: 9.2 FL (ref 6–12)
POTASSIUM SERPL-SCNC: 4 MMOL/L (ref 3.5–5.2)
PROTHROMBIN TIME: 23.7 SECONDS (ref 19.4–28.5)
RBC # BLD AUTO: 4.86 10*6/MM3 (ref 4.14–5.8)
SODIUM SERPL-SCNC: 140 MMOL/L (ref 136–145)
WBC NRBC COR # BLD AUTO: 7.53 10*3/MM3 (ref 3.4–10.8)

## 2024-12-19 PROCEDURE — 85610 PROTHROMBIN TIME: CPT | Performed by: PHYSICIAN ASSISTANT

## 2024-12-19 PROCEDURE — 80048 BASIC METABOLIC PNL TOTAL CA: CPT | Performed by: PHYSICIAN ASSISTANT

## 2024-12-19 PROCEDURE — 83735 ASSAY OF MAGNESIUM: CPT | Performed by: PHYSICIAN ASSISTANT

## 2024-12-19 PROCEDURE — 96366 THER/PROPH/DIAG IV INF ADDON: CPT

## 2024-12-19 PROCEDURE — 25010000002 MAGNESIUM SULFATE 4 GM/100ML SOLUTION: Performed by: EMERGENCY MEDICINE

## 2024-12-19 PROCEDURE — 85025 COMPLETE CBC W/AUTO DIFF WBC: CPT | Performed by: PHYSICIAN ASSISTANT

## 2024-12-19 PROCEDURE — G0378 HOSPITAL OBSERVATION PER HR: HCPCS

## 2024-12-19 RX ORDER — LANOLIN ALCOHOL/MO/W.PET/CERES
400 CREAM (GRAM) TOPICAL 2 TIMES DAILY
Qty: 60 TABLET | Refills: 0 | Status: SHIPPED | OUTPATIENT
Start: 2024-12-19

## 2024-12-19 RX ORDER — LANOLIN ALCOHOL/MO/W.PET/CERES
400 CREAM (GRAM) TOPICAL DAILY
Qty: 14 TABLET | Refills: 0 | Status: SHIPPED | OUTPATIENT
Start: 2024-12-19

## 2024-12-19 RX ADMIN — POTASSIUM CHLORIDE 40 MEQ: 1500 TABLET, EXTENDED RELEASE ORAL at 04:47

## 2024-12-19 RX ADMIN — Medication 10 ML: at 09:34

## 2024-12-19 RX ADMIN — PANTOPRAZOLE SODIUM 40 MG: 40 TABLET, DELAYED RELEASE ORAL at 09:34

## 2024-12-19 RX ADMIN — Medication 10 ML: at 09:33

## 2024-12-19 RX ADMIN — MAGNESIUM SULFATE HEPTAHYDRATE 4 G: 40 INJECTION, SOLUTION INTRAVENOUS at 00:12

## 2024-12-19 RX ADMIN — Medication 12.5 MG: at 09:34

## 2024-12-19 RX ADMIN — ATORVASTATIN CALCIUM 80 MG: 40 TABLET, FILM COATED ORAL at 09:33

## 2024-12-19 RX ADMIN — POTASSIUM CHLORIDE 40 MEQ: 1500 TABLET, EXTENDED RELEASE ORAL at 09:34

## 2024-12-19 RX ADMIN — POTASSIUM CHLORIDE 40 MEQ: 1500 TABLET, EXTENDED RELEASE ORAL at 00:12

## 2024-12-19 RX ADMIN — MAGNESIUM SULFATE HEPTAHYDRATE 4 G: 40 INJECTION, SOLUTION INTRAVENOUS at 04:47

## 2024-12-19 RX ADMIN — MEMANTINE HYDROCHLORIDE 5 MG: 5 TABLET, FILM COATED ORAL at 09:33

## 2024-12-19 RX ADMIN — NICOTINE 1 PATCH: 21 PATCH TRANSDERMAL at 09:36

## 2024-12-19 RX ADMIN — FERROUS SULFATE TAB 325 MG (65 MG ELEMENTAL FE) 325 MG: 325 (65 FE) TAB at 09:33

## 2024-12-19 RX ADMIN — TAMSULOSIN HYDROCHLORIDE 0.4 MG: 0.4 CAPSULE ORAL at 09:34

## 2024-12-19 NOTE — PLAN OF CARE
Goal Outcome Evaluation:      AOX4 K 3.4 Mg 0.8, IV Mag 4 mg over 4 hours administered, PO Potassium given.  Labs to drawn after subsequent doses of MG and K Chlor.

## 2024-12-19 NOTE — DISCHARGE SUMMARY
[facility]    Adi Kaminski MD    CHIEF COMPLAINT:     Abnormal labs    HISTORY OF PRESENT ILLNESS:    Abnormal Lab      ED  64-year-old male presents emergency room with complaints of low magnesium. Patient has no complaints. No paresthesias of skin no numbness tingling.. Patient states that he ran out of his magnesium supplement at home     Past Medical History:   Diagnosis Date    Acid reflux     Atrial fibrillation     Enlarged prostate     Headache, tension-type     Hyperlipidemia     Lung nodule     PFO (patent foramen ovale)     Stroke 03/2021    LOSS OF PERIPHERAL VISION    Stroke     7/2024     Past Surgical History:   Procedure Laterality Date    APPENDECTOMY      BRONCHOSCOPY Bilateral 4/8/2021    Procedure: BRONCHOSCOPY ENDOBRONCHIAL ULTRASOUND WITH FNA'S;  Surgeon: Sam Tony MD;  Location: [facility];  Service: Pulmonary;  Laterality: Bilateral;  PRE- LYMPHADENOPATHY  POST- SAME    CARDIAC CATHETERIZATION N/A 10/29/2021    Procedure: Patent foramen ovale closure  ABBOTT;  Surgeon: Sivakumar Chatman MD;  Location: [facility]ATH INVASIVE LOCATION;  Service: Cardiology;  Laterality: N/A;    KNEE ARTHROSCOPY      SHOULDER ARTHROSCOPY       Family History   Problem Relation Age of Onset    Heart disease Mother     Diabetes Mother     Dementia Father     Colon cancer Brother     No Known Problems Daughter     No Known Problems Daughter     No Known Problems Daughter     No Known Problems Son     Malig Hyperthermia Neg Hx      Social History     Tobacco Use    Smoking status: Every Day     Current packs/day: 1.00     Average packs/day: 1 pack/day for 44.0 years (44.0 ttl pk-yrs)     Types: Cigarettes     Start date: 1981    Smokeless tobacco: Never    Tobacco comments:     Patient advised to stop smoking   Vaping Use    Vaping status: Never Used   Substance Use Topics    Alcohol use: Not Currently     Comment: occasionally    Drug use: Not Currently      Medications Prior to Admission   Medication Sig Dispense Refill Last Dose/Taking    atorvastatin (LIPITOR) 80 MG tablet Take 1 tablet by mouth once daily 90 tablet 0 12/18/2024    Magnesium Oxide -Mg Supplement (MAGnesium-Oxide) 400 (240 Mg) MG tablet Take 1 tablet by mouth 2 (Two) Times a Day. 60 tablet 0 12/18/2024    memantine (NAMENDA) 5 MG tablet Take 1 tablet by mouth Every 12 (Twelve) Hours. 180 tablet 0 12/18/2024    metoprolol tartrate (LOPRESSOR) 25 MG tablet Take 0.5 tablets by mouth 2 (Two) Times a Day.   12/18/2024    pantoprazole (PROTONIX) 40 MG EC tablet Take 1 tablet by mouth Daily. 90 tablet 1 12/18/2024    potassium chloride (MICRO-K) 10 MEQ CR capsule Take 1 capsule by mouth 2 (Two) Times a Day. 180 capsule 0 12/18/2024    QUEtiapine (SEROquel) 50 MG tablet Take 1 tablet by mouth At Night As Needed.   12/17/2024    tamsulosin (FLOMAX) 0.4 MG capsule 24 hr capsule Take 1 capsule by mouth 2 (Two) Times a Day.   12/18/2024    warfarin (COUMADIN) 2 MG tablet Take 1 tablet by mouth 3 (Three) Times a Week. Roberto Maldonado, Sat   12/17/2024    albuterol sulfate  (90 Base) MCG/ACT inhaler Inhale 2 puffs Every 4 (Four) Hours As Needed for Wheezing. 18 g 3     cyanocobalamin 100 MCG tablet GIVE 1 TABLET BY MOUTH ONCE DAILY 30 tablet 0     FeroSul 325 (65 Fe) MG tablet Take 1 tablet by mouth once daily with breakfast 30 tablet 0     hydrOXYzine (ATARAX) 25 MG tablet Take 1 tablet by mouth Every 8 (Eight) Hours As Needed for Anxiety. 360 tablet 1     mirtazapine (Remeron) 30 MG tablet Take 1 tablet by mouth Every Night. 90 tablet 1     warfarin (COUMADIN) 4 MG tablet Take 1 tablet by mouth 4 (Four) Times a Week. Mon, Wed, Fri, Sun        Allergies:  Alpha-gal, Adhesive tape, and Amoxicillin-pot clavulanate    Immunization History   Administered Date(s) Administered    Flu Vaccine Quad PF >36MO 10/17/2017    Pneumococcal Conjugate 20-Valent (PCV20) 01/26/2024    Tdap 05/17/2017           REVIEW OF  SYSTEMS:    Review of Systems   All other systems reviewed and are negative.          Vital Signs  Temp:  [97.9 °F (36.6 °C)-98 °F (36.7 °C)] 98 °F (36.7 °C)  Heart Rate:  [65-79] 71  Resp:  [11-20] 16  BP: (141-163)/(63-72) 149/68          Physical Exam:  Physical Exam  Constitutional:       Appearance: Normal appearance.   Cardiovascular:      Rate and Rhythm: Normal rate and regular rhythm.   Pulmonary:      Effort: Pulmonary effort is normal.      Breath sounds: Normal breath sounds.   Neurological:      General: No focal deficit present.      Mental Status: He is alert and oriented to person, place, and time. Mental status is at baseline.   Psychiatric:         Mood and Affect: Mood normal.         Behavior: Behavior normal.       Emotional Behavior:    wnl   Debilities:   None      Results Review:    I reviewed the patient's new clinical results.  Lab Results (most recent)       Procedure Component Value Units Date/Time    Magnesium [[id number]]  (Abnormal) Collected: 12/19/24 1054    Specimen: Blood Updated: 12/19/24 1137     Magnesium 2.9 mg/dL     Basic Metabolic Panel [[id number]]  (Abnormal) Collected: 12/19/24 1054    Specimen: Blood Updated: 12/19/24 1136     Glucose 120 mg/dL      BUN 3 mg/dL      Creatinine 0.72 mg/dL      Sodium 140 mmol/L      Potassium 4.0 mmol/L      Chloride 105 mmol/L      CO2 26.8 mmol/L      Calcium 8.1 mg/dL      BUN/Creatinine Ratio 4.2     Anion Gap 8.2 mmol/L      eGFR 100.8 mL/min/1.73     Narrative:      GFR Categories in Chronic Kidney Disease (CKD)      GFR Category          GFR (mL/min/1.73)    Interpretation  G1                     90 or greater         Normal or high (1)  G2                      60-89                Mild decrease (1)  G3a                   45-59                Mild to moderate decrease  G3b                   30-44                Moderate to severe decrease  G4                    15-29                Severe decrease  G5                    14 or less            Kidney failure          (1)In the absence of evidence of kidney disease, neither GFR category G1 or G2 fulfill the criteria for CKD.    eGFR calculation 2021 CKD-EPI creatinine equation, which does not include race as a factor    Protime-INR [[id number]]  (Normal) Collected: 12/19/24 1054    Specimen: Blood Updated: 12/19/24 1111     Protime 23.7 Seconds      INR 2.13    CBC & Differential [[id number]]  (Normal) Collected: 12/19/24 1054    Specimen: Blood Updated: 12/19/24 1104    Narrative:      The following orders were created for panel order CBC & Differential.  Procedure                               Abnormality         Status                     ---------                               -----------         ------                     CBC Auto Differential[[id number]]        Normal              Final result                 Please view results for these tests on the individual orders.    CBC Auto Differential [[id number]]  (Normal) Collected: 12/19/24 1054    Specimen: Blood Updated: 12/19/24 1104     WBC 7.53 10*3/mm3      RBC 4.86 10*6/mm3      Hemoglobin 14.0 g/dL      Hematocrit 41.9 %      MCV 86.2 fL      MCH 28.8 pg      MCHC 33.4 g/dL      RDW 13.2 %      RDW-SD 42.0 fl      MPV 9.2 fL      Platelets 196 10*3/mm3      Neutrophil % 56.6 %      Lymphocyte % 26.4 %      Monocyte % 10.4 %      Eosinophil % 5.3 %      Basophil % 1.2 %      Immature Grans % 0.1 %      Neutrophils, Absolute 4.26 10*3/mm3      Lymphocytes, Absolute 1.99 10*3/mm3      Monocytes, Absolute 0.78 10*3/mm3      Eosinophils, Absolute 0.40 10*3/mm3      Basophils, Absolute 0.09 10*3/mm3      Immature Grans, Absolute 0.01 10*3/mm3      nRBC 0.0 /100 WBC     Protime-INR [[id number]]  (Normal) Collected: 12/18/24 1414    Specimen: Blood Updated: 12/18/24 1850     Protime 26.4 Seconds      INR 2.42    Magnesium [[id number]]  (Abnormal) Collected: 12/18/24 1414    Specimen: Blood Updated: 12/18/24 1451     Magnesium 0.8 mg/dL     Comprehensive  Metabolic Panel [[id number]]  (Abnormal) Collected: 12/18/24 1414    Specimen: Blood Updated: 12/18/24 1447     Glucose 92 mg/dL      BUN 4 mg/dL      Creatinine 0.82 mg/dL      Sodium 144 mmol/L      Potassium 3.0 mmol/L      Chloride 103 mmol/L      CO2 30.9 mmol/L      Calcium 8.0 mg/dL      Total Protein 7.0 g/dL      Albumin 3.9 g/dL      ALT (SGPT) 15 U/L      AST (SGOT) 34 U/L      Alkaline Phosphatase 138 U/L      Total Bilirubin 0.8 mg/dL      Globulin 3.1 gm/dL      A/G Ratio 1.3 g/dL      BUN/Creatinine Ratio 4.9     Anion Gap 10.1 mmol/L      eGFR 96.9 mL/min/1.73     Narrative:      GFR Categories in Chronic Kidney Disease (CKD)      GFR Category          GFR (mL/min/1.73)    Interpretation  G1                     90 or greater         Normal or high (1)  G2                      60-89                Mild decrease (1)  G3a                   45-59                Mild to moderate decrease  G3b                   30-44                Moderate to severe decrease  G4                    15-29                Severe decrease  G5                    14 or less           Kidney failure          (1)In the absence of evidence of kidney disease, neither GFR category G1 or G2 fulfill the criteria for CKD.    eGFR calculation 2021 CKD-EPI creatinine equation, which does not include race as a factor    Extra Tubes [[id number]] Collected: 12/18/24 1414    Specimen: Blood Updated: 12/18/24 1431    Narrative:      The following orders were created for panel order Extra Tubes.  Procedure                               Abnormality         Status                     ---------                               -----------         ------                     Gold Top - SST[[id number]]                                   Final result               Light Blue Top[[id number]]                                   Final result                 Please view results for these tests on the individual orders.    Gold Top - SST [[id number]] Collected:  12/18/24 1414    Specimen: Blood Updated: 12/18/24 1431     Extra Tube Hold for add-ons.     Comment: Auto resulted.       Light Blue Top [[id number]] Collected: 12/18/24 1414    Specimen: Blood Updated: 12/18/24 1431     Extra Tube Hold for add-ons.     Comment: Auto resulted       CBC & Differential [[id number]]  (Abnormal) Collected: 12/18/24 1414    Specimen: Blood Updated: 12/18/24 1426    Narrative:      The following orders were created for panel order CBC & Differential.  Procedure                               Abnormality         Status                     ---------                               -----------         ------                     CBC Auto Differential[[id number]]        Abnormal            Final result                 Please view results for these tests on the individual orders.    CBC Auto Differential [[id number]]  (Abnormal) Collected: 12/18/24 1414    Specimen: Blood Updated: 12/18/24 1426     WBC 5.16 10*3/mm3      RBC 5.16 10*6/mm3      Hemoglobin 14.5 g/dL      Hematocrit 44.7 %      MCV 86.6 fL      MCH 28.1 pg      MCHC 32.4 g/dL      RDW 13.5 %      RDW-SD 43.3 fl      MPV 9.2 fL      Platelets 189 10*3/mm3      Neutrophil % 39.2 %      Lymphocyte % 42.1 %      Monocyte % 10.5 %      Eosinophil % 6.8 %      Basophil % 1.2 %      Immature Grans % 0.2 %      Neutrophils, Absolute 2.03 10*3/mm3      Lymphocytes, Absolute 2.17 10*3/mm3      Monocytes, Absolute 0.54 10*3/mm3      Eosinophils, Absolute 0.35 10*3/mm3      Basophils, Absolute 0.06 10*3/mm3      Immature Grans, Absolute 0.01 10*3/mm3      nRBC 0.0 /100 WBC             Imaging Results (Most Recent)       None          reviewed    ECG/EMG Results (most recent)       Procedure Component Value Units Date/Time    Telemetry Scan [[id number]] Resulted: 12/18/24     Updated: 12/19/24 0948    Telemetry Scan [[id number]] Resulted: 12/18/24     Updated: 12/19/24 1047    Telemetry Scan [[id number]] Resulted: 12/18/24     Updated: 12/19/24 1224           reviewed    Results for orders placed during the hospital encounter of 10/24/24    Duplex Carotid Ultrasound CAR    Interpretation Summary    Right internal carotid artery demonstrates a less than 50% stenosis.    Antegrade right vertebral flow.    Antegrade right vertebral flow.    Left internal carotid artery demonstrates a less than 50% stenosis.    Antegrade left vertebral flow.    Antegrade left vertebral flow.      Results for orders placed during the hospital encounter of 10/24/24    Adult Transthoracic Echo Complete W/ Cont if Necessary Per Protocol    Interpretation Summary    Left ventricular ejection fraction appears to be 61 - 65%.    Left ventricular diastolic function was normal.    Estimated right ventricular systolic pressure from tricuspid regurgitation is normal (<35 mmHg).    Conclusion      Normal LV size and contractility EF of 60 to 65%  Normal RV size  Normal atrial size  Pulmonic valve is not well visualized.  Aortic valve leaflets are thickened and mildly sclerosed.  Dopplers are normal.  Mitral valve, tricuspid valve appears structurally normal, trace tricuspid regurgitation seen.  Calculated RV systolic pressure of 29 mmHg  No pericardial effusion seen.  Proximal aorta appears normal in size.      Microbiology Results (last 10 days)       ** No results found for the last 240 hours. **            Assessment & Plan     Hypomagnesemia       Hypomagnesemia  - pt takes mag supplement at home and ran out, retail pharm did not have in stock  - cbc unremarkable  - mag .7, .8 replacement given and today is 2.9  - potassium was low a 3.0, repeat today is 4.0  - will discharge home with mag supplement    Hx of afib  - pt on warfarin  - inr 2.42, 2.13    I discussed the patients findings and my recommendations with patient and nursing staff.     Discharge Diagnosis:      Hypomagnesemia      Hospital Course  Patient is a 64 y.o. male presented with abnormal labs. Er evaluated and admitted to  observation. Cbc was normal. Mag was low at .7 replaced using protocol and today  is 2.9. potassium was low at 3.0 replaced and is 4.0 today. Pt was asymptomatic and had no complaints. Discharge discussed with pt and he is agreeable to plan. Instructed pt to return to er if symptoms reoccur or worsen.       Past Medical History:     Past Medical History:   Diagnosis Date    Acid reflux     Atrial fibrillation     Enlarged prostate     Headache, tension-type     Hyperlipidemia     Lung nodule     PFO (patent foramen ovale)     Stroke 03/2021    LOSS OF PERIPHERAL VISION    Stroke     7/2024       Past Surgical History:     Past Surgical History:   Procedure Laterality Date    APPENDECTOMY      BRONCHOSCOPY Bilateral 4/8/2021    Procedure: BRONCHOSCOPY ENDOBRONCHIAL ULTRASOUND WITH FNA'S;  Surgeon: Sam Tony MD;  Location: [facility];  Service: Pulmonary;  Laterality: Bilateral;  PRE- LYMPHADENOPATHY  POST- SAME    CARDIAC CATHETERIZATION N/A 10/29/2021    Procedure: Patent foramen ovale closure  ABBOTT;  Surgeon: Sivakumar Chatman MD;  Location: [facility] CATH INVASIVE LOCATION;  Service: Cardiology;  Laterality: N/A;    KNEE ARTHROSCOPY      SHOULDER ARTHROSCOPY         Social History:   Social History     Socioeconomic History    Marital status:    Tobacco Use    Smoking status: Every Day     Current packs/day: 1.00     Average packs/day: 1 pack/day for 44.0 years (44.0 ttl pk-yrs)     Types: Cigarettes     Start date: 1981    Smokeless tobacco: Never    Tobacco comments:     Patient advised to stop smoking   Vaping Use    Vaping status: Never Used   Substance and Sexual Activity    Alcohol use: Not Currently     Comment: occasionally    Drug use: Not Currently    Sexual activity: Defer       Procedures Performed         Consults:   Consults       No orders found for last 30 day(s).            Condition on Discharge:     Stable    Discharge Disposition  Home or Self Care    Discharge  Medications     Discharge Medications        Changes to Medications        Instructions Start Date   Magnesium Oxide -Mg Supplement 400 (240 Mg) MG tablet  Commonly known as: MAGnesium-Oxide  What changed: Another medication with the same name was added. Make sure you understand how and when to take each.   400 mg, Oral, 2 Times Daily      Magnesium Oxide -Mg Supplement 400 (240 Mg) MG tablet  What changed: You were already taking a medication with the same name, and this prescription was added. Make sure you understand how and when to take each.   400 mg, Oral, Daily             Continue These Medications        Instructions Start Date   albuterol sulfate  (90 Base) MCG/ACT inhaler  Commonly known as: PROVENTIL HFA;VENTOLIN HFA;PROAIR HFA   2 puffs, Inhalation, Every 4 Hours PRN      atorvastatin 80 MG tablet  Commonly known as: LIPITOR   80 mg, Oral, Daily      FeroSul 325 (65 Fe) MG tablet  Generic drug: ferrous sulfate   325 mg, Oral, Daily With Breakfast      hydrOXYzine 25 MG tablet  Commonly known as: ATARAX   25 mg, Oral, Every 8 Hours PRN      memantine 5 MG tablet  Commonly known as: NAMENDA   5 mg, Oral, Every 12 Hours Scheduled      metoprolol tartrate 25 MG tablet  Commonly known as: LOPRESSOR   12.5 mg, 2 Times Daily      mirtazapine 30 MG tablet  Commonly known as: Remeron   30 mg, Oral, Nightly      pantoprazole 40 MG EC tablet  Commonly known as: PROTONIX   40 mg, Oral, Daily      potassium chloride 10 MEQ CR capsule  Commonly known as: MICRO-K   10 mEq, Oral, 2 Times Daily      QUEtiapine 50 MG tablet  Commonly known as: SEROquel   50 mg, Nightly PRN      tamsulosin 0.4 MG capsule 24 hr capsule  Commonly known as: FLOMAX   1 capsule, 2 Times Daily      vitamin B-12 100 MCG tablet  Commonly known as: CYANOCOBALAMIN   100 mcg, Oral, Daily      warfarin 2 MG tablet  Commonly known as: COUMADIN   2 mg, 3 Times Weekly      warfarin 4 MG tablet  Commonly known as: COUMADIN   4 mg, 4 Times Weekly                Discharge Diet:     Activity at Discharge:     Follow-up Appointments  Future Appointments   Date Time Provider Department Center   1/6/2025  2:00 PM Mary Quiroz APRN [facility]   5/9/2025  1:30 PM Elías Smith MD [facility]     Additional Instructions for the Follow-ups that You Need to Schedule       Discharge Follow-up with PCP   As directed       Currently Documented PCP:    Adi Kaminski MD    PCP Phone Number:    [phone]     Follow Up Details: 2 weeks                Test Results Pending at Discharge  Pending Results       None             Risk for Readmission (LACE) Score: 4 (12/19/2024  6:00 AM)      Less Than 30 minutes spent in discharge activities for this patient    Signature:Electronically signed by Isabelle Berumen PA-C, 12/19/24, 12:58 PM EST.

## 2024-12-19 NOTE — OUTREACH NOTE
Prep Survey      Flowsheet Row Responses   [facility] patient discharged from? [facility]   Is LACE score < 7 ? Yes   Eligibility [facility]   Date of Admission 12/18/24   Date of Discharge 12/19/24   Discharge Disposition Home or Self Care   Discharge diagnosis Hypomagnesemia   Does the patient have one of the following disease processes/diagnoses(primary or secondary)? Other   Does the patient have Home health ordered? No   Is there a DME ordered? No   Prep survey completed? Yes            Pam LARIOS - Registered Nurse

## 2024-12-19 NOTE — TELEPHONE ENCOUNTER
Caller: SLIME CHRISTOPHER,JUAN M    Relationship: Emergency Contact    Best call back number: [phone]     Requested Prescriptions:   Requested Prescriptions     Pending Prescriptions Disp Refills    Magnesium Oxide -Mg Supplement (MAGnesium-Oxide) 400 (240 Mg) MG tablet 60 tablet 0     Sig: Take 1 tablet by mouth 2 (Two) Times a Day.        Pharmacy where request should be sent: [facility] [address] [place], [place] [phone] [place], [place] [phone] FX     Last office visit with prescribing clinician: 12/17/2024   Last telemedicine visit with prescribing clinician: Visit date not found   Next office visit with prescribing clinician: Visit date not found         Does the patient have less than a 3 day supply:  [x] Yes  [] No        Franca Perrin Rep   12/19/24 10:10 EST

## 2024-12-20 ENCOUNTER — TRANSITIONAL CARE MANAGEMENT TELEPHONE ENCOUNTER (OUTPATIENT)
Dept: CALL CENTER | Facility: HOSPITAL | Age: 64
End: 2024-12-20
Payer: MEDICARE

## 2024-12-20 NOTE — OUTREACH NOTE
Call Center TCM Note      Flowsheet Row Responses   [facility] patient discharged from? [facility]   Does the patient have one of the following disease processes/diagnoses(primary or secondary)? Other   TCM attempt successful? Yes  [vr for Nichelle, wife]   Call start time 1311   Call end time 1316   Discharge diagnosis Hypomagnesemia   Meds reviewed with patient/caregiver? Yes   Does the patient have all medications ordered at discharge? Yes   Prescription comments AVS instructions not clear regarding magnesium dosing,  pt was prescribed once daily and  picked up supply for 14 days at  but also has regular prescription available at regular pharmacy for BID--so unclear QD or BID.   Pt was taking BID according to wife, will send to PCP to request call to pt to verify instructions.   Is the patient taking all medications as directed (includes completed medication regime)? Yes   Does the patient have an appointment with their PCP within 7-14 days of discharge? No   Nursing Interventions Routed TCM call to PCP office, Patient declined scheduling/rescheduling appointment at this time   Has home health visited the patient within 72 hours of discharge? N/A   Psychosocial issues? No   Did the patient receive a copy of their discharge instructions? Yes   Nursing interventions Reviewed instructions with patient   What is the patient's perception of their health status since discharge? Returned to baseline/stable  [pt offers no complaints and reports he was asymtomatic at admission. Aware to monitor for issues or concerns, no numbness or tingling.]   Is the patient/caregiver able to teach back signs and symptoms related to disease process for when to call PCP? Yes   Is the patient/caregiver able to teach back signs and symptoms related to disease process for when to call 911? Yes   TCM call completed? Yes   Call end time 1316            Zaina Paul RN    12/20/2024, 13:20 EST

## 2024-12-26 ENCOUNTER — CLINICAL SUPPORT (OUTPATIENT)
Dept: FAMILY MEDICINE CLINIC | Facility: CLINIC | Age: 64
End: 2024-12-26
Payer: MEDICARE

## 2024-12-26 DIAGNOSIS — Z79.01 LONG TERM (CURRENT) USE OF ANTICOAGULANTS: Primary | ICD-10-CM

## 2024-12-26 DIAGNOSIS — E61.1 IRON DEFICIENCY: ICD-10-CM

## 2024-12-26 LAB — INR PPP: 2.2 (ref 2–3)

## 2024-12-26 PROCEDURE — 93793 ANTICOAG MGMT PT WARFARIN: CPT | Performed by: FAMILY MEDICINE

## 2024-12-26 RX ORDER — FERROUS SULFATE 325(65) MG
1 TABLET ORAL
Qty: 30 TABLET | Refills: 0 | Status: SHIPPED | OUTPATIENT
Start: 2024-12-26

## 2025-01-02 ENCOUNTER — CLINICAL SUPPORT (OUTPATIENT)
Dept: FAMILY MEDICINE CLINIC | Facility: CLINIC | Age: 65
End: 2025-01-02
Payer: MEDICARE

## 2025-01-02 ENCOUNTER — OFFICE VISIT (OUTPATIENT)
Dept: FAMILY MEDICINE CLINIC | Facility: CLINIC | Age: 65
End: 2025-01-02
Payer: MEDICARE

## 2025-01-02 VITALS
TEMPERATURE: 97.5 F | WEIGHT: 148.2 LBS | SYSTOLIC BLOOD PRESSURE: 110 MMHG | BODY MASS INDEX: 19.03 KG/M2 | OXYGEN SATURATION: 99 % | DIASTOLIC BLOOD PRESSURE: 70 MMHG | HEART RATE: 92 BPM

## 2025-01-02 DIAGNOSIS — Z79.01 LONG TERM CURRENT USE OF ANTICOAGULANT: Primary | ICD-10-CM

## 2025-01-02 DIAGNOSIS — E83.42 HYPOMAGNESEMIA: ICD-10-CM

## 2025-01-02 DIAGNOSIS — E87.6 HYPOKALEMIA: ICD-10-CM

## 2025-01-02 DIAGNOSIS — Z09 HOSPITAL DISCHARGE FOLLOW-UP: Primary | ICD-10-CM

## 2025-01-02 DIAGNOSIS — L89.151 PRESSURE INJURY OF SACRAL REGION, STAGE 1: ICD-10-CM

## 2025-01-02 LAB
ALBUMIN SERPL-MCNC: 4.1 G/DL (ref 3.5–5.2)
ALBUMIN/GLOB SERPL: 1.1 G/DL
ALP SERPL-CCNC: 143 U/L (ref 39–117)
ALT SERPL W P-5'-P-CCNC: 16 U/L (ref 1–41)
ANION GAP SERPL CALCULATED.3IONS-SCNC: 7.9 MMOL/L (ref 5–15)
AST SERPL-CCNC: 22 U/L (ref 1–40)
BILIRUB SERPL-MCNC: 0.4 MG/DL (ref 0–1.2)
BUN SERPL-MCNC: 6 MG/DL (ref 8–23)
BUN/CREAT SERPL: 6.5 (ref 7–25)
CALCIUM SPEC-SCNC: 9.4 MG/DL (ref 8.6–10.5)
CHLORIDE SERPL-SCNC: 101 MMOL/L (ref 98–107)
CO2 SERPL-SCNC: 28.1 MMOL/L (ref 22–29)
CREAT SERPL-MCNC: 0.93 MG/DL (ref 0.76–1.27)
EGFRCR SERPLBLD CKD-EPI 2021: 90.6 ML/MIN/1.73
GLOBULIN UR ELPH-MCNC: 3.7 GM/DL
GLUCOSE SERPL-MCNC: 86 MG/DL (ref 65–99)
INR PPP: 1.9
MAGNESIUM SERPL-MCNC: 1.9 MG/DL (ref 1.6–2.4)
POTASSIUM SERPL-SCNC: 4.5 MMOL/L (ref 3.5–5.2)
PROT SERPL-MCNC: 7.8 G/DL (ref 6–8.5)
SODIUM SERPL-SCNC: 137 MMOL/L (ref 136–145)

## 2025-01-02 PROCEDURE — 93793 ANTICOAG MGMT PT WARFARIN: CPT | Performed by: FAMILY MEDICINE

## 2025-01-02 PROCEDURE — 80053 COMPREHEN METABOLIC PANEL: CPT | Performed by: FAMILY MEDICINE

## 2025-01-02 PROCEDURE — 83735 ASSAY OF MAGNESIUM: CPT | Performed by: FAMILY MEDICINE

## 2025-01-02 NOTE — PROGRESS NOTES
Faxed in INR of 1.9. increase dose to 4mg on , wed, Thursday , Friday and Saturday. 2mg other days and recheck in 1 week.

## 2025-01-02 NOTE — PROGRESS NOTES
Transitional Care Follow Up Visit  Subjective     Sathya Benavides is a 64 y.o. male who presents for a transitional care management visit.    Within 48 business hours after discharge our office contacted him via telephone to coordinate his care and needs.      I reviewed and discussed the details of that call along with the discharge summary, hospital problems, inpatient lab results, inpatient diagnostic studies, and consultation reports with Sathya.     Current outpatient and discharge medications have been reconciled for the patient.  Reviewed by: Adi Kaminski MD          12/19/2024     6:04 PM   Date of TCM Phone Call   [facility]   Date of Admission 12/18/2024   Date of Discharge 12/19/2024   Discharge Disposition Home or Self Care     Risk for Readmission (LACE) Score: 4 (12/19/2024  6:00 AM)      History of Present Illness  Pt doing better- need to recheck labs  Pt had coumadin dose changed to 4mg Wednesday, Thursday, Friday, and Saturday and 2mg all other days- recheck INR in 1 week    Pt sees neurology on 1/6/25    Pt sees GI 2/26/25    Pt has beginnings of presacral pressure ulcer- will use foam to take pressure off area and use doghnut to prevent ulcer formation- pt and his wife will monitor the area and they will let us know if there are changes.     Course During Hospital Stay:  Pt admitted for hypomagnesemia and hypokalemia- pt given supplements and levels returned to normal- need to recheck levels.  Pt had run out of oral magnesium at home and pharmacy was out of this med.     The following portions of the patient's history were reviewed and updated as appropriate: He  has a past medical history of Acid reflux, Atrial fibrillation, Enlarged prostate, Headache, tension-type, Hyperlipidemia, Lung nodule, PFO (patent foramen ovale), Stroke (03/2021), and Stroke.  He does not have any pertinent problems on file.  He  has a past surgical history that includes Appendectomy; Knee  arthroscopy; Shoulder arthroscopy; Bronchoscopy (Bilateral, 4/8/2021); and Cardiac catheterization (N/A, 10/29/2021).  His family history includes Colon cancer in his brother; Dementia in his father; Diabetes in his mother; Heart disease in his mother; No Known Problems in his daughter, daughter, daughter, and son.  He  reports that he has been smoking cigarettes. He started smoking about 44 years ago. He has a 44 pack-year smoking history. He has never used smokeless tobacco. He reports that he does not currently use alcohol. He reports that he does not currently use drugs.  Current Outpatient Medications   Medication Sig Dispense Refill    albuterol sulfate  (90 Base) MCG/ACT inhaler Inhale 2 puffs Every 4 (Four) Hours As Needed for Wheezing. 18 g 3    atorvastatin (LIPITOR) 80 MG tablet Take 1 tablet by mouth once daily 90 tablet 0    cyanocobalamin 100 MCG tablet GIVE 1 TABLET BY MOUTH ONCE DAILY 30 tablet 0    FeroSul 325 (65 Fe) MG tablet Take 1 tablet by mouth once daily with breakfast 30 tablet 0    hydrOXYzine (ATARAX) 25 MG tablet Take 1 tablet by mouth Every 8 (Eight) Hours As Needed for Anxiety. 360 tablet 1    Magnesium Oxide -Mg Supplement (MAGnesium-Oxide) 400 (240 Mg) MG tablet Take 1 tablet by mouth 2 (Two) Times a Day. 60 tablet 0    Magnesium Oxide -Mg Supplement 400 (240 Mg) MG tablet Take 1 tablet by mouth Daily. 14 tablet 0    memantine (NAMENDA) 5 MG tablet Take 1 tablet by mouth Every 12 (Twelve) Hours. 180 tablet 0    metoprolol tartrate (LOPRESSOR) 25 MG tablet Take 0.5 tablets by mouth 2 (Two) Times a Day.      mirtazapine (Remeron) 30 MG tablet Take 1 tablet by mouth Every Night. 90 tablet 1    pantoprazole (PROTONIX) 40 MG EC tablet Take 1 tablet by mouth Daily. 90 tablet 1    potassium chloride (MICRO-K) 10 MEQ CR capsule Take 1 capsule by mouth 2 (Two) Times a Day. 180 capsule 0    QUEtiapine (SEROquel) 50 MG tablet Take 1 tablet by mouth At Night As Needed.      tamsulosin  (FLOMAX) 0.4 MG capsule 24 hr capsule Take 1 capsule by mouth 2 (Two) Times a Day.      warfarin (COUMADIN) 2 MG tablet Take 1 tablet by mouth 3 (Three) Times a Week. Tues, Thur, Sat      warfarin (COUMADIN) 4 MG tablet Take 1 tablet by mouth 4 (Four) Times a Week. Mon, Wed, Fri, Sun       No current facility-administered medications for this visit.     Current Outpatient Medications on File Prior to Visit   Medication Sig    albuterol sulfate  (90 Base) MCG/ACT inhaler Inhale 2 puffs Every 4 (Four) Hours As Needed for Wheezing.    atorvastatin (LIPITOR) 80 MG tablet Take 1 tablet by mouth once daily    cyanocobalamin 100 MCG tablet GIVE 1 TABLET BY MOUTH ONCE DAILY    FeroSul 325 (65 Fe) MG tablet Take 1 tablet by mouth once daily with breakfast    hydrOXYzine (ATARAX) 25 MG tablet Take 1 tablet by mouth Every 8 (Eight) Hours As Needed for Anxiety.    Magnesium Oxide -Mg Supplement (MAGnesium-Oxide) 400 (240 Mg) MG tablet Take 1 tablet by mouth 2 (Two) Times a Day.    Magnesium Oxide -Mg Supplement 400 (240 Mg) MG tablet Take 1 tablet by mouth Daily.    memantine (NAMENDA) 5 MG tablet Take 1 tablet by mouth Every 12 (Twelve) Hours.    metoprolol tartrate (LOPRESSOR) 25 MG tablet Take 0.5 tablets by mouth 2 (Two) Times a Day.    mirtazapine (Remeron) 30 MG tablet Take 1 tablet by mouth Every Night.    pantoprazole (PROTONIX) 40 MG EC tablet Take 1 tablet by mouth Daily.    potassium chloride (MICRO-K) 10 MEQ CR capsule Take 1 capsule by mouth 2 (Two) Times a Day.    QUEtiapine (SEROquel) 50 MG tablet Take 1 tablet by mouth At Night As Needed.    tamsulosin (FLOMAX) 0.4 MG capsule 24 hr capsule Take 1 capsule by mouth 2 (Two) Times a Day.    warfarin (COUMADIN) 2 MG tablet Take 1 tablet by mouth 3 (Three) Times a Week. Tudaljit, Thur, Sat    warfarin (COUMADIN) 4 MG tablet Take 1 tablet by mouth 4 (Four) Times a Week. Mon, Wed, Fri, Sun     No current facility-administered medications on file prior to visit.      He is allergic to alpha-gal, adhesive tape, and amoxicillin-pot clavulanate..    Review of Systems   Constitutional:  Negative for fatigue and fever.   HENT:  Negative for sore throat.    Eyes:  Negative for visual disturbance.   Respiratory:  Negative for cough, chest tightness, shortness of breath and wheezing.    Cardiovascular:  Negative for chest pain, palpitations and leg swelling.   Gastrointestinal:  Negative for abdominal pain, diarrhea, nausea and vomiting.   Skin:  Negative for rash.   Neurological:  Negative for dizziness, light-headedness and headaches.       Objective   /70   Pulse 92   Temp 97.5 °F (36.4 °C)   Wt 67.2 kg (148 lb 3.2 oz)   SpO2 99%   BMI 19.03 kg/m²   Physical Exam  Vitals reviewed. Exam conducted with a chaperone present.   Constitutional:       Appearance: Normal appearance. He is well-developed.   HENT:      Head: Normocephalic and atraumatic.      Right Ear: External ear normal.      Left Ear: External ear normal.      Mouth/Throat:      Pharynx: No oropharyngeal exudate.   Eyes:      Conjunctiva/sclera: Conjunctivae normal.      Pupils: Pupils are equal, round, and reactive to light.   Cardiovascular:      Rate and Rhythm: Normal rate and regular rhythm.      Pulses: Normal pulses.      Heart sounds: Normal heart sounds. No murmur heard.     No friction rub. No gallop.   Pulmonary:      Effort: Pulmonary effort is normal.      Breath sounds: Normal breath sounds. No wheezing or rhonchi.   Abdominal:      General: Abdomen is flat. Bowel sounds are normal. There is no distension.      Palpations: Abdomen is soft. There is no mass.      Tenderness: There is no abdominal tenderness. There is no guarding or rebound.      Hernia: No hernia is present.   Musculoskeletal:         General: Normal range of motion.   Skin:     General: Skin is warm and dry.      Capillary Refill: Capillary refill takes less than 2 seconds.      Comments: Pt has beginning of pressure to  pilonidal area of buttocks- area is 2cm- has slight redness to skin only, no openings of skin, no warmth, no swelling or tenderness.   Neurological:      General: No focal deficit present.      Mental Status: He is alert and oriented to person, place, and time.      Cranial Nerves: No cranial nerve deficit.   Psychiatric:         Mood and Affect: Mood and affect normal.         Behavior: Behavior normal.         Thought Content: Thought content normal.         Judgment: Judgment normal.         Assessment & Plan   Problems Addressed this Visit       Hypomagnesemia    Relevant Orders    Magnesium     Other Visit Diagnoses       Hospital discharge follow-up    -  Primary    Hypokalemia        Relevant Orders    Comprehensive Metabolic Panel    Pressure injury of sacral region, stage 1        Relevant Orders    Ambulatory Referral to Home Health (Completed)    Cushioned Inflatable Ring    Dressing Sacrum Optifoam Bordered Adhesive LF [id number] Sterile          Diagnoses         Codes Comments    Hospital discharge follow-up    -  Primary ICD-10-CM: Z09  ICD-9-CM: V67.59     Hypomagnesemia     ICD-10-CM: E83.42  ICD-9-CM: 275.2     Hypokalemia     ICD-10-CM: E87.6  ICD-9-CM: 276.8     Pressure injury of sacral region, stage 1     ICD-10-CM: L89.151  ICD-9-CM: 707.03, 707.21

## 2025-01-02 NOTE — PROGRESS NOTES
..  Venipuncture Blood Specimen Collection  Venipuncture performed in LT arm by Antonia Armas with good hemostasis. Patient tolerated the procedure well without complications.   01/02/25   Nicole Smith MA

## 2025-01-08 ENCOUNTER — CLINICAL SUPPORT (OUTPATIENT)
Dept: FAMILY MEDICINE CLINIC | Facility: CLINIC | Age: 65
End: 2025-01-08
Payer: MEDICARE

## 2025-01-08 DIAGNOSIS — Z79.01 LONG TERM (CURRENT) USE OF ANTICOAGULANTS: Primary | ICD-10-CM

## 2025-01-08 LAB — INR PPP: 1.8 (ref 2–3)

## 2025-01-08 PROCEDURE — 93793 ANTICOAG MGMT PT WARFARIN: CPT | Performed by: FAMILY MEDICINE

## 2025-01-13 ENCOUNTER — CLINICAL SUPPORT (OUTPATIENT)
Dept: FAMILY MEDICINE CLINIC | Facility: CLINIC | Age: 65
End: 2025-01-13
Payer: MEDICARE

## 2025-01-13 DIAGNOSIS — Z79.01 LONG TERM (CURRENT) USE OF ANTICOAGULANTS: Primary | ICD-10-CM

## 2025-01-13 LAB — INR PPP: 1.9 (ref 2–3)

## 2025-01-13 PROCEDURE — 93793 ANTICOAG MGMT PT WARFARIN: CPT | Performed by: FAMILY MEDICINE

## 2025-01-13 NOTE — PROGRESS NOTES
Faxed in INR of 1.9, take 4 mg on Monday, 4 mg on Tuesday, 4 mg on Wednesday, 6 mg on Thursday, 4 mg on Friday, 4 mg on Saturday, and 2 mg on Sunday, recheck in 1 week.

## 2025-01-19 DIAGNOSIS — E83.42 HYPOMAGNESEMIA: ICD-10-CM

## 2025-01-20 LAB — INR PPP: 3.1

## 2025-01-20 RX ORDER — MAGNESIUM OXIDE TAB 400 MG (241.3 MG ELEMENTAL MG) 400 (241.3 MG) MG
1 TAB ORAL 2 TIMES DAILY
Qty: 60 TABLET | Refills: 0 | Status: SHIPPED | OUTPATIENT
Start: 2025-01-20

## 2025-01-21 ENCOUNTER — CLINICAL SUPPORT (OUTPATIENT)
Dept: FAMILY MEDICINE CLINIC | Facility: CLINIC | Age: 65
End: 2025-01-21
Payer: MEDICARE

## 2025-01-21 ENCOUNTER — ANTICOAGULATION VISIT (OUTPATIENT)
Dept: FAMILY MEDICINE CLINIC | Facility: CLINIC | Age: 65
End: 2025-01-21
Payer: MEDICARE

## 2025-01-21 DIAGNOSIS — Z79.01 LONG TERM (CURRENT) USE OF ANTICOAGULANTS: Primary | ICD-10-CM

## 2025-01-21 RX ORDER — WARFARIN SODIUM 1 MG/1
TABLET ORAL
Qty: 90 TABLET | Refills: 3 | Status: SHIPPED | OUTPATIENT
Start: 2025-01-21

## 2025-01-28 ENCOUNTER — CLINICAL SUPPORT (OUTPATIENT)
Dept: FAMILY MEDICINE CLINIC | Facility: CLINIC | Age: 65
End: 2025-01-28
Payer: MEDICARE

## 2025-01-28 DIAGNOSIS — Z79.01 LONG TERM (CURRENT) USE OF ANTICOAGULANTS: Primary | ICD-10-CM

## 2025-01-28 LAB — INR PPP: 2.9 (ref 0.9–1.1)

## 2025-01-28 PROCEDURE — 85610 PROTHROMBIN TIME: CPT | Performed by: FAMILY MEDICINE

## 2025-01-28 PROCEDURE — 93792 PT/CAREGIVER TRAING HOME INR: CPT | Performed by: FAMILY MEDICINE

## 2025-01-28 PROCEDURE — 36416 COLLJ CAPILLARY BLOOD SPEC: CPT | Performed by: FAMILY MEDICINE

## 2025-01-29 DIAGNOSIS — E78.2 MIXED HYPERLIPIDEMIA: ICD-10-CM

## 2025-01-29 RX ORDER — ATORVASTATIN CALCIUM 80 MG/1
80 TABLET, FILM COATED ORAL DAILY
Qty: 90 TABLET | Refills: 0 | Status: SHIPPED | OUTPATIENT
Start: 2025-01-29

## 2025-02-03 ENCOUNTER — CLINICAL SUPPORT (OUTPATIENT)
Dept: FAMILY MEDICINE CLINIC | Facility: CLINIC | Age: 65
End: 2025-02-03
Payer: MEDICARE

## 2025-02-03 DIAGNOSIS — Z79.01 LONG TERM (CURRENT) USE OF ANTICOAGULANTS: Primary | ICD-10-CM

## 2025-02-03 LAB — INR PPP: 3.4 (ref 2–3)

## 2025-02-03 PROCEDURE — 93793 ANTICOAG MGMT PT WARFARIN: CPT | Performed by: FAMILY MEDICINE

## 2025-02-03 NOTE — PROGRESS NOTES
Faxed in INR of 3.4, patient is to hold today and eat some green vegetables and recheck INR tomorrow 02-04-25.

## 2025-02-04 ENCOUNTER — CLINICAL SUPPORT (OUTPATIENT)
Dept: FAMILY MEDICINE CLINIC | Facility: CLINIC | Age: 65
End: 2025-02-04
Payer: MEDICARE

## 2025-02-04 DIAGNOSIS — Z79.01 LONG TERM (CURRENT) USE OF ANTICOAGULANTS: Primary | ICD-10-CM

## 2025-02-04 LAB — INR PPP: 2.4 (ref 2–3)

## 2025-02-04 PROCEDURE — 93792 PT/CAREGIVER TRAING HOME INR: CPT | Performed by: FAMILY MEDICINE

## 2025-02-08 DIAGNOSIS — R06.02 SHORTNESS OF BREATH: ICD-10-CM

## 2025-02-10 RX ORDER — ALBUTEROL SULFATE 90 UG/1
2 INHALANT RESPIRATORY (INHALATION) EVERY 4 HOURS PRN
Qty: 18 G | Refills: 0 | Status: SHIPPED | OUTPATIENT
Start: 2025-02-10

## 2025-02-11 ENCOUNTER — CLINICAL SUPPORT (OUTPATIENT)
Dept: FAMILY MEDICINE CLINIC | Facility: CLINIC | Age: 65
End: 2025-02-11
Payer: MEDICARE

## 2025-02-11 DIAGNOSIS — Z79.01 LONG TERM (CURRENT) USE OF ANTICOAGULANTS: Primary | ICD-10-CM

## 2025-02-11 LAB — INR PPP: 2.6 (ref 2–3)

## 2025-02-11 PROCEDURE — 93793 ANTICOAG MGMT PT WARFARIN: CPT | Performed by: FAMILY MEDICINE

## 2025-02-18 ENCOUNTER — CLINICAL SUPPORT (OUTPATIENT)
Dept: FAMILY MEDICINE CLINIC | Facility: CLINIC | Age: 65
End: 2025-02-18
Payer: MEDICARE

## 2025-02-18 DIAGNOSIS — I63.433 CEREBROVASCULAR ACCIDENT (CVA) DUE TO BILATERAL EMBOLISM OF POSTERIOR CEREBRAL ARTERIES: Primary | ICD-10-CM

## 2025-02-18 LAB — INR PPP: 2.9 (ref 2–3)

## 2025-02-18 PROCEDURE — 85610 PROTHROMBIN TIME: CPT | Performed by: FAMILY MEDICINE

## 2025-02-18 PROCEDURE — 36416 COLLJ CAPILLARY BLOOD SPEC: CPT | Performed by: FAMILY MEDICINE

## 2025-02-18 PROCEDURE — 93793 ANTICOAG MGMT PT WARFARIN: CPT | Performed by: FAMILY MEDICINE

## 2025-02-21 DIAGNOSIS — E83.42 HYPOMAGNESEMIA: ICD-10-CM

## 2025-02-24 RX ORDER — MAGNESIUM OXIDE TAB 400 MG (241.3 MG ELEMENTAL MG) 400 (241.3 MG) MG
1 TAB ORAL 2 TIMES DAILY
Qty: 60 TABLET | Refills: 0 | Status: SHIPPED | OUTPATIENT
Start: 2025-02-24

## 2025-02-25 ENCOUNTER — CLINICAL SUPPORT (OUTPATIENT)
Dept: FAMILY MEDICINE CLINIC | Facility: CLINIC | Age: 65
End: 2025-02-25
Payer: MEDICARE

## 2025-02-25 DIAGNOSIS — Z79.01 LONG TERM (CURRENT) USE OF ANTICOAGULANTS: Primary | ICD-10-CM

## 2025-02-25 LAB — INR PPP: 2.4 (ref 2–3)

## 2025-02-25 PROCEDURE — 93793 ANTICOAG MGMT PT WARFARIN: CPT | Performed by: FAMILY MEDICINE

## 2025-02-25 NOTE — PROGRESS NOTES
Faxed in INR of 2.4. Patient will continue 2 mg on Tuesday/Thursday 3mg on Sat, and 4 mg all other days. Recheck in two weeks

## 2025-03-02 DIAGNOSIS — K21.9 GASTROESOPHAGEAL REFLUX DISEASE, UNSPECIFIED WHETHER ESOPHAGITIS PRESENT: ICD-10-CM

## 2025-03-03 RX ORDER — PANTOPRAZOLE SODIUM 40 MG/1
40 TABLET, DELAYED RELEASE ORAL DAILY
Qty: 90 TABLET | Refills: 0 | Status: SHIPPED | OUTPATIENT
Start: 2025-03-03

## 2025-03-03 RX ORDER — TAMSULOSIN HYDROCHLORIDE 0.4 MG/1
2 CAPSULE ORAL DAILY
Qty: 180 CAPSULE | Refills: 0 | Status: SHIPPED | OUTPATIENT
Start: 2025-03-03

## 2025-03-04 ENCOUNTER — TELEPHONE (OUTPATIENT)
Dept: FAMILY MEDICINE CLINIC | Facility: CLINIC | Age: 65
End: 2025-03-04

## 2025-03-04 LAB — INR PPP: 2.8 (ref 2–3)

## 2025-03-04 NOTE — TELEPHONE ENCOUNTER
Caller: JUAN M KING    Relationship: Emergency Contact    Best call back number: [phone]     What is the medical concern/diagnosis: STROKE     What specialty or service is being requested: SPEECH THERAPY AND OCCUPATIONAL AND PHYSICAL THERAPY     What is the provider, practice or medical service name: HOME HEALTH       Any additional details: PATIENTS SPOUSE CALLED IN STATING PATIENT HAD A STROKE AND THE INSURANCE COMPANY STATED PATIENT COULD DO HOME HEALTH FOR THE THERAPIES ABOVE BUT THE INSURANCE WILL NEED A REFERRAL PUT IN BY MD THEODORE.     PATIENTS SPOUSE IS UNSURE IF THE PAPERWORK NEEDS TO GO TO THE INSURANCE OR TO THE PATIENT.     PATIENTS SPOUSE WOULD LIKE A CALL BACK FOR AN UPDATE AS SOON AS POSSIBLE, PLEASE ADVISE

## 2025-03-05 ENCOUNTER — ANTICOAGULATION VISIT (OUTPATIENT)
Dept: FAMILY MEDICINE CLINIC | Facility: CLINIC | Age: 65
End: 2025-03-05
Payer: MEDICARE

## 2025-03-05 ENCOUNTER — CLINICAL SUPPORT (OUTPATIENT)
Dept: FAMILY MEDICINE CLINIC | Facility: CLINIC | Age: 65
End: 2025-03-05
Payer: MEDICARE

## 2025-03-05 DIAGNOSIS — E61.1 IRON DEFICIENCY: ICD-10-CM

## 2025-03-05 DIAGNOSIS — Z79.01 LONG TERM CURRENT USE OF ANTICOAGULANT: Primary | ICD-10-CM

## 2025-03-05 DIAGNOSIS — I10 ESSENTIAL (PRIMARY) HYPERTENSION: ICD-10-CM

## 2025-03-05 PROCEDURE — 93792 PT/CAREGIVER TRAING HOME INR: CPT | Performed by: FAMILY MEDICINE

## 2025-03-05 RX ORDER — PNV NO.95/FERROUS FUM/FOLIC AC 28MG-0.8MG
1 TABLET ORAL
Qty: 30 TABLET | Refills: 0 | Status: SHIPPED | OUTPATIENT
Start: 2025-03-05

## 2025-03-05 RX ORDER — METOPROLOL TARTRATE 25 MG/1
TABLET, FILM COATED ORAL
Qty: 30 TABLET | Refills: 0 | Status: SHIPPED | OUTPATIENT
Start: 2025-03-05

## 2025-03-05 NOTE — PROGRESS NOTES
Faxed INR = 2.8 (goal 2-3).  Currently taking: Tues/Thurs= 2mg, Sun/Mon/Wed/Fri= 4mg, Sat= 3mg.  Per Shi, continue current dose/testing regimen.

## 2025-03-11 LAB — INR PPP: 2.9 (ref 2–3)

## 2025-03-12 ENCOUNTER — CLINICAL SUPPORT (OUTPATIENT)
Dept: FAMILY MEDICINE CLINIC | Facility: CLINIC | Age: 65
End: 2025-03-12
Payer: MEDICARE

## 2025-03-12 ENCOUNTER — ANTICOAGULATION VISIT (OUTPATIENT)
Dept: FAMILY MEDICINE CLINIC | Facility: CLINIC | Age: 65
End: 2025-03-12
Payer: MEDICARE

## 2025-03-12 DIAGNOSIS — Z79.01 LONG TERM CURRENT USE OF ANTICOAGULANT: Primary | ICD-10-CM

## 2025-03-12 PROCEDURE — 93792 PT/CAREGIVER TRAING HOME INR: CPT | Performed by: FAMILY MEDICINE

## 2025-03-12 NOTE — PROGRESS NOTES
Faxed INR = 2.9 (goal is 2-3).  Currently takes: Tues/Thurs= 2mg, Sun/Mon/Wed/Fri= 4mg,  Sat= 3mg.  Currently at goal.  Continue same dose/testing regimen.

## 2025-03-19 ENCOUNTER — CLINICAL SUPPORT (OUTPATIENT)
Dept: FAMILY MEDICINE CLINIC | Facility: CLINIC | Age: 65
End: 2025-03-19
Payer: MEDICARE

## 2025-03-19 DIAGNOSIS — Z79.01 LONG TERM (CURRENT) USE OF ANTICOAGULANTS: Primary | ICD-10-CM

## 2025-03-19 LAB — INR PPP: 2.9 (ref 2–3)

## 2025-03-19 PROCEDURE — 93793 ANTICOAG MGMT PT WARFARIN: CPT | Performed by: FAMILY MEDICINE

## 2025-03-20 ENCOUNTER — OFFICE VISIT (OUTPATIENT)
Dept: FAMILY MEDICINE CLINIC | Facility: CLINIC | Age: 65
End: 2025-03-20
Payer: MEDICARE

## 2025-03-20 VITALS
HEART RATE: 85 BPM | BODY MASS INDEX: 18.96 KG/M2 | TEMPERATURE: 96.4 F | HEIGHT: 74 IN | WEIGHT: 147.7 LBS | OXYGEN SATURATION: 90 % | SYSTOLIC BLOOD PRESSURE: 132 MMHG | DIASTOLIC BLOOD PRESSURE: 68 MMHG

## 2025-03-20 DIAGNOSIS — R76.8 POSITIVE ANA (ANTINUCLEAR ANTIBODY): ICD-10-CM

## 2025-03-20 DIAGNOSIS — I63.433 CEREBROVASCULAR ACCIDENT (CVA) DUE TO BILATERAL EMBOLISM OF POSTERIOR CEREBRAL ARTERIES: Primary | ICD-10-CM

## 2025-03-20 DIAGNOSIS — R74.8 ELEVATED ALKALINE PHOSPHATASE LEVEL: ICD-10-CM

## 2025-03-20 DIAGNOSIS — R53.83 FATIGUE, UNSPECIFIED TYPE: ICD-10-CM

## 2025-03-20 DIAGNOSIS — F41.9 ANXIETY AND DEPRESSION: ICD-10-CM

## 2025-03-20 DIAGNOSIS — I10 PRIMARY HYPERTENSION: ICD-10-CM

## 2025-03-20 DIAGNOSIS — M79.89 HAND SWELLING: ICD-10-CM

## 2025-03-20 DIAGNOSIS — R73.03 PREDIABETES: ICD-10-CM

## 2025-03-20 DIAGNOSIS — E78.2 MIXED HYPERLIPIDEMIA: ICD-10-CM

## 2025-03-20 DIAGNOSIS — D72.829 LEUKOCYTOSIS, UNSPECIFIED TYPE: ICD-10-CM

## 2025-03-20 DIAGNOSIS — G89.29 CHRONIC RIGHT SHOULDER PAIN: ICD-10-CM

## 2025-03-20 DIAGNOSIS — E83.42 HYPOMAGNESEMIA: ICD-10-CM

## 2025-03-20 DIAGNOSIS — F32.A ANXIETY AND DEPRESSION: ICD-10-CM

## 2025-03-20 DIAGNOSIS — M25.511 CHRONIC RIGHT SHOULDER PAIN: ICD-10-CM

## 2025-03-20 DIAGNOSIS — M19.90 ARTHRITIS: ICD-10-CM

## 2025-03-20 PROCEDURE — 86235 NUCLEAR ANTIGEN ANTIBODY: CPT | Performed by: FAMILY MEDICINE

## 2025-03-20 PROCEDURE — 80053 COMPREHEN METABOLIC PANEL: CPT | Performed by: FAMILY MEDICINE

## 2025-03-20 PROCEDURE — 86038 ANTINUCLEAR ANTIBODIES: CPT | Performed by: FAMILY MEDICINE

## 2025-03-20 PROCEDURE — 86431 RHEUMATOID FACTOR QUANT: CPT | Performed by: FAMILY MEDICINE

## 2025-03-20 PROCEDURE — 83036 HEMOGLOBIN GLYCOSYLATED A1C: CPT | Performed by: FAMILY MEDICINE

## 2025-03-20 PROCEDURE — 86200 CCP ANTIBODY: CPT | Performed by: FAMILY MEDICINE

## 2025-03-20 PROCEDURE — 86225 DNA ANTIBODY NATIVE: CPT | Performed by: FAMILY MEDICINE

## 2025-03-20 PROCEDURE — 86140 C-REACTIVE PROTEIN: CPT | Performed by: FAMILY MEDICINE

## 2025-03-20 PROCEDURE — 85025 COMPLETE CBC W/AUTO DIFF WBC: CPT | Performed by: FAMILY MEDICINE

## 2025-03-20 PROCEDURE — 84443 ASSAY THYROID STIM HORMONE: CPT | Performed by: FAMILY MEDICINE

## 2025-03-20 PROCEDURE — 83735 ASSAY OF MAGNESIUM: CPT | Performed by: FAMILY MEDICINE

## 2025-03-20 PROCEDURE — 80061 LIPID PANEL: CPT | Performed by: FAMILY MEDICINE

## 2025-03-20 PROCEDURE — 84439 ASSAY OF FREE THYROXINE: CPT | Performed by: FAMILY MEDICINE

## 2025-03-20 PROCEDURE — 84550 ASSAY OF BLOOD/URIC ACID: CPT | Performed by: FAMILY MEDICINE

## 2025-03-20 PROCEDURE — 85652 RBC SED RATE AUTOMATED: CPT | Performed by: FAMILY MEDICINE

## 2025-03-20 RX ORDER — HYDROXYZINE HYDROCHLORIDE 50 MG/1
50 TABLET, FILM COATED ORAL EVERY 8 HOURS PRN
Qty: 42 TABLET | Refills: 1 | Status: SHIPPED | OUTPATIENT
Start: 2025-03-20

## 2025-03-20 RX ORDER — LANOLIN ALCOHOL/MO/W.PET/CERES
1 CREAM (GRAM) TOPICAL 2 TIMES DAILY
Qty: 60 TABLET | Refills: 2 | Status: SHIPPED | OUTPATIENT
Start: 2025-03-20

## 2025-03-20 NOTE — PROGRESS NOTES
Venipuncture Blood Specimen Collection  Venipuncture performed in left arm by Johnny Mcmahan with good hemostasis. Patient tolerated the procedure well without complications.   03/20/25   Johnny Mcmahan

## 2025-03-20 NOTE — PROGRESS NOTES
Chief Complaint  Cerebrovascular Accident (Stroke July of 2024)    Subjective          Sathya Benavides presents to [facility]  History of Present Illness  Needing home health for insurance to cover PT/OT, speech for stroke from last year- speech is slurred, decreased strength right hand, fatigue    Chronic right shoulder pain- dec ROM- x 3 months- no known recent injury    Pt has right hand swelling x several days  Pt never saw rheumatology- was referred last September- pt has ongoing arthritis, hand swelling, positive may, fatigue, decreased appetite    Pt urged to quit smoking- pt reminded that his smoking could cause another stroke.  Hypomagnesemia- pt taking magnesium- need to recheck  Hypertension  This is a chronic problem. The current episode started more than 1 year ago. The problem has been improved since onset. The problem is controlled. Associated symptoms include anxiety and malaise/fatigue. Pertinent negatives include no blurred vision, chest pain, headaches, neck pain, orthopnea, palpitations, peripheral edema, PND, shortness of breath or sweats. There are no associated agents to hypertension. Risk factors for coronary artery disease include dyslipidemia, family history and male gender. Current antihypertension treatment includes beta blockers. The current treatment provides significant improvement. There are no compliance problems.    Hyperlipidemia  This is a chronic problem. The current episode started more than 1 year ago. The problem is controlled. Recent lipid tests were reviewed and are variable. There are no known factors aggravating his hyperlipidemia. Pertinent negatives include no chest pain, focal sensory loss, focal weakness, leg pain, myalgias or shortness of breath. Current antihyperlipidemic treatment includes statins. The current treatment provides significant improvement of lipids. There are no compliance problems.        BMI is within normal  parameters. No other follow-up for BMI required.       Objective   Allergies   Allergen Reactions    Alpha-Gal Hives    Adhesive Tape Rash    Amoxicillin-Pot Clavulanate Other (See Comments) and GI Intolerance     Upset stomach     Immunization History   Administered Date(s) Administered    Flu Vaccine Quad PF >36MO 10/17/2017    Pneumococcal Conjugate 20-Valent (PCV20) 01/26/2024    Tdap 05/17/2017     Past Medical History:   Diagnosis Date    Acid reflux     Atrial fibrillation     Enlarged prostate     Headache, tension-type     Hyperlipidemia     Lung nodule     PFO (patent foramen ovale)     Stroke 03/2021    LOSS OF PERIPHERAL VISION    Stroke     7/2024      Past Surgical History:   Procedure Laterality Date    APPENDECTOMY      BRONCHOSCOPY Bilateral 4/8/2021    Procedure: BRONCHOSCOPY ENDOBRONCHIAL ULTRASOUND WITH FNA'S;  Surgeon: Sam Tony MD;  Location: [facility] ENDOS[facility];  Service: Pulmonary;  Laterality: Bilateral;  PRE- LYMPHADENOPATHY  POST- SAME    CARDIAC CATHETERIZATION N/A 10/29/2021    Procedure: Patent foramen ovale closure  ABBOTT;  Surgeon: Sivakumar Chatman MD;  Location: [facility] CATH INVASIVE LOCATION;  Service: Cardiology;  Laterality: N/A;    KNEE ARTHROSCOPY      SHOULDER ARTHROSCOPY        Social History     Socioeconomic History    Marital status:    Tobacco Use    Smoking status: Every Day     Current packs/day: 1.00     Average packs/day: 1 pack/day for 44.2 years (44.2 ttl pk-yrs)     Types: Cigarettes     Start date: 1981    Smokeless tobacco: Never    Tobacco comments:     Patient advised to stop smoking   Vaping Use    Vaping status: Never Used   Substance and Sexual Activity    Alcohol use: Not Currently     Comment: occasionally    Drug use: Not Currently    Sexual activity: Defer        Current Outpatient Medications:     albuterol sulfate  (90 Base) MCG/ACT inhaler, INHALE 2 PUFFS BY MOUTH EVERY 4 HOURS AS NEEDED FOR WHEEZING, Disp: 18 g, Rfl: 0     atorvastatin (LIPITOR) 80 MG tablet, Take 1 tablet by mouth once daily, Disp: 90 tablet, Rfl: 0    cyanocobalamin 100 MCG tablet, GIVE 1 TABLET BY MOUTH ONCE DAILY, Disp: 30 tablet, Rfl: 0    ferrous sulfate 325 (65 Fe) MG tablet, Take 1 tablet by mouth once daily with breakfast, Disp: 30 tablet, Rfl: 0    hydrOXYzine (ATARAX) 50 MG tablet, Take 1 tablet by mouth Every 8 (Eight) Hours As Needed for Anxiety., Disp: 42 tablet, Rfl: 1    Magnesium Oxide -Mg Supplement (MAGnesium-Oxide) 400 (240 Mg) MG tablet, Take 1 tablet by mouth 2 (Two) Times a Day., Disp: 60 tablet, Rfl: 2    memantine (NAMENDA) 5 MG tablet, Take 1 tablet by mouth Every 12 (Twelve) Hours., Disp: 180 tablet, Rfl: 0    metoprolol tartrate (LOPRESSOR) 25 MG tablet, TAKE 1/2 (ONE-HALF) TABLET BY MOUTH TWICE DAILY FOR 30 DAYS, Disp: 30 tablet, Rfl: 0    mirtazapine (Remeron) 30 MG tablet, Take 1 tablet by mouth Every Night., Disp: 90 tablet, Rfl: 1    pantoprazole (PROTONIX) 40 MG EC tablet, Take 1 tablet by mouth once daily, Disp: 90 tablet, Rfl: 0    potassium chloride (MICRO-K) 10 MEQ CR capsule, Take 1 capsule by mouth 2 (Two) Times a Day., Disp: 180 capsule, Rfl: 0    QUEtiapine (SEROquel) 50 MG tablet, Take 1 tablet by mouth At Night As Needed., Disp: , Rfl:     tamsulosin (FLOMAX) 0.4 MG capsule 24 hr capsule, Take 2 capsules by mouth once daily, Disp: 180 capsule, Rfl: 0    warfarin (COUMADIN) 1 MG tablet, Take daily., Disp: 90 tablet, Rfl: 3    warfarin (COUMADIN) 2 MG tablet, Take 1 tablet by mouth 3 (Three) Times a Week. Tues, Thur, Sat, Disp: , Rfl:     warfarin (COUMADIN) 4 MG tablet, Take 1 tablet by mouth 4 (Four) Times a Week. Mon, Wed, Fri, Sun, Disp: , Rfl:     Diclofenac Sodium (VOLTAREN) 1 % gel gel, Apply 4 g topically to the appropriate area as directed 4 (Four) Times a Day As Needed (hand swelling)., Disp: 350 g, Rfl: 1   Family History   Problem Relation Age of Onset    Heart disease Mother     Diabetes Mother     Dementia Father  "    Colon cancer Brother     No Known Problems Daughter     No Known Problems Daughter     No Known Problems Daughter     No Known Problems Son     Teja Hyperthermia Neg Hx           Vital Signs:   Vitals:    03/20/25 1710   BP: 132/68   BP Location: Right arm   Patient Position: Sitting   Cuff Size: Adult   Pulse: 85   Temp: 96.4 °F (35.8 °C)   TempSrc: Temporal   SpO2: 90%   Weight: 67 kg (147 lb 11.2 oz)   Height: 188 cm (74\")       Review of Systems   Constitutional:  Positive for fatigue and malaise/fatigue. Negative for fever.   HENT:  Negative for sore throat.    Eyes:  Negative for blurred vision and visual disturbance.   Respiratory:  Negative for apnea, cough, shortness of breath and wheezing.    Cardiovascular:  Negative for chest pain, palpitations, orthopnea, leg swelling and PND.   Gastrointestinal:  Negative for abdominal pain, diarrhea, nausea and vomiting.   Musculoskeletal:  Positive for arthralgias. Negative for myalgias and neck pain.   Skin:  Negative for rash.   Neurological:  Negative for dizziness, focal weakness, light-headedness and headaches.      Physical Exam  Vitals reviewed.   Constitutional:       Appearance: Normal appearance. He is well-developed.   HENT:      Head: Normocephalic and atraumatic.      Right Ear: External ear normal.      Left Ear: External ear normal.      Mouth/Throat:      Pharynx: No oropharyngeal exudate.   Eyes:      Conjunctiva/sclera: Conjunctivae normal.      Pupils: Pupils are equal, round, and reactive to light.   Cardiovascular:      Rate and Rhythm: Normal rate and regular rhythm.      Pulses: Normal pulses.      Heart sounds: Normal heart sounds. No murmur heard.     No friction rub. No gallop.   Pulmonary:      Effort: Pulmonary effort is normal.      Breath sounds: Normal breath sounds. No wheezing or rhonchi.   Abdominal:      General: Abdomen is flat. Bowel sounds are normal. There is no distension.      Palpations: Abdomen is soft. There is no " mass.      Tenderness: There is no abdominal tenderness. There is no guarding or rebound.      Hernia: No hernia is present.   Musculoskeletal:      Comments: Right hand swelling, no redness, warmth, or bruising, non-tender.  Right lateral shoulder tenderness in area of bursa, dec ROM, stable, no redness, warmth, swelling, or bruising.   Skin:     General: Skin is warm and dry.      Capillary Refill: Capillary refill takes less than 2 seconds.   Neurological:      Mental Status: He is alert and oriented to person, place, and time.      Cranial Nerves: No cranial nerve deficit.      Sensory: No sensory deficit.      Motor: Weakness present.      Gait: Gait normal.   Psychiatric:         Mood and Affect: Mood and affect normal.         Behavior: Behavior normal.         Thought Content: Thought content normal.         Judgment: Judgment normal.        Result Review :   The following data was reviewed by: Adi Kaminski MD on 03/20/2025:  CMP          12/18/2024    14:14 12/19/2024    10:54 1/2/2025    11:59   CMP   Glucose 92  120  86    BUN 4  3  6    Creatinine 0.82  0.72  0.93    EGFR 96.9  100.8  90.6    Sodium 144  140  137    Potassium 3.0  4.0  4.5    Chloride 103  105  101    Calcium 8.0  8.1  9.4    Total Protein 7.0   7.8    Albumin 3.9   4.1    Globulin 3.1   3.7    Total Bilirubin 0.8   0.4    Alkaline Phosphatase 138   143    AST (SGOT) 34   22    ALT (SGPT) 15   16    Albumin/Globulin Ratio 1.3   1.1    BUN/Creatinine Ratio 4.9  4.2  6.5    Anion Gap 10.1  8.2  7.9      CBC          12/17/2024    14:12 12/18/2024    14:14 12/19/2024    10:54   CBC   WBC 7.34  5.16  7.53    RBC 5.07  5.16  4.86    Hemoglobin 14.1  14.5  14.0    Hematocrit 43.7  44.7  41.9    MCV 86.2  86.6  86.2    MCH 27.8  28.1  28.8    MCHC 32.3  32.4  33.4    RDW 13.1  13.5  13.2    Platelets 223  189  196      Lipid Panel          10/24/2024    18:52   Lipid Panel   Total Cholesterol 107    Triglycerides 128    HDL Cholesterol 28     VLDL Cholesterol 23    LDL Cholesterol  56    LDL/HDL Ratio 1.91      TSH          8/15/2024    14:45 8/29/2024    09:26 10/24/2024    16:45   TSH   TSH 3.570  3.780  3.500      Most Recent A1C          10/24/2024    22:33   HGBA1C Most Recent   Hemoglobin A1C 5.96          Data reviewed : Radiologic studies I viewed and interpreted 3 views right hand x-rays and 2 views right shoulder x-rays:no fxs.           Assessment and Plan    Diagnoses and all orders for this visit:    1. Cerebrovascular accident (CVA) due to bilateral embolism of posterior cerebral arteries (Primary)  -     Ambulatory Referral to Home Health    2. Chronic right shoulder pain  -     Ambulatory Referral to Orthopedic Surgery  -     XR Shoulder 2+ View Right (In Office)    3. Hand swelling  -     XR Hand 3+ View Right (In Office)  -     KAMRNO With / DsDNA, RNP, Sjogrens A / B, Kaminski  -     Uric Acid  -     Sedimentation Rate  -     Rheumatoid Factor  -     C-reactive Protein  -     Cyclic Citrul Peptide Antibody, IgG / IgA  -     Ambulatory Referral to Rheumatology  -     Diclofenac Sodium (VOLTAREN) 1 % gel gel; Apply 4 g topically to the appropriate area as directed 4 (Four) Times a Day As Needed (hand swelling).  Dispense: 350 g; Refill: 1    4. Hypomagnesemia  -     Magnesium  -     Magnesium Oxide -Mg Supplement (MAGnesium-Oxide) 400 (240 Mg) MG tablet; Take 1 tablet by mouth 2 (Two) Times a Day.  Dispense: 60 tablet; Refill: 2    5. Mixed hyperlipidemia    6. Fatigue, unspecified type  -     Ambulatory Referral to Rheumatology  -     Peripheral Blood Smear    7. Primary hypertension  -     CBC Auto Differential  -     Comprehensive Metabolic Panel  -     Lipid Panel  -     TSH+Free T4    8. Prediabetes  -     Hemoglobin A1c    9. Arthritis  -     Ambulatory Referral to Rheumatology    10. Positive KAMRON (antinuclear antibody)  -     Ambulatory Referral to Rheumatology    11. Elevated alkaline phosphatase level  -     Ambulatory Referral to  Rheumatology    12. Leukocytosis, unspecified type  -     Peripheral Blood Smear    13. Anxiety and depression  -     hydrOXYzine (ATARAX) 50 MG tablet; Take 1 tablet by mouth Every 8 (Eight) Hours As Needed for Anxiety.  Dispense: 42 tablet; Refill: 1            Follow Up   Return in about 3 weeks (around 4/10/2025) for Recheck.  Patient was given instructions and counseling regarding his condition or for health maintenance advice. Please see specific information pulled into the AVS if appropriate.

## 2025-03-21 LAB
ALBUMIN SERPL-MCNC: 4.3 G/DL (ref 3.5–5.2)
ALBUMIN/GLOB SERPL: 1.1 G/DL
ALP SERPL-CCNC: 155 U/L (ref 39–117)
ALT SERPL W P-5'-P-CCNC: 18 U/L (ref 1–41)
ANION GAP SERPL CALCULATED.3IONS-SCNC: 11 MMOL/L (ref 5–15)
AST SERPL-CCNC: 23 U/L (ref 1–40)
BASOPHILS # BLD AUTO: 0.09 10*3/MM3 (ref 0–0.2)
BASOPHILS # BLD AUTO: 0.1 10*3/MM3 (ref 0–0.2)
BASOPHILS NFR BLD AUTO: 0.9 % (ref 0–1.5)
BASOPHILS NFR BLD AUTO: 1.1 % (ref 0–1.5)
BILIRUB SERPL-MCNC: 0.6 MG/DL (ref 0–1.2)
BUN SERPL-MCNC: 7 MG/DL (ref 8–23)
BUN/CREAT SERPL: 7.7 (ref 7–25)
CALCIUM SPEC-SCNC: 9.9 MG/DL (ref 8.6–10.5)
CHLORIDE SERPL-SCNC: 101 MMOL/L (ref 98–107)
CHOLEST SERPL-MCNC: 112 MG/DL (ref 0–200)
CHROMATIN AB SERPL-ACNC: <10 IU/ML (ref 0–14)
CO2 SERPL-SCNC: 26 MMOL/L (ref 22–29)
CREAT SERPL-MCNC: 0.91 MG/DL (ref 0.76–1.27)
CRP SERPL-MCNC: <0.3 MG/DL (ref 0–0.5)
DEPRECATED RDW RBC AUTO: 37.8 FL (ref 37–54)
DEPRECATED RDW RBC AUTO: 39.8 FL (ref 37–54)
EGFRCR SERPLBLD CKD-EPI 2021: 93 ML/MIN/1.73
EOSINOPHIL # BLD AUTO: 0.27 10*3/MM3 (ref 0–0.4)
EOSINOPHIL # BLD AUTO: 0.32 10*3/MM3 (ref 0–0.4)
EOSINOPHIL NFR BLD AUTO: 2.9 % (ref 0.3–6.2)
EOSINOPHIL NFR BLD AUTO: 3.3 % (ref 0.3–6.2)
ERYTHROCYTE [DISTWIDTH] IN BLOOD BY AUTOMATED COUNT: 12.3 % (ref 12.3–15.4)
ERYTHROCYTE [DISTWIDTH] IN BLOOD BY AUTOMATED COUNT: 12.5 % (ref 12.3–15.4)
ERYTHROCYTE [SEDIMENTATION RATE] IN BLOOD: 5 MM/HR (ref 0–20)
GLOBULIN UR ELPH-MCNC: 3.9 GM/DL
GLUCOSE SERPL-MCNC: 91 MG/DL (ref 65–99)
HBA1C MFR BLD: 6 % (ref 4.8–5.6)
HCT VFR BLD AUTO: 46 % (ref 37.5–51)
HCT VFR BLD AUTO: 48.5 % (ref 37.5–51)
HDLC SERPL-MCNC: 28 MG/DL (ref 40–60)
HGB BLD-MCNC: 15.6 G/DL (ref 13–17.7)
HGB BLD-MCNC: 16 G/DL (ref 13–17.7)
IMM GRANULOCYTES # BLD AUTO: 0.02 10*3/MM3 (ref 0–0.05)
IMM GRANULOCYTES # BLD AUTO: 0.02 10*3/MM3 (ref 0–0.05)
IMM GRANULOCYTES NFR BLD AUTO: 0.2 % (ref 0–0.5)
IMM GRANULOCYTES NFR BLD AUTO: 0.2 % (ref 0–0.5)
LDLC SERPL CALC-MCNC: 58 MG/DL (ref 0–100)
LDLC/HDLC SERPL: 1.96 {RATIO}
LYMPHOCYTES # BLD AUTO: 2.53 10*3/MM3 (ref 0.7–3.1)
LYMPHOCYTES # BLD AUTO: 2.68 10*3/MM3 (ref 0.7–3.1)
LYMPHOCYTES NFR BLD AUTO: 26.9 % (ref 19.6–45.3)
LYMPHOCYTES NFR BLD AUTO: 27.3 % (ref 19.6–45.3)
MAGNESIUM SERPL-MCNC: 2.2 MG/DL (ref 1.6–2.4)
MCH RBC QN AUTO: 28.8 PG (ref 26.6–33)
MCH RBC QN AUTO: 28.9 PG (ref 26.6–33)
MCHC RBC AUTO-ENTMCNC: 33 G/DL (ref 31.5–35.7)
MCHC RBC AUTO-ENTMCNC: 33.9 G/DL (ref 31.5–35.7)
MCV RBC AUTO: 85.3 FL (ref 79–97)
MCV RBC AUTO: 87.4 FL (ref 79–97)
MONOCYTES # BLD AUTO: 0.88 10*3/MM3 (ref 0.1–0.9)
MONOCYTES # BLD AUTO: 0.92 10*3/MM3 (ref 0.1–0.9)
MONOCYTES NFR BLD AUTO: 9.4 % (ref 5–12)
MONOCYTES NFR BLD AUTO: 9.4 % (ref 5–12)
NEUTROPHILS NFR BLD AUTO: 5.59 10*3/MM3 (ref 1.7–7)
NEUTROPHILS NFR BLD AUTO: 5.78 10*3/MM3 (ref 1.7–7)
NEUTROPHILS NFR BLD AUTO: 58.9 % (ref 42.7–76)
NEUTROPHILS NFR BLD AUTO: 59.5 % (ref 42.7–76)
NRBC BLD AUTO-RTO: 0 /100 WBC (ref 0–0.2)
NRBC BLD AUTO-RTO: 0.2 /100 WBC (ref 0–0.2)
PATHOLOGY REVIEW: YES
PLATELET # BLD AUTO: 250 10*3/MM3 (ref 140–450)
PLATELET # BLD AUTO: 252 10*3/MM3 (ref 140–450)
PMV BLD AUTO: 10.7 FL (ref 6–12)
PMV BLD AUTO: 10.8 FL (ref 6–12)
POTASSIUM SERPL-SCNC: 5 MMOL/L (ref 3.5–5.2)
PROT SERPL-MCNC: 8.2 G/DL (ref 6–8.5)
RBC # BLD AUTO: 5.39 10*6/MM3 (ref 4.14–5.8)
RBC # BLD AUTO: 5.55 10*6/MM3 (ref 4.14–5.8)
SODIUM SERPL-SCNC: 138 MMOL/L (ref 136–145)
T4 FREE SERPL-MCNC: 1.11 NG/DL (ref 0.92–1.68)
TRIGL SERPL-MCNC: 146 MG/DL (ref 0–150)
TSH SERPL DL<=0.05 MIU/L-ACNC: 6.28 UIU/ML (ref 0.27–4.2)
URATE SERPL-MCNC: 3.9 MG/DL (ref 3.4–7)
VLDLC SERPL-MCNC: 26 MG/DL (ref 5–40)
WBC NRBC COR # BLD AUTO: 9.39 10*3/MM3 (ref 3.4–10.8)
WBC NRBC COR # BLD AUTO: 9.81 10*3/MM3 (ref 3.4–10.8)

## 2025-03-22 LAB
CCP IGA+IGG SERPL IA-ACNC: 7 UNITS (ref 0–19)
LAB AP CASE REPORT: NORMAL
PATH REPORT.FINAL DX SPEC: NORMAL

## 2025-03-24 LAB
ANA SER QL: POSITIVE
CENTROMERE B AB SER-ACNC: <0.2 AI (ref 0–0.9)
CHROMATIN AB SERPL-ACNC: <0.2 AI (ref 0–0.9)
DSDNA AB SER-ACNC: 1 IU/ML (ref 0–9)
ENA JO1 AB SER-ACNC: <0.2 AI (ref 0–0.9)
ENA RNP AB SER-ACNC: >8 AI (ref 0–0.9)
ENA SCL70 AB SER-ACNC: <0.2 AI (ref 0–0.9)
ENA SM AB SER-ACNC: <0.2 AI (ref 0–0.9)
ENA SS-A AB SER-ACNC: <0.2 AI (ref 0–0.9)
ENA SS-B AB SER-ACNC: <0.2 AI (ref 0–0.9)
Lab: ABNORMAL

## 2025-03-27 DIAGNOSIS — E87.6 HYPOKALEMIA: ICD-10-CM

## 2025-03-28 ENCOUNTER — CLINICAL SUPPORT (OUTPATIENT)
Dept: FAMILY MEDICINE CLINIC | Facility: CLINIC | Age: 65
End: 2025-03-28
Payer: MEDICARE

## 2025-03-28 DIAGNOSIS — Z79.01 LONG TERM (CURRENT) USE OF ANTICOAGULANTS: Primary | ICD-10-CM

## 2025-03-28 LAB — INR PPP: 3.3 (ref 2–3)

## 2025-03-28 PROCEDURE — 93793 ANTICOAG MGMT PT WARFARIN: CPT | Performed by: FAMILY MEDICINE

## 2025-03-28 RX ORDER — POTASSIUM CHLORIDE 750 MG/1
10 CAPSULE, EXTENDED RELEASE ORAL 2 TIMES DAILY
Qty: 180 CAPSULE | Refills: 0 | Status: SHIPPED | OUTPATIENT
Start: 2025-03-28

## 2025-04-02 ENCOUNTER — CLINICAL SUPPORT (OUTPATIENT)
Dept: FAMILY MEDICINE CLINIC | Facility: CLINIC | Age: 65
End: 2025-04-02
Payer: MEDICARE

## 2025-04-02 DIAGNOSIS — Z79.01 LONG TERM (CURRENT) USE OF ANTICOAGULANTS: Primary | ICD-10-CM

## 2025-04-02 LAB — INR PPP: 2.6 (ref 2–3)

## 2025-04-02 PROCEDURE — 93793 ANTICOAG MGMT PT WARFARIN: CPT | Performed by: FAMILY MEDICINE

## 2025-04-02 NOTE — PROGRESS NOTES
Faxed in INR of 2.6, continue regimen and recheck Tuesday 04-08-25, that was the original plan to check next week but, INR Co says he has to check weekly.

## 2025-04-04 DIAGNOSIS — E61.1 IRON DEFICIENCY: ICD-10-CM

## 2025-04-04 DIAGNOSIS — I10 ESSENTIAL (PRIMARY) HYPERTENSION: ICD-10-CM

## 2025-04-04 DIAGNOSIS — Z86.73 HISTORY OF CVA (CEREBROVASCULAR ACCIDENT): ICD-10-CM

## 2025-04-07 ENCOUNTER — TELEPHONE (OUTPATIENT)
Dept: FAMILY MEDICINE CLINIC | Facility: CLINIC | Age: 65
End: 2025-04-07

## 2025-04-07 RX ORDER — METOPROLOL TARTRATE 25 MG/1
12.5 TABLET, FILM COATED ORAL EVERY 12 HOURS SCHEDULED
Qty: 30 TABLET | Refills: 0 | Status: SHIPPED | OUTPATIENT
Start: 2025-04-07

## 2025-04-07 RX ORDER — MEMANTINE HYDROCHLORIDE 5 MG/1
5 TABLET ORAL EVERY 12 HOURS SCHEDULED
Qty: 180 TABLET | Refills: 0 | Status: SHIPPED | OUTPATIENT
Start: 2025-04-07

## 2025-04-07 RX ORDER — FERROUS SULFATE 325(65) MG
1 TABLET ORAL
Qty: 30 TABLET | Refills: 0 | Status: SHIPPED | OUTPATIENT
Start: 2025-04-07

## 2025-04-07 NOTE — TELEPHONE ENCOUNTER
Caller: [facility]    Relationship to patient:     Best call back number: [phone]     Patient is needing: PATIENTS NURSE STATES PATIENT CONTINUES TO LOSE WEIGHT.  PATIENTS NURSE STATES PATIENT HAS AN APPOINTMENT ON 4.10.25.  PATIENTS NURSE STATES SHE WOULD LIKE TO SEE IF AN APPETITE STIMULANT CAN BE DISCUSSED AT THIS TIME

## 2025-04-08 LAB — INR PPP: 3.2

## 2025-04-09 ENCOUNTER — CLINICAL SUPPORT (OUTPATIENT)
Dept: FAMILY MEDICINE CLINIC | Facility: CLINIC | Age: 65
End: 2025-04-09
Payer: MEDICARE

## 2025-04-09 ENCOUNTER — TELEPHONE (OUTPATIENT)
Dept: FAMILY MEDICINE CLINIC | Facility: CLINIC | Age: 65
End: 2025-04-09

## 2025-04-09 DIAGNOSIS — I63.9 CEREBROVASCULAR ACCIDENT (CVA), UNSPECIFIED MECHANISM: Primary | ICD-10-CM

## 2025-04-09 LAB — INR PPP: 3.3

## 2025-04-09 NOTE — TELEPHONE ENCOUNTER
Shonna called and would like verbal orders for OT one time q week x 8 weeks.   I gave them verbal orders     [phone]

## 2025-04-14 ENCOUNTER — OFFICE VISIT (OUTPATIENT)
Dept: NEUROLOGY | Facility: CLINIC | Age: 65
End: 2025-04-14
Payer: MEDICARE

## 2025-04-14 VITALS
WEIGHT: 144 LBS | HEART RATE: 71 BPM | BODY MASS INDEX: 18.48 KG/M2 | DIASTOLIC BLOOD PRESSURE: 66 MMHG | HEIGHT: 74 IN | SYSTOLIC BLOOD PRESSURE: 111 MMHG

## 2025-04-14 DIAGNOSIS — Z86.73 HISTORY OF STROKE: Primary | ICD-10-CM

## 2025-04-14 DIAGNOSIS — Z86.73 HISTORY OF CVA (CEREBROVASCULAR ACCIDENT): ICD-10-CM

## 2025-04-14 DIAGNOSIS — I48.0 PAROXYSMAL A-FIB: ICD-10-CM

## 2025-04-14 DIAGNOSIS — E78.5 HYPERLIPIDEMIA LDL GOAL <70: ICD-10-CM

## 2025-04-14 DIAGNOSIS — Z72.0 TOBACCO ABUSE: ICD-10-CM

## 2025-04-14 DIAGNOSIS — Z87.74 HISTORY OF SURGICAL CLOSURE OF PATENT FORAMEN OVALE (PFO): ICD-10-CM

## 2025-04-14 DIAGNOSIS — I10 PRIMARY HYPERTENSION: ICD-10-CM

## 2025-04-14 PROBLEM — I48.91 NEW ONSET A-FIB: Status: RESOLVED | Noted: 2024-10-24 | Resolved: 2025-04-14

## 2025-04-14 PROBLEM — I63.9 CVA (CEREBRAL VASCULAR ACCIDENT): Status: RESOLVED | Noted: 2021-03-25 | Resolved: 2025-04-14

## 2025-04-14 PROBLEM — I63.9 CRYPTOGENIC STROKE: Status: RESOLVED | Noted: 2021-03-26 | Resolved: 2025-04-14

## 2025-04-14 PROCEDURE — 3078F DIAST BP <80 MM HG: CPT | Performed by: PSYCHIATRY & NEUROLOGY

## 2025-04-14 PROCEDURE — 1160F RVW MEDS BY RX/DR IN RCRD: CPT | Performed by: PSYCHIATRY & NEUROLOGY

## 2025-04-14 PROCEDURE — 99204 OFFICE O/P NEW MOD 45 MIN: CPT | Performed by: PSYCHIATRY & NEUROLOGY

## 2025-04-14 PROCEDURE — 3074F SYST BP LT 130 MM HG: CPT | Performed by: PSYCHIATRY & NEUROLOGY

## 2025-04-14 PROCEDURE — 1159F MED LIST DOCD IN RCRD: CPT | Performed by: PSYCHIATRY & NEUROLOGY

## 2025-04-14 NOTE — PROGRESS NOTES
Chief Complaint  NEW PATIENT    Ida Benavides presents to [facility]UROLOG[facility] for CVA  History of Present Illness  New patient referred by Dr. Kaminski for multiple strokes. Patient has had 2 strokes in the last 5 yrs. He has been seen and followed by [facility] and [facility] team. Residual effects from strokes yes he can't get out words when he wants to say.    He states he doesn't have any  in his right hand since his stroke in July 2024.    He smoked 2 ppd now 1 ppd    Sleeps in recliner, his wife denies any concern of carol     He was found to have possible thrombus in aorta on DALLAS, now on coumadin also for paroxysmal a fib     Residual from stroke , visual field impairment weak right hand and some     Carotid doppler 10/25/2024     Right internal carotid artery demonstrates a less than 50% stenosis.    Antegrade right vertebral flow.    Antegrade right vertebral flow.    Left internal carotid artery demonstrates a less than 50% stenosis.    Antegrade left vertebral flow.    Antegrade left vertebral flow.    DALLAS  Conclusion  Well seated Amplatzer closure device noted in the interatrial septum with no  residual right to left shunt.  Moderate atherosclerotic plaque(s) in the aortic arch.  1.22 cm x 0.5 cm mobile echogenic mass, consistent with thrombus, noted  attached to an atherosclorotic plaque located  in the aortic arch. This could  be the potential source of stroke.  Normal left ventricle intracavitary chamber size.  There is mild concentric left ventricular hypertrophy.  Estimated left ventricular ejection fraction of 60-65 %  LV diastolic dysfunction ( Impaired relaxation ).  The right ventricle is normal in size and function.  Normal left atrial intracavitary chamber size.  No thrombus is detected in the left atrial appendage.  Saline contrast performed with quiet respirations and abdominal compression.  Saline contrast unremarkable for ASD, VSD, or PFO.  Mild  intrapulmonary shunt.  Exam End: --    Specimen Collected: 07/26/24 12:24 Last Resulted: 07/26/24 16:18   Received From: [facility]      Cardiology eval nov 2024  64-year-old white gentleman whose past medical history is significant for hyperlipidemia, paroxysmal atrial fibrillation, CVA, PFO, ...   ...In 2021 patient had TIA and CVA.  Patient was found to have PFO patient underwent closure of PFO percutaneously in [facility].  In July 2024, patient was admitted at [facility] and was noted to have large left MCA stroke.  Patient underwent urgent clot retrieval percutaneously.  Patient was discharged home on warfarin.  In October 2024, patient was admitted at [facility] with atrial fibrillation with RVR.  Patient spontaneously cardioverted into sinus rhythm.  .....            Family History   Problem Relation Age of Onset    Heart disease Mother     Diabetes Mother     Dementia Father     Colon cancer Brother     No Known Problems Daughter     No Known Problems Daughter     No Known Problems Daughter     No Known Problems Son     Malrahul Hyperthermia Neg Hx        Past Medical History:   Diagnosis Date    Acid reflux     Atrial fibrillation     Cryptogenic stroke 03/26/2021    CVA (cerebral vascular accident) 03/25/2021    Enlarged prostate     Headache, tension-type     Hyperlipidemia     Lung nodule     New onset a-fib 10/24/2024    PFO (patent foramen ovale)     Stroke 03/2021    LOSS OF PERIPHERAL VISION    Stroke     7/2024       Social History     Socioeconomic History    Marital status:    Tobacco Use    Smoking status: Every Day     Current packs/day: 1.00     Average packs/day: 1 pack/day for 44.3 years (44.3 ttl pk-yrs)     Types: Cigarettes     Start date: 1981    Smokeless tobacco: Never    Tobacco comments:     Patient advised to stop smoking   Vaping Use    Vaping status: Never Used   Substance and Sexual Activity    Alcohol use: Not Currently      Comment: occasionally    Drug use: Not Currently    Sexual activity: Defer         Current Outpatient Medications:     albuterol sulfate  (90 Base) MCG/ACT inhaler, INHALE 2 PUFFS BY MOUTH EVERY 4 HOURS AS NEEDED FOR WHEEZING, Disp: 18 g, Rfl: 0    atorvastatin (LIPITOR) 80 MG tablet, Take 1 tablet by mouth once daily, Disp: 90 tablet, Rfl: 0    cyanocobalamin 100 MCG tablet, GIVE 1 TABLET BY MOUTH ONCE DAILY, Disp: 30 tablet, Rfl: 0    Diclofenac Sodium (VOLTAREN) 1 % gel gel, Apply 4 g topically to the appropriate area as directed 4 (Four) Times a Day As Needed (hand swelling)., Disp: 350 g, Rfl: 1    FeroSul 325 (65 Fe) MG tablet, Take 1 tablet by mouth once daily with breakfast, Disp: 30 tablet, Rfl: 0    hydrOXYzine (ATARAX) 50 MG tablet, Take 1 tablet by mouth Every 8 (Eight) Hours As Needed for Anxiety., Disp: 42 tablet, Rfl: 1    Magnesium Oxide -Mg Supplement (MAGnesium-Oxide) 400 (240 Mg) MG tablet, Take 1 tablet by mouth 2 (Two) Times a Day., Disp: 60 tablet, Rfl: 2    memantine (NAMENDA) 5 MG tablet, TAKE 1 TABLET BY MOUTH EVERY 12 HOURS, Disp: 180 tablet, Rfl: 0    mirtazapine (Remeron) 30 MG tablet, Take 1 tablet by mouth Every Night., Disp: 90 tablet, Rfl: 1    pantoprazole (PROTONIX) 40 MG EC tablet, Take 1 tablet by mouth once daily, Disp: 90 tablet, Rfl: 0    potassium chloride (MICRO-K) 10 MEQ CR capsule, Take 1 capsule by mouth twice daily, Disp: 180 capsule, Rfl: 0    QUEtiapine (SEROquel) 50 MG tablet, Take 1 tablet by mouth At Night As Needed., Disp: , Rfl:     tamsulosin (FLOMAX) 0.4 MG capsule 24 hr capsule, Take 2 capsules by mouth once daily, Disp: 180 capsule, Rfl: 0    warfarin (COUMADIN) 1 MG tablet, Take daily., Disp: 90 tablet, Rfl: 3    warfarin (COUMADIN) 2 MG tablet, Take 1 tablet by mouth 3 (Three) Times a Week. Tudaljit, Thotis, Sat, Disp: , Rfl:     warfarin (COUMADIN) 4 MG tablet, Take 1 tablet by mouth 4 (Four) Times a Week. Mon, Wed, Fri, Sun, Disp: , Rfl:     metoprolol  "tartrate (LOPRESSOR) 25 MG tablet, Take 1/2 (one-half) tablet by mouth twice daily (Patient not taking: Reported on 4/14/2025), Disp: 30 tablet, Rfl: 0    Review of Systems   Constitutional: Negative.    HENT: Negative.     Eyes: Negative.    Respiratory: Negative.     Cardiovascular: Negative.    Gastrointestinal: Negative.    Endocrine: Negative.    Genitourinary: Negative.    Musculoskeletal: Negative.    Neurological:  Negative for dizziness and light-headedness.   Psychiatric/Behavioral: Negative.     All other systems reviewed and are negative.           Objective   Vital Signs:   /66   Pulse 71   Ht 188 cm (74\")   Wt 65.3 kg (144 lb)   BMI 18.49 kg/m²     Physical Exam  Vitals reviewed.   Constitutional:       Appearance: Normal appearance.   Eyes:      Extraocular Movements: Extraocular movements intact.   Cardiovascular:      Rate and Rhythm: Normal rate.   Pulmonary:      Effort: Pulmonary effort is normal. No respiratory distress.   Neurological:      Mental Status: He is oriented to person, place, and time.      Deep Tendon Reflexes:      Reflex Scores:       Bicep reflexes are 3+ on the right side and 2+ on the left side.  Psychiatric:         Mood and Affect: Mood normal.        Result Review :                Neurological Exam  Mental Status  Awake, alert and oriented to person, place and time. Oriented to person, place, and time. Recent and remote memory are intact. Attention and concentration are normal.    Cranial Nerves  CN II: Right homonymous hemianopsia.  CN III, IV, VI: Extraocular movements intact bilaterally.    Motor  Normal muscle bulk throughout. Normal muscle tone.    Reflexes                                            Right                      Left  Biceps                                 3+                         2+    Gait    Mild right hemiparesis.             Assessment and Plan    Diagnoses and all orders for this visit:    1. History of stroke (Primary)    2. Primary " hypertension    3. History of surgical closure of patent foramen ovale (PFO)    4. Paroxysmal A-fib    5. Hyperlipidemia LDL goal <70    6. Tobacco abuse    7. History of CVA (cerebrovascular accident)    Encourage dc of smoking, continue lipitor, continue to control bs bp,   Consider increase namenda to 10mg bid.  No driving due to history of visual field defect.   Follow up with cardiologist..conitnue coumadin with history of paroxysmal afib     I spent 45 minutes caring for Sathya on this date of service. This time includes time spent by me in the following activities:preparing for the visit, reviewing tests, obtaining and/or reviewing a separately obtained history, performing a medically appropriate examination and/or evaluation , counseling and educating the patient/family/caregiver, and documenting information in the medical record  Follow Up   Return if symptoms worsen or fail to improve.  Patient was given instructions and counseling regarding his condition or for health maintenance advice. Please see specific information pulled into the AVS if appropriate.         This document has been electronically signed by Joseph Seipel, MD on April 14, 2025 13:45 EDT

## 2025-04-15 LAB — INR PPP: 2.6

## 2025-04-16 ENCOUNTER — ANTICOAGULATION VISIT (OUTPATIENT)
Dept: FAMILY MEDICINE CLINIC | Facility: CLINIC | Age: 65
End: 2025-04-16
Payer: MEDICARE

## 2025-04-16 ENCOUNTER — CLINICAL SUPPORT (OUTPATIENT)
Dept: FAMILY MEDICINE CLINIC | Facility: CLINIC | Age: 65
End: 2025-04-16
Payer: MEDICARE

## 2025-04-16 DIAGNOSIS — Z79.01 LONG TERM (CURRENT) USE OF ANTICOAGULANTS: Primary | ICD-10-CM

## 2025-04-16 PROCEDURE — 93793 ANTICOAG MGMT PT WARFARIN: CPT | Performed by: FAMILY MEDICINE

## 2025-04-17 ENCOUNTER — PATIENT ROUNDING (BHMG ONLY) (OUTPATIENT)
Dept: NEUROLOGY | Facility: CLINIC | Age: 65
End: 2025-04-17
Payer: MEDICARE

## 2025-04-21 ENCOUNTER — TELEPHONE (OUTPATIENT)
Dept: FAMILY MEDICINE CLINIC | Facility: CLINIC | Age: 65
End: 2025-04-21

## 2025-04-21 NOTE — TELEPHONE ENCOUNTER
Caller: KLAUDIA    Relationship to patient: Home Health/ [facility]    Best call back number: [phone]    Patient is needing: [facility] CALLED IN AND SAID PATIENT CANCELED HIS APPOINTMENT LAST WEEK AND [facility] IS NEEDING THE ORDER FOR PT EVALUATION MOVED TO THIS WEEK. [facility] REQUESTS A CALL BACK WITH NAME OF INDIVIDUAL CALLING SO SHE CAN PLACE IT ON ORDER. [facility] SAID IT IS OKAY TO LEAVE MESSAGE.

## 2025-04-21 NOTE — TELEPHONE ENCOUNTER
Called and left message to approve appt moved to this week from last week for Home Health evaluation

## 2025-04-22 ENCOUNTER — CLINICAL SUPPORT (OUTPATIENT)
Dept: FAMILY MEDICINE CLINIC | Facility: CLINIC | Age: 65
End: 2025-04-22
Payer: MEDICARE

## 2025-04-22 ENCOUNTER — ANTICOAGULATION VISIT (OUTPATIENT)
Dept: FAMILY MEDICINE CLINIC | Facility: CLINIC | Age: 65
End: 2025-04-22
Payer: MEDICARE

## 2025-04-22 DIAGNOSIS — Z79.01 LONG TERM (CURRENT) USE OF ANTICOAGULANTS: Primary | ICD-10-CM

## 2025-04-22 DIAGNOSIS — I48.0 PAROXYSMAL A-FIB: Primary | ICD-10-CM

## 2025-04-22 LAB — INR PPP: 2.9 (ref 2–3)

## 2025-04-22 PROCEDURE — 93792 PT/CAREGIVER TRAING HOME INR: CPT | Performed by: FAMILY MEDICINE

## 2025-04-22 PROCEDURE — 93793 ANTICOAG MGMT PT WARFARIN: CPT | Performed by: FAMILY MEDICINE

## 2025-04-22 NOTE — PROGRESS NOTES
04/22- Faxed INR= 2.9.  Per Dr Kaminski, continue same regimen and recheck in 2 weeks.  Called and informed patient.

## 2025-04-24 ENCOUNTER — TELEPHONE (OUTPATIENT)
Dept: FAMILY MEDICINE CLINIC | Facility: CLINIC | Age: 65
End: 2025-04-24

## 2025-04-24 NOTE — TELEPHONE ENCOUNTER
Caller: EDIL    Relationship: [employer] [facility]    Best call back number: [phone] OK TO LEAVE A VOICEMAIL FOR THESE VERBAL ORDERS AS LONG AS WHOEVER IS LEAVING THE MESSAGE LEAVES THEIR NAME.    What orders are you requesting (i.e. lab or imaging): VERBAL ORDERS    In what timeframe would the patient need to come in: N/A    Where will you receive your lab/imaging services: IN HOME SERVICES    Additional notes: EDIL FROM [employer] WAS CALLING TO GET VERBAL ORDERS FOR PHYSICAL THERAPY IN HOME EVALUATION TO BE DONE NEXT WEEK. ALSO, VERBAL ORDERS FOR IN HOME PHYSICAL THERAPY VISITS.

## 2025-04-30 ENCOUNTER — CLINICAL SUPPORT (OUTPATIENT)
Dept: FAMILY MEDICINE CLINIC | Facility: CLINIC | Age: 65
End: 2025-04-30
Payer: MEDICARE

## 2025-04-30 DIAGNOSIS — Z79.01 LONG TERM (CURRENT) USE OF ANTICOAGULANTS: Primary | ICD-10-CM

## 2025-04-30 LAB — INR PPP: 3.5 (ref 2–3)

## 2025-04-30 PROCEDURE — 93793 ANTICOAG MGMT PT WARFARIN: CPT | Performed by: FAMILY MEDICINE

## 2025-05-01 ENCOUNTER — CLINICAL SUPPORT (OUTPATIENT)
Dept: FAMILY MEDICINE CLINIC | Facility: CLINIC | Age: 65
End: 2025-05-01
Payer: MEDICARE

## 2025-05-01 DIAGNOSIS — E78.2 MIXED HYPERLIPIDEMIA: ICD-10-CM

## 2025-05-01 DIAGNOSIS — Z79.01 LONG TERM (CURRENT) USE OF ANTICOAGULANTS: Primary | ICD-10-CM

## 2025-05-01 DIAGNOSIS — E61.1 IRON DEFICIENCY: Primary | ICD-10-CM

## 2025-05-01 DIAGNOSIS — I10 ESSENTIAL (PRIMARY) HYPERTENSION: ICD-10-CM

## 2025-05-01 DIAGNOSIS — E61.1 IRON DEFICIENCY: ICD-10-CM

## 2025-05-01 LAB — INR PPP: 2.9 (ref 2–3)

## 2025-05-01 PROCEDURE — 93793 ANTICOAG MGMT PT WARFARIN: CPT | Performed by: FAMILY MEDICINE

## 2025-05-01 RX ORDER — FERROUS SULFATE 325(65) MG
1 TABLET ORAL
Qty: 30 TABLET | Refills: 0 | Status: SHIPPED | OUTPATIENT
Start: 2025-05-01

## 2025-05-01 RX ORDER — METOPROLOL TARTRATE 25 MG/1
12.5 TABLET, FILM COATED ORAL EVERY 12 HOURS SCHEDULED
Qty: 30 TABLET | Refills: 0 | Status: SHIPPED | OUTPATIENT
Start: 2025-05-01

## 2025-05-01 RX ORDER — ATORVASTATIN CALCIUM 80 MG/1
80 TABLET, FILM COATED ORAL DAILY
Qty: 90 TABLET | Refills: 0 | Status: SHIPPED | OUTPATIENT
Start: 2025-05-01 | End: 2025-05-01 | Stop reason: SDUPTHER

## 2025-05-01 RX ORDER — ATORVASTATIN CALCIUM 80 MG/1
80 TABLET, FILM COATED ORAL DAILY
Qty: 90 TABLET | Refills: 0 | Status: SHIPPED | OUTPATIENT
Start: 2025-05-01

## 2025-05-02 ENCOUNTER — TELEPHONE (OUTPATIENT)
Dept: FAMILY MEDICINE CLINIC | Facility: CLINIC | Age: 65
End: 2025-05-02

## 2025-05-02 NOTE — TELEPHONE ENCOUNTER
Caller: [facility]    Relationship: [facility]    Best call back number: [phone] (WITH SECURE VOICEMAIL)    What orders are you requesting (i.e. lab or imaging):     PHYSICAL THERAPY   1 DAY A WEEK X 3 WEEKS    In what timeframe would the patient need to come in: ASAP- TREATMENT TO START NEXT WEEK.    Where will you receive your lab/imaging services: [facility].    Additional notes: CALLER REQUESTING VERBAL FOR PT ORDERS. CALLER REQUEST IF VOICEMAIL LEFT TO PLEASE LEAVE NAME ALONG WITH VERBAL ORDER.

## 2025-05-06 NOTE — PROGRESS NOTES
Date of Office Visit: 2025  Encounter Provider: Dr. Elías Smith  Place of Service: [facility] CARDIOLOG[facility] [place]  Patient Name: Sathya Benavides  :1960  Adi Kaminski MD    Chief Complaint   Patient presents with    Atrial Fibrillation    Hyperlipidemia    Follow-up     History of Present Illness    I am pleased to see Mr. Benavides in my office today as a follow-up.    As you know, patient is 64-year-old white gentleman whose past medical history is significant for hyperlipidemia, paroxysmal atrial fibrillation, CVA, PFO, who came today for follow-up.    In  patient had TIA and CVA.  Patient was found to have PFO patient underwent closure of PFO percutaneously in [facility] [place], operated by [facility].  In 2024, patient was admitted at [facility] and was noted to have large left MCA stroke.  Patient underwent urgent clot retrieval percutaneously.  Patient was discharged home on warfarin.  In 2024, patient was admitted at [facility] with atrial fibrillation with RVR.  Patient spontaneously cardioverted into sinus rhythm.  Patient was started on Lopressor 25 mg twice daily.  Apparently it was discontinued.    Patient came today for follow-up.  Overall patient is doing well.  Patient denies any symptom of chest pain or tightness or heaviness.  No orthopnea PND no syncope or presyncope.  Patient does have shortness of breath on exertion    EKG showed normal sinus rhythm incomplete right bundle branch block.    At this stage, I would recommend that patient should proceed with stress test with Cardiol[facility] imaging.  Continue Coumadin.      Past Medical History:   Diagnosis Date    Acid reflux     Atrial fibrillation     Cryptogenic stroke 2021    CVA (cerebral vascular accident) 2021    Enlarged prostate     Headache, tension-type     Hyperlipidemia     Lung nodule     New onset a-fib 10/24/2024    PFO (patent foramen ovale)     Stroke 2021    LOSS OF  PERIPHERAL VISION    Stroke     7/2024         Past Surgical History:   Procedure Laterality Date    APPENDECTOMY      BRONCHOSCOPY Bilateral 4/8/2021    Procedure: BRONCHOSCOPY ENDOBRONCHIAL ULTRASOUND WITH FNA'S;  Surgeon: Sam Tony MD;  Location: [facility] ENDOSCOPY;  Service: Pulmonary;  Laterality: Bilateral;  PRE- LYMPHADENOPATHY  POST- SAME    CARDIAC CATHETERIZATION N/A 10/29/2021    Procedure: Patent foramen ovale closure  ABBOTT;  Surgeon: Sivakumar Chatman MD;  Location: [facility] CATH INVASIVE LOCATION;  Service: Cardiology;  Laterality: N/A;    KNEE ARTHROSCOPY      SHOULDER ARTHROSCOPY             Current Outpatient Medications:     albuterol sulfate  (90 Base) MCG/ACT inhaler, INHALE 2 PUFFS BY MOUTH EVERY 4 HOURS AS NEEDED FOR WHEEZING, Disp: 18 g, Rfl: 0    atorvastatin (LIPITOR) 80 MG tablet, Take 1 tablet by mouth Daily., Disp: 90 tablet, Rfl: 0    cyanocobalamin 100 MCG tablet, GIVE 1 TABLET BY MOUTH ONCE DAILY, Disp: 30 tablet, Rfl: 0    Diclofenac Sodium (VOLTAREN) 1 % gel gel, Apply 4 g topically to the appropriate area as directed 4 (Four) Times a Day As Needed (hand swelling)., Disp: 350 g, Rfl: 1    FeroSul 325 (65 Fe) MG tablet, Take 1 tablet by mouth once daily with breakfast, Disp: 30 tablet, Rfl: 0    hydrOXYzine (ATARAX) 50 MG tablet, Take 1 tablet by mouth Every 8 (Eight) Hours As Needed for Anxiety., Disp: 42 tablet, Rfl: 1    Magnesium Oxide -Mg Supplement (MAGnesium-Oxide) 400 (240 Mg) MG tablet, Take 1 tablet by mouth 2 (Two) Times a Day., Disp: 60 tablet, Rfl: 2    memantine (NAMENDA) 5 MG tablet, TAKE 1 TABLET BY MOUTH EVERY 12 HOURS, Disp: 180 tablet, Rfl: 0    metoprolol tartrate (LOPRESSOR) 25 MG tablet, Take 1/2 (one-half) tablet by mouth twice daily, Disp: 30 tablet, Rfl: 0    mirtazapine (Remeron) 30 MG tablet, Take 1 tablet by mouth Every Night., Disp: 90 tablet, Rfl: 1    pantoprazole (PROTONIX) 40 MG EC tablet, Take 1 tablet by mouth once daily, Disp: 90  tablet, Rfl: 0    potassium chloride (MICRO-K) 10 MEQ CR capsule, Take 1 capsule by mouth twice daily, Disp: 180 capsule, Rfl: 0    QUEtiapine (SEROquel) 50 MG tablet, Take 1 tablet by mouth At Night As Needed., Disp: , Rfl:     tamsulosin (FLOMAX) 0.4 MG capsule 24 hr capsule, Take 2 capsules by mouth once daily, Disp: 180 capsule, Rfl: 0    warfarin (COUMADIN) 1 MG tablet, Take daily., Disp: 90 tablet, Rfl: 3    warfarin (COUMADIN) 2 MG tablet, Take 1 tablet by mouth 3 (Three) Times a Week. Tues, Thur, Sat, Disp: , Rfl:     warfarin (COUMADIN) 4 MG tablet, Take 1 tablet by mouth 4 (Four) Times a Week. Mon, Wed, Fri, Sun, Disp: , Rfl:       Social History     Socioeconomic History    Marital status:    Tobacco Use    Smoking status: Every Day     Current packs/day: 1.00     Average packs/day: 1 pack/day for 44.4 years (44.4 ttl pk-yrs)     Types: Cigarettes     Start date: 1981    Smokeless tobacco: Never    Tobacco comments:     Patient advised to stop smoking   Vaping Use    Vaping status: Never Used   Substance and Sexual Activity    Alcohol use: Not Currently     Comment: occasionally    Drug use: Not Currently    Sexual activity: Defer         Review of Systems   Constitutional: Negative for chills and fever.   HENT:  Negative for ear discharge and nosebleeds.    Eyes:  Negative for discharge and redness.   Cardiovascular:  Negative for chest pain, orthopnea, palpitations, paroxysmal nocturnal dyspnea and syncope.   Respiratory:  Positive for shortness of breath. Negative for cough and wheezing.    Endocrine: Negative for heat intolerance.   Skin:  Negative for rash.   Musculoskeletal:  Negative for arthritis and myalgias.   Gastrointestinal:  Negative for abdominal pain, melena, nausea and vomiting.   Genitourinary:  Negative for dysuria and hematuria.   Neurological:  Negative for dizziness, light-headedness, numbness and tremors.   Psychiatric/Behavioral:  Negative for depression. The patient is not  "nervous/anxious.        Procedures      ECG 12 Lead    Date/Time: 5/9/2025 1:48 PM  Performed by: Elías Smith MD    Authorized by: Elías Smith MD  Comparison: compared with previous ECG   Similar to previous ECG  Rhythm: sinus rhythm    Clinical impression: normal ECG          ECG 12 Lead    (Results Pending)           Objective:    /81 (BP Location: Right arm, Patient Position: Sitting, Cuff Size: Large Adult)   Pulse 72   Resp 18   Ht 188 cm (74.02\")   Wt 65.8 kg (145 lb)   SpO2 99%   BMI 18.61 kg/m²         Constitutional:       Appearance: Well-developed.   Eyes:      General: No scleral icterus.        Right eye: No discharge.   HENT:      Head: Normocephalic and atraumatic.   Neck:      Thyroid: No thyromegaly.      Lymphadenopathy: No cervical adenopathy.   Pulmonary:      Effort: Pulmonary effort is normal. No respiratory distress.      Breath sounds: Normal breath sounds. No wheezing. No rales.   Cardiovascular:      Normal rate. Regular rhythm.      No gallop.    Edema:     Peripheral edema absent.   Abdominal:      Tenderness: There is no abdominal tenderness.   Skin:     Findings: No erythema or rash.   Neurological:      Mental Status: Alert and oriented to person, place, and time.             Assessment:       Diagnosis Plan   1. Primary hypertension  ECG 12 Lead      2. Paroxysmal A-fib  ECG 12 Lead      3. PFO (patent foramen ovale)                 Plan:       MDM:    1.  Paroxysmal atrial fibrillation:    Patient is on Coumadin and maintaining sinus rhythm.  Proceed with stress test.  Patient has not had ischemic evaluation    2.  PFO s/p closure:    Patient underwent PFO closure continue Coumadin    3.  Mixed hyperlipidemia:    Patient is on Lipitor repeat lipid panel testing    4.  Hypertension:    Blood pressure is controlled.  "

## 2025-05-07 ENCOUNTER — CLINICAL SUPPORT (OUTPATIENT)
Dept: FAMILY MEDICINE CLINIC | Facility: CLINIC | Age: 65
End: 2025-05-07
Payer: MEDICARE

## 2025-05-07 DIAGNOSIS — Z79.01 LONG TERM (CURRENT) USE OF ANTICOAGULANTS: Primary | ICD-10-CM

## 2025-05-07 LAB — INR PPP: 3.2 (ref 2–3)

## 2025-05-07 PROCEDURE — 93793 ANTICOAG MGMT PT WARFARIN: CPT | Performed by: FAMILY MEDICINE

## 2025-05-07 NOTE — PROGRESS NOTES
Faxed in INR of 3.2, take 4 mg on Sun and Fri, take 3 mg on Mon, Wed, and Friday, and take 2 mg on Tues and Thurs. Recheck in 1 week.

## 2025-05-08 ENCOUNTER — TELEPHONE (OUTPATIENT)
Dept: FAMILY MEDICINE CLINIC | Facility: CLINIC | Age: 65
End: 2025-05-08

## 2025-05-08 NOTE — TELEPHONE ENCOUNTER
Caller: JUAN M KING    Relationship: Emergency Contact    Best call back number: [phone]    Which medication are you concerned about: MEMANTINE    Who prescribed you this medication: DR ANDREW THEODORE     When did you start taking this medication:     What are your concerns: PATIENT'S STROKE DOCTOR HAS RAISED THIS FROM 2.5 MG. TWICE A DAY TO 5 MG. TWICE A DAY AND PATIENT'S WIFE WANTED TO CHECK WITH PCP AND MAKE SURE HE WAS AWARE OF THIS AND IF HE AGREED WITH IT.  PLEASE ADVISE

## 2025-05-08 NOTE — TELEPHONE ENCOUNTER
Caller: KIMBERLEY TOVAR    Relationship to patient: Pending sale to [facility]    Best call back number: [phone]     Patient is needing: LOW FROM [facility] CALLED IN TO UPDATE MD THEODORE THAT THEY PATIENT WAS GOING TO [facility] THIS WEEK. LOW STATES SHE WILL TRY TO GO OVER MONDAY 5.12.25 PLEASE ADVISE

## 2025-05-09 ENCOUNTER — CLINICAL SUPPORT (OUTPATIENT)
Dept: FAMILY MEDICINE CLINIC | Facility: CLINIC | Age: 65
End: 2025-05-09
Payer: MEDICARE

## 2025-05-09 ENCOUNTER — OFFICE VISIT (OUTPATIENT)
Dept: CARDIOLOGY | Facility: CLINIC | Age: 65
End: 2025-05-09
Payer: MEDICARE

## 2025-05-09 ENCOUNTER — ANTICOAGULATION VISIT (OUTPATIENT)
Dept: FAMILY MEDICINE CLINIC | Facility: CLINIC | Age: 65
End: 2025-05-09
Payer: MEDICARE

## 2025-05-09 VITALS
RESPIRATION RATE: 18 BRPM | OXYGEN SATURATION: 99 % | DIASTOLIC BLOOD PRESSURE: 81 MMHG | HEIGHT: 74 IN | HEART RATE: 72 BPM | WEIGHT: 145 LBS | BODY MASS INDEX: 18.61 KG/M2 | SYSTOLIC BLOOD PRESSURE: 120 MMHG

## 2025-05-09 DIAGNOSIS — Q21.12 PFO (PATENT FORAMEN OVALE): Chronic | ICD-10-CM

## 2025-05-09 DIAGNOSIS — I10 PRIMARY HYPERTENSION: Primary | ICD-10-CM

## 2025-05-09 DIAGNOSIS — I48.0 PAROXYSMAL A-FIB: Primary | ICD-10-CM

## 2025-05-09 DIAGNOSIS — E78.5 HYPERLIPIDEMIA LDL GOAL <70: ICD-10-CM

## 2025-05-09 DIAGNOSIS — I10 PRIMARY HYPERTENSION: ICD-10-CM

## 2025-05-09 DIAGNOSIS — I48.0 PAROXYSMAL A-FIB: ICD-10-CM

## 2025-05-09 PROCEDURE — 93792 PT/CAREGIVER TRAING HOME INR: CPT | Performed by: FAMILY MEDICINE

## 2025-05-09 NOTE — PROGRESS NOTES
Venipuncture Blood Specimen Collection  Venipuncture performed in Lt by Antonia Armas with good hemostasis. Patient tolerated the procedure well without complications.   05/09/25   Yesenia Perez CMA/THAO

## 2025-05-15 ENCOUNTER — CLINICAL SUPPORT (OUTPATIENT)
Dept: FAMILY MEDICINE CLINIC | Facility: CLINIC | Age: 65
End: 2025-05-15
Payer: MEDICARE

## 2025-05-15 DIAGNOSIS — I48.91 ATRIAL FIBRILLATION, UNSPECIFIED TYPE: Primary | ICD-10-CM

## 2025-05-15 LAB — INR PPP: 3.2

## 2025-05-15 PROCEDURE — 93793 ANTICOAG MGMT PT WARFARIN: CPT | Performed by: FAMILY MEDICINE

## 2025-05-16 ENCOUNTER — TELEPHONE (OUTPATIENT)
Dept: FAMILY MEDICINE CLINIC | Facility: CLINIC | Age: 65
End: 2025-05-16

## 2025-05-16 NOTE — TELEPHONE ENCOUNTER
Caller: LOW    Relationship: Caregiver (non-relative)    Best call back number: [phone]     What is the best time to reach you: ANY    Who are you requesting to speak with (clinical staff, provider,  specific staff member): CLINICAL STAFF    What was the call regarding: LOW WITH [facility] CALLED STATING THAT THE PATIENT HAS BEEN CANCELING AND DECLINING VISITS. SHE WANTED THE PROVIDER TO KNOW

## 2025-05-20 ENCOUNTER — CLINICAL SUPPORT (OUTPATIENT)
Dept: FAMILY MEDICINE CLINIC | Facility: CLINIC | Age: 65
End: 2025-05-20
Payer: MEDICARE

## 2025-05-20 ENCOUNTER — HOSPITAL ENCOUNTER (EMERGENCY)
Facility: HOSPITAL | Age: 65
Discharge: HOME | End: 2025-05-20
Attending: EMERGENCY MEDICINE | Admitting: EMERGENCY MEDICINE
Payer: MEDICARE

## 2025-05-20 ENCOUNTER — TELEPHONE (OUTPATIENT)
Dept: FAMILY MEDICINE CLINIC | Facility: CLINIC | Age: 65
End: 2025-05-20
Payer: MEDICARE

## 2025-05-20 VITALS
DIASTOLIC BLOOD PRESSURE: 51 MMHG | TEMPERATURE: 98.1 F | SYSTOLIC BLOOD PRESSURE: 123 MMHG | HEART RATE: 79 BPM | BODY MASS INDEX: 18.11 KG/M2 | HEIGHT: 74 IN | RESPIRATION RATE: 18 BRPM | WEIGHT: 141.09 LBS | OXYGEN SATURATION: 98 %

## 2025-05-20 DIAGNOSIS — T45.511A POISONING BY WARFARIN SODIUM, ACCIDENTAL OR UNINTENTIONAL, INITIAL ENCOUNTER: Primary | ICD-10-CM

## 2025-05-20 DIAGNOSIS — I48.91 ATRIAL FIBRILLATION, UNSPECIFIED TYPE: Primary | ICD-10-CM

## 2025-05-20 LAB
ANION GAP SERPL CALCULATED.3IONS-SCNC: 7.5 MMOL/L (ref 5–15)
BASOPHILS # BLD AUTO: 0.06 10*3/MM3 (ref 0–0.2)
BASOPHILS NFR BLD AUTO: 0.8 % (ref 0–1.5)
BUN SERPL-MCNC: 10 MG/DL (ref 8–23)
BUN/CREAT SERPL: 9.7 (ref 7–25)
CALCIUM SPEC-SCNC: 9 MG/DL (ref 8.6–10.5)
CHLORIDE SERPL-SCNC: 101 MMOL/L (ref 98–107)
CO2 SERPL-SCNC: 26.5 MMOL/L (ref 22–29)
CREAT SERPL-MCNC: 1.03 MG/DL (ref 0.76–1.27)
DEPRECATED RDW RBC AUTO: 40.8 FL (ref 37–54)
EGFRCR SERPLBLD CKD-EPI 2021: 80.1 ML/MIN/1.73
EOSINOPHIL # BLD AUTO: 0.19 10*3/MM3 (ref 0–0.4)
EOSINOPHIL NFR BLD AUTO: 2.6 % (ref 0.3–6.2)
ERYTHROCYTE [DISTWIDTH] IN BLOOD BY AUTOMATED COUNT: 12.5 % (ref 12.3–15.4)
GLUCOSE SERPL-MCNC: 106 MG/DL (ref 65–99)
HCT VFR BLD AUTO: 44.6 % (ref 37.5–51)
HGB BLD-MCNC: 14.4 G/DL (ref 13–17.7)
HOLD SPECIMEN: NORMAL
IMM GRANULOCYTES # BLD AUTO: 0.02 10*3/MM3 (ref 0–0.05)
IMM GRANULOCYTES NFR BLD AUTO: 0.3 % (ref 0–0.5)
INR PPP: 3.77 (ref 2–3)
INR PPP: 4
LYMPHOCYTES # BLD AUTO: 2.08 10*3/MM3 (ref 0.7–3.1)
LYMPHOCYTES NFR BLD AUTO: 28.9 % (ref 19.6–45.3)
MCH RBC QN AUTO: 28.7 PG (ref 26.6–33)
MCHC RBC AUTO-ENTMCNC: 32.3 G/DL (ref 31.5–35.7)
MCV RBC AUTO: 89 FL (ref 79–97)
MONOCYTES # BLD AUTO: 0.75 10*3/MM3 (ref 0.1–0.9)
MONOCYTES NFR BLD AUTO: 10.4 % (ref 5–12)
NEUTROPHILS NFR BLD AUTO: 4.1 10*3/MM3 (ref 1.7–7)
NEUTROPHILS NFR BLD AUTO: 57 % (ref 42.7–76)
NRBC BLD AUTO-RTO: 0 /100 WBC (ref 0–0.2)
PLATELET # BLD AUTO: 193 10*3/MM3 (ref 140–450)
PMV BLD AUTO: 8.8 FL (ref 6–12)
POTASSIUM SERPL-SCNC: 3.8 MMOL/L (ref 3.5–5.2)
PROTHROMBIN TIME: 37.9 SECONDS (ref 19.4–28.5)
RBC # BLD AUTO: 5.01 10*6/MM3 (ref 4.14–5.8)
SODIUM SERPL-SCNC: 135 MMOL/L (ref 136–145)
WBC NRBC COR # BLD AUTO: 7.2 10*3/MM3 (ref 3.4–10.8)

## 2025-05-20 PROCEDURE — 99283 EMERGENCY DEPT VISIT LOW MDM: CPT

## 2025-05-20 PROCEDURE — 80048 BASIC METABOLIC PNL TOTAL CA: CPT | Performed by: EMERGENCY MEDICINE

## 2025-05-20 PROCEDURE — 85025 COMPLETE CBC W/AUTO DIFF WBC: CPT | Performed by: EMERGENCY MEDICINE

## 2025-05-20 PROCEDURE — 93793 ANTICOAG MGMT PT WARFARIN: CPT | Performed by: FAMILY MEDICINE

## 2025-05-20 PROCEDURE — 85610 PROTHROMBIN TIME: CPT | Performed by: EMERGENCY MEDICINE

## 2025-05-20 RX ORDER — SODIUM CHLORIDE 0.9 % (FLUSH) 0.9 %
10 SYRINGE (ML) INJECTION AS NEEDED
Status: DISCONTINUED | OUTPATIENT
Start: 2025-05-20 | End: 2025-05-20 | Stop reason: HOSPADM

## 2025-05-20 NOTE — ED PROVIDER NOTES
Subjective   History of Present Illness  64-year-old male with reported history of atrial fibrillation and previous stroke states he been on Coumadin for the last year and checks his INR weekly and his INR this week was 4.0.  He denies any bleeding or headache or shortness of breath or melena or any other complaints.  He was told by primary care to come to the ER for vitamin K today.  Review of Systems    Past Medical History:   Diagnosis Date    Acid reflux     Atrial fibrillation     Cryptogenic stroke 03/26/2021    CVA (cerebral vascular accident) 03/25/2021    Enlarged prostate     Headache, tension-type     Hyperlipidemia     Lung nodule     New onset a-fib 10/24/2024    PFO (patent foramen ovale)     Stroke 03/2021    LOSS OF PERIPHERAL VISION    Stroke     7/2024       Allergies   Allergen Reactions    Alpha-Gal Hives    Adhesive Tape Rash    Amoxicillin-Pot Clavulanate Other (See Comments) and GI Intolerance     Upset stomach       Past Surgical History:   Procedure Laterality Date    APPENDECTOMY      BRONCHOSCOPY Bilateral 4/8/2021    Procedure: BRONCHOSCOPY ENDOBRONCHIAL ULTRASOUND WITH FNA'S;  Surgeon: Sam Tony MD;  Location: [facility] ENDOSCOPY;  Service: Pulmonary;  Laterality: Bilateral;  PRE- LYMPHADENOPATHY  POST- SAME    CARDIAC CATHETERIZATION N/A 10/29/2021    Procedure: Patent foramen ovale closure  ABBOTT;  Surgeon: Sivakumar Chatman MD;  Location: [facility] CATH INVASIVE LOCATION;  Service: Cardiology;  Laterality: N/A;    KNEE ARTHROSCOPY      SHOULDER ARTHROSCOPY         Family History   Problem Relation Age of Onset    Heart disease Mother     Diabetes Mother     Dementia Father     Colon cancer Brother     No Known Problems Daughter     No Known Problems Daughter     No Known Problems Daughter     No Known Problems Son     Malrahul Hyperthermia Neg Hx        Social History     Socioeconomic History    Marital status:    Tobacco Use    Smoking status: Every Day     Current  packs/day: 1.00     Average packs/day: 1 pack/day for 44.4 years (44.4 ttl pk-yrs)     Types: Cigarettes     Start date: 1981    Smokeless tobacco: Never    Tobacco comments:     Patient advised to stop smoking   Vaping Use    Vaping status: Never Used   Substance and Sexual Activity    Alcohol use: Not Currently     Comment: occasionally    Drug use: Not Currently    Sexual activity: Defer     Prior to Admission medications    Medication Sig Start Date End Date Taking? Authorizing Provider   albuterol sulfate  (90 Base) MCG/ACT inhaler INHALE 2 PUFFS BY MOUTH EVERY 4 HOURS AS NEEDED FOR WHEEZING 2/10/25   Adi Kaminski MD   atorvastatin (LIPITOR) 80 MG tablet Take 1 tablet by mouth Daily. 5/1/25   Adi Kaminski MD   cyanocobalamin 100 MCG tablet GIVE 1 TABLET BY MOUTH ONCE DAILY 11/19/24   Adi Kaminski MD   Diclofenac Sodium (VOLTAREN) 1 % gel gel Apply 4 g topically to the appropriate area as directed 4 (Four) Times a Day As Needed (hand swelling). 3/20/25   Adi Kaminski MD   FeroSul 325 (65 Fe) MG tablet Take 1 tablet by mouth once daily with breakfast 5/1/25   Adi Kaminski MD   hydrOXYzine (ATARAX) 50 MG tablet Take 1 tablet by mouth Every 8 (Eight) Hours As Needed for Anxiety. 3/20/25   Adi Kaminski MD   Magnesium Oxide -Mg Supplement (MAGnesium-Oxide) 400 (240 Mg) MG tablet Take 1 tablet by mouth 2 (Two) Times a Day. 3/20/25   Adi Kaminski MD   memantine (NAMENDA) 5 MG tablet TAKE 1 TABLET BY MOUTH EVERY 12 HOURS 4/7/25   Adi Kaminski MD   metoprolol tartrate (LOPRESSOR) 25 MG tablet Take 1/2 (one-half) tablet by mouth twice daily 5/1/25   Elías Smith MD   mirtazapine (Remeron) 30 MG tablet Take 1 tablet by mouth Every Night. 12/17/24   Adi Kaminski MD   pantoprazole (PROTONIX) 40 MG EC tablet Take 1 tablet by mouth once daily 3/3/25   Adi Kaminski MD   potassium chloride (MICRO-K) 10 MEQ CR capsule Take 1 capsule  "by mouth twice daily 3/28/25   Adi Kaminski MD   QUEtiapine (SEROquel) 50 MG tablet Take 1 tablet by mouth At Night As Needed.    ProviderChip MD   tamsulosin (FLOMAX) 0.4 MG capsule 24 hr capsule Take 2 capsules by mouth once daily 3/3/25   Adi Kaminski MD   warfarin (COUMADIN) 1 MG tablet Take daily. 1/21/25   Adi Kaminski MD   warfarin (COUMADIN) 2 MG tablet Take 1 tablet by mouth 3 (Three) Times a Week. Roberto Maldonado, Sat    Provider, Historical, MD   warfarin (COUMADIN) 4 MG tablet Take 1 tablet by mouth 4 (Four) Times a Week. Mon, Wed, Fri, Sun    Chip Ascencio MD     /51   Pulse 79   Temp 98.1 °F (36.7 °C)   Resp 18   Ht 188 cm (74\")   Wt 64 kg (141 lb 1.5 oz)   SpO2 98%   BMI 18.12 kg/m²         Objective   Physical Exam  General: Well-appearing, no acute distress  Psych: Oriented, pleasant affect  Respirations: Clear, nonlabored respirations  Abdomen soft and nontender  Skin: No rash, normal color, no petechiae or purpura   Procedures           ED Course      Results for orders placed or performed during the hospital encounter of 05/20/25   Basic Metabolic Panel    Collection Time: 05/20/25  6:10 PM    Specimen: Blood   Result Value Ref Range    Glucose 106 (H) 65 - 99 mg/dL    BUN 10 8 - 23 mg/dL    Creatinine 1.03 0.76 - 1.27 mg/dL    Sodium 135 (L) 136 - 145 mmol/L    Potassium 3.8 3.5 - 5.2 mmol/L    Chloride 101 98 - 107 mmol/L    CO2 26.5 22.0 - 29.0 mmol/L    Calcium 9.0 8.6 - 10.5 mg/dL    BUN/Creatinine Ratio 9.7 7.0 - 25.0    Anion Gap 7.5 5.0 - 15.0 mmol/L    eGFR 80.1 >60.0 mL/min/1.73   Protime-INR    Collection Time: 05/20/25  6:10 PM    Specimen: Blood   Result Value Ref Range    Protime 37.9 (H) 19.4 - 28.5 Seconds    INR 3.77 (H) 2.00 - 3.00   CBC Auto Differential    Collection Time: 05/20/25  6:10 PM    Specimen: Blood   Result Value Ref Range    WBC 7.20 3.40 - 10.80 10*3/mm3    RBC 5.01 4.14 - 5.80 10*6/mm3    Hemoglobin 14.4 13.0 - " 17.7 g/dL    Hematocrit 44.6 37.5 - 51.0 %    MCV 89.0 79.0 - 97.0 fL    MCH 28.7 26.6 - 33.0 pg    MCHC 32.3 31.5 - 35.7 g/dL    RDW 12.5 12.3 - 15.4 %    RDW-SD 40.8 37.0 - 54.0 fl    MPV 8.8 6.0 - 12.0 fL    Platelets 193 140 - 450 10*3/mm3    Neutrophil % 57.0 42.7 - 76.0 %    Lymphocyte % 28.9 19.6 - 45.3 %    Monocyte % 10.4 5.0 - 12.0 %    Eosinophil % 2.6 0.3 - 6.2 %    Basophil % 0.8 0.0 - 1.5 %    Immature Grans % 0.3 0.0 - 0.5 %    Neutrophils, Absolute 4.10 1.70 - 7.00 10*3/mm3    Lymphocytes, Absolute 2.08 0.70 - 3.10 10*3/mm3    Monocytes, Absolute 0.75 0.10 - 0.90 10*3/mm3    Eosinophils, Absolute 0.19 0.00 - 0.40 10*3/mm3    Basophils, Absolute 0.06 0.00 - 0.20 10*3/mm3    Immature Grans, Absolute 0.02 0.00 - 0.05 10*3/mm3    nRBC 0.0 0.0 - 0.2 /100 WBC   [facility]    Collection Time: 05/20/25  6:10 PM   Result Value Ref Range    Extra Tube Hold for add-ons.                                                       Medical Decision Making  Patient is having no complications of his INR elevation.  INR is only marginally elevated from his therapeutic range.  We discussed weighing risks and benefits of vitamin K injection on today's visit is such that I do not feel that it should be given for his current situation of mild INR elevation and no bleeding or complaints.  He will hold his Coumadin and recheck his INR this week and follow-up with his doctor.  Patient and wife agreeable with the plan    Problems Addressed:  Poisoning by warfarin sodium, accidental or unintentional, initial encounter: complicated acute illness or injury    Amount and/or Complexity of Data Reviewed  Labs: ordered. Decision-making details documented in ED Course.     Details: CBC normal, INR is elevated, Chem-7 shows some mild hyponatremia    Risk  Prescription drug management.        Final diagnoses:   Poisoning by warfarin sodium, accidental or unintentional, initial encounter       ED Disposition  ED Disposition       ED  Disposition   Discharge    Condition   Stable    Comment   --               No follow-up provider specified.       Medication List      No changes were made to your prescriptions during this visit.            Ortiz Marie MD  05/20/25 7417

## 2025-05-20 NOTE — DISCHARGE INSTRUCTIONS
Hold Coumadin and recheck your INR in 2 days and follow-up with your doctor for further instructions on your Coumadin dosing.  Return for bleeding, or any other concern

## 2025-05-20 NOTE — TELEPHONE ENCOUNTER
Patient was called at 1420 hrs today and told to go to ER due to his INR was 4.0, which is higher than the last reading.  Pt told our staff that he had only held one day previously and then restarted his coumadin.  He had been told to hold his coumadin and recheck his INR the next day and let us know the results.  Pt's family told that if he does not go to ER and if his INR continued to increase, then he risked having a large bleed and could die from this.  She was told to have pt hold his coumadin until his INR gets below 3.0 and we tell him to restart it.

## 2025-05-22 ENCOUNTER — CLINICAL SUPPORT (OUTPATIENT)
Dept: FAMILY MEDICINE CLINIC | Facility: CLINIC | Age: 65
End: 2025-05-22
Payer: MEDICARE

## 2025-05-22 ENCOUNTER — TELEPHONE (OUTPATIENT)
Dept: FAMILY MEDICINE CLINIC | Facility: CLINIC | Age: 65
End: 2025-05-22

## 2025-05-22 DIAGNOSIS — I48.0 PAROXYSMAL ATRIAL FIBRILLATION: Primary | ICD-10-CM

## 2025-05-22 LAB — INR PPP: 2.6 (ref 0.9–1.1)

## 2025-05-22 NOTE — PROGRESS NOTES
INR 2.6 pt to take 4mg Wed, Fri, Sun then 2mg rest of the days with recheck of INR in 1 week on 5/29

## 2025-05-22 NOTE — TELEPHONE ENCOUNTER
Caller: JUAN M KING    Relationship: Emergency Contact    Best call back number: [phone]     What was the call regarding: JUAN M STATES THE PATIENTS INR WAS 2.6 TODAY.

## 2025-05-28 ENCOUNTER — CLINICAL SUPPORT (OUTPATIENT)
Dept: FAMILY MEDICINE CLINIC | Facility: CLINIC | Age: 65
End: 2025-05-28
Payer: MEDICARE

## 2025-05-28 DIAGNOSIS — Z86.73 HISTORY OF STROKE: Primary | ICD-10-CM

## 2025-05-28 LAB — INR PPP: 2.3 (ref 2–3)

## 2025-05-28 PROCEDURE — 93792 PT/CAREGIVER TRAING HOME INR: CPT | Performed by: FAMILY MEDICINE

## 2025-05-30 DIAGNOSIS — K21.9 GASTROESOPHAGEAL REFLUX DISEASE, UNSPECIFIED WHETHER ESOPHAGITIS PRESENT: ICD-10-CM

## 2025-05-30 RX ORDER — PANTOPRAZOLE SODIUM 40 MG/1
40 TABLET, DELAYED RELEASE ORAL DAILY
Qty: 90 TABLET | Refills: 0 | OUTPATIENT
Start: 2025-05-30

## 2025-05-30 RX ORDER — TAMSULOSIN HYDROCHLORIDE 0.4 MG/1
2 CAPSULE ORAL DAILY
Qty: 180 CAPSULE | Refills: 0 | OUTPATIENT
Start: 2025-05-30

## 2025-05-30 NOTE — TELEPHONE ENCOUNTER
Caller: JUAN M KING    Relationship: Emergency Contact    Best call back number: [phone]     Requested Prescriptions:   Requested Prescriptions     Pending Prescriptions Disp Refills    tamsulosin (FLOMAX) 0.4 MG capsule 24 hr capsule 180 capsule 0     Sig: Take 2 capsules by mouth Daily.    pantoprazole (PROTONIX) 40 MG EC tablet 90 tablet 0     Sig: Take 1 tablet by mouth Daily.        Pharmacy where request should be sent: [facility] [id number] [place], [place] [phone]      Last office visit with prescribing clinician: 3/20/2025   Last telemedicine visit with prescribing clinician: Visit date not found   Next office visit with prescribing clinician: Visit date not found     Additional details provided by patient: OUT OF REFILLS AND MEDICATION    Does the patient have less than a 3 day supply:  [x] Yes  [] No    Would you like a call back once the refill request has been completed: [] Yes [] No    If the office needs to give you a call back, can they leave a voicemail: [] Yes [] No    Franca Perrin Rep   05/30/25 13:58 EDT

## 2025-06-03 ENCOUNTER — CLINICAL SUPPORT (OUTPATIENT)
Dept: FAMILY MEDICINE CLINIC | Facility: CLINIC | Age: 65
End: 2025-06-03
Payer: MEDICARE

## 2025-06-03 DIAGNOSIS — I48.0 PAROXYSMAL ATRIAL FIBRILLATION: Primary | ICD-10-CM

## 2025-06-03 LAB — INR PPP: 3.3 (ref 0.9–1.1)

## 2025-06-03 PROCEDURE — 85610 PROTHROMBIN TIME: CPT | Performed by: FAMILY MEDICINE

## 2025-06-03 PROCEDURE — 36416 COLLJ CAPILLARY BLOOD SPEC: CPT | Performed by: FAMILY MEDICINE

## 2025-06-03 PROCEDURE — 93793 ANTICOAG MGMT PT WARFARIN: CPT | Performed by: FAMILY MEDICINE

## 2025-06-04 ENCOUNTER — CLINICAL SUPPORT (OUTPATIENT)
Dept: FAMILY MEDICINE CLINIC | Facility: CLINIC | Age: 65
End: 2025-06-04
Payer: MEDICARE

## 2025-06-04 DIAGNOSIS — I10 PRIMARY HYPERTENSION: ICD-10-CM

## 2025-06-04 DIAGNOSIS — I48.0 PAROXYSMAL A-FIB: ICD-10-CM

## 2025-06-04 DIAGNOSIS — E78.5 HYPERLIPIDEMIA LDL GOAL <70: ICD-10-CM

## 2025-06-04 LAB
IRON 24H UR-MRATE: 77 MCG/DL (ref 59–158)
IRON SATN MFR SERPL: 27 % (ref 20–50)
TIBC SERPL-MCNC: 289 MCG/DL (ref 298–536)
TRANSFERRIN SERPL-MCNC: 194 MG/DL (ref 200–360)

## 2025-06-04 PROCEDURE — 36415 COLL VENOUS BLD VENIPUNCTURE: CPT | Performed by: FAMILY MEDICINE

## 2025-06-04 PROCEDURE — 83540 ASSAY OF IRON: CPT | Performed by: FAMILY MEDICINE

## 2025-06-04 PROCEDURE — 84466 ASSAY OF TRANSFERRIN: CPT | Performed by: FAMILY MEDICINE

## 2025-06-04 NOTE — PROGRESS NOTES
..  Venipuncture Blood Specimen Collection  Venipuncture performed in LT arm by Antonia Armas with good hemostasis. Patient tolerated the procedure well without complications.   06/04/25   Nicole Smith MA

## 2025-06-05 ENCOUNTER — CLINICAL SUPPORT (OUTPATIENT)
Dept: FAMILY MEDICINE CLINIC | Facility: CLINIC | Age: 65
End: 2025-06-05
Payer: MEDICARE

## 2025-06-05 ENCOUNTER — TELEPHONE (OUTPATIENT)
Dept: FAMILY MEDICINE CLINIC | Facility: CLINIC | Age: 65
End: 2025-06-05

## 2025-06-05 ENCOUNTER — READMISSION MANAGEMENT (OUTPATIENT)
Dept: CALL CENTER | Facility: HOSPITAL | Age: 65
End: 2025-06-05
Payer: MEDICARE

## 2025-06-05 ENCOUNTER — TELEPHONE (OUTPATIENT)
Dept: FAMILY MEDICINE CLINIC | Facility: CLINIC | Age: 65
End: 2025-06-05
Payer: MEDICARE

## 2025-06-05 DIAGNOSIS — I48.0 PAROXYSMAL A-FIB: Primary | ICD-10-CM

## 2025-06-05 DIAGNOSIS — I48.0 PAROXYSMAL A-FIB: ICD-10-CM

## 2025-06-05 LAB
INR PPP: 1.49 (ref 0.86–1.15)
PROTHROMBIN TIME: 18.7 SECONDS (ref 11.8–14.9)

## 2025-06-05 PROCEDURE — 36415 COLL VENOUS BLD VENIPUNCTURE: CPT | Performed by: FAMILY MEDICINE

## 2025-06-05 PROCEDURE — 85610 PROTHROMBIN TIME: CPT | Performed by: FAMILY MEDICINE

## 2025-06-05 NOTE — OUTREACH NOTE
Prep Survey      Flowsheet Row Responses   [facility] facility patient discharged from? Non-BH   Is LACE score < 7 ? Non-BH Discharge   Eligibility Not Eligible   What are the reasons patient is not eligible? Other  [no recent external discharge noted]   Does the patient have one of the following disease processes/diagnoses(primary or secondary)? Other   Prep survey completed? Yes            Delicia Phillips Registered Nurse

## 2025-06-05 NOTE — TELEPHONE ENCOUNTER
[facility] is discharging pt due to non compliance.  Pt has cx the last 3 visits.  Seeing pt for right side weakness, issues with right shoulder and hand.

## 2025-06-05 NOTE — TELEPHONE ENCOUNTER
"Patient came to the office today for his PT/INR. Patient has been off of his coumadin x3 days. Patient states \"he don't know what to do.\"  "

## 2025-06-05 NOTE — PROGRESS NOTES
..  Venipuncture Blood Specimen Collection  Venipuncture performed in Lt arm by Antonia Armas with good hemostasis. Patient tolerated the procedure well without complications.   06/05/25   Nicole Smith MA

## 2025-06-06 DIAGNOSIS — I10 ESSENTIAL (PRIMARY) HYPERTENSION: ICD-10-CM

## 2025-06-06 RX ORDER — METOPROLOL TARTRATE 25 MG/1
12.5 TABLET, FILM COATED ORAL EVERY 12 HOURS SCHEDULED
Qty: 30 TABLET | Refills: 0 | Status: SHIPPED | OUTPATIENT
Start: 2025-06-06

## 2025-06-09 ENCOUNTER — OFFICE VISIT (OUTPATIENT)
Dept: FAMILY MEDICINE CLINIC | Facility: CLINIC | Age: 65
End: 2025-06-09
Payer: MEDICARE

## 2025-06-09 VITALS
HEIGHT: 74 IN | SYSTOLIC BLOOD PRESSURE: 112 MMHG | BODY MASS INDEX: 18.15 KG/M2 | WEIGHT: 141.4 LBS | OXYGEN SATURATION: 99 % | TEMPERATURE: 97.8 F | HEART RATE: 84 BPM | DIASTOLIC BLOOD PRESSURE: 64 MMHG

## 2025-06-09 DIAGNOSIS — R53.83 OTHER FATIGUE: ICD-10-CM

## 2025-06-09 DIAGNOSIS — E03.9 HYPOTHYROIDISM, UNSPECIFIED TYPE: ICD-10-CM

## 2025-06-09 DIAGNOSIS — I48.0 PAROXYSMAL A-FIB: Primary | ICD-10-CM

## 2025-06-09 DIAGNOSIS — F32.A ANXIETY AND DEPRESSION: ICD-10-CM

## 2025-06-09 DIAGNOSIS — K21.9 GASTROESOPHAGEAL REFLUX DISEASE, UNSPECIFIED WHETHER ESOPHAGITIS PRESENT: ICD-10-CM

## 2025-06-09 DIAGNOSIS — Z86.73 HISTORY OF CVA (CEREBROVASCULAR ACCIDENT): ICD-10-CM

## 2025-06-09 DIAGNOSIS — E87.6 HYPOKALEMIA: ICD-10-CM

## 2025-06-09 DIAGNOSIS — G47.00 INSOMNIA, UNSPECIFIED TYPE: ICD-10-CM

## 2025-06-09 DIAGNOSIS — E83.42 HYPOMAGNESEMIA: ICD-10-CM

## 2025-06-09 DIAGNOSIS — N40.0 BENIGN PROSTATIC HYPERPLASIA WITHOUT LOWER URINARY TRACT SYMPTOMS: ICD-10-CM

## 2025-06-09 DIAGNOSIS — F41.9 ANXIETY AND DEPRESSION: ICD-10-CM

## 2025-06-09 PROBLEM — Q21.12 PATENT FORAMEN OVALE: Chronic | Status: ACTIVE | Noted: 2024-09-13

## 2025-06-09 PROBLEM — R21 RASH: Status: ACTIVE | Noted: 2017-03-15

## 2025-06-09 PROBLEM — I63.9 CEREBROVASCULAR ACCIDENT: Status: ACTIVE | Noted: 2021-03-25

## 2025-06-09 PROBLEM — R59.0 MEDIASTINAL LYMPHADENOPATHY: Status: ACTIVE | Noted: 2021-03-29

## 2025-06-09 PROBLEM — Z71.6 TOBACCO ABUSE COUNSELING: Status: ACTIVE | Noted: 2017-03-15

## 2025-06-09 PROBLEM — Z72.0 TOBACCO USER: Status: ACTIVE | Noted: 2021-05-05

## 2025-06-09 PROBLEM — N42.9 DISORDER OF PROSTATE: Status: ACTIVE | Noted: 2021-10-01

## 2025-06-09 LAB
ALBUMIN SERPL-MCNC: 4.5 G/DL (ref 3.5–5.2)
ALBUMIN/GLOB SERPL: 1.1 G/DL
ALP SERPL-CCNC: 181 U/L (ref 39–117)
ALT SERPL W P-5'-P-CCNC: 16 U/L (ref 1–41)
ANION GAP SERPL CALCULATED.3IONS-SCNC: 13.1 MMOL/L (ref 5–15)
AST SERPL-CCNC: 23 U/L (ref 1–40)
BILIRUB SERPL-MCNC: 0.9 MG/DL (ref 0–1.2)
BUN SERPL-MCNC: 8.2 MG/DL (ref 8–23)
BUN/CREAT SERPL: 8.8 (ref 7–25)
CALCIUM SPEC-SCNC: 9.8 MG/DL (ref 8.6–10.5)
CHLORIDE SERPL-SCNC: 96 MMOL/L (ref 98–107)
CO2 SERPL-SCNC: 25.9 MMOL/L (ref 22–29)
CREAT SERPL-MCNC: 0.93 MG/DL (ref 0.76–1.27)
EGFRCR SERPLBLD CKD-EPI 2021: 90.6 ML/MIN/1.73
GLOBULIN UR ELPH-MCNC: 4.1 GM/DL
GLUCOSE SERPL-MCNC: 86 MG/DL (ref 65–99)
MAGNESIUM SERPL-MCNC: 2.1 MG/DL (ref 1.6–2.4)
POTASSIUM SERPL-SCNC: 4.2 MMOL/L (ref 3.5–5.2)
PROT SERPL-MCNC: 8.6 G/DL (ref 6–8.5)
SODIUM SERPL-SCNC: 135 MMOL/L (ref 136–145)

## 2025-06-09 PROCEDURE — 1126F AMNT PAIN NOTED NONE PRSNT: CPT | Performed by: FAMILY MEDICINE

## 2025-06-09 PROCEDURE — 36415 COLL VENOUS BLD VENIPUNCTURE: CPT | Performed by: FAMILY MEDICINE

## 2025-06-09 PROCEDURE — 80053 COMPREHEN METABOLIC PANEL: CPT | Performed by: FAMILY MEDICINE

## 2025-06-09 PROCEDURE — 1160F RVW MEDS BY RX/DR IN RCRD: CPT | Performed by: FAMILY MEDICINE

## 2025-06-09 PROCEDURE — 1159F MED LIST DOCD IN RCRD: CPT | Performed by: FAMILY MEDICINE

## 2025-06-09 PROCEDURE — 3074F SYST BP LT 130 MM HG: CPT | Performed by: FAMILY MEDICINE

## 2025-06-09 PROCEDURE — 83735 ASSAY OF MAGNESIUM: CPT | Performed by: FAMILY MEDICINE

## 2025-06-09 PROCEDURE — 3078F DIAST BP <80 MM HG: CPT | Performed by: FAMILY MEDICINE

## 2025-06-09 PROCEDURE — 99214 OFFICE O/P EST MOD 30 MIN: CPT | Performed by: FAMILY MEDICINE

## 2025-06-09 RX ORDER — POTASSIUM CHLORIDE 750 MG/1
10 CAPSULE, EXTENDED RELEASE ORAL 2 TIMES DAILY
Qty: 180 CAPSULE | Refills: 1 | Status: SHIPPED | OUTPATIENT
Start: 2025-06-09

## 2025-06-09 RX ORDER — MIRTAZAPINE 30 MG/1
30 TABLET, FILM COATED ORAL NIGHTLY
Qty: 90 TABLET | Refills: 1 | Status: SHIPPED | OUTPATIENT
Start: 2025-06-09

## 2025-06-09 RX ORDER — TAMSULOSIN HYDROCHLORIDE 0.4 MG/1
2 CAPSULE ORAL DAILY
Qty: 180 CAPSULE | Refills: 1 | Status: SHIPPED | OUTPATIENT
Start: 2025-06-09

## 2025-06-09 RX ORDER — LANOLIN ALCOHOL/MO/W.PET/CERES
1 CREAM (GRAM) TOPICAL 2 TIMES DAILY
Qty: 180 TABLET | Refills: 1 | Status: SHIPPED | OUTPATIENT
Start: 2025-06-09

## 2025-06-09 RX ORDER — PANTOPRAZOLE SODIUM 40 MG/1
40 TABLET, DELAYED RELEASE ORAL DAILY
Qty: 90 TABLET | Refills: 1 | Status: SHIPPED | OUTPATIENT
Start: 2025-06-09

## 2025-06-09 RX ORDER — QUETIAPINE FUMARATE 50 MG/1
50 TABLET, FILM COATED ORAL NIGHTLY PRN
Qty: 90 TABLET | Refills: 1 | Status: SHIPPED | OUTPATIENT
Start: 2025-06-09

## 2025-06-09 RX ORDER — MEMANTINE HYDROCHLORIDE 10 MG/1
10 TABLET ORAL EVERY 12 HOURS SCHEDULED
Qty: 180 TABLET | Refills: 1 | Status: SHIPPED | OUTPATIENT
Start: 2025-06-09

## 2025-06-09 NOTE — PROGRESS NOTES
Venipuncture Blood Specimen Collection  Venipuncture performed in left arm by Jorge Hoffmann MA with good hemostasis. Patient tolerated the procedure well without complications.   06/09/25   Jorge Hoffmann MA

## 2025-06-09 NOTE — PROGRESS NOTES
Chief Complaint  Hyperlipidemia, Heartburn, Anxiety (Pt presents for medication refills today), and Depression    Subjective          Sathya Benavides presents to [facility]  History of Present Illness  Pt recently had coumadin dose changed and has been told to recheck his INR on 6/12/25.  Pt told not to take eliquis until he lets us know that he can afford med- if so we will take him off coumadin and start eliquis.  He willl call us and let us know if he can now afford eliquis or not.  Depression  Visit:  Follow-up  Follow-up Visit:     Medication compliance:  %    Treatment side effects: no side effects.    Symptoms: insomnia      Symptoms: no chest pain, no feeling of choking, no compulsions, no confusion, no decreased concentration, no depressed mood, no dizziness, no dry mouth, no excessive worry, no hyperventilation, no irritability, no malaise/fatigue, no muscle tension, no nausea, is not nervous/anxious, no obsessions, no palpitations, no panic, no shortness of breath, no suicidal ideas, no thoughts that death would be easier, does not have a plan, no hopelessness, no feelings of worthlessness, no hypersomnia, no psychomotor agitation, no psychomotor retardation, no weight gain, no weight loss and no difficulty controlling mood      Frequency:  Occasionally    Severity:  Moderate    Sleep quality:  Good    Nighttime awakenings:  Seldom    PMH Includes: depression      Treatment tried:  Non-SSRI antidepressants    Improvement on treatment:  Significant  Insomnia  Symptoms are chronic.   Onset was 1 to 5 years.   Symptoms occur constantly.   Symptoms have been improved since onset.   Pertinent negative symptoms include no abdominal pain, no anorexia, no joint pain, no change in stool, no chest pain, no chills, no congestion, no cough, no diaphoresis, no fatigue, no fever, no headaches, no joint swelling, no myalgias, no nausea, no neck pain, no numbness, no rash, no sore  throat, no swollen glands, no dysuria, no vertigo, no visual change, no vomiting and no weakness.   Aggravating factors include nothing.   Treatments tried: seroquel.   Improvement on treatment was significant.              Objective   Allergies   Allergen Reactions    Alpha-Gal Hives    Adhesive Tape Rash    Amoxicillin-Pot Clavulanate Other (See Comments) and GI Intolerance     Upset stomach     Immunization History   Administered Date(s) Administered    Flu Vaccine Quad PF >36MO 10/17/2017    Pneumococcal Conjugate 20-Valent (PCV20) 01/26/2024    Tdap 05/17/2017     Past Medical History:   Diagnosis Date    Acid reflux     Atrial fibrillation     Cryptogenic stroke 03/26/2021    CVA (cerebral vascular accident) 03/25/2021    Enlarged prostate     Headache, tension-type     Hyperlipidemia     Lung nodule     New onset a-fib 10/24/2024    PFO (patent foramen ovale)     Stroke 03/2021    LOSS OF PERIPHERAL VISION    Stroke     7/2024      Past Surgical History:   Procedure Laterality Date    APPENDECTOMY      BRONCHOSCOPY Bilateral 4/8/2021    Procedure: BRONCHOSCOPY ENDOBRONCHIAL ULTRASOUND WITH FNA'S;  Surgeon: Sam Tony MD;  Location: [facility] ENDOSCOPY;  Service: Pulmonary;  Laterality: Bilateral;  PRE- LYMPHADENOPATHY  POST- SAME    CARDIAC CATHETERIZATION N/A 10/29/2021    Procedure: Patent foramen ovale closure  ABBOTT;  Surgeon: Sivakumar Chatman MD;  Location: [facility] CATH INVASIVE LOCATION;  Service: Cardiology;  Laterality: N/A;    KNEE ARTHROSCOPY      SHOULDER ARTHROSCOPY        Social History     Socioeconomic History    Marital status:    Tobacco Use    Smoking status: Every Day     Current packs/day: 1.00     Average packs/day: 1 pack/day for 44.4 years (44.4 ttl pk-yrs)     Types: Cigarettes     Start date: 1981    Smokeless tobacco: Never    Tobacco comments:     Patient advised to stop smoking   Vaping Use    Vaping status: Never Used   Substance and Sexual Activity    Alcohol use:  Not Currently     Comment: occasionally    Drug use: Not Currently    Sexual activity: Defer        Current Outpatient Medications:     albuterol sulfate  (90 Base) MCG/ACT inhaler, INHALE 2 PUFFS BY MOUTH EVERY 4 HOURS AS NEEDED FOR WHEEZING, Disp: 18 g, Rfl: 0    atorvastatin (LIPITOR) 80 MG tablet, Take 1 tablet by mouth Daily., Disp: 90 tablet, Rfl: 0    cyanocobalamin 100 MCG tablet, GIVE 1 TABLET BY MOUTH ONCE DAILY, Disp: 30 tablet, Rfl: 0    Diclofenac Sodium (VOLTAREN) 1 % gel gel, Apply 4 g topically to the appropriate area as directed 4 (Four) Times a Day As Needed (hand swelling)., Disp: 350 g, Rfl: 1    hydrOXYzine (ATARAX) 50 MG tablet, Take 1 tablet by mouth Every 8 (Eight) Hours As Needed for Anxiety., Disp: 42 tablet, Rfl: 1    Magnesium Oxide -Mg Supplement (MAGnesium-Oxide) 400 (240 Mg) MG tablet, Take 1 tablet by mouth 2 (Two) Times a Day., Disp: 180 tablet, Rfl: 1    memantine (NAMENDA) 10 MG tablet, Take 1 tablet by mouth Every 12 (Twelve) Hours., Disp: 180 tablet, Rfl: 1    metoprolol tartrate (LOPRESSOR) 25 MG tablet, Take 1/2 (one-half) tablet by mouth twice daily, Disp: 30 tablet, Rfl: 0    mirtazapine (Remeron) 30 MG tablet, Take 1 tablet by mouth Every Night., Disp: 90 tablet, Rfl: 1    pantoprazole (PROTONIX) 40 MG EC tablet, Take 1 tablet by mouth Daily., Disp: 90 tablet, Rfl: 1    potassium chloride (MICRO-K) 10 MEQ CR capsule, Take 1 capsule by mouth 2 (Two) Times a Day., Disp: 180 capsule, Rfl: 1    QUEtiapine (SEROquel) 50 MG tablet, Take 1 tablet by mouth At Night As Needed (insomnia)., Disp: 90 tablet, Rfl: 1    tamsulosin (FLOMAX) 0.4 MG capsule 24 hr capsule, Take 2 capsules by mouth Daily., Disp: 180 capsule, Rfl: 1    warfarin (COUMADIN) 1 MG tablet, Take daily., Disp: 90 tablet, Rfl: 3    warfarin (COUMADIN) 2 MG tablet, Take 1 tablet by mouth 3 (Three) Times a Week. Roberto Maldonado, Sat, Disp: , Rfl:     warfarin (COUMADIN) 4 MG tablet, Take 1 tablet by mouth 4 (Four)  "Times a Week. Mon, Wed, Fri, Sun, Disp: , Rfl:     apixaban (ELIQUIS) 5 MG tablet tablet, Take 1 tablet by mouth 2 (Two) Times a Day., Disp: 60 tablet, Rfl: 11   Family History   Problem Relation Age of Onset    Heart disease Mother     Diabetes Mother     Dementia Father     Colon cancer Brother     No Known Problems Daughter     No Known Problems Daughter     No Known Problems Daughter     No Known Problems Son     Teja Hyperthermia Neg Hx           Vital Signs:   Vitals:    06/09/25 1527   BP: 112/64   BP Location: Right arm   Patient Position: Sitting   Cuff Size: Adult   Pulse: 84   Temp: 97.8 °F (36.6 °C)   TempSrc: Temporal   SpO2: 99%   Weight: 64.1 kg (141 lb 6.4 oz)   Height: 188 cm (74\")       Review of Systems   Constitutional:  Negative for chills, diaphoresis, fatigue, fever, irritability, malaise/fatigue, weight gain and weight loss.   HENT:  Negative for congestion and sore throat.    Eyes:  Negative for visual disturbance.   Respiratory:  Negative for apnea, cough, shortness of breath and wheezing.    Cardiovascular:  Negative for chest pain, palpitations and leg swelling.   Gastrointestinal:  Negative for abdominal pain, anorexia, diarrhea, nausea and vomiting.   Genitourinary:  Negative for dysuria.   Musculoskeletal:  Negative for joint pain, myalgias and neck pain.   Skin:  Negative for rash.   Neurological:  Negative for dizziness, vertigo, weakness, light-headedness, numbness and headaches.   Psychiatric/Behavioral:  Negative for confusion, decreased concentration and suicidal ideas. The patient has insomnia. The patient is not nervous/anxious.       Physical Exam  Vitals reviewed.   Constitutional:       Appearance: Normal appearance. He is well-developed.   HENT:      Head: Normocephalic and atraumatic.      Right Ear: External ear normal.      Left Ear: External ear normal.      Mouth/Throat:      Pharynx: No oropharyngeal exudate.   Eyes:      Conjunctiva/sclera: Conjunctivae normal.      " Pupils: Pupils are equal, round, and reactive to light.   Cardiovascular:      Rate and Rhythm: Normal rate and regular rhythm.      Pulses: Normal pulses.      Heart sounds: Normal heart sounds. No murmur heard.     No friction rub. No gallop.   Pulmonary:      Effort: Pulmonary effort is normal.      Breath sounds: Normal breath sounds. No wheezing or rhonchi.   Abdominal:      General: Abdomen is flat. Bowel sounds are normal. There is no distension.      Palpations: Abdomen is soft. There is no mass.      Tenderness: There is no abdominal tenderness. There is no guarding or rebound.      Hernia: No hernia is present.   Musculoskeletal:         General: Normal range of motion.   Skin:     General: Skin is warm and dry.      Capillary Refill: Capillary refill takes less than 2 seconds.   Neurological:      General: No focal deficit present.      Mental Status: He is alert and oriented to person, place, and time.      Cranial Nerves: No cranial nerve deficit.   Psychiatric:         Mood and Affect: Mood and affect normal.         Behavior: Behavior normal.         Thought Content: Thought content normal.         Judgment: Judgment normal.        Result Review :   The following data was reviewed by: Adi Kaminski MD on 06/09/2025:  CMP          1/2/2025    11:59 3/20/2025    18:07 5/20/2025    18:10   CMP   Glucose 86  91  106    BUN 6  7  10    Creatinine 0.93  0.91  1.03    EGFR 90.6  93.0  80.1    Sodium 137  138  135    Potassium 4.5  5.0  3.8    Chloride 101  101  101    Calcium 9.4  9.9  9.0    Total Protein 7.8  8.2     Albumin 4.1  4.3     Globulin 3.7  3.9     Total Bilirubin 0.4  0.6     Alkaline Phosphatase 143  155     AST (SGOT) 22  23     ALT (SGPT) 16  18     Albumin/Globulin Ratio 1.1  1.1     BUN/Creatinine Ratio 6.5  7.7  9.7    Anion Gap 7.9  11.0  7.5      CBC          12/19/2024    10:54 3/20/2025    18:07 3/20/2025    18:13 5/20/2025    18:10   CBC   WBC 7.53  9.81  9.39  7.20    RBC 4.86   5.39  5.55  5.01    Hemoglobin 14.0  15.6  16.0  14.4    Hematocrit 41.9  46.0  48.5  44.6    MCV 86.2  85.3  87.4  89.0    MCH 28.8  28.9  28.8  28.7    MCHC 33.4  33.9  33.0  32.3    RDW 13.2  12.3  12.5  12.5    Platelets 196  250  252  193      Lipid Panel          10/24/2024    18:52 3/20/2025    18:07   Lipid Panel   Total Cholesterol 107  112    Triglycerides 128  146    HDL Cholesterol 28  28    VLDL Cholesterol 23  26    LDL Cholesterol  56  58    LDL/HDL Ratio 1.91  1.96      TSH          8/29/2024    09:26 10/24/2024    16:45 3/20/2025    18:07   TSH   TSH 3.780  3.500  6.280                  Assessment and Plan    Diagnoses and all orders for this visit:    1. Paroxysmal A-fib (Primary)  -     apixaban (ELIQUIS) 5 MG tablet tablet; Take 1 tablet by mouth 2 (Two) Times a Day.  Dispense: 60 tablet; Refill: 11    2. History of CVA (cerebrovascular accident)  -     memantine (NAMENDA) 10 MG tablet; Take 1 tablet by mouth Every 12 (Twelve) Hours.  Dispense: 180 tablet; Refill: 1    3. Gastroesophageal reflux disease, unspecified whether esophagitis present  -     pantoprazole (PROTONIX) 40 MG EC tablet; Take 1 tablet by mouth Daily.  Dispense: 90 tablet; Refill: 1    4. Anxiety and depression  -     mirtazapine (Remeron) 30 MG tablet; Take 1 tablet by mouth Every Night.  Dispense: 90 tablet; Refill: 1    5. Benign prostatic hyperplasia without lower urinary tract symptoms  -     tamsulosin (FLOMAX) 0.4 MG capsule 24 hr capsule; Take 2 capsules by mouth Daily.  Dispense: 180 capsule; Refill: 1    6. Hypokalemia  -     potassium chloride (MICRO-K) 10 MEQ CR capsule; Take 1 capsule by mouth 2 (Two) Times a Day.  Dispense: 180 capsule; Refill: 1  -     Comprehensive Metabolic Panel    7. Hypomagnesemia  -     Magnesium Oxide -Mg Supplement (MAGnesium-Oxide) 400 (240 Mg) MG tablet; Take 1 tablet by mouth 2 (Two) Times a Day.  Dispense: 180 tablet; Refill: 1  -     Magnesium    8. Insomnia, unspecified type  -      QUEtiapine (SEROquel) 50 MG tablet; Take 1 tablet by mouth At Night As Needed (insomnia).  Dispense: 90 tablet; Refill: 1            Follow Up   Return in about 6 months (around 12/9/2025) for Recheck.  Patient was given instructions and counseling regarding his condition or for health maintenance advice. Please see specific information pulled into the AVS if appropriate.

## 2025-06-10 DIAGNOSIS — R74.8 ELEVATED ALKALINE PHOSPHATASE LEVEL: Primary | ICD-10-CM

## 2025-06-12 ENCOUNTER — CLINICAL SUPPORT (OUTPATIENT)
Dept: FAMILY MEDICINE CLINIC | Facility: CLINIC | Age: 65
End: 2025-06-12
Payer: MEDICARE

## 2025-06-12 DIAGNOSIS — I48.0 PAROXYSMAL ATRIAL FIBRILLATION: Primary | ICD-10-CM

## 2025-06-12 LAB — INR PPP: 2

## 2025-06-12 PROCEDURE — 93793 ANTICOAG MGMT PT WARFARIN: CPT | Performed by: FAMILY MEDICINE

## 2025-06-14 DIAGNOSIS — R06.02 SHORTNESS OF BREATH: ICD-10-CM

## 2025-06-14 RX ORDER — ALBUTEROL SULFATE 90 UG/1
2 INHALANT RESPIRATORY (INHALATION) EVERY 4 HOURS PRN
Qty: 9 G | Refills: 0 | Status: SHIPPED | OUTPATIENT
Start: 2025-06-14

## 2025-06-20 ENCOUNTER — CLINICAL SUPPORT (OUTPATIENT)
Dept: FAMILY MEDICINE CLINIC | Facility: CLINIC | Age: 65
End: 2025-06-20
Payer: MEDICARE

## 2025-06-20 DIAGNOSIS — I48.0 PAROXYSMAL A-FIB: Primary | ICD-10-CM

## 2025-06-20 LAB — INR PPP: 2.2 (ref 0.9–1.1)

## 2025-06-26 ENCOUNTER — CLINICAL SUPPORT (OUTPATIENT)
Dept: FAMILY MEDICINE CLINIC | Facility: CLINIC | Age: 65
End: 2025-06-26
Payer: MEDICARE

## 2025-06-26 DIAGNOSIS — I48.0 PAROXYSMAL A-FIB: Primary | ICD-10-CM

## 2025-06-27 DIAGNOSIS — I10 ESSENTIAL (PRIMARY) HYPERTENSION: ICD-10-CM

## 2025-06-27 LAB — INR PPP: 2.1 (ref 0.9–1.1)

## 2025-06-30 RX ORDER — METOPROLOL TARTRATE 25 MG/1
12.5 TABLET, FILM COATED ORAL EVERY 12 HOURS SCHEDULED
Qty: 90 TABLET | Refills: 3 | Status: SHIPPED | OUTPATIENT
Start: 2025-06-30

## 2025-07-07 ENCOUNTER — CLINICAL SUPPORT (OUTPATIENT)
Dept: FAMILY MEDICINE CLINIC | Facility: CLINIC | Age: 65
End: 2025-07-07
Payer: MEDICARE

## 2025-07-07 DIAGNOSIS — I48.0 PAROXYSMAL A-FIB: Primary | ICD-10-CM

## 2025-07-07 LAB — INR PPP: 2.1 (ref 0.9–1.1)

## 2025-07-07 PROCEDURE — 36416 COLLJ CAPILLARY BLOOD SPEC: CPT | Performed by: FAMILY MEDICINE

## 2025-07-07 PROCEDURE — 85610 PROTHROMBIN TIME: CPT | Performed by: FAMILY MEDICINE

## 2025-07-07 PROCEDURE — 93793 ANTICOAG MGMT PT WARFARIN: CPT | Performed by: FAMILY MEDICINE

## 2025-07-08 ENCOUNTER — TELEPHONE (OUTPATIENT)
Dept: CASE MANAGEMENT | Facility: OTHER | Age: 65
End: 2025-07-08
Payer: MEDICARE

## 2025-07-10 ENCOUNTER — TELEPHONE (OUTPATIENT)
Dept: CASE MANAGEMENT | Facility: OTHER | Age: 65
End: 2025-07-10
Payer: MEDICARE

## 2025-07-11 ENCOUNTER — CLINICAL SUPPORT (OUTPATIENT)
Dept: FAMILY MEDICINE CLINIC | Facility: CLINIC | Age: 65
End: 2025-07-11
Payer: MEDICARE

## 2025-07-11 DIAGNOSIS — I48.0 PAROXYSMAL A-FIB: Primary | ICD-10-CM

## 2025-07-11 LAB — INR PPP: 2

## 2025-07-11 PROCEDURE — 93793 ANTICOAG MGMT PT WARFARIN: CPT | Performed by: FAMILY MEDICINE

## 2025-07-19 LAB — INR PPP: 2.3

## 2025-07-21 ENCOUNTER — CLINICAL SUPPORT (OUTPATIENT)
Dept: FAMILY MEDICINE CLINIC | Facility: CLINIC | Age: 65
End: 2025-07-21
Payer: MEDICARE

## 2025-07-21 ENCOUNTER — TELEPHONE (OUTPATIENT)
Dept: CASE MANAGEMENT | Facility: OTHER | Age: 65
End: 2025-07-21
Payer: MEDICARE

## 2025-07-21 DIAGNOSIS — I48.0 PAROXYSMAL A-FIB: Primary | ICD-10-CM

## 2025-07-21 LAB — INR PPP: 2.3 (ref 2–3)

## 2025-07-21 NOTE — TELEPHONE ENCOUNTER
Attempted to call to discuss CCM program and medication assistance. No answer, vm full. Will close for unable to reach.

## 2025-07-28 ENCOUNTER — CLINICAL SUPPORT (OUTPATIENT)
Dept: FAMILY MEDICINE CLINIC | Facility: CLINIC | Age: 65
End: 2025-07-28
Payer: MEDICARE

## 2025-07-28 DIAGNOSIS — I48.0 PAROXYSMAL A-FIB: Primary | ICD-10-CM

## 2025-07-28 LAB — INR PPP: 2.4

## 2025-07-28 PROCEDURE — 93793 ANTICOAG MGMT PT WARFARIN: CPT | Performed by: FAMILY MEDICINE

## 2025-08-12 ENCOUNTER — CLINICAL SUPPORT (OUTPATIENT)
Dept: FAMILY MEDICINE CLINIC | Facility: CLINIC | Age: 65
End: 2025-08-12
Payer: MEDICARE

## 2025-08-12 DIAGNOSIS — I48.0 PAROXYSMAL A-FIB: Primary | ICD-10-CM

## 2025-08-12 LAB — INR PPP: 2.6

## 2025-08-12 PROCEDURE — 93793 ANTICOAG MGMT PT WARFARIN: CPT | Performed by: FAMILY MEDICINE

## 2025-08-22 ENCOUNTER — CLINICAL SUPPORT (OUTPATIENT)
Dept: FAMILY MEDICINE CLINIC | Facility: CLINIC | Age: 65
End: 2025-08-22
Payer: MEDICARE

## 2025-08-22 DIAGNOSIS — I48.0 PAROXYSMAL A-FIB: Primary | ICD-10-CM

## 2025-08-22 LAB — INR PPP: 2.1

## 2025-08-22 PROCEDURE — 93793 ANTICOAG MGMT PT WARFARIN: CPT | Performed by: FAMILY MEDICINE

## (undated) DEVICE — SYS DEL TREVISIO 45DEG 8F 80CM

## (undated) DEVICE — CATH ULTRASND ECHO ACUNAV FOR ACUSON 8F 90CM

## (undated) DEVICE — GW AMPLATZER SS .035 1.5MM J 260CM

## (undated) DEVICE — PK CATH CARD 40

## (undated) DEVICE — CATH DIAG IMPULSE W/SH MPA2 6F125CM

## (undated) DEVICE — MSK AIRWY LARYNG LMA PILOT SZ4

## (undated) DEVICE — KT MANIFLD CARDIAC

## (undated) DEVICE — Device: Brand: BALLOON

## (undated) DEVICE — VITAL SIGNS™ JACKSON-REES CIRCUITS: Brand: VITAL SIGNS™

## (undated) DEVICE — PINNACLE INTRODUCER SHEATH: Brand: PINNACLE

## (undated) DEVICE — INTRO SHEATH ART/FEM ENGAGE .035 8F12CM

## (undated) DEVICE — ADAPT CLN BIOGUARD AIR/H2O DISP

## (undated) DEVICE — Device

## (undated) DEVICE — LARGE BORE STOPCOCK WITH EXTENSION SET, MALE LUER LOCK ADAPTER WITH RETRACTABLE COLLAR

## (undated) DEVICE — GW AMPLTZ SUPERSTIFF 3MMJTIP .035IN 260CM

## (undated) DEVICE — BITEBLOCK OMNI BLOC

## (undated) DEVICE — LN SMPL O2 NASL/ORL SMART/CAPNOLINE PLS A/

## (undated) DEVICE — ADAPT SWVL FIBROPTIC BRONCH

## (undated) DEVICE — SENSR O2 OXIMAX FNGR A/ 18IN NONSTR

## (undated) DEVICE — SINGLE USE BIOPSY VALVE MAJ-210: Brand: SINGLE USE BIOPSY VALVE (STERILE)

## (undated) DEVICE — SINGLE USE SUCTION VALVE MAJ-209: Brand: SINGLE USE SUCTION VALVE (STERILE)

## (undated) DEVICE — SINGLE USE ASPIRATION NEEDLE: Brand: SINGLE USE ASPIRATION NEEDLE

## (undated) DEVICE — GW EMR FIX EXCHG J STD .035 3MM 260CM

## (undated) DEVICE — TUBING, SUCTION, 1/4" X 10', STRAIGHT: Brand: MEDLINE